# Patient Record
Sex: FEMALE | Race: WHITE | Employment: OTHER | URBAN - NONMETROPOLITAN AREA
[De-identification: names, ages, dates, MRNs, and addresses within clinical notes are randomized per-mention and may not be internally consistent; named-entity substitution may affect disease eponyms.]

---

## 2017-10-06 ENCOUNTER — OFFICE VISIT (OUTPATIENT)
Dept: URGENT CARE | Facility: CLINIC | Age: 82
End: 2017-10-06
Payer: MEDICARE

## 2017-10-06 PROCEDURE — 99213 OFFICE O/P EST LOW 20 MIN: CPT

## 2017-10-06 PROCEDURE — G0463 HOSPITAL OUTPT CLINIC VISIT: HCPCS

## 2017-10-14 NOTE — PROGRESS NOTES
Assessment  1  Corn of toe (700) (L84)   2  Corn infected (700) (L84)    Plan  Corn infected    · Cephalexin 500 MG Oral Capsule; TAKE 1 CAPSULE 3 TIMES DAILY UNTIL GONE    Discussion/Summary  Discussion Summary:   Start antibiotic for infection  Recommend FU with podiatrist    Medication Side Effects Reviewed: Possible side effects of new medications were reviewed with the patient/guardian today  Understands and agrees with treatment plan: The treatment plan was reviewed with the patient/guardian  The patient/guardian understands and agrees with the treatment plan   Follow Up Instructions: Follow Up with your Primary Care Provider in 7 days  If your symptoms worsen, go to the nearest [place] [address] Emergency Department  Chief Complaint  1  Foot Problem  Chief Complaint Free Text Note Form: Lesion between great toe and second toe right foot for 2 weeks      History of Present Illness  HPI: 80year old female here complaining of painful lesion on her 2nd toe  Has been present for a few weeks but has been getting progressively worse over the past 2 weeks  Area is more reddened  Has tried over the counter pads for corns without relief and the pads don't stay in place  Hospital Based Practices Required Assessment:   Pain Assessment   the patient states they have pain  The pain is located in the foot  The patient describes the pain as burning  (on a scale of 0 to 10, the patient rates the pain at 5 )   Abuse And Domestic Violence Screen    Yes, the patient is safe at home  -- The patient states no one is hurting them  Depression And Suicide Screen  No, the patient has not had thoughts of hurting themself  No, the patient has not felt depressed in the past 7 days  Foot Problem: Mehrdad Raymond presents with complaints of foot problem  Associated symptoms include foot pain,-- foot skin lesions-- and-- corns, but-- no foot swelling        Review of Systems  Focused-Female:   Constitutional: No fever, no chills, feels well, no tiredness, no recent weight gain or loss  ENT: no ear ache, no loss of hearing, no nosebleeds or nasal discharge, no sore throat or hoarseness  Cardiovascular: no complaints of slow or fast heart rate, no chest pain, no palpitations, no leg claudication or lower extremity edema  Respiratory: no complaints of shortness of breath, no wheezing, no dyspnea on exertion, no orthopnea or PND  Musculoskeletal: as noted in HPI  Integumentary: as noted in HPI  ROS Reviewed:   ROS reviewed  Active Problems  1  Arthritis (716 90) (M19 90)   2  Benign essential hypertension (401 1) (I10)   3  Hyperlipidemia (272 4) (E78 5)    Past Medical History  Active Problems And Past Medical History Reviewed: The active problems and past medical history were reviewed and updated today  Family History  Family History Reviewed: The family history was reviewed and updated today  Social History   · Never a smoker  Social History Reviewed: The social history was reviewed and updated today  The social history was reviewed and is unchanged  Surgical History  Surgical History Reviewed: The surgical history was reviewed and updated today  Current Meds   1  Anaprox  MG Oral Tablet; Therapy: (Recorded:06Oct2017) to Recorded   2  HydroCHLOROthiazide TABS; Therapy: (Recorded:06Oct2017) to Recorded   3  Lipitor TABS; Therapy: (Recorded:06Oct2017) to Recorded   4  Neurontin TABS; Therapy: (Recorded:06Oct2017) to Recorded  Medication List Reviewed: The medication list was reviewed and updated today  Allergies  1   No Known Drug Allergies    Vitals  Signs   Recorded: [id number] 12:48PM   Temperature: 97 8 F  Heart Rate: 70  Respiration: 18  Systolic: 452  Diastolic: 53  Height: 5 ft 4 in  Weight: 125 lb   BMI Calculated: 21 46  BSA Calculated: 1 6  O2 Saturation: 99    Physical Exam    Constitutional   General appearance: No acute distress, well appearing and well nourished  Skin   Skin and subcutaneous tissue: Abnormal  -- corn on medial aspect of right 2nd toe with surrounding erythema, very tender to palpation, bunion of right great toe        Signatures   Electronically signed by : Malorie Archer, [facility]; Oct  6 2017  2:20PM EST                       (Author)    Electronically signed by : ANTHONY Horner ; Oct 13 2017  1:15PM EST                       (Co-author)

## 2019-03-15 ENCOUNTER — OFFICE VISIT (OUTPATIENT)
Dept: URGENT CARE | Facility: CLINIC | Age: 84
End: 2019-03-15
Payer: MEDICARE

## 2019-03-15 VITALS
SYSTOLIC BLOOD PRESSURE: 128 MMHG | HEART RATE: 72 BPM | BODY MASS INDEX: 23.92 KG/M2 | DIASTOLIC BLOOD PRESSURE: 62 MMHG | OXYGEN SATURATION: 98 % | WEIGHT: 130 LBS | RESPIRATION RATE: 20 BRPM | TEMPERATURE: 98.7 F | HEIGHT: 62 IN

## 2019-03-15 DIAGNOSIS — R19.7 DIARRHEA, UNSPECIFIED TYPE: Primary | ICD-10-CM

## 2019-03-15 PROCEDURE — G0463 HOSPITAL OUTPT CLINIC VISIT: HCPCS | Performed by: PHYSICIAN ASSISTANT

## 2019-03-15 PROCEDURE — 99213 OFFICE O/P EST LOW 20 MIN: CPT | Performed by: PHYSICIAN ASSISTANT

## 2019-03-15 RX ORDER — MELATONIN
1000 DAILY
COMMUNITY

## 2019-03-15 RX ORDER — ATORVASTATIN CALCIUM 80 MG/1
TABLET, FILM COATED ORAL
COMMUNITY

## 2019-03-15 RX ORDER — ENALAPRIL MALEATE 2.5 MG/1
TABLET ORAL
COMMUNITY
Start: 2019-03-15

## 2019-03-15 RX ORDER — LOPERAMIDE HYDROCHLORIDE 2 MG/1
2 TABLET ORAL 4 TIMES DAILY PRN
Qty: 30 TABLET | Refills: 0 | Status: SHIPPED | OUTPATIENT
Start: 2019-03-15

## 2019-03-15 RX ORDER — RIBOFLAVIN (VITAMIN B2) 100 MG
100 TABLET ORAL DAILY
COMMUNITY

## 2019-03-15 RX ORDER — TRAZODONE HYDROCHLORIDE 50 MG/1
150 TABLET ORAL
COMMUNITY

## 2019-03-15 RX ORDER — ESCITALOPRAM OXALATE 20 MG/1
20 TABLET ORAL DAILY
COMMUNITY

## 2019-03-15 NOTE — PATIENT INSTRUCTIONS
Instructions:  1) take loperamide as prescribed  2) keep food journal and bowel journal x 1 week  3) follow up with family doctor in 1 week for further evaluation      Chronic Diarrhea   WHAT YOU NEED TO KNOW:   What is chronic diarrhea? Diarrhea is chronic when it lasts more than 4 weeks  You may have 3 or more episodes of diarrhea each day  What causes chronic diarrhea? · Infections caused by bacteria, viruses, or parasites    · Trouble digesting food, such as lactose, gluten, or sorbitol    · Irritable bowel syndrome, celiac disease, ulcerative colitis, or Crohns disease    · A lack of enzymes that help digest foods or an overgrowth of bacteria in your small intestines    · Chronic pancreatitis, cystic fibrosis, or a tumor in your digestive system    · A thyroid disorder, such as hyperthyroidism    · An immune system disorder, such as AIDS    · Medicines such as laxatives, NSAIDs, antibiotics, metformin, or digoxin    · Surgery or procedures on your stomach, liver, or bowels  What signs and symptoms may happen with chronic diarrhea? · Abdominal tenderness     · Nausea and vomiting    · Urgent need to have a bowel movement, or loss of bowel control     · Weight loss    · Anal irritation and inflammation  How is chronic diarrhea diagnosed? Your healthcare provider will examine you and ask you about your symptoms  Tell him or her if you have noticed symptoms after you eat certain foods  Tell your healthcare provider if you have travelled recently or been around others with the same symptoms  You may need the following tests:  · Blood tests  may show infection  They will also give information about your overall health  · A bowel movement sample  may be sent to a lab to help find the germ that is causing your diarrhea  · An x-ray  may be taken to check for problems in your digestive tract, such as a tumor       · An endoscopy  is a procedure to examine the inside of your esophagus, stomach, and small intestine  A flexible tube with a small light and camera on the end is used  Samples of your esophagus, stomach, or small intestine may be taken and tested for causes of diarrhea  · A colonoscopy is a procedure to examine the inside of your colon (large intestine)  A flexible tube with a small light and camera on the end is used  Samples of your colon may be taken and tested for causes of diarrhea  How is chronic diarrhea treated? Treatment will depend on the condition causing your chronic diarrhea  Medicines may be given to treat an infection or stop the diarrhea  Medicines that are causing diarrhea may be stopped or changed  You may need to change your diet and avoid certain foods  What can I do to manage my symptoms? · Drink more liquids  to replace body fluids lost through diarrhea  You may also need to drink an oral rehydration solution (ORS)  An ORS has the right amounts of sugar, salt, and minerals in water to replace body fluids  ORS can be found at most grocery stores or pharmacies  Ask how much liquid to drink each day and which liquids are best for you  Do not drink liquids with caffeine or alcohol  These can increase your risk for dehydration  · Do not drink or eat foods that may make your symptoms worse  These include milk and dairy products, greasy and fatty foods, spicy foods, caffeine, and alcohol  Keep a food diary to see if your symptoms are caused by certain foods  Bring this to your follow-up visits  · Eat foods that are easy to digest   These include bananas, boiled potatoes, cooked carrots, cooked chicken, plain rice, and toast  You can also try yogurt and applesauce  · Wash your hands often  Germs on your hands can get into your mouth and cause diarrhea  Use soap and water  Use an alcohol-based hand rub if soap and water are not available  Wash your hands after you use the bathroom, change a child's diaper, or sneeze  Wash your hands before you prepare or eat food    When should I seek immediate care? · Your skin, mouth, and tongue are dry, and you feel very thirsty  · You have blood or pus in your bowel movement  · You have trouble eating, drinking, or keeping food down  · You have severe abdominal pain  · You feel lightheaded, weak, or you faint  · Your heart beats faster than normal or you have trouble breathing  · You are confused or cannot think clearly  When should I contact my healthcare provider? · You have a fever  · You have new symptoms  · Your symptoms do not improve, or they get worse  · You have questions or concerns about your condition or care  CARE AGREEMENT:   You have the right to help plan your care  Learn about your health condition and how it may be treated  Discuss treatment options with your caregivers to decide what care you want to receive  You always have the right to refuse treatment  The above information is an  only  It is not intended as medical advice for individual conditions or treatments  Talk to your doctor, nurse or pharmacist before following any medical regimen to see if it is safe and effective for you  © 2017 [facility] Information is for End User's use only and may not be sold, redistributed or otherwise used for commercial purposes  All illustrations and images included in CareNotes® are the copyrighted property of [employer]  or [employer]

## 2019-03-15 NOTE — PROGRESS NOTES
[facility]      NAME: Sumner Councilman is a 80 y o  female  : 1934    MRN: [id number]  DATE: March 15, 2019  TIME: 3:27 PM    Assessment and Plan   Diarrhea, unspecified type [R19 7]  1  Diarrhea, unspecified type         Patient Instructions     Patient Instructions   Instructions:  1) take loperamide as prescribed  2) keep food journal and bowel journal x 1 week  3) follow up with family doctor in 1 week for further evaluation      Chronic Diarrhea   WHAT YOU NEED TO KNOW:   What is chronic diarrhea? Diarrhea is chronic when it lasts more than 4 weeks  You may have 3 or more episodes of diarrhea each day  What causes chronic diarrhea? · Infections caused by bacteria, viruses, or parasites    · Trouble digesting food, such as lactose, gluten, or sorbitol    · Irritable bowel syndrome, celiac disease, ulcerative colitis, or Crohns disease    · A lack of enzymes that help digest foods or an overgrowth of bacteria in your small intestines    · Chronic pancreatitis, cystic fibrosis, or a tumor in your digestive system    · A thyroid disorder, such as hyperthyroidism    · An immune system disorder, such as AIDS    · Medicines such as laxatives, NSAIDs, antibiotics, metformin, or digoxin    · Surgery or procedures on your stomach, liver, or bowels  What signs and symptoms may happen with chronic diarrhea? · Abdominal tenderness     · Nausea and vomiting    · Urgent need to have a bowel movement, or loss of bowel control     · Weight loss    · Anal irritation and inflammation  How is chronic diarrhea diagnosed? Your healthcare provider will examine you and ask you about your symptoms  Tell him or her if you have noticed symptoms after you eat certain foods  Tell your healthcare provider if you have travelled recently or been around others with the same symptoms  You may need the following tests:  · Blood tests  may show infection  They will also give information about your overall health      · A bowel movement sample  may be sent to a lab to help find the germ that is causing your diarrhea  · An x-ray  may be taken to check for problems in your digestive tract, such as a tumor  · An endoscopy  is a procedure to examine the inside of your esophagus, stomach, and small intestine  A flexible tube with a small light and camera on the end is used  Samples of your esophagus, stomach, or small intestine may be taken and tested for causes of diarrhea  · A colonoscopy is a procedure to examine the inside of your colon (large intestine)  A flexible tube with a small light and camera on the end is used  Samples of your colon may be taken and tested for causes of diarrhea  How is chronic diarrhea treated? Treatment will depend on the condition causing your chronic diarrhea  Medicines may be given to treat an infection or stop the diarrhea  Medicines that are causing diarrhea may be stopped or changed  You may need to change your diet and avoid certain foods  What can I do to manage my symptoms? · Drink more liquids  to replace body fluids lost through diarrhea  You may also need to drink an oral rehydration solution (ORS)  An ORS has the right amounts of sugar, salt, and minerals in water to replace body fluids  ORS can be found at most grocery stores or pharmacies  Ask how much liquid to drink each day and which liquids are best for you  Do not drink liquids with caffeine or alcohol  These can increase your risk for dehydration  · Do not drink or eat foods that may make your symptoms worse  These include milk and dairy products, greasy and fatty foods, spicy foods, caffeine, and alcohol  Keep a food diary to see if your symptoms are caused by certain foods  Bring this to your follow-up visits  · Eat foods that are easy to digest   These include bananas, boiled potatoes, cooked carrots, cooked chicken, plain rice, and toast  You can also try yogurt and applesauce  · Wash your hands often  Germs on your hands can get into your mouth and cause diarrhea  Use soap and water  Use an alcohol-based hand rub if soap and water are not available  Wash your hands after you use the bathroom, change a child's diaper, or sneeze  Wash your hands before you prepare or eat food  When should I seek immediate care? · Your skin, mouth, and tongue are dry, and you feel very thirsty  · You have blood or pus in your bowel movement  · You have trouble eating, drinking, or keeping food down  · You have severe abdominal pain  · You feel lightheaded, weak, or you faint  · Your heart beats faster than normal or you have trouble breathing  · You are confused or cannot think clearly  When should I contact my healthcare provider? · You have a fever  · You have new symptoms  · Your symptoms do not improve, or they get worse  · You have questions or concerns about your condition or care  CARE AGREEMENT:   You have the right to help plan your care  Learn about your health condition and how it may be treated  Discuss treatment options with your caregivers to decide what care you want to receive  You always have the right to refuse treatment  The above information is an  only  It is not intended as medical advice for individual conditions or treatments  Talk to your doctor, nurse or pharmacist before following any medical regimen to see if it is safe and effective for you  © 2017 [address] Information is for End User's use only and may not be sold, redistributed or otherwise used for commercial purposes  All illustrations and images included in CareNotes® are the copyrighted property of [employer]  or [employer]  Chief Complaint     Chief Complaint   Patient presents with    Diarrhea     Diarrhea for the past few months  Decreased appitite          History of Present Illness       Diarrhea    This is a new problem  Episode onset: 1 year ago  Episode frequency: once a day  The problem has been gradually worsening  The stool consistency is described as watery (brown)  The patient states that diarrhea does not awaken her from sleep  Associated symptoms include increased flatus and weight loss (10lbs)  Pertinent negatives include no abdominal pain, arthralgias, bloating, chills, coughing, fever, headaches, myalgias, sweats, URI or vomiting  Associated symptoms comments: Abdominal "rumbling", flatus  Exacerbated by: eating  There are no known risk factors  Treatments tried: benefiber and probiotic; patient refuses to try medication because she says it is expensive  There is no history of bowel resection, inflammatory bowel disease, irritable bowel syndrome, malabsorption, a recent abdominal surgery or short gut syndrome  hysterectomy, gall bladder, colonoscopy 2 years ago; patient is concerned she is being poisoned by dog's raw dog food that  feeds dog       Review of Systems   Review of Systems   Constitutional: Positive for weight loss (10lbs)  Negative for chills and fever  Respiratory: Negative for cough  Gastrointestinal: Positive for diarrhea and flatus  Negative for abdominal distention, abdominal pain, anal bleeding, bloating, blood in stool, constipation, nausea, rectal pain and vomiting  Musculoskeletal: Negative for arthralgias and myalgias  Neurological: Negative for headaches       Current Medications       Current Outpatient Medications:     Ascorbic Acid (VITAMIN C) 100 MG tablet, Take 100 mg by mouth daily, Disp: , Rfl:     atorvastatin (LIPITOR) 80 mg tablet, Take by mouth, Disp: , Rfl:     calcium acetate (PHOSLO) 667 mg capsule, Take 1,334 mg by mouth 3 (three) times a day with meals, Disp: , Rfl:     cholecalciferol (VITAMIN D3) 1,000 units tablet, Take 1,000 Units by mouth daily, Disp: , Rfl:     enalapril (VASOTEC) 2 5 mg tablet, , Disp: , Rfl:     escitalopram (LEXAPRO) 20 mg tablet, Take 20 mg by mouth daily, Disp: , Rfl:     traZODone (DESYREL) 50 mg tablet, Take 150 mg by mouth daily at bedtime, Disp: , Rfl:     Current Allergies     Allergies as of 03/15/2019    (No Known Allergies)            The following portions of the patient's history were reviewed and updated as appropriate: allergies, current medications, past family history, past medical history, past social history, past surgical history and problem list      Past Medical History:   Diagnosis Date    Arthritis     Glaucoma     Hypertension     Kidney disease        Past Surgical History:   Procedure Laterality Date    CHOLECYSTECTOMY      HYSTERECTOMY      KNEE ARTHROPLASTY Left     THYROID LOBECTOMY         History reviewed  No pertinent family history  Medications have been verified  Objective   /62   Pulse 72   Temp 98 7 °F (37 1 °C) (Tympanic)   Resp 20   Ht 5' 2" (1 575 m)   Wt 59 kg (130 lb)   SpO2 98%   BMI 23 78 kg/m²        Physical Exam     Physical Exam   Constitutional: She appears well-developed and well-nourished  Cardiovascular: Normal rate and regular rhythm  Exam reveals no gallop and no friction rub  No murmur heard  Pulmonary/Chest: Effort normal and breath sounds normal  No stridor  No respiratory distress  She has no wheezes  She has no rales  Abdominal: Soft  Bowel sounds are normal  She exhibits no distension and no mass  There is no tenderness  There is no rebound and no guarding  No hernia

## 2021-02-12 ENCOUNTER — IMMUNIZATIONS (OUTPATIENT)
Dept: FAMILY MEDICINE CLINIC | Facility: HOSPITAL | Age: 86
End: 2021-02-12

## 2021-02-12 DIAGNOSIS — Z23 ENCOUNTER FOR IMMUNIZATION: Primary | ICD-10-CM

## 2021-02-12 PROCEDURE — 0011A SARS-COV-2 / COVID-19 MRNA VACCINE (MODERNA) 100 MCG: CPT | Performed by: EMERGENCY MEDICINE

## 2021-02-12 PROCEDURE — 91301 SARS-COV-2 / COVID-19 MRNA VACCINE (MODERNA) 100 MCG: CPT | Performed by: EMERGENCY MEDICINE

## 2021-03-12 ENCOUNTER — IMMUNIZATIONS (OUTPATIENT)
Dept: FAMILY MEDICINE CLINIC | Facility: HOSPITAL | Age: 86
End: 2021-03-12

## 2021-03-12 DIAGNOSIS — Z23 ENCOUNTER FOR IMMUNIZATION: Primary | ICD-10-CM

## 2021-03-12 PROCEDURE — 91301 SARS-COV-2 / COVID-19 MRNA VACCINE (MODERNA) 100 MCG: CPT

## 2021-03-12 PROCEDURE — 0012A SARS-COV-2 / COVID-19 MRNA VACCINE (MODERNA) 100 MCG: CPT

## 2021-06-23 ENCOUNTER — APPOINTMENT (EMERGENCY)
Dept: RADIOLOGY | Facility: HOSPITAL | Age: 86
End: 2021-06-23
Payer: MEDICARE

## 2021-06-23 ENCOUNTER — HOSPITAL ENCOUNTER (EMERGENCY)
Facility: HOSPITAL | Age: 86
Discharge: HOME | End: 2021-06-23
Attending: EMERGENCY MEDICINE | Admitting: EMERGENCY MEDICINE
Payer: MEDICARE

## 2021-06-23 VITALS
HEART RATE: 78 BPM | BODY MASS INDEX: 20.31 KG/M2 | HEIGHT: 65 IN | RESPIRATION RATE: 20 BRPM | OXYGEN SATURATION: 97 % | WEIGHT: 121.91 LBS | DIASTOLIC BLOOD PRESSURE: 85 MMHG | TEMPERATURE: 97.8 F | SYSTOLIC BLOOD PRESSURE: 185 MMHG

## 2021-06-23 DIAGNOSIS — S93.601A SPRAIN OF RIGHT FOOT, INITIAL ENCOUNTER: Primary | ICD-10-CM

## 2021-06-23 DIAGNOSIS — M77.50 TENDONITIS OF FOOT: ICD-10-CM

## 2021-06-23 PROCEDURE — 73610 X-RAY EXAM OF ANKLE: CPT

## 2021-06-23 PROCEDURE — 73630 X-RAY EXAM OF FOOT: CPT

## 2021-06-23 PROCEDURE — 99283 EMERGENCY DEPT VISIT LOW MDM: CPT | Performed by: EMERGENCY MEDICINE

## 2021-06-23 PROCEDURE — 99283 EMERGENCY DEPT VISIT LOW MDM: CPT

## 2021-06-23 NOTE — ED PROVIDER NOTES
History  Chief Complaint   Patient presents with    Ankle Pain     pt c/o right medial ankle pain, denies injury, able to bear weight with some pain, + pedal pulse, no deformity , redness or swelling noted     One week of right lateral foot and ankle pain  Worse with walking  No pain at rest   Patient does not recall any injury or twisting  No other complaints  History provided by:  Patient   used: No    Ankle Pain  Location:  Ankle and foot  Time since incident:  1 week  Injury: no    Ankle location:  R ankle  Foot location:  R foot  Pain details:     Quality:  Aching    Radiates to:  Does not radiate    Severity:  Mild    Onset quality:  Gradual    Duration:  1 week    Timing:  Intermittent    Progression:  Unchanged  Chronicity:  New  Dislocation: no    Prior injury to area:  No  Relieved by:  Nothing  Exacerbated by: Walking, palpation  Ineffective treatments:  None tried  Associated symptoms: no decreased ROM, no fever, no muscle weakness, no neck pain, no numbness, no stiffness, no swelling and no tingling        Prior to Admission Medications   Prescriptions Last Dose Informant Patient Reported? Taking?    Ascorbic Acid (VITAMIN C) 100 MG tablet   Yes No   Sig: Take 100 mg by mouth daily   atorvastatin (LIPITOR) 80 mg tablet   Yes No   Sig: Take by mouth   calcium acetate (PHOSLO) 667 mg capsule   Yes No   Sig: Take 1,334 mg by mouth 3 (three) times a day with meals   cholecalciferol (VITAMIN D3) 1,000 units tablet   Yes No   Sig: Take 1,000 Units by mouth daily   enalapril (VASOTEC) 2 5 mg tablet   Yes No   escitalopram (LEXAPRO) 20 mg tablet   Yes No   Sig: Take 20 mg by mouth daily   loperamide (IMODIUM A-D) 2 MG tablet   No No   Sig: Take 1 tablet (2 mg total) by mouth 4 (four) times a day as needed for diarrhea   traZODone (DESYREL) 50 mg tablet   Yes No   Sig: Take 150 mg by mouth daily at bedtime      Facility-Administered Medications: None       Past Medical History: Diagnosis Date    Arthritis     Glaucoma     Hypertension     Kidney disease        Past Surgical History:   Procedure Laterality Date    CHOLECYSTECTOMY      HYSTERECTOMY      KNEE ARTHROPLASTY Left     THYROID LOBECTOMY         History reviewed  No pertinent family history  I have reviewed and agree with the history as documented  E-Cigarette/Vaping    E-Cigarette Use Never User      E-Cigarette/Vaping Substances    Nicotine No     THC No     CBD No     Flavoring No      Social History     Tobacco Use    Smoking status: Former Smoker    Smokeless tobacco: Never Used    Tobacco comment: quit over 50 years ago   Vaping Use    Vaping Use: Never used   Substance Use Topics    Alcohol use: Not Currently    Drug use: Never       Review of Systems   Constitutional: Negative for chills and fever  HENT: Negative for ear pain, hearing loss, sore throat, trouble swallowing and voice change  Eyes: Negative for pain and discharge  Respiratory: Negative for cough, shortness of breath and wheezing  Cardiovascular: Negative for chest pain and palpitations  Gastrointestinal: Negative for abdominal pain, blood in stool, constipation, diarrhea, nausea and vomiting  Genitourinary: Negative for dysuria, flank pain, frequency and hematuria  Musculoskeletal: Negative for joint swelling, neck pain, neck stiffness and stiffness  Skin: Negative for rash and wound  Neurological: Negative for dizziness, seizures, syncope, facial asymmetry and headaches  Psychiatric/Behavioral: Negative for hallucinations, self-injury and suicidal ideas  All other systems reviewed and are negative  Physical Exam  Physical Exam  Vitals and nursing note reviewed  Constitutional:       General: She is not in acute distress  Appearance: She is well-developed  She is not ill-appearing or diaphoretic  HENT:      Head: Normocephalic and atraumatic        Right Ear: External ear normal       Left Ear: External ear normal    Eyes:      General: No scleral icterus  Right eye: No discharge  Left eye: No discharge  Extraocular Movements: Extraocular movements intact  Conjunctiva/sclera: Conjunctivae normal    Pulmonary:      Effort: Pulmonary effort is normal  No respiratory distress  Musculoskeletal:         General: No deformity or signs of injury  Normal range of motion  Cervical back: Normal range of motion and neck supple  Comments: No deformity of the right foot or right ankle  No bony tenderness  There is tenderness in the soft tissues on the lateral side  Consistent with sprain versus tendonitis  Distal neurovascular function is normal    Skin:     General: Skin is warm and dry  Coloration: Skin is not jaundiced or pale  Neurological:      General: No focal deficit present  Mental Status: She is alert and oriented to person, place, and time  Cranial Nerves: No cranial nerve deficit  Coordination: Coordination normal       Gait: Gait normal    Psychiatric:         Mood and Affect: Mood normal          Behavior: Behavior normal          Thought Content: Thought content normal          Judgment: Judgment normal          Vital Signs  ED Triage Vitals [06/23/21 1316]   Temperature Pulse Respirations Blood Pressure SpO2   97 8 °F (36 6 °C) 78 20 (!) 185/85 97 %      Temp Source Heart Rate Source Patient Position - Orthostatic VS BP Location FiO2 (%)   Temporal Monitor Sitting Right arm --      Pain Score       4           Vitals:    06/23/21 1316   BP: (!) 185/85   Pulse: 78   Patient Position - Orthostatic VS: Sitting         Visual Acuity      ED Medications  Medications - No data to display    Diagnostic Studies  Results Reviewed     None                 XR ankle 3+ views RIGHT   ED Interpretation by Riana Vides MD (06/23 1402)   Arthritic changes    No fractures      XR foot 3+ views RIGHT   ED Interpretation by Riana Vides MD (06/23 1402)   Arthritic changes  No fractures                 Procedures  Procedures         ED Course                                           MDM  Number of Diagnoses or Management Options     Amount and/or Complexity of Data Reviewed  Tests in the radiology section of CPT®: ordered and reviewed        Disposition  Final diagnoses:   Sprain of right foot, initial encounter   Tendonitis of foot     Time reflects when diagnosis was documented in both MDM as applicable and the Disposition within this note     Time User Action Codes Description Comment    6/23/2021  1:27 PM Evelia Payer Add [S93 601A] Sprain of right foot, initial encounter     6/23/2021  2:03 PM Evelia Ramirezer Add [M77 50] Tendonitis of foot       ED Disposition     ED Disposition Condition Date/Time Comment    Discharge Stable Wed Jun 23, 2021  2:02 PM Jennifer Martinez discharge to home/self care  Follow-up Information     Follow up With Specialties Details Why Contact Info    Nava Dutton DPM Podiatry, Wound Care  As needed [address] [facility],[address]  [phone]      David Gilmore DPM Podiatry  As needed [address] [facility] [address]  [address] [facility] Orthopedic Surgery  As needed [address]  [address] [facility]      Patrick Gage MD Orthopedic Surgery  As needed [address]   [address] [facility] Headings, DO Internal Medicine  As needed [address]  [address]  [id number] [phone]            Patient's Medications   Discharge Prescriptions    No medications on file     No discharge procedures on file      PDMP Review     None          ED Provider  Electronically Signed by           Tracie Restrepo MD  06/23/21 5634

## 2023-01-01 ENCOUNTER — APPOINTMENT (INPATIENT)
Dept: RADIOLOGY | Facility: HOSPITAL | Age: 88
End: 2023-01-01

## 2023-01-01 ENCOUNTER — APPOINTMENT (INPATIENT)
Dept: NON INVASIVE DIAGNOSTICS | Facility: HOSPITAL | Age: 88
End: 2023-01-01

## 2023-01-01 ENCOUNTER — HOME CARE VISIT (OUTPATIENT)
Dept: HOME HEALTH SERVICES | Facility: HOME HEALTHCARE | Age: 88
End: 2023-01-01

## 2023-01-01 ENCOUNTER — HOSPITAL ENCOUNTER (INPATIENT)
Facility: HOSPITAL | Age: 88
LOS: 18 days | End: 2023-02-28
Attending: EMERGENCY MEDICINE | Admitting: FAMILY MEDICINE

## 2023-01-01 ENCOUNTER — APPOINTMENT (INPATIENT)
Dept: CT IMAGING | Facility: HOSPITAL | Age: 88
End: 2023-01-01

## 2023-01-01 ENCOUNTER — APPOINTMENT (EMERGENCY)
Dept: RADIOLOGY | Facility: HOSPITAL | Age: 88
End: 2023-01-01

## 2023-01-01 ENCOUNTER — APPOINTMENT (EMERGENCY)
Dept: CT IMAGING | Facility: HOSPITAL | Age: 88
End: 2023-01-01

## 2023-01-01 ENCOUNTER — HOME CARE VISIT (OUTPATIENT)
Dept: HOME HOSPICE | Facility: HOSPICE | Age: 88
End: 2023-01-01

## 2023-01-01 ENCOUNTER — APPOINTMENT (INPATIENT)
Dept: INTERVENTIONAL RADIOLOGY/VASCULAR | Facility: HOSPITAL | Age: 88
End: 2023-01-01
Attending: FAMILY MEDICINE

## 2023-01-01 VITALS
RESPIRATION RATE: 15 BRPM | TEMPERATURE: 97.5 F | OXYGEN SATURATION: 98 % | HEART RATE: 63 BPM | DIASTOLIC BLOOD PRESSURE: 58 MMHG | HEIGHT: 62 IN | BODY MASS INDEX: 27.42 KG/M2 | SYSTOLIC BLOOD PRESSURE: 108 MMHG | WEIGHT: 149 LBS

## 2023-01-01 DIAGNOSIS — R78.81 BACTEREMIA: ICD-10-CM

## 2023-01-01 DIAGNOSIS — I48.91 ATRIAL FIBRILLATION WITH RVR (HCC): ICD-10-CM

## 2023-01-01 DIAGNOSIS — N39.0 UTI (URINARY TRACT INFECTION): ICD-10-CM

## 2023-01-01 DIAGNOSIS — E83.42 HYPOMAGNESEMIA: ICD-10-CM

## 2023-01-01 DIAGNOSIS — K57.00 DIVERTICULITIS OF SMALL INTESTINE WITH PERFORATION AND ABSCESS WITHOUT BLEEDING: ICD-10-CM

## 2023-01-01 DIAGNOSIS — K52.9 ENTEROCOLITIS: ICD-10-CM

## 2023-01-01 DIAGNOSIS — I50.9 ACUTE CHF (CONGESTIVE HEART FAILURE) (HCC): Primary | ICD-10-CM

## 2023-01-01 DIAGNOSIS — K56.609 SMALL BOWEL OBSTRUCTION (HCC): ICD-10-CM

## 2023-01-01 LAB
ACANTHOCYTES BLD QL SMEAR: PRESENT
ALBUMIN SERPL BCP-MCNC: 2.4 G/DL (ref 3.5–5)
ALBUMIN SERPL BCP-MCNC: 2.5 G/DL (ref 3.5–5)
ALBUMIN SERPL BCP-MCNC: 2.7 G/DL (ref 3.5–5)
ALBUMIN SERPL BCP-MCNC: 2.9 G/DL (ref 3.5–5)
ALBUMIN SERPL BCP-MCNC: 2.9 G/DL (ref 3.5–5)
ALBUMIN SERPL BCP-MCNC: 3 G/DL (ref 3.5–5)
ALBUMIN SERPL BCP-MCNC: 3 G/DL (ref 3.5–5)
ALBUMIN SERPL BCP-MCNC: 3.5 G/DL (ref 3.5–5)
ALL TARGETS: NOT DETECTED
ALP SERPL-CCNC: 36 U/L (ref 34–104)
ALP SERPL-CCNC: 37 U/L (ref 34–104)
ALP SERPL-CCNC: 37 U/L (ref 34–104)
ALP SERPL-CCNC: 38 U/L (ref 34–104)
ALP SERPL-CCNC: 39 U/L (ref 34–104)
ALP SERPL-CCNC: 42 U/L (ref 34–104)
ALP SERPL-CCNC: 43 U/L (ref 34–104)
ALP SERPL-CCNC: 46 U/L (ref 34–104)
ALT SERPL W P-5'-P-CCNC: 10 U/L (ref 7–52)
ALT SERPL W P-5'-P-CCNC: 19 U/L (ref 7–52)
ALT SERPL W P-5'-P-CCNC: 5 U/L (ref 7–52)
ALT SERPL W P-5'-P-CCNC: 6 U/L (ref 7–52)
ALT SERPL W P-5'-P-CCNC: 7 U/L (ref 7–52)
ALT SERPL W P-5'-P-CCNC: 9 U/L (ref 7–52)
ANION GAP SERPL CALCULATED.3IONS-SCNC: 2 MMOL/L (ref 4–13)
ANION GAP SERPL CALCULATED.3IONS-SCNC: 3 MMOL/L (ref 4–13)
ANION GAP SERPL CALCULATED.3IONS-SCNC: 3 MMOL/L (ref 4–13)
ANION GAP SERPL CALCULATED.3IONS-SCNC: 4 MMOL/L (ref 4–13)
ANION GAP SERPL CALCULATED.3IONS-SCNC: 5 MMOL/L (ref 4–13)
ANION GAP SERPL CALCULATED.3IONS-SCNC: 6 MMOL/L (ref 4–13)
ANION GAP SERPL CALCULATED.3IONS-SCNC: 8 MMOL/L (ref 4–13)
ANISOCYTOSIS BLD QL SMEAR: PRESENT
AORTIC ROOT: 3.3 CM
APICAL FOUR CHAMBER EJECTION FRACTION: 19 %
APPEARANCE FLD: CLEAR
APTT PPP: 104 SECONDS (ref 23–37)
APTT PPP: 109 SECONDS (ref 23–37)
APTT PPP: 110 SECONDS (ref 23–37)
APTT PPP: 132 SECONDS (ref 23–37)
APTT PPP: 142 SECONDS (ref 23–37)
APTT PPP: 176 SECONDS (ref 23–37)
APTT PPP: 199 SECONDS (ref 23–37)
APTT PPP: 34 SECONDS (ref 23–37)
APTT PPP: 41 SECONDS (ref 23–37)
APTT PPP: 46 SECONDS (ref 23–37)
APTT PPP: 52 SECONDS (ref 23–37)
APTT PPP: 52 SECONDS (ref 23–37)
APTT PPP: 56 SECONDS (ref 23–37)
APTT PPP: 59 SECONDS (ref 23–37)
APTT PPP: 66 SECONDS (ref 23–37)
APTT PPP: 70 SECONDS (ref 23–37)
APTT PPP: 99 SECONDS (ref 23–37)
AST SERPL W P-5'-P-CCNC: 11 U/L (ref 13–39)
AST SERPL W P-5'-P-CCNC: 13 U/L (ref 13–39)
AST SERPL W P-5'-P-CCNC: 14 U/L (ref 13–39)
AST SERPL W P-5'-P-CCNC: 17 U/L (ref 13–39)
AST SERPL W P-5'-P-CCNC: 19 U/L (ref 13–39)
AST SERPL W P-5'-P-CCNC: 22 U/L (ref 13–39)
AST SERPL W P-5'-P-CCNC: 24 U/L (ref 13–39)
AST SERPL W P-5'-P-CCNC: 51 U/L (ref 13–39)
BACTERIA BLD CULT: ABNORMAL
BACTERIA BLD CULT: NORMAL
BACTERIA SPEC BFLD CULT: NO GROWTH
BACTERIA UR QL AUTO: ABNORMAL /HPF
BASOPHILS # BLD AUTO: 0.01 THOUSANDS/ÂΜL (ref 0–0.1)
BASOPHILS # BLD AUTO: 0.02 THOUSANDS/ÂΜL (ref 0–0.1)
BASOPHILS # BLD AUTO: 0.02 THOUSANDS/ÂΜL (ref 0–0.1)
BASOPHILS # BLD AUTO: 0.03 THOUSANDS/ÂΜL (ref 0–0.1)
BASOPHILS # BLD AUTO: 0.03 THOUSANDS/ÂΜL (ref 0–0.1)
BASOPHILS # BLD MANUAL: 0 THOUSAND/UL (ref 0–0.1)
BASOPHILS NFR BLD AUTO: 0 % (ref 0–1)
BASOPHILS NFR MAR MANUAL: 0 % (ref 0–1)
BILIRUB SERPL-MCNC: 0.36 MG/DL (ref 0.2–1)
BILIRUB SERPL-MCNC: 0.36 MG/DL (ref 0.2–1)
BILIRUB SERPL-MCNC: 0.37 MG/DL (ref 0.2–1)
BILIRUB SERPL-MCNC: 0.49 MG/DL (ref 0.2–1)
BILIRUB SERPL-MCNC: 0.49 MG/DL (ref 0.2–1)
BILIRUB SERPL-MCNC: 0.51 MG/DL (ref 0.2–1)
BILIRUB SERPL-MCNC: 1.04 MG/DL (ref 0.2–1)
BILIRUB SERPL-MCNC: 1.49 MG/DL (ref 0.2–1)
BILIRUB UR QL STRIP: NEGATIVE
BNP SERPL-MCNC: 396 PG/ML (ref 0–100)
BUN SERPL-MCNC: 13 MG/DL (ref 5–25)
BUN SERPL-MCNC: 15 MG/DL (ref 5–25)
BUN SERPL-MCNC: 17 MG/DL (ref 5–25)
BUN SERPL-MCNC: 17 MG/DL (ref 5–25)
BUN SERPL-MCNC: 19 MG/DL (ref 5–25)
BUN SERPL-MCNC: 20 MG/DL (ref 5–25)
BUN SERPL-MCNC: 22 MG/DL (ref 5–25)
BUN SERPL-MCNC: 22 MG/DL (ref 5–25)
BUN SERPL-MCNC: 23 MG/DL (ref 5–25)
BUN SERPL-MCNC: 24 MG/DL (ref 5–25)
BUN SERPL-MCNC: 26 MG/DL (ref 5–25)
BUN SERPL-MCNC: 28 MG/DL (ref 5–25)
BUN SERPL-MCNC: 28 MG/DL (ref 5–25)
BUN SERPL-MCNC: 29 MG/DL (ref 5–25)
BUN SERPL-MCNC: 30 MG/DL (ref 5–25)
BUN SERPL-MCNC: 30 MG/DL (ref 5–25)
BUN SERPL-MCNC: 32 MG/DL (ref 5–25)
BUN SERPL-MCNC: 33 MG/DL (ref 5–25)
BURR CELLS BLD QL SMEAR: PRESENT
C DIFF TOX GENS STL QL NAA+PROBE: NEGATIVE
C DIFF TOX GENS STL QL NAA+PROBE: NEGATIVE
CALCIUM ALBUM COR SERPL-MCNC: 7.8 MG/DL (ref 8.3–10.1)
CALCIUM ALBUM COR SERPL-MCNC: 7.9 MG/DL (ref 8.3–10.1)
CALCIUM ALBUM COR SERPL-MCNC: 8.1 MG/DL (ref 8.3–10.1)
CALCIUM ALBUM COR SERPL-MCNC: 8.4 MG/DL (ref 8.3–10.1)
CALCIUM ALBUM COR SERPL-MCNC: 8.8 MG/DL (ref 8.3–10.1)
CALCIUM SERPL-MCNC: 6.7 MG/DL (ref 8.4–10.2)
CALCIUM SERPL-MCNC: 6.8 MG/DL (ref 8.4–10.2)
CALCIUM SERPL-MCNC: 6.8 MG/DL (ref 8.4–10.2)
CALCIUM SERPL-MCNC: 6.9 MG/DL (ref 8.4–10.2)
CALCIUM SERPL-MCNC: 6.9 MG/DL (ref 8.4–10.2)
CALCIUM SERPL-MCNC: 7 MG/DL (ref 8.4–10.2)
CALCIUM SERPL-MCNC: 7.1 MG/DL (ref 8.4–10.2)
CALCIUM SERPL-MCNC: 7.1 MG/DL (ref 8.4–10.2)
CALCIUM SERPL-MCNC: 7.2 MG/DL (ref 8.4–10.2)
CALCIUM SERPL-MCNC: 7.7 MG/DL (ref 8.4–10.2)
CALCIUM SERPL-MCNC: 8 MG/DL (ref 8.4–10.2)
CALCIUM SERPL-MCNC: 8.1 MG/DL (ref 8.4–10.2)
CAMPYLOBACTER DNA SPEC NAA+PROBE: NORMAL
CARDIAC TROPONIN I PNL SERPL HS: 15 NG/L
CHLORIDE SERPL-SCNC: 100 MMOL/L (ref 96–108)
CHLORIDE SERPL-SCNC: 100 MMOL/L (ref 96–108)
CHLORIDE SERPL-SCNC: 102 MMOL/L (ref 96–108)
CHLORIDE SERPL-SCNC: 103 MMOL/L (ref 96–108)
CHLORIDE SERPL-SCNC: 104 MMOL/L (ref 96–108)
CHLORIDE SERPL-SCNC: 105 MMOL/L (ref 96–108)
CHLORIDE SERPL-SCNC: 107 MMOL/L (ref 96–108)
CHLORIDE SERPL-SCNC: 97 MMOL/L (ref 96–108)
CHLORIDE SERPL-SCNC: 99 MMOL/L (ref 96–108)
CHLORIDE SERPL-SCNC: 99 MMOL/L (ref 96–108)
CLARITY UR: CLEAR
CO2 SERPL-SCNC: 23 MMOL/L (ref 21–32)
CO2 SERPL-SCNC: 24 MMOL/L (ref 21–32)
CO2 SERPL-SCNC: 26 MMOL/L (ref 21–32)
CO2 SERPL-SCNC: 26 MMOL/L (ref 21–32)
CO2 SERPL-SCNC: 27 MMOL/L (ref 21–32)
CO2 SERPL-SCNC: 27 MMOL/L (ref 21–32)
CO2 SERPL-SCNC: 28 MMOL/L (ref 21–32)
CO2 SERPL-SCNC: 29 MMOL/L (ref 21–32)
CO2 SERPL-SCNC: 30 MMOL/L (ref 21–32)
CO2 SERPL-SCNC: 31 MMOL/L (ref 21–32)
CO2 SERPL-SCNC: 32 MMOL/L (ref 21–32)
COLOR FLD: YELLOW
COLOR UR: YELLOW
CREAT SERPL-MCNC: 0.76 MG/DL (ref 0.6–1.3)
CREAT SERPL-MCNC: 0.78 MG/DL (ref 0.6–1.3)
CREAT SERPL-MCNC: 0.79 MG/DL (ref 0.6–1.3)
CREAT SERPL-MCNC: 0.79 MG/DL (ref 0.6–1.3)
CREAT SERPL-MCNC: 0.85 MG/DL (ref 0.6–1.3)
CREAT SERPL-MCNC: 0.87 MG/DL (ref 0.6–1.3)
CREAT SERPL-MCNC: 0.88 MG/DL (ref 0.6–1.3)
CREAT SERPL-MCNC: 0.92 MG/DL (ref 0.6–1.3)
CREAT SERPL-MCNC: 0.92 MG/DL (ref 0.6–1.3)
CREAT SERPL-MCNC: 0.93 MG/DL (ref 0.6–1.3)
CREAT SERPL-MCNC: 0.93 MG/DL (ref 0.6–1.3)
CREAT SERPL-MCNC: 0.94 MG/DL (ref 0.6–1.3)
CREAT SERPL-MCNC: 0.94 MG/DL (ref 0.6–1.3)
CREAT SERPL-MCNC: 0.96 MG/DL (ref 0.6–1.3)
CREAT SERPL-MCNC: 1.14 MG/DL (ref 0.6–1.3)
CREAT SERPL-MCNC: 1.16 MG/DL (ref 0.6–1.3)
CREAT SERPL-MCNC: 1.23 MG/DL (ref 0.6–1.3)
CREAT SERPL-MCNC: 1.52 MG/DL (ref 0.6–1.3)
EOSINOPHIL # BLD AUTO: 0 THOUSAND/ÂΜL (ref 0–0.61)
EOSINOPHIL # BLD AUTO: 0 THOUSAND/ÂΜL (ref 0–0.61)
EOSINOPHIL # BLD AUTO: 0.01 THOUSAND/ÂΜL (ref 0–0.61)
EOSINOPHIL # BLD AUTO: 0.01 THOUSAND/ÂΜL (ref 0–0.61)
EOSINOPHIL # BLD AUTO: 0.02 THOUSAND/ÂΜL (ref 0–0.61)
EOSINOPHIL # BLD AUTO: 0.02 THOUSAND/ÂΜL (ref 0–0.61)
EOSINOPHIL # BLD AUTO: 0.03 THOUSAND/ÂΜL (ref 0–0.61)
EOSINOPHIL # BLD AUTO: 0.03 THOUSAND/ÂΜL (ref 0–0.61)
EOSINOPHIL # BLD AUTO: 0.09 THOUSAND/ÂΜL (ref 0–0.61)
EOSINOPHIL # BLD AUTO: 0.09 THOUSAND/ÂΜL (ref 0–0.61)
EOSINOPHIL # BLD MANUAL: 0 THOUSAND/UL (ref 0–0.4)
EOSINOPHIL NFR BLD AUTO: 0 % (ref 0–6)
EOSINOPHIL NFR BLD AUTO: 1 % (ref 0–6)
EOSINOPHIL NFR BLD AUTO: 1 % (ref 0–6)
EOSINOPHIL NFR BLD MANUAL: 0 % (ref 0–6)
ERYTHROCYTE [DISTWIDTH] IN BLOOD BY AUTOMATED COUNT: 14.4 % (ref 11.6–15.1)
ERYTHROCYTE [DISTWIDTH] IN BLOOD BY AUTOMATED COUNT: 14.5 % (ref 11.6–15.1)
ERYTHROCYTE [DISTWIDTH] IN BLOOD BY AUTOMATED COUNT: 14.6 % (ref 11.6–15.1)
ERYTHROCYTE [DISTWIDTH] IN BLOOD BY AUTOMATED COUNT: 14.7 % (ref 11.6–15.1)
ERYTHROCYTE [DISTWIDTH] IN BLOOD BY AUTOMATED COUNT: 14.7 % (ref 11.6–15.1)
ERYTHROCYTE [DISTWIDTH] IN BLOOD BY AUTOMATED COUNT: 14.8 % (ref 11.6–15.1)
ERYTHROCYTE [DISTWIDTH] IN BLOOD BY AUTOMATED COUNT: 14.8 % (ref 11.6–15.1)
ERYTHROCYTE [DISTWIDTH] IN BLOOD BY AUTOMATED COUNT: 14.9 % (ref 11.6–15.1)
ERYTHROCYTE [DISTWIDTH] IN BLOOD BY AUTOMATED COUNT: 15.1 % (ref 11.6–15.1)
ERYTHROCYTE [DISTWIDTH] IN BLOOD BY AUTOMATED COUNT: 15.8 % (ref 11.6–15.1)
ERYTHROCYTE [DISTWIDTH] IN BLOOD BY AUTOMATED COUNT: 15.9 % (ref 11.6–15.1)
ERYTHROCYTE [DISTWIDTH] IN BLOOD BY AUTOMATED COUNT: 16.1 % (ref 11.6–15.1)
ERYTHROCYTE [DISTWIDTH] IN BLOOD BY AUTOMATED COUNT: 16.1 % (ref 11.6–15.1)
FLUAV RNA RESP QL NAA+PROBE: NEGATIVE
FLUBV RNA RESP QL NAA+PROBE: NEGATIVE
FRACTIONAL SHORTENING: 23 (ref 28–44)
GFR SERPL CREATININE-BSD FRML MDRD: 30 ML/MIN/1.73SQ M
GFR SERPL CREATININE-BSD FRML MDRD: 39 ML/MIN/1.73SQ M
GFR SERPL CREATININE-BSD FRML MDRD: 42 ML/MIN/1.73SQ M
GFR SERPL CREATININE-BSD FRML MDRD: 43 ML/MIN/1.73SQ M
GFR SERPL CREATININE-BSD FRML MDRD: 52 ML/MIN/1.73SQ M
GFR SERPL CREATININE-BSD FRML MDRD: 54 ML/MIN/1.73SQ M
GFR SERPL CREATININE-BSD FRML MDRD: 54 ML/MIN/1.73SQ M
GFR SERPL CREATININE-BSD FRML MDRD: 55 ML/MIN/1.73SQ M
GFR SERPL CREATININE-BSD FRML MDRD: 58 ML/MIN/1.73SQ M
GFR SERPL CREATININE-BSD FRML MDRD: 59 ML/MIN/1.73SQ M
GFR SERPL CREATININE-BSD FRML MDRD: 61 ML/MIN/1.73SQ M
GFR SERPL CREATININE-BSD FRML MDRD: 67 ML/MIN/1.73SQ M
GFR SERPL CREATININE-BSD FRML MDRD: 67 ML/MIN/1.73SQ M
GFR SERPL CREATININE-BSD FRML MDRD: 68 ML/MIN/1.73SQ M
GFR SERPL CREATININE-BSD FRML MDRD: 70 ML/MIN/1.73SQ M
GLUCOSE SERPL-MCNC: 106 MG/DL (ref 65–140)
GLUCOSE SERPL-MCNC: 108 MG/DL (ref 65–140)
GLUCOSE SERPL-MCNC: 109 MG/DL (ref 65–140)
GLUCOSE SERPL-MCNC: 114 MG/DL (ref 65–140)
GLUCOSE SERPL-MCNC: 122 MG/DL (ref 65–140)
GLUCOSE SERPL-MCNC: 131 MG/DL (ref 65–140)
GLUCOSE SERPL-MCNC: 152 MG/DL (ref 65–140)
GLUCOSE SERPL-MCNC: 153 MG/DL (ref 65–140)
GLUCOSE SERPL-MCNC: 155 MG/DL (ref 65–140)
GLUCOSE SERPL-MCNC: 160 MG/DL (ref 65–140)
GLUCOSE SERPL-MCNC: 190 MG/DL (ref 65–140)
GLUCOSE SERPL-MCNC: 195 MG/DL (ref 65–140)
GLUCOSE SERPL-MCNC: 204 MG/DL (ref 65–140)
GLUCOSE SERPL-MCNC: 213 MG/DL (ref 65–140)
GLUCOSE SERPL-MCNC: 247 MG/DL (ref 65–140)
GLUCOSE SERPL-MCNC: 79 MG/DL (ref 65–140)
GLUCOSE SERPL-MCNC: 81 MG/DL (ref 65–140)
GLUCOSE SERPL-MCNC: 82 MG/DL (ref 65–140)
GLUCOSE SERPL-MCNC: 88 MG/DL (ref 65–140)
GLUCOSE SERPL-MCNC: 91 MG/DL (ref 65–140)
GLUCOSE UR STRIP-MCNC: NEGATIVE MG/DL
GRAM STN SPEC: ABNORMAL
GRAM STN SPEC: NORMAL
GRAM STN SPEC: NORMAL
HCT VFR BLD AUTO: 35.4 % (ref 34.8–46.1)
HCT VFR BLD AUTO: 35.8 % (ref 34.8–46.1)
HCT VFR BLD AUTO: 36.5 % (ref 34.8–46.1)
HCT VFR BLD AUTO: 36.6 % (ref 34.8–46.1)
HCT VFR BLD AUTO: 37.2 % (ref 34.8–46.1)
HCT VFR BLD AUTO: 37.2 % (ref 34.8–46.1)
HCT VFR BLD AUTO: 37.7 % (ref 34.8–46.1)
HCT VFR BLD AUTO: 37.8 % (ref 34.8–46.1)
HCT VFR BLD AUTO: 38.4 % (ref 34.8–46.1)
HCT VFR BLD AUTO: 38.8 % (ref 34.8–46.1)
HCT VFR BLD AUTO: 40.2 % (ref 34.8–46.1)
HCT VFR BLD AUTO: 40.2 % (ref 34.8–46.1)
HCT VFR BLD AUTO: 40.4 % (ref 34.8–46.1)
HCT VFR BLD AUTO: 44.6 % (ref 34.8–46.1)
HCT VFR BLD AUTO: 44.7 % (ref 34.8–46.1)
HGB BLD-MCNC: 11.4 G/DL (ref 11.5–15.4)
HGB BLD-MCNC: 11.5 G/DL (ref 11.5–15.4)
HGB BLD-MCNC: 11.6 G/DL (ref 11.5–15.4)
HGB BLD-MCNC: 11.8 G/DL (ref 11.5–15.4)
HGB BLD-MCNC: 11.8 G/DL (ref 11.5–15.4)
HGB BLD-MCNC: 12 G/DL (ref 11.5–15.4)
HGB BLD-MCNC: 12.1 G/DL (ref 11.5–15.4)
HGB BLD-MCNC: 12.2 G/DL (ref 11.5–15.4)
HGB BLD-MCNC: 12.3 G/DL (ref 11.5–15.4)
HGB BLD-MCNC: 12.4 G/DL (ref 11.5–15.4)
HGB BLD-MCNC: 12.5 G/DL (ref 11.5–15.4)
HGB BLD-MCNC: 12.6 G/DL (ref 11.5–15.4)
HGB BLD-MCNC: 12.9 G/DL (ref 11.5–15.4)
HGB BLD-MCNC: 13 G/DL (ref 11.5–15.4)
HGB BLD-MCNC: 13.2 G/DL (ref 11.5–15.4)
HGB BLD-MCNC: 14.6 G/DL (ref 11.5–15.4)
HGB BLD-MCNC: 14.7 G/DL (ref 11.5–15.4)
HGB UR QL STRIP.AUTO: NEGATIVE
HISTIOCYTES NFR FLD: 6 %
HYALINE CASTS #/AREA URNS LPF: ABNORMAL /LPF
IMM GRANULOCYTES # BLD AUTO: 0.01 THOUSAND/UL (ref 0–0.2)
IMM GRANULOCYTES # BLD AUTO: 0.03 THOUSAND/UL (ref 0–0.2)
IMM GRANULOCYTES # BLD AUTO: 0.03 THOUSAND/UL (ref 0–0.2)
IMM GRANULOCYTES # BLD AUTO: 0.04 THOUSAND/UL (ref 0–0.2)
IMM GRANULOCYTES # BLD AUTO: 0.05 THOUSAND/UL (ref 0–0.2)
IMM GRANULOCYTES # BLD AUTO: 0.05 THOUSAND/UL (ref 0–0.2)
IMM GRANULOCYTES # BLD AUTO: 0.06 THOUSAND/UL (ref 0–0.2)
IMM GRANULOCYTES # BLD AUTO: 0.07 THOUSAND/UL (ref 0–0.2)
IMM GRANULOCYTES # BLD AUTO: 0.12 THOUSAND/UL (ref 0–0.2)
IMM GRANULOCYTES # BLD AUTO: 0.13 THOUSAND/UL (ref 0–0.2)
IMM GRANULOCYTES NFR BLD AUTO: 0 % (ref 0–2)
IMM GRANULOCYTES NFR BLD AUTO: 1 % (ref 0–2)
INR PPP: 1.9 (ref 0.84–1.19)
INTERVENTRICULAR SEPTUM IN DIASTOLE (PARASTERNAL SHORT AXIS VIEW): 0.9 CM
INTERVENTRICULAR SEPTUM: 0.9 CM (ref 0.6–1.1)
KETONES UR STRIP-MCNC: NEGATIVE MG/DL
LACTATE SERPL-SCNC: 1.4 MMOL/L (ref 0.5–2)
LDH FLD L TO P-CCNC: 58 U/L
LEFT ATRIUM SIZE: 3.8 CM
LEFT INTERNAL DIMENSION IN SYSTOLE: 3.1 CM (ref 2.1–4)
LEFT VENTRICLE DIASTOLIC VOLUME (MOD BIPLANE): 57 ML
LEFT VENTRICLE SYSTOLIC VOLUME (MOD BIPLANE): 40 ML
LEFT VENTRICULAR INTERNAL DIMENSION IN DIASTOLE: 4 CM (ref 3.5–6)
LEFT VENTRICULAR POSTERIOR WALL IN END DIASTOLE: 0.8 CM
LEFT VENTRICULAR STROKE VOLUME: 35 ML
LEUKOCYTE ESTERASE UR QL STRIP: ABNORMAL
LG PLATELETS BLD QL SMEAR: PRESENT
LIPASE SERPL-CCNC: 7 U/L (ref 11–82)
LV EF: 30 %
LVSV (TEICH): 35 ML
LYMPHOCYTES # BLD AUTO: 0.36 THOUSANDS/ÂΜL (ref 0.6–4.47)
LYMPHOCYTES # BLD AUTO: 0.42 THOUSANDS/ÂΜL (ref 0.6–4.47)
LYMPHOCYTES # BLD AUTO: 0.57 THOUSANDS/ÂΜL (ref 0.6–4.47)
LYMPHOCYTES # BLD AUTO: 0.69 THOUSANDS/ÂΜL (ref 0.6–4.47)
LYMPHOCYTES # BLD AUTO: 0.73 THOUSANDS/ÂΜL (ref 0.6–4.47)
LYMPHOCYTES # BLD AUTO: 0.78 THOUSANDS/ÂΜL (ref 0.6–4.47)
LYMPHOCYTES # BLD AUTO: 0.91 THOUSANDS/ÂΜL (ref 0.6–4.47)
LYMPHOCYTES # BLD AUTO: 1.04 THOUSANDS/ÂΜL (ref 0.6–4.47)
LYMPHOCYTES # BLD AUTO: 1.09 THOUSAND/UL (ref 0.6–4.47)
LYMPHOCYTES # BLD AUTO: 1.13 THOUSANDS/ÂΜL (ref 0.6–4.47)
LYMPHOCYTES # BLD AUTO: 1.23 THOUSANDS/ÂΜL (ref 0.6–4.47)
LYMPHOCYTES # BLD AUTO: 5 % (ref 14–44)
LYMPHOCYTES NFR BLD AUTO: 10 % (ref 14–44)
LYMPHOCYTES NFR BLD AUTO: 10 % (ref 14–44)
LYMPHOCYTES NFR BLD AUTO: 11 % (ref 14–44)
LYMPHOCYTES NFR BLD AUTO: 13 % (ref 14–44)
LYMPHOCYTES NFR BLD AUTO: 13 % (ref 14–44)
LYMPHOCYTES NFR BLD AUTO: 14 % (ref 14–44)
LYMPHOCYTES NFR BLD AUTO: 3 % (ref 14–44)
LYMPHOCYTES NFR BLD AUTO: 31 %
LYMPHOCYTES NFR BLD AUTO: 5 % (ref 14–44)
LYMPHOCYTES NFR BLD AUTO: 6 % (ref 14–44)
LYMPHOCYTES NFR BLD AUTO: 9 % (ref 14–44)
MAGNESIUM SERPL-MCNC: 1.8 MG/DL (ref 1.9–2.7)
MAGNESIUM SERPL-MCNC: 1.9 MG/DL (ref 1.9–2.7)
MAGNESIUM SERPL-MCNC: 2 MG/DL (ref 1.9–2.7)
MAGNESIUM SERPL-MCNC: 2.1 MG/DL (ref 1.9–2.7)
MAGNESIUM SERPL-MCNC: 2.3 MG/DL (ref 1.9–2.7)
MCH RBC QN AUTO: 27.6 PG (ref 26.8–34.3)
MCH RBC QN AUTO: 27.8 PG (ref 26.8–34.3)
MCH RBC QN AUTO: 28 PG (ref 26.8–34.3)
MCH RBC QN AUTO: 28 PG (ref 26.8–34.3)
MCH RBC QN AUTO: 28.1 PG (ref 26.8–34.3)
MCH RBC QN AUTO: 28.1 PG (ref 26.8–34.3)
MCH RBC QN AUTO: 28.2 PG (ref 26.8–34.3)
MCH RBC QN AUTO: 28.3 PG (ref 26.8–34.3)
MCH RBC QN AUTO: 28.3 PG (ref 26.8–34.3)
MCH RBC QN AUTO: 28.4 PG (ref 26.8–34.3)
MCH RBC QN AUTO: 28.5 PG (ref 26.8–34.3)
MCH RBC QN AUTO: 28.6 PG (ref 26.8–34.3)
MCHC RBC AUTO-ENTMCNC: 31.5 G/DL (ref 31.4–37.4)
MCHC RBC AUTO-ENTMCNC: 31.8 G/DL (ref 31.4–37.4)
MCHC RBC AUTO-ENTMCNC: 32.1 G/DL (ref 31.4–37.4)
MCHC RBC AUTO-ENTMCNC: 32.2 G/DL (ref 31.4–37.4)
MCHC RBC AUTO-ENTMCNC: 32.3 G/DL (ref 31.4–37.4)
MCHC RBC AUTO-ENTMCNC: 32.4 G/DL (ref 31.4–37.4)
MCHC RBC AUTO-ENTMCNC: 32.5 G/DL (ref 31.4–37.4)
MCHC RBC AUTO-ENTMCNC: 32.5 G/DL (ref 31.4–37.4)
MCHC RBC AUTO-ENTMCNC: 32.7 G/DL (ref 31.4–37.4)
MCHC RBC AUTO-ENTMCNC: 32.7 G/DL (ref 31.4–37.4)
MCHC RBC AUTO-ENTMCNC: 32.8 G/DL (ref 31.4–37.4)
MCHC RBC AUTO-ENTMCNC: 32.9 G/DL (ref 31.4–37.4)
MCHC RBC AUTO-ENTMCNC: 33 G/DL (ref 31.4–37.4)
MCHC RBC AUTO-ENTMCNC: 33.1 G/DL (ref 31.4–37.4)
MCHC RBC AUTO-ENTMCNC: 33.1 G/DL (ref 31.4–37.4)
MCV RBC AUTO: 85 FL (ref 82–98)
MCV RBC AUTO: 86 FL (ref 82–98)
MCV RBC AUTO: 87 FL (ref 82–98)
MCV RBC AUTO: 88 FL (ref 82–98)
MCV RBC AUTO: 89 FL (ref 82–98)
MICROCYTES BLD QL AUTO: PRESENT
MONO+MESO NFR FLD MANUAL: 1 %
MONOCYTES # BLD AUTO: 0.19 THOUSAND/ÂΜL (ref 0.17–1.22)
MONOCYTES # BLD AUTO: 0.53 THOUSAND/ÂΜL (ref 0.17–1.22)
MONOCYTES # BLD AUTO: 0.55 THOUSAND/ÂΜL (ref 0.17–1.22)
MONOCYTES # BLD AUTO: 0.7 THOUSAND/ÂΜL (ref 0.17–1.22)
MONOCYTES # BLD AUTO: 0.72 THOUSAND/ÂΜL (ref 0.17–1.22)
MONOCYTES # BLD AUTO: 0.76 THOUSAND/ÂΜL (ref 0.17–1.22)
MONOCYTES # BLD AUTO: 0.77 THOUSAND/ÂΜL (ref 0.17–1.22)
MONOCYTES # BLD AUTO: 0.88 THOUSAND/ÂΜL (ref 0.17–1.22)
MONOCYTES # BLD AUTO: 0.88 THOUSAND/ÂΜL (ref 0.17–1.22)
MONOCYTES # BLD AUTO: 1.09 THOUSAND/UL (ref 0–1.22)
MONOCYTES # BLD AUTO: 1.09 THOUSAND/ÂΜL (ref 0.17–1.22)
MONOCYTES NFR BLD AUTO: 11 %
MONOCYTES NFR BLD AUTO: 11 % (ref 4–12)
MONOCYTES NFR BLD AUTO: 6 % (ref 4–12)
MONOCYTES NFR BLD AUTO: 6 % (ref 4–12)
MONOCYTES NFR BLD AUTO: 7 % (ref 4–12)
MONOCYTES NFR BLD AUTO: 8 % (ref 4–12)
MONOCYTES NFR BLD AUTO: 9 % (ref 4–12)
MONOCYTES NFR BLD AUTO: 9 % (ref 4–12)
MONOCYTES NFR BLD: 5 % (ref 4–12)
NEUTROPHILS # BLD AUTO: 10.1 THOUSANDS/ÂΜL (ref 1.85–7.62)
NEUTROPHILS # BLD AUTO: 10.13 THOUSANDS/ÂΜL (ref 1.85–7.62)
NEUTROPHILS # BLD AUTO: 11.28 THOUSANDS/ÂΜL (ref 1.85–7.62)
NEUTROPHILS # BLD AUTO: 11.34 THOUSANDS/ÂΜL (ref 1.85–7.62)
NEUTROPHILS # BLD AUTO: 2.55 THOUSANDS/ÂΜL (ref 1.85–7.62)
NEUTROPHILS # BLD AUTO: 6.11 THOUSANDS/ÂΜL (ref 1.85–7.62)
NEUTROPHILS # BLD AUTO: 6.14 THOUSANDS/ÂΜL (ref 1.85–7.62)
NEUTROPHILS # BLD AUTO: 6.23 THOUSANDS/ÂΜL (ref 1.85–7.62)
NEUTROPHILS # BLD AUTO: 6.57 THOUSANDS/ÂΜL (ref 1.85–7.62)
NEUTROPHILS # BLD AUTO: 6.58 THOUSANDS/ÂΜL (ref 1.85–7.62)
NEUTROPHILS # BLD MANUAL: 19.68 THOUSAND/UL (ref 1.85–7.62)
NEUTS SEG NFR BLD AUTO: 51 %
NEUTS SEG NFR BLD AUTO: 75 % (ref 43–75)
NEUTS SEG NFR BLD AUTO: 79 % (ref 43–75)
NEUTS SEG NFR BLD AUTO: 79 % (ref 43–75)
NEUTS SEG NFR BLD AUTO: 80 % (ref 43–75)
NEUTS SEG NFR BLD AUTO: 81 % (ref 43–75)
NEUTS SEG NFR BLD AUTO: 81 % (ref 43–75)
NEUTS SEG NFR BLD AUTO: 82 % (ref 43–75)
NEUTS SEG NFR BLD AUTO: 86 % (ref 43–75)
NEUTS SEG NFR BLD AUTO: 89 % (ref 43–75)
NEUTS SEG NFR BLD AUTO: 89 % (ref 43–75)
NEUTS SEG NFR BLD AUTO: 90 % (ref 43–75)
NITRITE UR QL STRIP: NEGATIVE
NON-SQ EPI CELLS URNS QL MICRO: ABNORMAL /HPF
NRBC BLD AUTO-RTO: 0 /100 WBCS
OTHER STN SPEC: ABNORMAL
OVALOCYTES BLD QL SMEAR: PRESENT
PH UR STRIP.AUTO: 5.5 [PH]
PHOSPHATE SERPL-MCNC: 2.1 MG/DL (ref 2.3–4.1)
PHOSPHATE SERPL-MCNC: 2.2 MG/DL (ref 2.3–4.1)
PHOSPHATE SERPL-MCNC: 2.2 MG/DL (ref 2.3–4.1)
PHOSPHATE SERPL-MCNC: 2.3 MG/DL (ref 2.3–4.1)
PHOSPHATE SERPL-MCNC: 2.5 MG/DL (ref 2.3–4.1)
PLATELET # BLD AUTO: 188 THOUSANDS/UL (ref 149–390)
PLATELET # BLD AUTO: 190 THOUSANDS/UL (ref 149–390)
PLATELET # BLD AUTO: 211 THOUSANDS/UL (ref 149–390)
PLATELET # BLD AUTO: 288 THOUSANDS/UL (ref 149–390)
PLATELET # BLD AUTO: 304 THOUSANDS/UL (ref 149–390)
PLATELET # BLD AUTO: 320 THOUSANDS/UL (ref 149–390)
PLATELET # BLD AUTO: 335 THOUSANDS/UL (ref 149–390)
PLATELET # BLD AUTO: 336 THOUSANDS/UL (ref 149–390)
PLATELET # BLD AUTO: 361 THOUSANDS/UL (ref 149–390)
PLATELET # BLD AUTO: 378 THOUSANDS/UL (ref 149–390)
PLATELET # BLD AUTO: 391 THOUSANDS/UL (ref 149–390)
PLATELET # BLD AUTO: 398 THOUSANDS/UL (ref 149–390)
PLATELET # BLD AUTO: 401 THOUSANDS/UL (ref 149–390)
PLATELET # BLD AUTO: 414 THOUSANDS/UL (ref 149–390)
PLATELET # BLD AUTO: 421 THOUSANDS/UL (ref 149–390)
PLATELET # BLD AUTO: 428 THOUSANDS/UL (ref 149–390)
PLATELET # BLD AUTO: 449 THOUSANDS/UL (ref 149–390)
PLATELET BLD QL SMEAR: ABNORMAL
PMV BLD AUTO: 8.6 FL (ref 8.9–12.7)
PMV BLD AUTO: 8.6 FL (ref 8.9–12.7)
PMV BLD AUTO: 8.7 FL (ref 8.9–12.7)
PMV BLD AUTO: 8.8 FL (ref 8.9–12.7)
PMV BLD AUTO: 8.8 FL (ref 8.9–12.7)
PMV BLD AUTO: 8.9 FL (ref 8.9–12.7)
PMV BLD AUTO: 9 FL (ref 8.9–12.7)
PMV BLD AUTO: 9 FL (ref 8.9–12.7)
PMV BLD AUTO: 9.2 FL (ref 8.9–12.7)
PMV BLD AUTO: 9.3 FL (ref 8.9–12.7)
PMV BLD AUTO: 9.3 FL (ref 8.9–12.7)
PMV BLD AUTO: 9.4 FL (ref 8.9–12.7)
PMV BLD AUTO: 9.5 FL (ref 8.9–12.7)
PMV BLD AUTO: 9.7 FL (ref 8.9–12.7)
PMV BLD AUTO: 9.7 FL (ref 8.9–12.7)
POLYCHROMASIA BLD QL SMEAR: PRESENT
POTASSIUM SERPL-SCNC: 3 MMOL/L (ref 3.5–5.3)
POTASSIUM SERPL-SCNC: 3.1 MMOL/L (ref 3.5–5.3)
POTASSIUM SERPL-SCNC: 3.4 MMOL/L (ref 3.5–5.3)
POTASSIUM SERPL-SCNC: 3.5 MMOL/L (ref 3.5–5.3)
POTASSIUM SERPL-SCNC: 3.6 MMOL/L (ref 3.5–5.3)
POTASSIUM SERPL-SCNC: 3.8 MMOL/L (ref 3.5–5.3)
POTASSIUM SERPL-SCNC: 3.8 MMOL/L (ref 3.5–5.3)
POTASSIUM SERPL-SCNC: 3.9 MMOL/L (ref 3.5–5.3)
POTASSIUM SERPL-SCNC: 3.9 MMOL/L (ref 3.5–5.3)
POTASSIUM SERPL-SCNC: 4 MMOL/L (ref 3.5–5.3)
POTASSIUM SERPL-SCNC: 4.1 MMOL/L (ref 3.5–5.3)
POTASSIUM SERPL-SCNC: 4.3 MMOL/L (ref 3.5–5.3)
POTASSIUM SERPL-SCNC: 4.3 MMOL/L (ref 3.5–5.3)
POTASSIUM SERPL-SCNC: 4.4 MMOL/L (ref 3.5–5.3)
PROCALCITONIN SERPL-MCNC: 1.28 NG/ML
PROT FLD-MCNC: <2 G/DL
PROT SERPL-MCNC: 4.5 G/DL (ref 6.4–8.4)
PROT SERPL-MCNC: 4.7 G/DL (ref 6.4–8.4)
PROT SERPL-MCNC: 5 G/DL (ref 6.4–8.4)
PROT SERPL-MCNC: 5.3 G/DL (ref 6.4–8.4)
PROT SERPL-MCNC: 5.5 G/DL (ref 6.4–8.4)
PROT SERPL-MCNC: 5.8 G/DL (ref 6.4–8.4)
PROT SERPL-MCNC: 6 G/DL (ref 6.4–8.4)
PROT SERPL-MCNC: 6.1 G/DL (ref 6.4–8.4)
PROT UR STRIP-MCNC: NEGATIVE MG/DL
PROTHROMBIN TIME: 21.9 SECONDS (ref 11.6–14.5)
QRS AXIS: 185 DEGREES
QRS AXIS: 213 DEGREES
QRS AXIS: 230 DEGREES
QRS AXIS: 261 DEGREES
QRSD INTERVAL: 140 MS
QRSD INTERVAL: 148 MS
QRSD INTERVAL: 92 MS
QRSD INTERVAL: 94 MS
QT INTERVAL: 254 MS
QT INTERVAL: 290 MS
QT INTERVAL: 312 MS
QT INTERVAL: 420 MS
QTC INTERVAL: 376 MS
QTC INTERVAL: 392 MS
QTC INTERVAL: 424 MS
QTC INTERVAL: 565 MS
RBC # BLD AUTO: 4.05 MILLION/UL (ref 3.81–5.12)
RBC # BLD AUTO: 4.1 MILLION/UL (ref 3.81–5.12)
RBC # BLD AUTO: 4.14 MILLION/UL (ref 3.81–5.12)
RBC # BLD AUTO: 4.17 MILLION/UL (ref 3.81–5.12)
RBC # BLD AUTO: 4.18 MILLION/UL (ref 3.81–5.12)
RBC # BLD AUTO: 4.25 MILLION/UL (ref 3.81–5.12)
RBC # BLD AUTO: 4.27 MILLION/UL (ref 3.81–5.12)
RBC # BLD AUTO: 4.34 MILLION/UL (ref 3.81–5.12)
RBC # BLD AUTO: 4.34 MILLION/UL (ref 3.81–5.12)
RBC # BLD AUTO: 4.39 MILLION/UL (ref 3.81–5.12)
RBC # BLD AUTO: 4.43 MILLION/UL (ref 3.81–5.12)
RBC # BLD AUTO: 4.47 MILLION/UL (ref 3.81–5.12)
RBC # BLD AUTO: 4.52 MILLION/UL (ref 3.81–5.12)
RBC # BLD AUTO: 4.61 MILLION/UL (ref 3.81–5.12)
RBC # BLD AUTO: 4.72 MILLION/UL (ref 3.81–5.12)
RBC # BLD AUTO: 5.16 MILLION/UL (ref 3.81–5.12)
RBC # BLD AUTO: 5.19 MILLION/UL (ref 3.81–5.12)
RBC #/AREA URNS AUTO: ABNORMAL /HPF
RBC MORPH BLD: PRESENT
RSV RNA RESP QL NAA+PROBE: NEGATIVE
SALMONELLA DNA SPEC QL NAA+PROBE: NORMAL
SARS-COV-2 RNA RESP QL NAA+PROBE: NEGATIVE
SHIGA TOXIN STX GENE SPEC NAA+PROBE: NORMAL
SHIGELLA DNA SPEC QL NAA+PROBE: NORMAL
SITE: NORMAL
SL CV PED ECHO LEFT VENTRICLE DIASTOLIC VOLUME (MOD BIPLANE) 2D: 72 ML
SL CV PED ECHO LEFT VENTRICLE SYSTOLIC VOLUME (MOD BIPLANE) 2D: 37 ML
SODIUM SERPL-SCNC: 129 MMOL/L (ref 135–147)
SODIUM SERPL-SCNC: 130 MMOL/L (ref 135–147)
SODIUM SERPL-SCNC: 132 MMOL/L (ref 135–147)
SODIUM SERPL-SCNC: 132 MMOL/L (ref 135–147)
SODIUM SERPL-SCNC: 133 MMOL/L (ref 135–147)
SODIUM SERPL-SCNC: 134 MMOL/L (ref 135–147)
SODIUM SERPL-SCNC: 135 MMOL/L (ref 135–147)
SODIUM SERPL-SCNC: 136 MMOL/L (ref 135–147)
SODIUM SERPL-SCNC: 137 MMOL/L (ref 135–147)
SODIUM SERPL-SCNC: 137 MMOL/L (ref 135–147)
SODIUM SERPL-SCNC: 138 MMOL/L (ref 135–147)
SODIUM SERPL-SCNC: 138 MMOL/L (ref 135–147)
SP GR UR STRIP.AUTO: 1.01 (ref 1–1.03)
T WAVE AXIS: -9 DEGREES
T WAVE AXIS: -9 DEGREES
T WAVE AXIS: 14 DEGREES
T WAVE AXIS: 15 DEGREES
TARGETS BLD QL SMEAR: PRESENT
TOTAL CELLS COUNTED SPEC: 100
TR MAX PG: 19 MMHG
TR PEAK VELOCITY: 2.2 M/S
TRICUSPID VALVE PEAK REGURGITATION VELOCITY: 2.2 M/S
TRIGL SERPL-MCNC: 122 MG/DL
TRIGL SERPL-MCNC: 72 MG/DL
UROBILINOGEN UR QL STRIP.AUTO: 0.2 E.U./DL
VENTRICULAR RATE: 101 BPM
VENTRICULAR RATE: 109 BPM
VENTRICULAR RATE: 111 BPM
VENTRICULAR RATE: 143 BPM
WBC # BLD AUTO: 11.32 THOUSAND/UL (ref 4.31–10.16)
WBC # BLD AUTO: 11.73 THOUSAND/UL (ref 4.31–10.16)
WBC # BLD AUTO: 12.58 THOUSAND/UL (ref 4.31–10.16)
WBC # BLD AUTO: 12.71 THOUSAND/UL (ref 4.31–10.16)
WBC # BLD AUTO: 13.16 THOUSAND/UL (ref 4.31–10.16)
WBC # BLD AUTO: 13.34 THOUSAND/UL (ref 4.31–10.16)
WBC # BLD AUTO: 13.52 THOUSAND/UL (ref 4.31–10.16)
WBC # BLD AUTO: 14.99 THOUSAND/UL (ref 4.31–10.16)
WBC # BLD AUTO: 17.79 THOUSAND/UL (ref 4.31–10.16)
WBC # BLD AUTO: 21.87 THOUSAND/UL (ref 4.31–10.16)
WBC # BLD AUTO: 3.19 THOUSAND/UL (ref 4.31–10.16)
WBC # BLD AUTO: 7.49 THOUSAND/UL (ref 4.31–10.16)
WBC # BLD AUTO: 7.85 THOUSAND/UL (ref 4.31–10.16)
WBC # BLD AUTO: 8.05 THOUSAND/UL (ref 4.31–10.16)
WBC # BLD AUTO: 8.3 THOUSAND/UL (ref 4.31–10.16)
WBC # BLD AUTO: 8.36 THOUSAND/UL (ref 4.31–10.16)
WBC # BLD AUTO: 9.8 THOUSAND/UL (ref 4.31–10.16)
WBC # FLD MANUAL: 175 /UL
WBC #/AREA URNS AUTO: ABNORMAL /HPF

## 2023-01-01 PROCEDURE — 0W993ZZ DRAINAGE OF RIGHT PLEURAL CAVITY, PERCUTANEOUS APPROACH: ICD-10-PCS | Performed by: RADIOLOGY

## 2023-01-01 PROCEDURE — 02HV33Z INSERTION OF INFUSION DEVICE INTO SUPERIOR VENA CAVA, PERCUTANEOUS APPROACH: ICD-10-PCS | Performed by: RADIOLOGY

## 2023-01-01 RX ORDER — DILTIAZEM HYDROCHLORIDE 5 MG/ML
10 INJECTION INTRAVENOUS ONCE
Status: COMPLETED | OUTPATIENT
Start: 2023-01-01 | End: 2023-01-01

## 2023-01-01 RX ORDER — POTASSIUM CHLORIDE 14.9 MG/ML
20 INJECTION INTRAVENOUS
Status: COMPLETED | OUTPATIENT
Start: 2023-01-01 | End: 2023-01-01

## 2023-01-01 RX ORDER — METOPROLOL TARTRATE 5 MG/5ML
5 INJECTION INTRAVENOUS ONCE
Status: COMPLETED | OUTPATIENT
Start: 2023-01-01 | End: 2023-01-01

## 2023-01-01 RX ORDER — SODIUM CHLORIDE 9 MG/ML
75 INJECTION, SOLUTION INTRAVENOUS CONTINUOUS
Status: DISCONTINUED | OUTPATIENT
Start: 2023-01-01 | End: 2023-01-01

## 2023-01-01 RX ORDER — METOPROLOL SUCCINATE 25 MG/1
25 TABLET, EXTENDED RELEASE ORAL 2 TIMES DAILY
Status: DISCONTINUED | OUTPATIENT
Start: 2023-01-01 | End: 2023-01-01

## 2023-01-01 RX ORDER — FUROSEMIDE 10 MG/ML
40 INJECTION INTRAMUSCULAR; INTRAVENOUS
Status: DISCONTINUED | OUTPATIENT
Start: 2023-01-01 | End: 2023-01-01

## 2023-01-01 RX ORDER — DILTIAZEM HYDROCHLORIDE 5 MG/ML
15 INJECTION INTRAVENOUS ONCE
Status: COMPLETED | OUTPATIENT
Start: 2023-01-01 | End: 2023-01-01

## 2023-01-01 RX ORDER — LORAZEPAM 0.5 MG/1
0.5 TABLET ORAL EVERY 4 HOURS PRN
Status: DISCONTINUED | OUTPATIENT
Start: 2023-01-01 | End: 2023-01-01

## 2023-01-01 RX ORDER — MIDODRINE HYDROCHLORIDE 5 MG/1
2.5 TABLET ORAL
Status: DISCONTINUED | OUTPATIENT
Start: 2023-01-01 | End: 2023-01-01

## 2023-01-01 RX ORDER — GABAPENTIN 300 MG/1
300 CAPSULE ORAL
Status: DISCONTINUED | OUTPATIENT
Start: 2023-01-01 | End: 2023-01-01

## 2023-01-01 RX ORDER — FUROSEMIDE 10 MG/ML
40 INJECTION INTRAMUSCULAR; INTRAVENOUS ONCE
Status: COMPLETED | OUTPATIENT
Start: 2023-01-01 | End: 2023-01-01

## 2023-01-01 RX ORDER — VANCOMYCIN HYDROCHLORIDE 125 MG/1
125 CAPSULE ORAL EVERY 6 HOURS SCHEDULED
Status: DISCONTINUED | OUTPATIENT
Start: 2023-01-01 | End: 2023-01-01

## 2023-01-01 RX ORDER — CEFTRIAXONE 1 G/50ML
1000 INJECTION, SOLUTION INTRAVENOUS ONCE
Status: COMPLETED | OUTPATIENT
Start: 2023-01-01 | End: 2023-01-01

## 2023-01-01 RX ORDER — MAGNESIUM SULFATE HEPTAHYDRATE 40 MG/ML
2 INJECTION, SOLUTION INTRAVENOUS ONCE
Status: COMPLETED | OUTPATIENT
Start: 2023-01-01 | End: 2023-01-01

## 2023-01-01 RX ORDER — FAMOTIDINE 20 MG/1
20 TABLET, FILM COATED ORAL DAILY
COMMUNITY
End: 2023-01-01

## 2023-01-01 RX ORDER — ACETAMINOPHEN 325 MG/1
650 TABLET ORAL EVERY 6 HOURS PRN
Status: DISCONTINUED | OUTPATIENT
Start: 2023-01-01 | End: 2023-01-01

## 2023-01-01 RX ORDER — METOPROLOL TARTRATE 5 MG/5ML
5 INJECTION INTRAVENOUS ONCE
Status: DISCONTINUED | OUTPATIENT
Start: 2023-01-01 | End: 2023-01-01

## 2023-01-01 RX ORDER — METOPROLOL TARTRATE 5 MG/5ML
2.5 INJECTION INTRAVENOUS ONCE
Status: COMPLETED | OUTPATIENT
Start: 2023-01-01 | End: 2023-01-01

## 2023-01-01 RX ORDER — TRAZODONE HYDROCHLORIDE 100 MG/1
100 TABLET ORAL
Status: DISCONTINUED | OUTPATIENT
Start: 2023-01-01 | End: 2023-01-01

## 2023-01-01 RX ORDER — FUROSEMIDE 40 MG/1
40 TABLET ORAL DAILY
Status: DISCONTINUED | OUTPATIENT
Start: 2023-01-01 | End: 2023-01-01

## 2023-01-01 RX ORDER — LIDOCAINE HYDROCHLORIDE 10 MG/ML
INJECTION, SOLUTION EPIDURAL; INFILTRATION; INTRACAUDAL; PERINEURAL AS NEEDED
Status: COMPLETED | OUTPATIENT
Start: 2023-01-01 | End: 2023-01-01

## 2023-01-01 RX ORDER — SODIUM CHLORIDE, SODIUM GLUCONATE, SODIUM ACETATE, POTASSIUM CHLORIDE, MAGNESIUM CHLORIDE, SODIUM PHOSPHATE, DIBASIC, AND POTASSIUM PHOSPHATE .53; .5; .37; .037; .03; .012; .00082 G/100ML; G/100ML; G/100ML; G/100ML; G/100ML; G/100ML; G/100ML
500 INJECTION, SOLUTION INTRAVENOUS ONCE
Status: COMPLETED | OUTPATIENT
Start: 2023-01-01 | End: 2023-01-01

## 2023-01-01 RX ORDER — TRAZODONE HYDROCHLORIDE 100 MG/1
100 TABLET ORAL
Status: DISCONTINUED | OUTPATIENT
Start: 2023-01-01 | End: 2023-01-01 | Stop reason: HOSPADM

## 2023-01-01 RX ORDER — LANOLIN ALCOHOL/MO/W.PET/CERES
3 CREAM (GRAM) TOPICAL
Status: DISCONTINUED | OUTPATIENT
Start: 2023-01-01 | End: 2023-01-01 | Stop reason: HOSPADM

## 2023-01-01 RX ORDER — GABAPENTIN 600 MG/1
600 TABLET ORAL DAILY
COMMUNITY
End: 2023-01-01

## 2023-01-01 RX ORDER — MORPHINE SULFATE 100 MG/5ML
5 SOLUTION, CONCENTRATE ORAL
Status: DISCONTINUED | OUTPATIENT
Start: 2023-01-01 | End: 2023-01-01

## 2023-01-01 RX ORDER — LATANOPROST 50 UG/ML
1 SOLUTION/ DROPS OPHTHALMIC
COMMUNITY
End: 2023-01-01

## 2023-01-01 RX ORDER — DIGOXIN 0.25 MG/ML
250 INJECTION INTRAMUSCULAR; INTRAVENOUS ONCE
Status: COMPLETED | OUTPATIENT
Start: 2023-01-01 | End: 2023-01-01

## 2023-01-01 RX ORDER — LEVALBUTEROL INHALATION SOLUTION 1.25 MG/3ML
1.25 SOLUTION RESPIRATORY (INHALATION) EVERY 4 HOURS PRN
Status: DISCONTINUED | OUTPATIENT
Start: 2023-01-01 | End: 2023-01-01 | Stop reason: HOSPADM

## 2023-01-01 RX ORDER — FUROSEMIDE 10 MG/ML
20 INJECTION INTRAMUSCULAR; INTRAVENOUS ONCE
Status: COMPLETED | OUTPATIENT
Start: 2023-01-01 | End: 2023-01-01

## 2023-01-01 RX ORDER — PRAVASTATIN SODIUM 40 MG
40 TABLET ORAL
Status: DISCONTINUED | OUTPATIENT
Start: 2023-01-01 | End: 2023-01-01

## 2023-01-01 RX ORDER — LATANOPROST 50 UG/ML
1 SOLUTION/ DROPS OPHTHALMIC
Status: DISCONTINUED | OUTPATIENT
Start: 2023-01-01 | End: 2023-01-01 | Stop reason: HOSPADM

## 2023-01-01 RX ORDER — SODIUM CHLORIDE 9 MG/ML
50 INJECTION, SOLUTION INTRAVENOUS CONTINUOUS
Status: DISCONTINUED | OUTPATIENT
Start: 2023-01-01 | End: 2023-01-01

## 2023-01-01 RX ORDER — CALCIUM GLUCONATE 20 MG/ML
1 INJECTION, SOLUTION INTRAVENOUS ONCE
Status: COMPLETED | OUTPATIENT
Start: 2023-01-01 | End: 2023-01-01

## 2023-01-01 RX ORDER — METOPROLOL TARTRATE 5 MG/5ML
5 INJECTION INTRAVENOUS EVERY 4 HOURS
Status: DISCONTINUED | OUTPATIENT
Start: 2023-01-01 | End: 2023-01-01

## 2023-01-01 RX ORDER — METOPROLOL TARTRATE 5 MG/5ML
2.5 INJECTION INTRAVENOUS EVERY 4 HOURS
Status: DISCONTINUED | OUTPATIENT
Start: 2023-01-01 | End: 2023-01-01

## 2023-01-01 RX ORDER — METOPROLOL TARTRATE 5 MG/5ML
5 INJECTION INTRAVENOUS EVERY 6 HOURS PRN
Status: DISCONTINUED | OUTPATIENT
Start: 2023-01-01 | End: 2023-01-01

## 2023-01-01 RX ORDER — CALCIUM GLUCONATE 20 MG/ML
2 INJECTION, SOLUTION INTRAVENOUS ONCE
Status: COMPLETED | OUTPATIENT
Start: 2023-01-01 | End: 2023-01-01

## 2023-01-01 RX ORDER — POTASSIUM CHLORIDE 20 MEQ/1
40 TABLET, EXTENDED RELEASE ORAL ONCE
Status: COMPLETED | OUTPATIENT
Start: 2023-01-01 | End: 2023-01-01

## 2023-01-01 RX ORDER — METOPROLOL TARTRATE 5 MG/5ML
5 INJECTION INTRAVENOUS
Status: COMPLETED | OUTPATIENT
Start: 2023-01-01 | End: 2023-01-01

## 2023-01-01 RX ORDER — LORAZEPAM 2 MG/ML
0.5 INJECTION INTRAMUSCULAR ONCE
Status: COMPLETED | OUTPATIENT
Start: 2023-01-01 | End: 2023-01-01

## 2023-01-01 RX ORDER — ONDANSETRON 2 MG/ML
4 INJECTION INTRAMUSCULAR; INTRAVENOUS EVERY 4 HOURS PRN
Status: DISCONTINUED | OUTPATIENT
Start: 2023-01-01 | End: 2023-01-01 | Stop reason: HOSPADM

## 2023-01-01 RX ORDER — POTASSIUM CHLORIDE 20 MEQ/1
20 TABLET, EXTENDED RELEASE ORAL ONCE
Status: COMPLETED | OUTPATIENT
Start: 2023-01-01 | End: 2023-01-01

## 2023-01-01 RX ORDER — METRONIDAZOLE 500 MG/100ML
500 INJECTION, SOLUTION INTRAVENOUS EVERY 8 HOURS
Status: DISCONTINUED | OUTPATIENT
Start: 2023-01-01 | End: 2023-01-01

## 2023-01-01 RX ORDER — ESCITALOPRAM OXALATE 10 MG/1
20 TABLET ORAL DAILY
Status: DISCONTINUED | OUTPATIENT
Start: 2023-01-01 | End: 2023-01-01

## 2023-01-01 RX ORDER — AMIODARONE HYDROCHLORIDE 200 MG/1
200 TABLET ORAL 2 TIMES DAILY WITH MEALS
Status: DISCONTINUED | OUTPATIENT
Start: 2023-01-01 | End: 2023-01-01 | Stop reason: HOSPADM

## 2023-01-01 RX ORDER — AMIODARONE HYDROCHLORIDE 200 MG/1
200 TABLET ORAL 2 TIMES DAILY WITH MEALS
Status: DISCONTINUED | OUTPATIENT
Start: 2023-01-01 | End: 2023-01-01

## 2023-01-01 RX ORDER — SIMVASTATIN 80 MG
80 TABLET ORAL
COMMUNITY
End: 2023-01-01

## 2023-01-01 RX ORDER — FUROSEMIDE 40 MG/1
40 TABLET ORAL AS NEEDED
COMMUNITY
End: 2023-01-01

## 2023-01-01 RX ORDER — PANTOPRAZOLE SODIUM 40 MG/1
40 TABLET, DELAYED RELEASE ORAL
Status: DISCONTINUED | OUTPATIENT
Start: 2023-01-01 | End: 2023-01-01

## 2023-01-01 RX ORDER — FUROSEMIDE 10 MG/ML
20 INJECTION INTRAMUSCULAR; INTRAVENOUS
Status: DISCONTINUED | OUTPATIENT
Start: 2023-01-01 | End: 2023-01-01

## 2023-01-01 RX ORDER — POTASSIUM CHLORIDE 14.9 MG/ML
20 INJECTION INTRAVENOUS
Status: DISPENSED | OUTPATIENT
Start: 2023-01-01 | End: 2023-01-01

## 2023-01-01 RX ORDER — VANCOMYCIN HYDROCHLORIDE 125 MG/1
125 CAPSULE ORAL EVERY 12 HOURS SCHEDULED
Status: DISCONTINUED | OUTPATIENT
Start: 2023-01-01 | End: 2023-01-01

## 2023-01-01 RX ORDER — LORAZEPAM 2 MG/ML
0.5 INJECTION INTRAMUSCULAR EVERY 6 HOURS PRN
Status: DISCONTINUED | OUTPATIENT
Start: 2023-01-01 | End: 2023-01-01

## 2023-01-01 RX ORDER — METRONIDAZOLE 500 MG/1
500 TABLET ORAL EVERY 8 HOURS SCHEDULED
Status: DISCONTINUED | OUTPATIENT
Start: 2023-01-01 | End: 2023-01-01

## 2023-01-01 RX ORDER — SACCHAROMYCES BOULARDII 250 MG
250 CAPSULE ORAL 2 TIMES DAILY
COMMUNITY
End: 2023-01-01

## 2023-01-01 RX ORDER — MIDODRINE HYDROCHLORIDE 5 MG/1
5 TABLET ORAL
Status: DISCONTINUED | OUTPATIENT
Start: 2023-01-01 | End: 2023-01-01

## 2023-01-01 RX ORDER — METOPROLOL SUCCINATE 25 MG/1
25 TABLET, EXTENDED RELEASE ORAL DAILY
Status: DISCONTINUED | OUTPATIENT
Start: 2023-01-01 | End: 2023-01-01

## 2023-01-01 RX ORDER — ENOXAPARIN SODIUM 100 MG/ML
40 INJECTION SUBCUTANEOUS
Status: DISCONTINUED | OUTPATIENT
Start: 2023-01-01 | End: 2023-01-01 | Stop reason: DRUGHIGH

## 2023-01-01 RX ORDER — SODIUM CHLORIDE, SODIUM GLUCONATE, SODIUM ACETATE, POTASSIUM CHLORIDE, MAGNESIUM CHLORIDE, SODIUM PHOSPHATE, DIBASIC, AND POTASSIUM PHOSPHATE .53; .5; .37; .037; .03; .012; .00082 G/100ML; G/100ML; G/100ML; G/100ML; G/100ML; G/100ML; G/100ML
75 INJECTION, SOLUTION INTRAVENOUS CONTINUOUS
Status: DISCONTINUED | OUTPATIENT
Start: 2023-01-01 | End: 2023-01-01

## 2023-01-01 RX ORDER — GABAPENTIN 300 MG/1
300 CAPSULE ORAL DAILY
Status: DISCONTINUED | OUTPATIENT
Start: 2023-01-01 | End: 2023-01-01

## 2023-01-01 RX ORDER — LANOLIN ALCOHOL/MO/W.PET/CERES
5000 CREAM (GRAM) TOPICAL DAILY
COMMUNITY
End: 2023-01-01

## 2023-01-01 RX ORDER — METOPROLOL SUCCINATE 50 MG/1
50 TABLET, EXTENDED RELEASE ORAL 2 TIMES DAILY
Status: DISCONTINUED | OUTPATIENT
Start: 2023-01-01 | End: 2023-01-01 | Stop reason: HOSPADM

## 2023-01-01 RX ORDER — METOPROLOL SUCCINATE 50 MG/1
50 TABLET, EXTENDED RELEASE ORAL 2 TIMES DAILY
Status: DISCONTINUED | OUTPATIENT
Start: 2023-01-01 | End: 2023-01-01

## 2023-01-01 RX ORDER — PANTOPRAZOLE SODIUM 40 MG/10ML
40 INJECTION, POWDER, LYOPHILIZED, FOR SOLUTION INTRAVENOUS
Status: DISCONTINUED | OUTPATIENT
Start: 2023-01-01 | End: 2023-01-01

## 2023-01-01 RX ORDER — OMEPRAZOLE 20 MG
2000 TABLET, DELAYED RELEASE (ENTERIC COATED) ORAL
COMMUNITY
End: 2023-01-01

## 2023-01-01 RX ORDER — METOPROLOL TARTRATE 5 MG/5ML
5 INJECTION INTRAVENOUS EVERY 6 HOURS PRN
Status: DISCONTINUED | OUTPATIENT
Start: 2023-01-01 | End: 2023-01-01 | Stop reason: HOSPADM

## 2023-01-01 RX ORDER — HEPARIN SODIUM 10000 [USP'U]/100ML
3-20 INJECTION, SOLUTION INTRAVENOUS
Status: DISCONTINUED | OUTPATIENT
Start: 2023-01-01 | End: 2023-01-01

## 2023-01-01 RX ORDER — POTASSIUM CHLORIDE 14.9 MG/ML
20 INJECTION INTRAVENOUS ONCE
Status: COMPLETED | OUTPATIENT
Start: 2023-01-01 | End: 2023-01-01

## 2023-01-01 RX ORDER — CEFTRIAXONE 1 G/50ML
1000 INJECTION, SOLUTION INTRAVENOUS EVERY 24 HOURS
Status: DISCONTINUED | OUTPATIENT
Start: 2023-01-01 | End: 2023-01-01

## 2023-01-01 RX ORDER — METOPROLOL SUCCINATE 25 MG/1
25 TABLET, EXTENDED RELEASE ORAL 2 TIMES DAILY
Status: CANCELLED | OUTPATIENT
Start: 2023-01-01

## 2023-01-01 RX ORDER — LORAZEPAM 2 MG/ML
0.5 INJECTION INTRAMUSCULAR EVERY 4 HOURS PRN
Status: DISCONTINUED | OUTPATIENT
Start: 2023-01-01 | End: 2023-01-01 | Stop reason: HOSPADM

## 2023-01-01 RX ORDER — LISINOPRIL 2.5 MG/1
2.5 TABLET ORAL DAILY
COMMUNITY
End: 2023-01-01

## 2023-01-01 RX ORDER — FUROSEMIDE 10 MG/ML
40 INJECTION INTRAMUSCULAR; INTRAVENOUS DAILY
Status: DISCONTINUED | OUTPATIENT
Start: 2023-01-01 | End: 2023-01-01

## 2023-01-01 RX ADMIN — Medication: at 21:36

## 2023-01-01 RX ADMIN — GABAPENTIN 300 MG: 300 CAPSULE ORAL at 20:05

## 2023-01-01 RX ADMIN — METRONIDAZOLE 500 MG: 500 INJECTION, SOLUTION INTRAVENOUS at 13:58

## 2023-01-01 RX ADMIN — PIPERACILLIN AND TAZOBACTAM 3.38 G: 36; 4.5 INJECTION, POWDER, FOR SOLUTION INTRAVENOUS at 15:30

## 2023-01-01 RX ADMIN — IOHEXOL 100 ML: 350 INJECTION, SOLUTION INTRAVENOUS at 09:22

## 2023-01-01 RX ADMIN — GABAPENTIN 300 MG: 300 CAPSULE ORAL at 21:25

## 2023-01-01 RX ADMIN — TRAZODONE HYDROCHLORIDE 100 MG: 100 TABLET ORAL at 23:44

## 2023-01-01 RX ADMIN — FUROSEMIDE 40 MG: 10 INJECTION, SOLUTION INTRAVENOUS at 15:02

## 2023-01-01 RX ADMIN — MIDODRINE HYDROCHLORIDE 5 MG: 5 TABLET ORAL at 05:19

## 2023-01-01 RX ADMIN — TRAZODONE HYDROCHLORIDE 150 MG: 50 TABLET ORAL at 21:15

## 2023-01-01 RX ADMIN — HEPARIN SODIUM 10 UNITS/KG/HR: 10000 INJECTION, SOLUTION INTRAVENOUS at 06:12

## 2023-01-01 RX ADMIN — AMIODARONE HYDROCHLORIDE 200 MG: 200 TABLET ORAL at 16:22

## 2023-01-01 RX ADMIN — FUROSEMIDE 20 MG: 10 INJECTION, SOLUTION INTRAVENOUS at 08:40

## 2023-01-01 RX ADMIN — METOROPROLOL TARTRATE 2.5 MG: 5 INJECTION, SOLUTION INTRAVENOUS at 08:00

## 2023-01-01 RX ADMIN — METOROPROLOL TARTRATE 5 MG: 5 INJECTION, SOLUTION INTRAVENOUS at 21:03

## 2023-01-01 RX ADMIN — METOPROLOL SUCCINATE 50 MG: 50 TABLET, EXTENDED RELEASE ORAL at 08:52

## 2023-01-01 RX ADMIN — MIDODRINE HYDROCHLORIDE 2.5 MG: 5 TABLET ORAL at 11:32

## 2023-01-01 RX ADMIN — MELATONIN 3 MG: 3 TAB ORAL at 21:39

## 2023-01-01 RX ADMIN — METOROPROLOL TARTRATE 2.5 MG: 5 INJECTION, SOLUTION INTRAVENOUS at 00:03

## 2023-01-01 RX ADMIN — METOROPROLOL TARTRATE 5 MG: 5 INJECTION, SOLUTION INTRAVENOUS at 07:52

## 2023-01-01 RX ADMIN — LORAZEPAM 0.5 MG: 2 INJECTION INTRAMUSCULAR; INTRAVENOUS at 07:48

## 2023-01-01 RX ADMIN — APIXABAN 2.5 MG: 2.5 TABLET, FILM COATED ORAL at 08:02

## 2023-01-01 RX ADMIN — METOROPROLOL TARTRATE 2.5 MG: 5 INJECTION, SOLUTION INTRAVENOUS at 20:33

## 2023-01-01 RX ADMIN — LEVALBUTEROL HYDROCHLORIDE 1.25 MG: 1.25 SOLUTION RESPIRATORY (INHALATION) at 13:06

## 2023-01-01 RX ADMIN — CEFTRIAXONE 1000 MG: 1 INJECTION, SOLUTION INTRAVENOUS at 04:40

## 2023-01-01 RX ADMIN — MIDODRINE HYDROCHLORIDE 2.5 MG: 5 TABLET ORAL at 15:02

## 2023-01-01 RX ADMIN — POTASSIUM CHLORIDE 40 MEQ: 20 TABLET, EXTENDED RELEASE ORAL at 13:38

## 2023-01-01 RX ADMIN — METOPROLOL SUCCINATE 50 MG: 50 TABLET, EXTENDED RELEASE ORAL at 08:54

## 2023-01-01 RX ADMIN — VANCOMYCIN HYDROCHLORIDE 125 MG: 125 CAPSULE ORAL at 05:18

## 2023-01-01 RX ADMIN — LEVALBUTEROL HYDROCHLORIDE 1.25 MG: 1.25 SOLUTION RESPIRATORY (INHALATION) at 17:53

## 2023-01-01 RX ADMIN — METOPROLOL SUCCINATE 25 MG: 25 TABLET, EXTENDED RELEASE ORAL at 09:46

## 2023-01-01 RX ADMIN — METRONIDAZOLE 500 MG: 500 INJECTION, SOLUTION INTRAVENOUS at 14:49

## 2023-01-01 RX ADMIN — TRAZODONE HYDROCHLORIDE 100 MG: 100 TABLET ORAL at 21:22

## 2023-01-01 RX ADMIN — PIPERACILLIN AND TAZOBACTAM 3.38 G: 36; 4.5 INJECTION, POWDER, FOR SOLUTION INTRAVENOUS at 02:37

## 2023-01-01 RX ADMIN — PIPERACILLIN AND TAZOBACTAM 3.38 G: 36; 4.5 INJECTION, POWDER, FOR SOLUTION INTRAVENOUS at 16:30

## 2023-01-01 RX ADMIN — HEPARIN SODIUM 12 UNITS/KG/HR: 10000 INJECTION, SOLUTION INTRAVENOUS at 17:36

## 2023-01-01 RX ADMIN — HEPARIN SODIUM 17 UNITS/KG/HR: 10000 INJECTION, SOLUTION INTRAVENOUS at 13:58

## 2023-01-01 RX ADMIN — MIDODRINE HYDROCHLORIDE 2.5 MG: 5 TABLET ORAL at 12:43

## 2023-01-01 RX ADMIN — LATANOPROST 1 DROP: 50 SOLUTION OPHTHALMIC at 20:41

## 2023-01-01 RX ADMIN — METRONIDAZOLE 500 MG: 500 INJECTION, SOLUTION INTRAVENOUS at 14:07

## 2023-01-01 RX ADMIN — APIXABAN 2.5 MG: 2.5 TABLET, FILM COATED ORAL at 07:59

## 2023-01-01 RX ADMIN — METOROPROLOL TARTRATE 5 MG: 5 INJECTION, SOLUTION INTRAVENOUS at 02:37

## 2023-01-01 RX ADMIN — CEFTRIAXONE 1000 MG: 1 INJECTION, SOLUTION INTRAVENOUS at 05:15

## 2023-01-01 RX ADMIN — METOPROLOL SUCCINATE 25 MG: 25 TABLET, EXTENDED RELEASE ORAL at 09:40

## 2023-01-01 RX ADMIN — METOPROLOL SUCCINATE 50 MG: 50 TABLET, EXTENDED RELEASE ORAL at 21:15

## 2023-01-01 RX ADMIN — MIDODRINE HYDROCHLORIDE 2.5 MG: 5 TABLET ORAL at 05:09

## 2023-01-01 RX ADMIN — METOPROLOL SUCCINATE 50 MG: 50 TABLET, EXTENDED RELEASE ORAL at 23:44

## 2023-01-01 RX ADMIN — LATANOPROST 1 DROP: 50 SOLUTION OPHTHALMIC at 21:16

## 2023-01-01 RX ADMIN — LORAZEPAM 0.5 MG: 2 INJECTION INTRAMUSCULAR; INTRAVENOUS at 02:34

## 2023-01-01 RX ADMIN — Medication: at 21:37

## 2023-01-01 RX ADMIN — GABAPENTIN 300 MG: 300 CAPSULE ORAL at 21:06

## 2023-01-01 RX ADMIN — GABAPENTIN 300 MG: 300 CAPSULE ORAL at 21:15

## 2023-01-01 RX ADMIN — METOPROLOL SUCCINATE 50 MG: 50 TABLET, EXTENDED RELEASE ORAL at 08:21

## 2023-01-01 RX ADMIN — LORAZEPAM 0.5 MG: 2 INJECTION INTRAMUSCULAR; INTRAVENOUS at 12:46

## 2023-01-01 RX ADMIN — FUROSEMIDE 40 MG: 10 INJECTION, SOLUTION INTRAMUSCULAR; INTRAVENOUS at 09:12

## 2023-01-01 RX ADMIN — LATANOPROST 1 DROP: 50 SOLUTION OPHTHALMIC at 21:25

## 2023-01-01 RX ADMIN — AMIODARONE HYDROCHLORIDE 200 MG: 200 TABLET ORAL at 09:06

## 2023-01-01 RX ADMIN — METRONIDAZOLE 500 MG: 500 TABLET ORAL at 13:15

## 2023-01-01 RX ADMIN — METRONIDAZOLE 500 MG: 500 TABLET ORAL at 05:17

## 2023-01-01 RX ADMIN — CEFTRIAXONE 1000 MG: 1 INJECTION, SOLUTION INTRAVENOUS at 03:08

## 2023-01-01 RX ADMIN — AMIODARONE HYDROCHLORIDE 200 MG: 200 TABLET ORAL at 15:56

## 2023-01-01 RX ADMIN — METOROPROLOL TARTRATE 2.5 MG: 5 INJECTION, SOLUTION INTRAVENOUS at 12:43

## 2023-01-01 RX ADMIN — MIDODRINE HYDROCHLORIDE 5 MG: 5 TABLET ORAL at 10:42

## 2023-01-01 RX ADMIN — LEVALBUTEROL HYDROCHLORIDE 1.25 MG: 1.25 SOLUTION RESPIRATORY (INHALATION) at 09:05

## 2023-01-01 RX ADMIN — SODIUM CHLORIDE, SODIUM LACTATE, POTASSIUM CHLORIDE, AND CALCIUM CHLORIDE 500 ML: .6; .31; .03; .02 INJECTION, SOLUTION INTRAVENOUS at 00:54

## 2023-01-01 RX ADMIN — AMIODARONE HYDROCHLORIDE 1 MG/MIN: 50 INJECTION, SOLUTION INTRAVENOUS at 12:36

## 2023-01-01 RX ADMIN — IOHEXOL 85 ML: 350 INJECTION, SOLUTION INTRAVENOUS at 12:11

## 2023-01-01 RX ADMIN — MIDODRINE HYDROCHLORIDE 2.5 MG: 5 TABLET ORAL at 16:34

## 2023-01-01 RX ADMIN — MAGNESIUM SULFATE HEPTAHYDRATE 2 G: 40 INJECTION, SOLUTION INTRAVENOUS at 02:19

## 2023-01-01 RX ADMIN — ACETAMINOPHEN 325MG 650 MG: 325 TABLET ORAL at 20:05

## 2023-01-01 RX ADMIN — METOROPROLOL TARTRATE 5 MG: 5 INJECTION, SOLUTION INTRAVENOUS at 16:43

## 2023-01-01 RX ADMIN — VANCOMYCIN HYDROCHLORIDE 125 MG: 125 CAPSULE ORAL at 00:36

## 2023-01-01 RX ADMIN — APIXABAN 2.5 MG: 2.5 TABLET, FILM COATED ORAL at 09:40

## 2023-01-01 RX ADMIN — METOPROLOL SUCCINATE 50 MG: 50 TABLET, EXTENDED RELEASE ORAL at 20:30

## 2023-01-01 RX ADMIN — AMIODARONE HYDROCHLORIDE 200 MG: 200 TABLET ORAL at 16:56

## 2023-01-01 RX ADMIN — Medication 1 SPRAY: at 13:31

## 2023-01-01 RX ADMIN — PANTOPRAZOLE SODIUM 40 MG: 40 TABLET, DELAYED RELEASE ORAL at 05:17

## 2023-01-01 RX ADMIN — VANCOMYCIN HYDROCHLORIDE 125 MG: 125 CAPSULE ORAL at 11:24

## 2023-01-01 RX ADMIN — CEFTRIAXONE 1000 MG: 1 INJECTION, SOLUTION INTRAVENOUS at 05:14

## 2023-01-01 RX ADMIN — Medication: at 21:03

## 2023-01-01 RX ADMIN — IOHEXOL 100 ML: 350 INJECTION, SOLUTION INTRAVENOUS at 13:41

## 2023-01-01 RX ADMIN — METRONIDAZOLE 500 MG: 500 TABLET ORAL at 20:44

## 2023-01-01 RX ADMIN — METOROPROLOL TARTRATE 2.5 MG: 5 INJECTION, SOLUTION INTRAVENOUS at 14:50

## 2023-01-01 RX ADMIN — LATANOPROST 1 DROP: 50 SOLUTION OPHTHALMIC at 22:29

## 2023-01-01 RX ADMIN — METOPROLOL SUCCINATE 50 MG: 50 TABLET, EXTENDED RELEASE ORAL at 21:06

## 2023-01-01 RX ADMIN — METOPROLOL SUCCINATE 25 MG: 25 TABLET, EXTENDED RELEASE ORAL at 09:21

## 2023-01-01 RX ADMIN — AMIODARONE HYDROCHLORIDE 0.5 MG/MIN: 50 INJECTION, SOLUTION INTRAVENOUS at 04:04

## 2023-01-01 RX ADMIN — MIDODRINE HYDROCHLORIDE 2.5 MG: 5 TABLET ORAL at 11:49

## 2023-01-01 RX ADMIN — TRAZODONE HYDROCHLORIDE 100 MG: 100 TABLET ORAL at 21:39

## 2023-01-01 RX ADMIN — POTASSIUM CHLORIDE 20 MEQ: 14.9 INJECTION, SOLUTION INTRAVENOUS at 19:57

## 2023-01-01 RX ADMIN — AMIODARONE HYDROCHLORIDE 200 MG: 200 TABLET ORAL at 16:46

## 2023-01-01 RX ADMIN — SODIUM CHLORIDE 75 ML/HR: 0.9 INJECTION, SOLUTION INTRAVENOUS at 06:19

## 2023-01-01 RX ADMIN — Medication: at 21:29

## 2023-01-01 RX ADMIN — LORAZEPAM 0.5 MG: 2 INJECTION INTRAMUSCULAR; INTRAVENOUS at 03:58

## 2023-01-01 RX ADMIN — POTASSIUM CHLORIDE 20 MEQ: 14.9 INJECTION, SOLUTION INTRAVENOUS at 15:55

## 2023-01-01 RX ADMIN — Medication 1 SPRAY: at 10:57

## 2023-01-01 RX ADMIN — LATANOPROST 1 DROP: 50 SOLUTION OPHTHALMIC at 21:22

## 2023-01-01 RX ADMIN — MIDODRINE HYDROCHLORIDE 5 MG: 5 TABLET ORAL at 15:23

## 2023-01-01 RX ADMIN — MIDODRINE HYDROCHLORIDE 5 MG: 5 TABLET ORAL at 11:41

## 2023-01-01 RX ADMIN — APIXABAN 2.5 MG: 2.5 TABLET, FILM COATED ORAL at 17:39

## 2023-01-01 RX ADMIN — DILTIAZEM HYDROCHLORIDE 10 MG: 5 INJECTION INTRAVENOUS at 06:40

## 2023-01-01 RX ADMIN — PIPERACILLIN AND TAZOBACTAM 3.38 G: 36; 4.5 INJECTION, POWDER, FOR SOLUTION INTRAVENOUS at 02:32

## 2023-01-01 RX ADMIN — PANTOPRAZOLE SODIUM 40 MG: 40 TABLET, DELAYED RELEASE ORAL at 05:50

## 2023-01-01 RX ADMIN — MIDODRINE HYDROCHLORIDE 2.5 MG: 5 TABLET ORAL at 10:47

## 2023-01-01 RX ADMIN — METOROPROLOL TARTRATE 5 MG: 5 INJECTION, SOLUTION INTRAVENOUS at 06:58

## 2023-01-01 RX ADMIN — POTASSIUM PHOSPHATE, MONOBASIC AND POTASSIUM PHOSPHATE, DIBASIC 9 MMOL: 224; 236 INJECTION, SOLUTION, CONCENTRATE INTRAVENOUS at 15:29

## 2023-01-01 RX ADMIN — TRAZODONE HYDROCHLORIDE 150 MG: 50 TABLET ORAL at 20:43

## 2023-01-01 RX ADMIN — LORAZEPAM 0.5 MG: 2 INJECTION INTRAMUSCULAR; INTRAVENOUS at 16:17

## 2023-01-01 RX ADMIN — PIPERACILLIN AND TAZOBACTAM 3.38 G: 36; 4.5 INJECTION, POWDER, FOR SOLUTION INTRAVENOUS at 16:16

## 2023-01-01 RX ADMIN — PANTOPRAZOLE SODIUM 40 MG: 40 TABLET, DELAYED RELEASE ORAL at 05:18

## 2023-01-01 RX ADMIN — AMIODARONE HYDROCHLORIDE 200 MG: 200 TABLET ORAL at 18:38

## 2023-01-01 RX ADMIN — METOPROLOL SUCCINATE 50 MG: 50 TABLET, EXTENDED RELEASE ORAL at 21:25

## 2023-01-01 RX ADMIN — VANCOMYCIN HYDROCHLORIDE 125 MG: 125 CAPSULE ORAL at 08:54

## 2023-01-01 RX ADMIN — IOHEXOL 100 ML: 350 INJECTION, SOLUTION INTRAVENOUS at 02:17

## 2023-01-01 RX ADMIN — PIPERACILLIN AND TAZOBACTAM 3.38 G: 36; 4.5 INJECTION, POWDER, FOR SOLUTION INTRAVENOUS at 22:24

## 2023-01-01 RX ADMIN — LORAZEPAM 0.5 MG: 2 INJECTION INTRAMUSCULAR; INTRAVENOUS at 22:59

## 2023-01-01 RX ADMIN — FUROSEMIDE 40 MG: 10 INJECTION, SOLUTION INTRAMUSCULAR; INTRAVENOUS at 10:52

## 2023-01-01 RX ADMIN — LATANOPROST 1 DROP: 50 SOLUTION OPHTHALMIC at 20:44

## 2023-01-01 RX ADMIN — METRONIDAZOLE 500 MG: 500 INJECTION, SOLUTION INTRAVENOUS at 06:08

## 2023-01-01 RX ADMIN — METRONIDAZOLE 500 MG: 500 TABLET ORAL at 21:15

## 2023-01-01 RX ADMIN — METOROPROLOL TARTRATE 2.5 MG: 5 INJECTION, SOLUTION INTRAVENOUS at 23:45

## 2023-01-01 RX ADMIN — METRONIDAZOLE 500 MG: 500 INJECTION, SOLUTION INTRAVENOUS at 06:12

## 2023-01-01 RX ADMIN — PANTOPRAZOLE SODIUM 40 MG: 40 TABLET, DELAYED RELEASE ORAL at 06:00

## 2023-01-01 RX ADMIN — FUROSEMIDE 20 MG: 10 INJECTION, SOLUTION INTRAVENOUS at 10:48

## 2023-01-01 RX ADMIN — TRAZODONE HYDROCHLORIDE 150 MG: 50 TABLET ORAL at 20:41

## 2023-01-01 RX ADMIN — METOPROLOL SUCCINATE 50 MG: 50 TABLET, EXTENDED RELEASE ORAL at 21:39

## 2023-01-01 RX ADMIN — PIPERACILLIN AND TAZOBACTAM 3.38 G: 36; 4.5 INJECTION, POWDER, FOR SOLUTION INTRAVENOUS at 15:38

## 2023-01-01 RX ADMIN — GABAPENTIN 300 MG: 300 CAPSULE ORAL at 21:03

## 2023-01-01 RX ADMIN — CEFTRIAXONE 1000 MG: 1 INJECTION, SOLUTION INTRAVENOUS at 05:12

## 2023-01-01 RX ADMIN — AMIODARONE HYDROCHLORIDE 200 MG: 200 TABLET ORAL at 07:59

## 2023-01-01 RX ADMIN — LATANOPROST 1 DROP: 50 SOLUTION OPHTHALMIC at 21:24

## 2023-01-01 RX ADMIN — Medication: at 22:20

## 2023-01-01 RX ADMIN — AMIODARONE HYDROCHLORIDE 200 MG: 200 TABLET ORAL at 08:40

## 2023-01-01 RX ADMIN — ESCITALOPRAM OXALATE 20 MG: 20 TABLET ORAL at 21:06

## 2023-01-01 RX ADMIN — GABAPENTIN 300 MG: 300 CAPSULE ORAL at 23:09

## 2023-01-01 RX ADMIN — METOPROLOL TARTRATE 2.5 MG: 5 INJECTION INTRAVENOUS at 17:23

## 2023-01-01 RX ADMIN — ESCITALOPRAM OXALATE 20 MG: 20 TABLET ORAL at 20:05

## 2023-01-01 RX ADMIN — METOPROLOL SUCCINATE 50 MG: 50 TABLET, EXTENDED RELEASE ORAL at 09:06

## 2023-01-01 RX ADMIN — METRONIDAZOLE 500 MG: 500 INJECTION, SOLUTION INTRAVENOUS at 11:30

## 2023-01-01 RX ADMIN — GABAPENTIN 300 MG: 300 CAPSULE ORAL at 23:44

## 2023-01-01 RX ADMIN — CEFTRIAXONE 1000 MG: 1 INJECTION, SOLUTION INTRAVENOUS at 05:29

## 2023-01-01 RX ADMIN — FUROSEMIDE 40 MG: 10 INJECTION, SOLUTION INTRAMUSCULAR; INTRAVENOUS at 01:22

## 2023-01-01 RX ADMIN — MORPHINE SULFATE 5 MG: 100 SOLUTION ORAL at 19:19

## 2023-01-01 RX ADMIN — PIPERACILLIN AND TAZOBACTAM 3.38 G: 36; 4.5 INJECTION, POWDER, FOR SOLUTION INTRAVENOUS at 20:31

## 2023-01-01 RX ADMIN — METRONIDAZOLE 500 MG: 500 INJECTION, SOLUTION INTRAVENOUS at 03:41

## 2023-01-01 RX ADMIN — LATANOPROST 1 DROP: 50 SOLUTION OPHTHALMIC at 21:38

## 2023-01-01 RX ADMIN — METOROPROLOL TARTRATE 2.5 MG: 5 INJECTION, SOLUTION INTRAVENOUS at 20:18

## 2023-01-01 RX ADMIN — METOPROLOL SUCCINATE 50 MG: 50 TABLET, EXTENDED RELEASE ORAL at 09:12

## 2023-01-01 RX ADMIN — APIXABAN 2.5 MG: 2.5 TABLET, FILM COATED ORAL at 17:34

## 2023-01-01 RX ADMIN — PRAVASTATIN SODIUM 40 MG: 40 TABLET ORAL at 16:56

## 2023-01-01 RX ADMIN — MELATONIN 3 MG: 3 TAB ORAL at 21:41

## 2023-01-01 RX ADMIN — METOROPROLOL TARTRATE 5 MG: 5 INJECTION, SOLUTION INTRAVENOUS at 02:33

## 2023-01-01 RX ADMIN — METOPROLOL SUCCINATE 50 MG: 50 TABLET, EXTENDED RELEASE ORAL at 09:17

## 2023-01-01 RX ADMIN — CEFTRIAXONE 1000 MG: 1 INJECTION, SOLUTION INTRAVENOUS at 04:29

## 2023-01-01 RX ADMIN — SODIUM CHLORIDE, SODIUM GLUCONATE, SODIUM ACETATE, POTASSIUM CHLORIDE, MAGNESIUM CHLORIDE, SODIUM PHOSPHATE, DIBASIC, AND POTASSIUM PHOSPHATE 500 ML: .53; .5; .37; .037; .03; .012; .00082 INJECTION, SOLUTION INTRAVENOUS at 17:00

## 2023-01-01 RX ADMIN — PIPERACILLIN AND TAZOBACTAM 3.38 G: 36; 4.5 INJECTION, POWDER, FOR SOLUTION INTRAVENOUS at 09:21

## 2023-01-01 RX ADMIN — METRONIDAZOLE 500 MG: 500 INJECTION, SOLUTION INTRAVENOUS at 23:10

## 2023-01-01 RX ADMIN — METOPROLOL SUCCINATE 50 MG: 50 TABLET, EXTENDED RELEASE ORAL at 21:41

## 2023-01-01 RX ADMIN — LORAZEPAM 0.5 MG: 2 INJECTION INTRAMUSCULAR; INTRAVENOUS at 08:04

## 2023-01-01 RX ADMIN — METOPROLOL TARTRATE 5 MG: 5 INJECTION INTRAVENOUS at 04:58

## 2023-01-01 RX ADMIN — SODIUM CHLORIDE, SODIUM GLUCONATE, SODIUM ACETATE, POTASSIUM CHLORIDE, MAGNESIUM CHLORIDE, SODIUM PHOSPHATE, DIBASIC, AND POTASSIUM PHOSPHATE 500 ML: .53; .5; .37; .037; .03; .012; .00082 INJECTION, SOLUTION INTRAVENOUS at 18:48

## 2023-01-01 RX ADMIN — METRONIDAZOLE 500 MG: 500 INJECTION, SOLUTION INTRAVENOUS at 05:58

## 2023-01-01 RX ADMIN — AMIODARONE HYDROCHLORIDE 200 MG: 200 TABLET ORAL at 17:35

## 2023-01-01 RX ADMIN — PRAVASTATIN SODIUM 40 MG: 40 TABLET ORAL at 17:24

## 2023-01-01 RX ADMIN — APIXABAN 2.5 MG: 2.5 TABLET, FILM COATED ORAL at 14:55

## 2023-01-01 RX ADMIN — POTASSIUM CHLORIDE 20 MEQ: 20 TABLET, EXTENDED RELEASE ORAL at 11:17

## 2023-01-01 RX ADMIN — CEFTRIAXONE 1000 MG: 1 INJECTION, SOLUTION INTRAVENOUS at 05:51

## 2023-01-01 RX ADMIN — METOPROLOL SUCCINATE 50 MG: 50 TABLET, EXTENDED RELEASE ORAL at 23:09

## 2023-01-01 RX ADMIN — METOPROLOL SUCCINATE 50 MG: 50 TABLET, EXTENDED RELEASE ORAL at 08:20

## 2023-01-01 RX ADMIN — CALCIUM GLUCONATE 2 G: 20 INJECTION, SOLUTION INTRAVENOUS at 14:05

## 2023-01-01 RX ADMIN — DILTIAZEM HYDROCHLORIDE 10 MG: 5 INJECTION INTRAVENOUS at 09:15

## 2023-01-01 RX ADMIN — LORAZEPAM 0.5 MG: 2 INJECTION INTRAMUSCULAR; INTRAVENOUS at 21:04

## 2023-01-01 RX ADMIN — PIPERACILLIN AND TAZOBACTAM 3.38 G: 36; 4.5 INJECTION, POWDER, FOR SOLUTION INTRAVENOUS at 16:08

## 2023-01-01 RX ADMIN — TRAZODONE HYDROCHLORIDE 100 MG: 100 TABLET ORAL at 23:09

## 2023-01-01 RX ADMIN — METOPROLOL TARTRATE 5 MG: 5 INJECTION INTRAVENOUS at 17:00

## 2023-01-01 RX ADMIN — GABAPENTIN 300 MG: 300 CAPSULE ORAL at 20:45

## 2023-01-01 RX ADMIN — CEFTRIAXONE 1000 MG: 1 INJECTION, SOLUTION INTRAVENOUS at 04:58

## 2023-01-01 RX ADMIN — METRONIDAZOLE 500 MG: 500 INJECTION, SOLUTION INTRAVENOUS at 21:22

## 2023-01-01 RX ADMIN — AMIODARONE HYDROCHLORIDE 200 MG: 200 TABLET ORAL at 09:17

## 2023-01-01 RX ADMIN — POTASSIUM CHLORIDE 20 MEQ: 14.9 INJECTION, SOLUTION INTRAVENOUS at 16:22

## 2023-01-01 RX ADMIN — VANCOMYCIN HYDROCHLORIDE 125 MG: 125 CAPSULE ORAL at 09:17

## 2023-01-01 RX ADMIN — GABAPENTIN 300 MG: 300 CAPSULE ORAL at 21:22

## 2023-01-01 RX ADMIN — GABAPENTIN 300 MG: 300 CAPSULE ORAL at 21:23

## 2023-01-01 RX ADMIN — METOROPROLOL TARTRATE 5 MG: 5 INJECTION, SOLUTION INTRAVENOUS at 21:42

## 2023-01-01 RX ADMIN — POTASSIUM CHLORIDE 20 MEQ: 14.9 INJECTION, SOLUTION INTRAVENOUS at 17:43

## 2023-01-01 RX ADMIN — VANCOMYCIN HYDROCHLORIDE 125 MG: 125 CAPSULE ORAL at 23:44

## 2023-01-01 RX ADMIN — APIXABAN 2.5 MG: 2.5 TABLET, FILM COATED ORAL at 19:10

## 2023-01-01 RX ADMIN — FUROSEMIDE 40 MG: 10 INJECTION, SOLUTION INTRAVENOUS at 14:05

## 2023-01-01 RX ADMIN — METOROPROLOL TARTRATE 5 MG: 5 INJECTION, SOLUTION INTRAVENOUS at 00:01

## 2023-01-01 RX ADMIN — METRONIDAZOLE 500 MG: 500 TABLET ORAL at 21:06

## 2023-01-01 RX ADMIN — GABAPENTIN 300 MG: 300 CAPSULE ORAL at 20:41

## 2023-01-01 RX ADMIN — METOROPROLOL TARTRATE 5 MG: 5 INJECTION, SOLUTION INTRAVENOUS at 14:37

## 2023-01-01 RX ADMIN — METRONIDAZOLE 500 MG: 500 TABLET ORAL at 05:01

## 2023-01-01 RX ADMIN — METRONIDAZOLE 500 MG: 500 INJECTION, SOLUTION INTRAVENOUS at 05:24

## 2023-01-01 RX ADMIN — MIDODRINE HYDROCHLORIDE 2.5 MG: 5 TABLET ORAL at 06:19

## 2023-01-01 RX ADMIN — LEVALBUTEROL HYDROCHLORIDE 1.25 MG: 1.25 SOLUTION RESPIRATORY (INHALATION) at 15:40

## 2023-01-01 RX ADMIN — METRONIDAZOLE 500 MG: 500 INJECTION, SOLUTION INTRAVENOUS at 11:52

## 2023-01-01 RX ADMIN — TRAZODONE HYDROCHLORIDE 100 MG: 100 TABLET ORAL at 21:23

## 2023-01-01 RX ADMIN — METRONIDAZOLE 500 MG: 500 INJECTION, SOLUTION INTRAVENOUS at 01:44

## 2023-01-01 RX ADMIN — METOROPROLOL TARTRATE 2.5 MG: 5 INJECTION, SOLUTION INTRAVENOUS at 15:35

## 2023-01-01 RX ADMIN — APIXABAN 2.5 MG: 2.5 TABLET, FILM COATED ORAL at 09:20

## 2023-01-01 RX ADMIN — FUROSEMIDE 20 MG: 10 INJECTION, SOLUTION INTRAVENOUS at 16:22

## 2023-01-01 RX ADMIN — SODIUM CHLORIDE 50 ML/HR: 0.9 INJECTION, SOLUTION INTRAVENOUS at 08:52

## 2023-01-01 RX ADMIN — APIXABAN 2.5 MG: 2.5 TABLET, FILM COATED ORAL at 08:52

## 2023-01-01 RX ADMIN — ENOXAPARIN SODIUM 40 MG: 40 INJECTION SUBCUTANEOUS at 08:54

## 2023-01-01 RX ADMIN — PIPERACILLIN AND TAZOBACTAM 3.38 G: 36; 4.5 INJECTION, POWDER, FOR SOLUTION INTRAVENOUS at 03:44

## 2023-01-01 RX ADMIN — AMIODARONE HYDROCHLORIDE 200 MG: 200 TABLET ORAL at 09:11

## 2023-01-01 RX ADMIN — APIXABAN 2.5 MG: 2.5 TABLET, FILM COATED ORAL at 08:47

## 2023-01-01 RX ADMIN — METOPROLOL TARTRATE 5 MG: 5 INJECTION INTRAVENOUS at 17:06

## 2023-01-01 RX ADMIN — METOPROLOL TARTRATE 5 MG: 5 INJECTION INTRAVENOUS at 02:19

## 2023-01-01 RX ADMIN — SODIUM CHLORIDE 75 ML/HR: 0.9 INJECTION, SOLUTION INTRAVENOUS at 15:36

## 2023-01-01 RX ADMIN — APIXABAN 2.5 MG: 2.5 TABLET, FILM COATED ORAL at 08:20

## 2023-01-01 RX ADMIN — PIPERACILLIN AND TAZOBACTAM 3.38 G: 36; 4.5 INJECTION, POWDER, FOR SOLUTION INTRAVENOUS at 09:38

## 2023-01-01 RX ADMIN — METRONIDAZOLE 500 MG: 500 INJECTION, SOLUTION INTRAVENOUS at 19:44

## 2023-01-01 RX ADMIN — PIPERACILLIN AND TAZOBACTAM 3.38 G: 36; 4.5 INJECTION, POWDER, FOR SOLUTION INTRAVENOUS at 16:43

## 2023-01-01 RX ADMIN — METOROPROLOL TARTRATE 2.5 MG: 5 INJECTION, SOLUTION INTRAVENOUS at 04:39

## 2023-01-01 RX ADMIN — PANTOPRAZOLE SODIUM 40 MG: 40 INJECTION, POWDER, FOR SOLUTION INTRAVENOUS at 09:21

## 2023-01-01 RX ADMIN — PIPERACILLIN AND TAZOBACTAM 3.38 G: 36; 4.5 INJECTION, POWDER, FOR SOLUTION INTRAVENOUS at 09:24

## 2023-01-01 RX ADMIN — METRONIDAZOLE 500 MG: 500 TABLET ORAL at 05:57

## 2023-01-01 RX ADMIN — LATANOPROST 1 DROP: 50 SOLUTION OPHTHALMIC at 20:18

## 2023-01-01 RX ADMIN — TRAZODONE HYDROCHLORIDE 100 MG: 100 TABLET ORAL at 21:03

## 2023-01-01 RX ADMIN — METOROPROLOL TARTRATE 5 MG: 5 INJECTION, SOLUTION INTRAVENOUS at 11:53

## 2023-01-01 RX ADMIN — SODIUM CHLORIDE, SODIUM GLUCONATE, SODIUM ACETATE, POTASSIUM CHLORIDE, MAGNESIUM CHLORIDE, SODIUM PHOSPHATE, DIBASIC, AND POTASSIUM PHOSPHATE 75 ML/HR: .53; .5; .37; .037; .03; .012; .00082 INJECTION, SOLUTION INTRAVENOUS at 05:15

## 2023-01-01 RX ADMIN — METOPROLOL SUCCINATE 50 MG: 50 TABLET, EXTENDED RELEASE ORAL at 07:59

## 2023-01-01 RX ADMIN — SODIUM CHLORIDE 75 ML/HR: 0.9 INJECTION, SOLUTION INTRAVENOUS at 16:45

## 2023-01-01 RX ADMIN — METRONIDAZOLE 500 MG: 500 INJECTION, SOLUTION INTRAVENOUS at 21:04

## 2023-01-01 RX ADMIN — AMIODARONE HYDROCHLORIDE 200 MG: 200 TABLET ORAL at 16:34

## 2023-01-01 RX ADMIN — METRONIDAZOLE 500 MG: 500 INJECTION, SOLUTION INTRAVENOUS at 06:02

## 2023-01-01 RX ADMIN — APIXABAN 2.5 MG: 2.5 TABLET, FILM COATED ORAL at 17:24

## 2023-01-01 RX ADMIN — LATANOPROST 1 DROP: 50 SOLUTION OPHTHALMIC at 21:07

## 2023-01-01 RX ADMIN — VANCOMYCIN HYDROCHLORIDE 125 MG: 125 CAPSULE ORAL at 17:59

## 2023-01-01 RX ADMIN — METRONIDAZOLE 500 MG: 500 INJECTION, SOLUTION INTRAVENOUS at 21:25

## 2023-01-01 RX ADMIN — PANTOPRAZOLE SODIUM 40 MG: 40 TABLET, DELAYED RELEASE ORAL at 05:01

## 2023-01-01 RX ADMIN — METRONIDAZOLE 500 MG: 500 INJECTION, SOLUTION INTRAVENOUS at 13:31

## 2023-01-01 RX ADMIN — METRONIDAZOLE 500 MG: 500 INJECTION, SOLUTION INTRAVENOUS at 06:30

## 2023-01-01 RX ADMIN — APIXABAN 2.5 MG: 2.5 TABLET, FILM COATED ORAL at 09:06

## 2023-01-01 RX ADMIN — METOROPROLOL TARTRATE 2.5 MG: 5 INJECTION, SOLUTION INTRAVENOUS at 11:35

## 2023-01-01 RX ADMIN — VANCOMYCIN HYDROCHLORIDE 125 MG: 125 CAPSULE ORAL at 05:29

## 2023-01-01 RX ADMIN — MAGNESIUM SULFATE HEPTAHYDRATE 2 G: 40 INJECTION, SOLUTION INTRAVENOUS at 09:29

## 2023-01-01 RX ADMIN — LATANOPROST 1 DROP: 50 SOLUTION OPHTHALMIC at 21:39

## 2023-01-01 RX ADMIN — TRAZODONE HYDROCHLORIDE 100 MG: 100 TABLET ORAL at 20:31

## 2023-01-01 RX ADMIN — IOHEXOL 50 ML: 300 INJECTION, SOLUTION INTRAVENOUS at 13:41

## 2023-01-01 RX ADMIN — LORAZEPAM 0.5 MG: 2 INJECTION INTRAMUSCULAR; INTRAVENOUS at 16:40

## 2023-01-01 RX ADMIN — LATANOPROST 1 DROP: 50 SOLUTION OPHTHALMIC at 20:52

## 2023-01-01 RX ADMIN — METOPROLOL SUCCINATE 50 MG: 50 TABLET, EXTENDED RELEASE ORAL at 20:45

## 2023-01-01 RX ADMIN — DILTIAZEM HYDROCHLORIDE 15 MG: 5 INJECTION INTRAVENOUS at 05:56

## 2023-01-01 RX ADMIN — METOROPROLOL TARTRATE 5 MG: 5 INJECTION, SOLUTION INTRAVENOUS at 04:29

## 2023-01-01 RX ADMIN — PIPERACILLIN AND TAZOBACTAM 3.38 G: 36; 4.5 INJECTION, POWDER, FOR SOLUTION INTRAVENOUS at 02:02

## 2023-01-01 RX ADMIN — AMIODARONE HYDROCHLORIDE 200 MG: 200 TABLET ORAL at 08:54

## 2023-01-01 RX ADMIN — METRONIDAZOLE 500 MG: 500 INJECTION, SOLUTION INTRAVENOUS at 15:00

## 2023-01-01 RX ADMIN — VANCOMYCIN HYDROCHLORIDE 125 MG: 125 CAPSULE ORAL at 21:41

## 2023-01-01 RX ADMIN — MIDODRINE HYDROCHLORIDE 2.5 MG: 5 TABLET ORAL at 12:05

## 2023-01-01 RX ADMIN — AMIODARONE HYDROCHLORIDE 200 MG: 200 TABLET ORAL at 15:32

## 2023-01-01 RX ADMIN — ESCITALOPRAM OXALATE 20 MG: 20 TABLET ORAL at 21:15

## 2023-01-01 RX ADMIN — METOPROLOL SUCCINATE 50 MG: 50 TABLET, EXTENDED RELEASE ORAL at 21:22

## 2023-01-01 RX ADMIN — MIDODRINE HYDROCHLORIDE 2.5 MG: 5 TABLET ORAL at 17:34

## 2023-01-01 RX ADMIN — METRONIDAZOLE 500 MG: 500 INJECTION, SOLUTION INTRAVENOUS at 14:39

## 2023-01-01 RX ADMIN — VANCOMYCIN HYDROCHLORIDE 125 MG: 125 CAPSULE ORAL at 22:34

## 2023-01-01 RX ADMIN — MELATONIN 3 MG: 3 TAB ORAL at 23:44

## 2023-01-01 RX ADMIN — PIPERACILLIN AND TAZOBACTAM 3.38 G: 36; 4.5 INJECTION, POWDER, FOR SOLUTION INTRAVENOUS at 21:38

## 2023-01-01 RX ADMIN — TRAZODONE HYDROCHLORIDE 150 MG: 50 TABLET ORAL at 20:45

## 2023-01-01 RX ADMIN — HEPARIN SODIUM 16 UNITS/KG/HR: 10000 INJECTION, SOLUTION INTRAVENOUS at 02:28

## 2023-01-01 RX ADMIN — AMIODARONE HYDROCHLORIDE 200 MG: 200 TABLET ORAL at 08:20

## 2023-01-01 RX ADMIN — GABAPENTIN 300 MG: 300 CAPSULE ORAL at 21:39

## 2023-01-01 RX ADMIN — METRONIDAZOLE 500 MG: 500 INJECTION, SOLUTION INTRAVENOUS at 10:52

## 2023-01-01 RX ADMIN — METRONIDAZOLE 500 MG: 500 TABLET ORAL at 13:38

## 2023-01-01 RX ADMIN — METRONIDAZOLE 500 MG: 500 TABLET ORAL at 13:07

## 2023-01-01 RX ADMIN — METOPROLOL SUCCINATE 50 MG: 50 TABLET, EXTENDED RELEASE ORAL at 08:40

## 2023-01-01 RX ADMIN — POTASSIUM CHLORIDE 20 MEQ: 14.9 INJECTION, SOLUTION INTRAVENOUS at 22:32

## 2023-01-01 RX ADMIN — PIPERACILLIN AND TAZOBACTAM 3.38 G: 36; 4.5 INJECTION, POWDER, FOR SOLUTION INTRAVENOUS at 21:24

## 2023-01-01 RX ADMIN — TRAZODONE HYDROCHLORIDE 150 MG: 50 TABLET ORAL at 21:06

## 2023-01-01 RX ADMIN — ESCITALOPRAM OXALATE 20 MG: 20 TABLET ORAL at 20:41

## 2023-01-01 RX ADMIN — MIDODRINE HYDROCHLORIDE 2.5 MG: 5 TABLET ORAL at 15:27

## 2023-01-01 RX ADMIN — GABAPENTIN 300 MG: 300 CAPSULE ORAL at 21:41

## 2023-01-01 RX ADMIN — METOROPROLOL TARTRATE 2.5 MG: 5 INJECTION, SOLUTION INTRAVENOUS at 15:26

## 2023-01-01 RX ADMIN — APIXABAN 2.5 MG: 2.5 TABLET, FILM COATED ORAL at 17:05

## 2023-01-01 RX ADMIN — LATANOPROST 1 DROP: 50 SOLUTION OPHTHALMIC at 23:43

## 2023-01-01 RX ADMIN — ENOXAPARIN SODIUM 40 MG: 40 INJECTION SUBCUTANEOUS at 18:39

## 2023-01-01 RX ADMIN — CEFTRIAXONE 1000 MG: 1 INJECTION, SOLUTION INTRAVENOUS at 05:56

## 2023-01-01 RX ADMIN — AMIODARONE HYDROCHLORIDE 200 MG: 200 TABLET ORAL at 16:08

## 2023-01-01 RX ADMIN — GABAPENTIN 300 MG: 300 CAPSULE ORAL at 20:30

## 2023-01-01 RX ADMIN — ONDANSETRON 4 MG: 2 INJECTION INTRAMUSCULAR; INTRAVENOUS at 10:36

## 2023-01-01 RX ADMIN — SODIUM CHLORIDE 500 ML: 0.9 INJECTION, SOLUTION INTRAVENOUS at 23:10

## 2023-01-01 RX ADMIN — PIPERACILLIN AND TAZOBACTAM 3.38 G: 36; 4.5 INJECTION, POWDER, FOR SOLUTION INTRAVENOUS at 21:35

## 2023-01-01 RX ADMIN — VANCOMYCIN HYDROCHLORIDE 125 MG: 125 CAPSULE ORAL at 17:46

## 2023-01-01 RX ADMIN — AMIODARONE HYDROCHLORIDE 1 MG/MIN: 50 INJECTION, SOLUTION INTRAVENOUS at 02:25

## 2023-01-01 RX ADMIN — PIPERACILLIN AND TAZOBACTAM 3.38 G: 36; 4.5 INJECTION, POWDER, FOR SOLUTION INTRAVENOUS at 04:18

## 2023-01-01 RX ADMIN — FUROSEMIDE 40 MG: 40 TABLET ORAL at 11:41

## 2023-01-01 RX ADMIN — MIDODRINE HYDROCHLORIDE 5 MG: 5 TABLET ORAL at 06:00

## 2023-01-01 RX ADMIN — MIDODRINE HYDROCHLORIDE 2.5 MG: 5 TABLET ORAL at 15:32

## 2023-01-01 RX ADMIN — LIDOCAINE HYDROCHLORIDE 5 ML: 10 INJECTION, SOLUTION EPIDURAL; INFILTRATION; INTRACAUDAL; PERINEURAL at 12:48

## 2023-01-01 RX ADMIN — PIPERACILLIN AND TAZOBACTAM 3.38 G: 36; 4.5 INJECTION, POWDER, FOR SOLUTION INTRAVENOUS at 08:27

## 2023-01-01 RX ADMIN — METOPROLOL SUCCINATE 50 MG: 50 TABLET, EXTENDED RELEASE ORAL at 21:23

## 2023-01-01 RX ADMIN — METRONIDAZOLE 500 MG: 500 INJECTION, SOLUTION INTRAVENOUS at 18:03

## 2023-01-01 RX ADMIN — AMIODARONE HYDROCHLORIDE 200 MG: 200 TABLET ORAL at 08:21

## 2023-01-01 RX ADMIN — METRONIDAZOLE 500 MG: 500 INJECTION, SOLUTION INTRAVENOUS at 23:46

## 2023-01-01 RX ADMIN — DIGOXIN 250 MCG: 0.25 INJECTION INTRAMUSCULAR; INTRAVENOUS at 17:39

## 2023-01-01 RX ADMIN — DILTIAZEM HYDROCHLORIDE 10 MG: 5 INJECTION INTRAVENOUS at 11:13

## 2023-01-01 RX ADMIN — APIXABAN 2.5 MG: 2.5 TABLET, FILM COATED ORAL at 16:57

## 2023-01-01 RX ADMIN — TRAZODONE HYDROCHLORIDE 100 MG: 100 TABLET ORAL at 21:25

## 2023-01-01 RX ADMIN — PIPERACILLIN AND TAZOBACTAM 3.38 G: 36; 4.5 INJECTION, POWDER, FOR SOLUTION INTRAVENOUS at 02:23

## 2023-01-01 RX ADMIN — METOROPROLOL TARTRATE 2.5 MG: 5 INJECTION, SOLUTION INTRAVENOUS at 03:58

## 2023-01-01 RX ADMIN — CEFTRIAXONE 1000 MG: 1 INJECTION, SOLUTION INTRAVENOUS at 04:25

## 2023-01-01 RX ADMIN — METRONIDAZOLE 500 MG: 500 INJECTION, SOLUTION INTRAVENOUS at 06:18

## 2023-01-01 RX ADMIN — AMIODARONE HYDROCHLORIDE 200 MG: 200 TABLET ORAL at 09:33

## 2023-01-01 RX ADMIN — VANCOMYCIN HYDROCHLORIDE 125 MG: 125 CAPSULE ORAL at 13:35

## 2023-01-01 RX ADMIN — METOPROLOL SUCCINATE 50 MG: 50 TABLET, EXTENDED RELEASE ORAL at 08:02

## 2023-01-01 RX ADMIN — METOPROLOL TARTRATE 2.5 MG: 5 INJECTION INTRAVENOUS at 00:56

## 2023-01-01 RX ADMIN — SODIUM CHLORIDE 50 ML/HR: 0.9 INJECTION, SOLUTION INTRAVENOUS at 13:21

## 2023-01-01 RX ADMIN — PIPERACILLIN AND TAZOBACTAM 3.38 G: 36; 4.5 INJECTION, POWDER, FOR SOLUTION INTRAVENOUS at 09:17

## 2023-01-01 RX ADMIN — POTASSIUM CHLORIDE 20 MEQ: 14.9 INJECTION, SOLUTION INTRAVENOUS at 12:29

## 2023-01-01 RX ADMIN — TRAZODONE HYDROCHLORIDE 100 MG: 100 TABLET ORAL at 21:41

## 2023-01-01 RX ADMIN — AMIODARONE HYDROCHLORIDE 200 MG: 200 TABLET ORAL at 08:02

## 2023-01-01 RX ADMIN — MIDODRINE HYDROCHLORIDE 2.5 MG: 5 TABLET ORAL at 11:35

## 2023-01-01 RX ADMIN — DILTIAZEM HYDROCHLORIDE 10 MG: 5 INJECTION INTRAVENOUS at 18:35

## 2023-01-01 RX ADMIN — ESCITALOPRAM OXALATE 20 MG: 20 TABLET ORAL at 20:44

## 2023-01-01 RX ADMIN — METOPROLOL TARTRATE 5 MG: 5 INJECTION INTRAVENOUS at 16:20

## 2023-01-01 RX ADMIN — DILTIAZEM HYDROCHLORIDE 30 MG: 30 TABLET, FILM COATED ORAL at 10:55

## 2023-01-01 RX ADMIN — CEFTRIAXONE 1000 MG: 1 INJECTION, SOLUTION INTRAVENOUS at 05:17

## 2023-01-01 RX ADMIN — MIDODRINE HYDROCHLORIDE 2.5 MG: 5 TABLET ORAL at 05:50

## 2023-01-01 RX ADMIN — METRONIDAZOLE 500 MG: 500 INJECTION, SOLUTION INTRAVENOUS at 23:14

## 2023-01-01 RX ADMIN — Medication: at 21:25

## 2023-01-01 RX ADMIN — PIPERACILLIN AND TAZOBACTAM 3.38 G: 36; 4.5 INJECTION, POWDER, FOR SOLUTION INTRAVENOUS at 08:40

## 2023-01-01 RX ADMIN — CALCIUM GLUCONATE 1 G: 20 INJECTION, SOLUTION INTRAVENOUS at 15:49

## 2023-01-01 RX ADMIN — Medication: at 23:03

## 2023-01-01 RX ADMIN — PIPERACILLIN AND TAZOBACTAM 3.38 G: 36; 4.5 INJECTION, POWDER, FOR SOLUTION INTRAVENOUS at 21:41

## 2023-01-01 RX ADMIN — METOPROLOL SUCCINATE 25 MG: 25 TABLET, EXTENDED RELEASE ORAL at 18:01

## 2023-01-01 RX ADMIN — AMIODARONE HYDROCHLORIDE 0.5 MG/MIN: 50 INJECTION, SOLUTION INTRAVENOUS at 10:22

## 2023-01-01 RX ADMIN — LORAZEPAM 0.5 MG: 2 INJECTION INTRAMUSCULAR; INTRAVENOUS at 14:55

## 2023-01-01 RX ADMIN — CALCIUM GLUCONATE 1 G: 20 INJECTION, SOLUTION INTRAVENOUS at 09:10

## 2023-01-01 RX ADMIN — Medication: at 21:38

## 2023-01-01 RX ADMIN — METOROPROLOL TARTRATE 2.5 MG: 5 INJECTION, SOLUTION INTRAVENOUS at 12:04

## 2023-01-01 RX ADMIN — METRONIDAZOLE 500 MG: 500 INJECTION, SOLUTION INTRAVENOUS at 02:03

## 2023-01-01 RX ADMIN — LATANOPROST 1 DROP: 50 SOLUTION OPHTHALMIC at 20:33

## 2023-02-10 PROBLEM — E44.0 MODERATE PROTEIN-CALORIE MALNUTRITION (HCC): Status: ACTIVE | Noted: 2023-01-01

## 2023-02-10 PROBLEM — I48.91 ATRIAL FIBRILLATION WITH RVR (HCC): Status: ACTIVE | Noted: 2023-01-01

## 2023-02-10 PROBLEM — N18.32 STAGE 3B CHRONIC KIDNEY DISEASE (HCC): Status: ACTIVE | Noted: 2023-01-01

## 2023-02-10 PROBLEM — I25.10 CORONARY ARTERY DISEASE INVOLVING NATIVE CORONARY ARTERY OF NATIVE HEART: Status: ACTIVE | Noted: 2023-01-01

## 2023-02-10 PROBLEM — K52.9 ENTEROCOLITIS: Status: ACTIVE | Noted: 2023-01-01

## 2023-02-10 PROBLEM — I50.42 CHRONIC COMBINED SYSTOLIC AND DIASTOLIC CONGESTIVE HEART FAILURE (HCC): Status: ACTIVE | Noted: 2023-01-01

## 2023-02-10 PROBLEM — J90 PLEURAL EFFUSION, BILATERAL: Status: ACTIVE | Noted: 2023-01-01

## 2023-02-10 PROBLEM — I50.23 ACUTE ON CHRONIC SYSTOLIC (CONGESTIVE) HEART FAILURE (HCC): Status: ACTIVE | Noted: 2023-01-01

## 2023-02-10 PROBLEM — N30.00 ACUTE CYSTITIS: Status: ACTIVE | Noted: 2023-01-01

## 2023-02-10 NOTE — CONSULTS
Consultation - General Surgery   Eli York 80 y o  female MRN: [id number]  Unit/Bed#: -01 Encounter: [id number]    Assessment:  80 y o  female w/ CT suggestive of severe gastroenterocolitis  CTAP pertinent IMPRESSION:  - Findings most suggestive of severe gastroenterocolitis with associated mesenteric edema and small volume ascites, which is likely reactive  - No evidence of peritoneal enhancement at this time to suggest peritonitis, however, clinical follow-up recommended  - Afebrile, tachycardia, episodes of hypotension, on 2L nasal cannula  - WBC 3 19  - Hgb 12 4  - Lactic Acid 1 4   - Hypomagnesia 1 8   -   - Blood cultures x 2 in progress  - Acute cystitis - Moderate leuks on UA  - Hx CAD, CKD3b, CHF, NSTEMI, takotsubo cardiomyopathy, A fib on Eliquis    Plan:  - Conservative management  - No acute surgical intervention indicated at this time  - Bowel rest - NPO sips with meds  - Plasmalyte IV hydration   - Serial abdominal exams  - IV abx as indicated - Ceftriaxone/Flagyl   - Procalcitonin pending  - Co-morbidities per primary service  _______________________________________________________________  Physician Requesting Consult: Abad Siegel MD    Additional consultants: cardiology    Reason for Consult / Principal Problem: enterocolitis    HPI: Eli York is a 80y o  year old female with PMH significant for A fib on Eliquis, NSTEMI, CHF, SKD3b, CAD who presented to [address] [place] [address] ED with abdominal pain, increased urinary symptoms, and weakness on 2/10  Patient explains the pain started suddenly late that night  She felt it most significantly across the lower abdomen  She had associated nausea with vomiting and urinary incontinence  In route to ED and in ED patient noted to be short of breath, O2 90%, Afib RVR  CTAP performed significant for severe gastroenterocolitis   At time of exam patient explains she feels her symptoms are much improved although she remains with some tenderness to touch  She denies further nausea or vomiting  She did have an episode of diarrhea this AM  Patient denies any issues with urination including dysuria, burning, frequency, or urgency  Of note patient does mention not having eaten anything the past two days but states this is second to a broken microwave and not feeling like cleaning up after cooking  Patient denies experiencing pain during this time frame  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 who remains at home with her dog at this time  Patient also feels as if she can equate these symptoms to taking her water pill and states she just won't take this next time  Educated on importance of water pill when she notes significant change in weight and unlikely relation of pill with current symptoms  Historical Information   Past Medical History:   Diagnosis Date   • Arthritis    • CHF (congestive heart failure) ([facility] [address] )    • Coronary artery disease    • Glaucoma    • Hypertension    • Kidney disease      Past Surgical History:   Procedure Laterality Date   • CHOLECYSTECTOMY     • HYSTERECTOMY     • KNEE ARTHROPLASTY Left    • THYROID LOBECTOMY       Social History   Social History     Substance and Sexual Activity   Alcohol Use Not Currently     Social History     Substance and Sexual Activity   Drug Use Never     Social History     Tobacco Use   Smoking Status Former   Smokeless Tobacco Never   Tobacco Comments    quit over 50 years ago     Family History: non-contributory    Meds/Allergies   Home meds:   Prior to Admission medications    Medication Sig Start Date End Date Taking?  Authorizing Provider   apixaban (Eliquis) 2 5 mg Take by mouth 2 (two) times a day   Yes Historical Provider, MD   Ascorbic Acid (VITAMIN C) 100 MG tablet Take 100 mg by mouth daily   Yes Historical Provider, MD   cholecalciferol (VITAMIN D3) 1,000 units tablet Take 1,000 Units by mouth daily   Yes Historical Provider, MD   famotidine (PEPCID) 20 mg tablet Take 20 mg by mouth daily   Yes Historical Provider, MD   Flaxseed, Linseed, ([facility] Flaxseed Oil) 1000 MG CAPS Take 2,000 mg by mouth   Yes Historical Provider, MD   gabapentin (NEURONTIN) 600 MG tablet Take 600 mg by mouth daily   Yes Historical Provider, MD   latanoprost (XALATAN) 0 005 % ophthalmic solution Administer 1 drop to both eyes daily at bedtime   Yes Historical Provider, MD   lisinopril (ZESTRIL) 2 5 mg tablet Take 2 5 mg by mouth daily   Yes Historical Provider, MD   metoprolol tartrate (LOPRESSOR) 25 mg tablet Take 12 5 mg by mouth every 12 (twelve) hours   Yes Historical Provider, MD   saccharomyces boulardii (FLORASTOR) 250 mg capsule Take 250 mg by mouth 2 (two) times a day   Yes Historical Provider, MD   simvastatin (ZOCOR) 80 mg tablet Take 80 mg by mouth daily at bedtime   Yes Historical Provider, MD   traZODone (DESYREL) 50 mg tablet Take 150 mg by mouth 3 (three) times a day   Yes Historical Provider, MD   vitamin B-12 (VITAMIN B-12) 1,000 mcg tablet Take 5,000 mcg by mouth daily   Yes Historical Provider, MD   calcium acetate (PHOSLO) 667 mg capsule Take 1,334 mg by mouth 3 (three) times a day with meals  2/10/23 Yes Historical Provider, MD   Latanoprost 0 005 % EMUL Apply to eye  2/10/23 Yes Historical Provider, MD   escitalopram (LEXAPRO) 20 mg tablet Take 20 mg by mouth daily    Historical Provider, MD   furosemide (LASIX) 40 mg tablet Take 40 mg by mouth as needed    Historical Provider, MD   loperamide (IMODIUM A-D) 2 MG tablet Take 1 tablet (2 mg total) by mouth 4 (four) times a day as needed for diarrhea 3/15/19   Sonja Campbell PA-C   atorvastatin (LIPITOR) 80 mg tablet Take by mouth  Patient not taking: Reported on 2/10/2023  2/10/23  Historical Provider, MD   enalapril (VASOTEC) 2 5 mg tablet  3/15/19 2/10/23  Historical Provider, MD     Scheduled Meds:  Current Facility-Administered Medications   Medication Dose Route Frequency Provider Last Rate   • apixaban  2 5 mg Oral BID Liz MEZA Germaine, CRNP     • [START ON 2/11/2023] cefTRIAXone  1,000 mg Intravenous Q24H Liz S Germaine, CRNP     • escitalopram  20 mg Oral Daily Liz S Germaine, CRNP     • gabapentin  300 mg Oral Daily Liz S Germaine, CRNP     • latanoprost  1 drop Both Eyes HS Liz S Germaine, CRNP     • metoprolol  5 mg Intravenous Q5 Min PRN Liz S Germaine, CRNP     • metoprolol succinate  25 mg Oral Daily Liz S Germaine, CRNP     • metroNIDAZOLE  500 mg Intravenous Q8H Harlan Stover MD Stopped (02/10/23 0411)   • multi-electrolyte  75 mL/hr Intravenous Continuous Liz S Germaine, CRNP 75 mL/hr (02/10/23 0515)   • pantoprazole  40 mg Intravenous Q24H [address] Liz S Germaine, CRNP     • traZODone  100 mg Oral HS Liz S Germaine, CRNP       Continuous Infusions:multi-electrolyte, 75 mL/hr, Last Rate: 75 mL/hr (02/10/23 0515)    PRN Meds:  •  metoprolol    ALLERGIES:   Allergies   Allergen Reactions   • Diclofenac Other (See Comments)     liver problems  liver problems     • Meloxicam Other (See Comments)     Kidney failure  Kidney failure     • Sertraline Other (See Comments)     Other reaction(s): increase in anxiety  Other reaction(s): increase in anxiety       Review of Systems:  General: negative for - chills, fatigue or fever  Cardiovascular: negative for - chest pain or awareness of rapid heart rate  Respiratory: positive for - shortness of breath  negative for - cough or wheezing  Gastrointestinal: positive for - abdominal pain, appetite loss, diarrhea and nausea  negative for - blood in stools or vomiting  Genitourinary: positive for - incontinence   negative for - dysuria, hematuria or urinary frequency/urgency  Musculoskeletal: negative  Neurological: positive for - dizziness and lightheadedness initially yesterday AM although resolved   Hematological and Lymphatic: negative  Dermatological : negative  Psychological: negative    Objective   Vitals:  Blood pressure 118/67, pulse 105, temperature 98 3 °F (36 8 °C), temperature source Temporal, resp  rate 16, height 5' 2" (1 575 m), weight 53 3 kg (117 lb 8 1 oz), SpO2 95 %  Body mass index is 21 49 kg/m²  I/Os:  I/O       02/08 0701 02/09 0700 02/09 0701  02/10 0700 02/10 0701 02/11 0700    IV Piggyback  450     Total Intake(mL/kg)  450 (8 4)     Urine (mL/kg/hr)  450     Total Output  450     Net  0                Invasive Lines/Tubes:  Invasive Devices     Peripheral Intravenous Line  Duration           Peripheral IV 02/10/23 Distal;Dorsal (posterior); Right Forearm <1 day    Peripheral IV 02/10/23 Dorsal (posterior); Left Wrist <1 day              Physical Exam  Constitutional:       General: She is not in acute distress  Appearance: Normal appearance  Comments: Hard of hearing   HENT:      Head: Normocephalic and atraumatic  Right Ear: External ear normal       Left Ear: External ear normal       Nose: Nose normal       Comments: Nasal cannula in place     Mouth/Throat:      Mouth: Mucous membranes are dry  Pharynx: Oropharynx is clear  Comments: Dry mucous membranes, licking lips and commenting on dryness throughout conversation  Eyes:      Extraocular Movements: Extraocular movements intact  Pupils: Pupils are equal, round, and reactive to light  Cardiovascular:      Rate and Rhythm: Tachycardia present  Rhythm irregular  Pulmonary:      Effort: Pulmonary effort is normal    Abdominal:      General: Abdomen is flat  Bowel sounds are normal  There is no distension  Palpations: Abdomen is soft  Tenderness: There is abdominal tenderness (across lower abdomen)  There is no guarding or rebound  Genitourinary:     Comments: Clear yellow urine in cannister  Skin:     General: Skin is warm and dry  Capillary Refill: Capillary refill takes less than 2 seconds  Neurological:      General: No focal deficit present  Mental Status: She is alert     Psychiatric:         Mood and Affect: Mood normal          Behavior: Behavior normal      Lab Results and Cultures:   COVID:   Last COVID19 Screening Values     None         CBC:   Results from last 7 days   Lab Units 02/10/23  0050   WBC Thousand/uL 3 19*   HEMOGLOBIN g/dL 12 4   HEMATOCRIT % 38 4   PLATELETS Thousands/uL 211     BMP/CMP:  Results from last 7 days   Lab Units 02/10/23  0050   POTASSIUM mmol/L 4 3   CHLORIDE mmol/L 102   CO2 mmol/L 32   BUN mg/dL 15   CREATININE mg/dL 1 16   CALCIUM mg/dL 8 1*     Coags:     Lipid panel:     HgbA1c: No results found for: HGBA1C    Urinalysis:   Lab Results   Component Value Date    COLORU Yellow 02/10/2023    CLARITYU Clear 02/10/2023    SPECGRAV 1 015 02/10/2023    PHUR 5 5 02/10/2023    LEUKOCYTESUR Moderate (A) 02/10/2023    NITRITE Negative 02/10/2023    GLUCOSEU Negative 02/10/2023    KETONESU Negative 02/10/2023    BILIRUBINUR Negative 02/10/2023    BLOODU Negative 02/10/2023     Urine Culture: No results found for: URINECX  Wound Culure: No results found for: WOUNDCULT  Blood Culture: No results found for: BLOODCX    Imaging Studies: I have personally reviewed pertinent reports  EKG, Pathology, and Other Studies: I have personally reviewed pertinent reports  VTE Prophylaxis: Eliquis     Code Status: Level 1 - Full Code  Advance Directive and Living Will:      Power of :    POLST:      Counseling / Coordination of Care  Counseling/Coordination of Care: Total floor / unit time spent today 40 minutes  Greater than 50% of total time was spent with the patient and / or family counseling and / or coordination of care  A description of the counseling / coordination of care: review of history, notes, labs, imaging, prior history and physical from patient, assessment, plan discussed with patient and attending       Oziel Alonso  2/10/2023

## 2023-02-10 NOTE — MALNUTRITION/BMI
This medical record reflects one or more clinical indicators suggestive of malnutrition  Malnutrition Findings:   Adult Malnutrition type: Chronic illness  Adult Degree of Malnutrition: Malnutrition of moderate degree  Malnutrition Characteristics: Muscle loss, Fat loss, Inadequate energy                  360 Statement: Chronic moderate malnutrition related to decreased oral intake, difficulties with meal preparation at home as evidenced by < 75% estimated energy intake > 1 mo; moderate muscle loss to temporalis, pectoralis, deltoid muscles; moderate to severe fat loss to orbitals, moderate fat loss to buccal pads  Treated with: Advance diet as medically appropriate to modest 4gm DENY given hx of CHF  Will discuss supplements with pt once diet initiated  Recommend daily weights for nutrition monitoring  Did discuss Mom's Meals with pt for ease of meal preparation at home  BMI Findings: Body mass index is 21 49 kg/m²  See Nutrition note dated 2/10/23 for additional details  Completed nutrition assessment is viewable in the nutrition documentation

## 2023-02-10 NOTE — ASSESSMENT & PLAN NOTE
· Presented with complaints of abdominal pain and foul-smelling urine  · UA with bacteriuria, pyuria  · Urine culture pending  · Empiric ceftriaxone

## 2023-02-10 NOTE — ASSESSMENT & PLAN NOTE
· Reports 1 day of severe generalized abdominal pain associated with nausea and dry heaves  · Normal lactic acid  · CT abdomen pelvis with gastroenterocolitis  · Empiric ceftriaxone/metronidazole -check procalcitonin  · Pain improved significantly at time of admission, abdomen soft  · Appreciate general surgery consultation  · N p o  with IV hydration

## 2023-02-10 NOTE — CASE MANAGEMENT
Case Management Assessment & Discharge Planning Note    Patient name Joyce Dominguez  Location /-62 MRN [id number]  : 1934 Date 2/10/2023       Current Admission Date: 2/10/2023  Current Admission Diagnosis:Atrial fibrillation with RVR [facility])   Patient Active Problem List    Diagnosis Date Noted   • Atrial fibrillation with R[facility] ) 02/10/2023   • Enterocolitis 02/10/2023   • Pleural effusion, bilateral 02/10/2023   • Chronic combined systolic and diastolic congestive heart failure ([facility] ) 02/10/2023   • Acute cystitis 02/10/2023   • Stage 3b chronic kidney disease ([facility] ) 02/10/2023   • Coronary artery disease involving native coronary artery of native heart 02/10/2023   • Moderate protein-calorie malnutrition ([facility] ) 02/10/2023      LOS (days): 0  Geometric Mean LOS (GMLOS) (days): 2 60  Days to GMLOS:2     OBJECTIVE:    Risk of Unplanned Readmission Score: 11 37         Current admission status: Inpatient  Referral Reason:  (discharge planning)    Preferred Pharmacy:   [facility]  [address] [place] [address]  Phone: [phone] Fax: [phone]    Primary Care Provider: Amadeo Motta DO    Primary Insurance: MEDICARE  Secondary Insurance: AARP    ASSESSMENT:    CM met with patient at the bedside,baseline information  was obtained  CM discussed the role of CM in helping the patient develop a discharge plan and assist the patient in carry out their plan  Patient stated she lives with her spouse who she cares for he has dementia / forgetful       [address] Proxies    There are no active Health Care Proxies on file         Advance Directives  Does patient have a [address]?: Yes  Does patient have Advance Directives?: Yes  Advance Directives: Living will  Primary Contact: patient stated her daughter Constantin Ruggiero has POA         Readmission Root Cause  30 Day Readmission: No    Patient Information  Admitted from[de-identified] Home  Mental Status: Alert  During Assessment patient was accompanied by: Not accompanied during assessment  Assessment information provided by[de-identified] Patient  Primary Caregiver: Family (daughter assist as needed)  Caregiver's Name[de-identified] Rigoberto Lord Relationship to Patient[de-identified] Family Member  Caregiver's Telephone Number[de-identified] see face sheet  [place] of Residence: [address] do you live in?: [address] entry access options   Select all that apply : Stairs  Number of steps to enter home : 2  Do the steps have railings?: Yes  Type of Current Residence: 2 story home  Upon entering residence, is there a bedroom on the main floor (no further steps)?: Yes (first floor living)  Upon entering residence, is there a bathroom on the main floor (no further steps)?: Yes  In the last 12 months, was there a time when you were not able to pay the mortgage or rent on time?: No  In the last 12 months, how many places have you lived?: 1  In the last 12 months, was there a time when you did not have a steady place to sleep or slept in a shelter (including now)?: No  Homeless/housing insecurity resource given?: N/A  Living Arrangements: Lives w/ Spouse/significant other  Is patient a ?: No    Activities of Daily Living Prior to Admission  Functional Status: Independent  Completes ADLs independently?: Yes  Ambulates independently?: Yes  Does patient use assisted devices?: Yes  Assisted Devices (DME) used: Bedside Commode, Walker  Does patient currently own DME?: Yes  What DME does the patient currently own?: Bedside Commode, Walker  Does patient have a history of Outpatient Therapy (PT/OT)?: No  Does the patient have a history of Short-Term Rehab?: No  Does patient have a history of HHC?: No  Does patient currently have [facility]?: No         Patient Information Continued  Income Source: Pension/group home  Does patient have prescription coverage?: No  Within the past 12 months, you worried that your food would run out before you got the money to buy more : Never true  Within the past 12 months, the food you bought just didn't last and you didn't have money to get more : Never true  Food insecurity resource given?: N/A  Does patient receive dialysis treatments?: No  Does patient have a history of substance abuse?: No         Means of Transportation  Means of Transport to Appts[de-identified] Drives Self  In the past 12 months, has lack of transportation kept you from medical appointments or from getting medications?: No  In the past 12 months, has lack of transportation kept you from meetings, work, or from getting things needed for daily living?: No  Was application for public transport provided?: N/A        DISCHARGE DETAILS:    Discharge planning discussed with[de-identified] patient at bedside  Freedom of Choice: Yes     CM contacted family/caregiver?: Yes (spoke with daughter Jamia Montgomery after 3 attemps during day/ICU nursing to contact daughter with medical update)  Were Treatment Team discharge recommendations reviewed with patient/caregiver?: Yes  Did patient/caregiver verbalize understanding of patient care needs?: Yes  Were patient/caregiver advised of the risks associated with not following Treatment Team discharge recommendations?: Yes    Contacts  Patient Contacts: Casey Montano daughter  Relationship to Patient[de-identified] Family  Contact Method: Phone  Phone Number: see  face sheet  Reason/Outcome: Discharge Planning    CM to follow

## 2023-02-10 NOTE — CASE MANAGEMENT
Case Management Progress Note    Patient name Rhea Winn  Location /-01 MRN [id number]  : 1934 Date 2/10/2023       LOS (days): 0  Geometric Mean LOS (GMLOS) (days): 2 60  Days to GMLOS:2 1        OBJECTIVE:        Current admission status: Inpatient  Preferred Pharmacy:   [facility] [phone]  [facility] [address] [address] [place] [address]  Phone: [phone] Fax: [phone]    Primary Care Provider: Rhina Briseno DO    Primary Insurance: MEDICARE  Secondary Insurance: AARP    PROGRESS NOTE:    CM received message patient was admitted to  hospital and staff or patient are unable to reach her daughter to check on her spouse at home hx some degree of dementia     1000 CM called patient daughter Reyna Haro and no answer message left to call [facility] CM     1015 CM called [address] for a wellness check on patient today due to home alone hx dementia and hospital staff and CM tried to reach patient daughter with no answer   1515 CM placed call to patient daughter Addie Mendes and had to leave message to call [facility]     1530 CM called Claudell Grammes and they did not do a wellness check on patient spouse  They took info again and said they will do wellness check this afternoon and retry calling daughter    CM called [facility] police that covers [facility] to complete a wellness check on patient spouse this evening that [facility] [employer] was called this am for wellness check and they didn't go to check on patient  Police said  They will do wellness visit on patient due to some degree of dementia per patient  State police to call CM back once wellness check is completed  0 patient daughter called CM stating she got a call from Willam Cuenca at  [facility] that we are doing a wellness check on her dad, she will go see how he is but his dementia isn't really bad        Daughter asking for return call from nursing to update her on her moms condition  ICU nursing updated  [address] [address] [facility] from [place] [employer] called EL and stated same they got in contact with daughter and she said she will go and check on her father  AAA not doing wellness check today      EL to follow

## 2023-02-10 NOTE — ASSESSMENT & PLAN NOTE
· Moderate bilateral pleural effusions  · History of chronic systolic heart failure, reviewed care everywhere there were small bilateral pleural effusions on chest x-ray September  · Reports chronic dyspnea  · Outpatient follow-up

## 2023-02-10 NOTE — ASSESSMENT & PLAN NOTE
Lab Results   Component Value Date    EGFR 42 02/10/2023    CREATININE 1 16 02/10/2023   · History CKD 3  · Creatinine appears baseline when compared in care everywhere  · Trend creatinine  · Bacteriuria-empiric ceftriaxone  · Renally dose medications, avoid nephrotoxic medication

## 2023-02-10 NOTE — PROGRESS NOTES
Pt with reported poor oral intake due to decreased appetite and difficulties with meal prepartion at home  Says she is taking care of her  who has dementia,  had broke their microwave at home  Pt could not explain clearly if she was getting a new microwave though says that it has affected her meal intake, has to reheat foods over the stove top which is timely and requires more effort  Was trying to make PRO shakes at home with whey protein, she was concerned about high sugar content of pre-made shakes  Chart review of weight hx: 10/14/21 123lb, 2/28/22 124lb, 9/12/22 108lb, 1/6/23 110lb, 2/10/23 117lb  Previous 12 9% weight loss x 7 mo  Pt reports this was weight loss was related to her taking tumeric supplements for inflammation relief and subsequently had diarrhea  Reports that once she stopped the tumeric supplement, diarrhea stopped and started to gain back some weight  Noting moderate muscle loss, moderate to severe fat loss  Meets criteria for chronic moderate malnutrition  Advance diet as medically appropriate to modest 4gm DENY given hx of CHF  Will discuss supplements with pt once diet initiated  Recommend daily weights for nutrition monitoring  Did discuss Mom's Meals with pt for ease of meal preparation at home

## 2023-02-10 NOTE — ED NOTES
Patient incontinent of a large amount of stool, incontinence care provided       Fabrice Ibanez RN  02/10/23 7475

## 2023-02-10 NOTE — ASSESSMENT & PLAN NOTE
· NSTEMI September 2022  · Cardiac catheterization with minimal atherosclerotic disease, no PCI  · Continue Eliquis and simvastatin

## 2023-02-10 NOTE — PLAN OF CARE
Pt admitted for afib w/ RVR, gastroenteritis, and cystitis  Pt on flagyl and rocephin  Pt is 's caregiver   forgetful and daugthers' not aware of pt's admission  Pt also on IVF  Will continue to monitor and support as needed  Cardiology and surgery consulted and still to see  Problem: Nutrition/Hydration-ADULT  Goal: Nutrient/Hydration intake appropriate for improving, restoring or maintaining nutritional needs  Description: Monitor and assess patient's nutrition/hydration status for malnutrition  Collaborate with interdisciplinary team and initiate plan and interventions as ordered  Monitor patient's weight and dietary intake as ordered or per policy  Utilize nutrition screening tool and intervene as necessary  Determine patient's food preferences and provide high-protein, high-caloric foods as appropriate       INTERVENTIONS:  - Monitor oral intake, urinary output, labs, and treatment plans  - Assess nutrition and hydration status and recommend course of action  - Evaluate amount of meals eaten  - Assist patient with eating if necessary   - Allow adequate time for meals  - Recommend/ encourage appropriate diets, oral nutritional supplements, and vitamin/mineral supplements  - Order, calculate, and assess calorie counts as needed  - Recommend, monitor, and adjust tube feedings and TPN/PPN based on assessed needs  - Assess need for intravenous fluids  - Provide specific nutrition/hydration education as appropriate  - Include patient/family/caregiver in decisions related to nutrition  Outcome: Progressing     Problem: PAIN - ADULT  Goal: Verbalizes/displays adequate comfort level or baseline comfort level  Description: Interventions:  - Encourage patient to monitor pain and request assistance  - Assess pain using appropriate pain scale  - Administer analgesics based on type and severity of pain and evaluate response  - Implement non-pharmacological measures as appropriate and evaluate response  - Consider cultural and social influences on pain and pain management  - Notify physician/advanced practitioner if interventions unsuccessful or patient reports new pain  Outcome: Progressing     Problem: INFECTION - ADULT  Goal: Absence or prevention of progression during hospitalization  Description: INTERVENTIONS:  - Assess and monitor for signs and symptoms of infection  - Monitor lab/diagnostic results  - Monitor all insertion sites, i e  indwelling lines, tubes, and drains  - Monitor endotracheal if appropriate and nasal secretions for changes in amount and color  - [place] appropriate cooling/warming therapies per order  - Administer medications as ordered  - Instruct and encourage patient and family to use good hand hygiene technique  - Identify and instruct in appropriate isolation precautions for identified infection/condition  Outcome: Progressing     Problem: SAFETY ADULT  Goal: Patient will remain free of falls  Description: INTERVENTIONS:  - Educate patient/family on patient safety including physical limitations  - Instruct patient to call for assistance with activity   - Consult OT/PT to assist with strengthening/mobility   - Keep Call bell within reach  - Keep bed low and locked with side rails adjusted as appropriate  - Keep care items and personal belongings within reach  - Initiate and maintain comfort rounds  - Make Fall Risk Sign visible to staff  - Apply yellow socks and bracelet for high fall risk patients  - Consider moving patient to room near nurses station  Outcome: Progressing  Goal: Maintain or return to baseline ADL function  Description: INTERVENTIONS:  -  Assess patient's ability to carry out ADLs; assess patient's baseline for ADL function and identify physical deficits which impact ability to perform ADLs (bathing, care of mouth/teeth, toileting, grooming, dressing, etc )  - Assess/evaluate cause of self-care deficits   - Assess range of motion  - Assess patient's mobility; develop plan if impaired  - Assess patient's need for assistive devices and provide as appropriate  - Encourage maximum independence but intervene and supervise when necessary  - Involve family in performance of ADLs  - Assess for home care needs following discharge   - Consider OT consult to assist with ADL evaluation and planning for discharge  - Provide patient education as appropriate  Outcome: Progressing  Goal: Maintains/Returns to pre admission functional level  Description: INTERVENTIONS:  - Perform BMAT or MOVE assessment daily    - Set and communicate daily mobility goal to care team and patient/family/caregiver  - Collaborate with rehabilitation services on mobility goals if consulted  - Perform Range of Motion 3 times a day  - Reposition patient every 2 hours if pt unable to reposition self  - Out of bed for toileting  - Record patient progress and toleration of activity level   Outcome: Progressing     Problem: DISCHARGE PLANNING  Goal: Discharge to home or other facility with appropriate resources  Description: INTERVENTIONS:  - Identify barriers to discharge w/patient and caregiver  - Arrange for needed discharge resources and transportation as appropriate  - Identify discharge learning needs (meds, wound care, etc )  - Arrange for interpretive services to assist at discharge as needed  - Refer to Case Management Department for coordinating discharge planning if the patient needs post-hospital services based on physician/advanced practitioner order or complex needs related to functional status, cognitive ability, or social support system  Outcome: Progressing     Problem: Knowledge Deficit  Goal: Patient/family/caregiver demonstrates understanding of disease process, treatment plan, medications, and discharge instructions  Description: Complete learning assessment and assess knowledge base    Interventions:  - Provide teaching at level of understanding  - Provide teaching via preferred learning methods  Outcome: Progressing     Problem: CARDIOVASCULAR - ADULT  Goal: Maintains optimal cardiac output and hemodynamic stability  Description: INTERVENTIONS:  - Monitor I/O, vital signs and rhythm  - Monitor for S/S and trends of decreased cardiac output  - Administer and titrate ordered vasoactive medications to optimize hemodynamic stability  - Assess quality of pulses, skin color and temperature  - Assess for signs of decreased coronary artery perfusion  - Instruct patient to report change in severity of symptoms  Outcome: Progressing  Goal: Absence of cardiac dysrhythmias or at baseline rhythm  Description: INTERVENTIONS:  - Continuous cardiac monitoring, vital signs, obtain 12 lead EKG if ordered  - Administer antiarrhythmic and heart rate control medications as ordered  - Monitor electrolytes and administer replacement therapy as ordered  Outcome: Progressing     Problem: GASTROINTESTINAL - ADULT  Goal: Minimal or absence of nausea and/or vomiting  Description: INTERVENTIONS:  - Administer IV fluids if ordered to ensure adequate hydration  - Maintain NPO status until nausea and vomiting are resolved  - Nasogastric tube if ordered  - Administer ordered antiemetic medications as needed  - Provide nonpharmacologic comfort measures as appropriate  - Advance diet as tolerated, if ordered  - Consider nutrition services referral to assist patient with adequate nutrition and appropriate food choices  Outcome: Progressing  Goal: Maintains or returns to baseline bowel function  Description: INTERVENTIONS:  - Assess bowel function  - Encourage oral fluids to ensure adequate hydration  - Administer IV fluids if ordered to ensure adequate hydration  - Administer ordered medications as needed  - Encourage mobilization and activity  - Consider nutritional services referral to assist patient with adequate nutrition and appropriate food choices  Outcome: Progressing  Goal: Maintains adequate nutritional intake  Description: INTERVENTIONS:  - Monitor percentage of each meal consumed  - Identify factors contributing to decreased intake, treat as appropriate  - Assist with meals as needed  - Monitor I&O, weight, and lab values if indicated  - Obtain nutrition services referral as needed  Outcome: Progressing     Problem: MOBILITY - ADULT  Goal: Maintain or return to baseline ADL function  Description: INTERVENTIONS:  -  Assess patient's ability to carry out ADLs; assess patient's baseline for ADL function and identify physical deficits which impact ability to perform ADLs (bathing, care of mouth/teeth, toileting, grooming, dressing, etc )  - Assess/evaluate cause of self-care deficits   - Assess range of motion  - Assess patient's mobility; develop plan if impaired  - Assess patient's need for assistive devices and provide as appropriate  - Encourage maximum independence but intervene and supervise when necessary  - Involve family in performance of ADLs  - Assess for home care needs following discharge   - Consider OT consult to assist with ADL evaluation and planning for discharge  - Provide patient education as appropriate  Outcome: Progressing  Goal: Maintains/Returns to pre admission functional level  Description: INTERVENTIONS:  - Perform BMAT or MOVE assessment daily    - Set and communicate daily mobility goal to care team and patient/family/caregiver  - Collaborate with rehabilitation services on mobility goals if consulted  - Perform Range of Motion 3 times a day    - Reposition patient every 2 hours if pt unable to reposition self  - Out of bed for toileting  - Record patient progress and toleration of activity level   Outcome: Progressing     Problem: Prexisting or High Potential for Compromised Skin Integrity  Goal: Skin integrity is maintained or improved  Description: INTERVENTIONS:  - Identify patients at risk for skin breakdown  - Assess and monitor skin integrity  - Assess and monitor nutrition and hydration status  - Monitor labs   - Assess for incontinence   - Turn and reposition patient  - Assist with mobility/ambulation  - Relieve pressure over bony prominences  - Avoid friction and shearing  - Provide appropriate hygiene as needed including keeping skin clean and dry  - Evaluate need for skin moisturizer/barrier cream  - Collaborate with interdisciplinary team   - Patient/family teaching  - Consider wound care consult   Outcome: Progressing

## 2023-02-10 NOTE — ASSESSMENT & PLAN NOTE
· History paroxysmal atrial fibrillation chronically maintained on metoprolol succinate 25 mg daily   · chronic anticoagulation with Eliquis 2 5 mg twice daily for elevated [id number] stroke risk  · Presented A-fib RVR with rates 143 bpm requiring IV metoprolol for rate control  · Continue to monitor on telemetry  · Appreciate cardiology consultation

## 2023-02-10 NOTE — ASSESSMENT & PLAN NOTE
Wt Readings from Last 3 Encounters:   02/10/23 53 3 kg (117 lb 8 1 oz)   06/23/21 55 3 kg (121 lb 14 6 oz)   03/15/19 59 kg (130 lb)     · History chronic congestive heart failure, follows with [facility] cardiology  · Per their notes - Probable Takotsubo cardiomyopathy with recovered EF and at least moderate MR and moderate TR    · Utilizes diuretics per cardiology direction as needed if weight increases otherwise does not take them on a daily basis  · Continue PTA metoprolol succinate 25 mg daily  · Lisinopril 2 5 mg daily on hold due to hypotension  · Received 40 mg IV furosemide in the ED  · Now appears mildly hypovolemic  · N p o  for gastro enterocolitis, gentle IV hydration  · Monitor volume status closely  · Intake and output

## 2023-02-10 NOTE — ED PROVIDER NOTES
History  Chief Complaint   Patient presents with   • Abdominal Pain     Pt  Called 911 for lower abdominal pain, urinary urgency and foul smelling with incontinence, pt  Found to be sob, a  Fib on monitor 130's, low 90s on room air, placed on 2 L via NC 98 %, hx of a  Fib and takes eliquis, denies n/v       HPI   88F w hx of HTN, CKD, paroxysmal afib on eliquis, Takotsubo cardiomyopathy, CHF presenting with generalized weakness and abdominal pain  Today, patient was having abdominal pain in the left side of her abdomen  She has also been having increased urinary urgency and frequency  Today, she had vomiting episode and during the episode, she had a bowel movement and urinated over herself  EMS noted she was borderline hypoxic to 90% on room air therefore placed her on 2L of O2, and gave her 500cc of IV fluids  She follows with [facility] cardiology and was noted to have paroxysmal afib/flutter with probably persistent atrial fibrillation, mild CAD  She had recent echo which showed small circumferential pericardial effusion with EF 50%  ECHO 12/21/22  Left Ventricle: Left ventricle was not well visualized  Left ventricle is normal in size  There is mild hypertrophy  Consider infiltrative disease such as amyloid  Systolic function is low normal with an ejection fraction of 50%  Prior to Admission Medications   Prescriptions Last Dose Informant Patient Reported? Taking?    Ascorbic Acid (VITAMIN C) 100 MG tablet 2/10/2023  Yes Yes   Sig: Take 100 mg by mouth daily   Flaxseed, Linseed, ([facility] Flaxseed Oil) 1000 MG CAPS 2/10/2023  Yes Yes   Sig: Take 2,000 mg by mouth   calcium acetate (PHOSLO) 667 mg capsule 2/10/2023  Yes Yes   Sig: Take 1,334 mg by mouth 3 (three) times a day with meals   cholecalciferol (VITAMIN D3) 1,000 units tablet 2/10/2023  Yes Yes   Sig: Take 1,000 Units by mouth daily   escitalopram (LEXAPRO) 20 mg tablet Unknown  Yes No   Sig: Take 20 mg by mouth daily   famotidine (PEPCID) 20 mg tablet 2/10/2023  Yes Yes   Sig: Take 20 mg by mouth daily   furosemide (LASIX) 40 mg tablet Unknown  Yes No   Sig: Take 40 mg by mouth as needed   gabapentin (NEURONTIN) 600 MG tablet 2/10/2023  Yes Yes   Sig: Take 600 mg by mouth daily   latanoprost (XALATAN) 0 005 % ophthalmic solution   Yes Yes   Sig: Administer 1 drop to both eyes daily at bedtime   lisinopril (ZESTRIL) 2 5 mg tablet 2/10/2023  Yes Yes   Sig: Take 2 5 mg by mouth daily   loperamide (IMODIUM A-D) 2 MG tablet Unknown  No No   Sig: Take 1 tablet (2 mg total) by mouth 4 (four) times a day as needed for diarrhea   metoprolol tartrate (LOPRESSOR) 25 mg tablet 2/10/2023  Yes Yes   Sig: Take 12 5 mg by mouth every 12 (twelve) hours   saccharomyces boulardii (FLORASTOR) 250 mg capsule 2/10/2023  Yes Yes   Sig: Take 250 mg by mouth 2 (two) times a day   simvastatin (ZOCOR) 80 mg tablet 2/10/2023  Yes Yes   Sig: Take 80 mg by mouth daily at bedtime   traZODone (DESYREL) 50 mg tablet 2/10/2023  Yes Yes   Sig: Take 150 mg by mouth 3 (three) times a day   vitamin B-12 (VITAMIN B-12) 1,000 mcg tablet 2/10/2023  Yes Yes   Sig: Take 5,000 mcg by mouth daily      Facility-Administered Medications: None       Past Medical History:   Diagnosis Date   • Arthritis    • Glaucoma    • Hypertension    • Kidney disease        Past Surgical History:   Procedure Laterality Date   • CHOLECYSTECTOMY     • HYSTERECTOMY     • KNEE ARTHROPLASTY Left    • THYROID LOBECTOMY         History reviewed  No pertinent family history  I have reviewed and agree with the history as documented      E-Cigarette/Vaping   • E-Cigarette Use Never User      E-Cigarette/Vaping Substances   • Nicotine No    • THC No    • CBD No    • Flavoring No      Social History     Tobacco Use   • Smoking status: Former   • Smokeless tobacco: Never   • Tobacco comments:     quit over 50 years ago   Vaping Use   • Vaping Use: Never used   Substance Use Topics   • Alcohol use: Not Currently   • Drug use: Never       Review of Systems   Constitutional: Negative for chills and fever  HENT: Negative for ear pain and sore throat  Eyes: Negative for pain and visual disturbance  Respiratory: Positive for shortness of breath  Negative for cough  Cardiovascular: Negative for chest pain and palpitations  Gastrointestinal: Positive for abdominal pain, nausea and vomiting  Genitourinary: Positive for frequency and urgency  Negative for hematuria  Musculoskeletal: Negative for arthralgias and back pain  Skin: Negative for color change and rash  Neurological: Negative for seizures and syncope  All other systems reviewed and are negative  Physical Exam  Physical Exam  Vitals and nursing note reviewed  Constitutional:       Appearance: She is well-developed  She is ill-appearing  HENT:      Head: Normocephalic and atraumatic  Right Ear: External ear normal       Left Ear: External ear normal       Nose: Nose normal    Eyes:      Conjunctiva/sclera: Conjunctivae normal    Cardiovascular:      Rate and Rhythm: Tachycardia present  Rhythm irregular  Pulmonary:      Effort: Pulmonary effort is normal  No respiratory distress  Breath sounds: Examination of the right-middle field reveals decreased breath sounds  Examination of the left-middle field reveals decreased breath sounds  Examination of the right-lower field reveals decreased breath sounds  Examination of the left-lower field reveals decreased breath sounds  Decreased breath sounds present  Abdominal:      Palpations: Abdomen is soft  Tenderness: There is abdominal tenderness in the periumbilical area and left upper quadrant  Musculoskeletal:      Cervical back: Normal range of motion and neck supple  Right lower le+ Edema present  Left lower le+ Edema present  Skin:     General: Skin is warm and dry  Neurological:      General: No focal deficit present  Mental Status: She is alert   Mental status is at baseline           Vital Signs  ED Triage Vitals   Temperature Pulse Respirations Blood Pressure SpO2   02/10/23 0030 02/10/23 0030 02/10/23 0030 02/10/23 0030 02/10/23 0028   98 2 °F (36 8 °C) 65 22 123/69 98 %      Temp Source Heart Rate Source Patient Position - Orthostatic VS BP Location FiO2 (%)   02/10/23 0030 02/10/23 0030 02/10/23 0030 02/10/23 0030 --   Temporal Monitor Lying Right arm       Pain Score       02/10/23 0030       10 - Worst Possible Pain           Vitals:    02/10/23 0415 02/10/23 0430 02/10/23 0445 02/10/23 0515   BP: 94/55 (!) 89/54 92/55 118/67   Pulse: (!) 112 95 (!) 110 (!) 146   Patient Position - Orthostatic VS:    Lying         Visual Acuity      ED Medications  Medications   metroNIDAZOLE (FLAGYL) IVPB (premix) 500 mg 100 mL (500 mg Intravenous New Bag 2/10/23 0341)   pantoprazole (PROTONIX) injection 40 mg (has no administration in time range)   metoprolol (LOPRESSOR) injection 5 mg (has no administration in time range)   cefTRIAXone (ROCEPHIN) IVPB (premix in dextrose) 1,000 mg 50 mL (has no administration in time range)   multi-electrolyte (PLASMALYTE-A/ISOLYTE-S PH 7 4) IV solution (75 mL/hr Intravenous New Bag 2/10/23 0515)   escitalopram (LEXAPRO) tablet 20 mg (has no administration in time range)   gabapentin (NEURONTIN) capsule 300 mg (has no administration in time range)   metoprolol tartrate (LOPRESSOR) partial tablet 12 5 mg (has no administration in time range)   traZODone (DESYREL) tablet 100 mg (has no administration in time range)   latanoprost (XALATAN) 0 005 % ophthalmic solution 1 drop (has no administration in time range)   metoprolol (LOPRESSOR) injection 2 5 mg (2 5 mg Intravenous Given 2/10/23 0056)   furosemide (LASIX) injection 40 mg (40 mg Intravenous Given 2/10/23 0122)   magnesium sulfate 2 g/50 mL IVPB (premix) 2 g (0 g Intravenous Stopped 2/10/23 0318)   metoprolol (LOPRESSOR) injection 5 mg (5 mg Intravenous Given 2/10/23 0219)   iohexol (OMNIPAQUE) 350 MG/ML injection (SINGLE-DOSE) 100 mL (100 mL Intravenous Given 2/10/23 0217)   cefTRIAXone (ROCEPHIN) IVPB (premix in dextrose) 1,000 mg 50 mL (0 mg Intravenous Stopped 2/10/23 0342)       Diagnostic Studies  Results Reviewed     Procedure Component Value Units Date/Time    Urine Microscopic [[id number]]  (Abnormal) Collected: 02/10/23 0225    Lab Status: Final result Specimen: Urine, Clean Catch Updated: 02/10/23 0246     RBC, UA 0-1 /hpf      WBC, UA 4-10 /hpf      Epithelial Cells Occasional /hpf      Bacteria, UA Innumerable /hpf      Hyaline Casts, UA 0-1 /lpf      OTHER OBSERVATIONS Renal Epithelial Cells Present    UA w Reflex to Microscopic w Reflex to Culture [[id number]]  (Abnormal) Collected: 02/10/23 0225    Lab Status: Final result Specimen: Urine, Clean Catch Updated: 02/10/23 0237     Color, UA Yellow     Clarity, UA Clear     Specific Gravity, UA 1 015     pH, UA 5 5     Leukocytes, UA Moderate     Nitrite, UA Negative     Protein, UA Negative mg/dl      Glucose, UA Negative mg/dl      Ketones, UA Negative mg/dl      Urobilinogen, UA 0 2 E U /dl      Bilirubin, UA Negative     Occult Blood, UA Negative    HS Troponin 0hr (reflex protocol) [[id number]]  (Normal) Collected: 02/10/23 0138    Lab Status: Final result Specimen: Blood from Arm, Right Updated: 02/10/23 0222     hs TnI 0hr 15 ng/L     B-Type Natriuretic Peptide(BNP) [[id number]]  (Abnormal) Collected: 02/10/23 0050    Lab Status: Final result Specimen: Blood from Arm, Right Updated: 02/10/23 0220      pg/mL     Lactic acid [[id number]]  (Normal) Collected: 02/10/23 0050    Lab Status: Final result Specimen: Blood from Arm, Right Updated: 02/10/23 0151     LACTIC ACID 1 4 mmol/L     Narrative:      Result may be elevated if tourniquet was used during collection      Comprehensive metabolic panel [[id number]]  (Abnormal) Collected: 02/10/23 0050    Lab Status: Final result Specimen: Blood from Arm, Right Updated: 02/10/23 0151     Sodium 138 mmol/L      Potassium 4 3 mmol/L      Chloride 102 mmol/L      CO2 32 mmol/L      ANION GAP 4 mmol/L      BUN 15 mg/dL      Creatinine 1 16 mg/dL      Glucose 91 mg/dL      Calcium 8 1 mg/dL      AST 24 U/L      ALT 10 U/L      Alkaline Phosphatase 46 U/L      Total Protein 6 0 g/dL      Albumin 3 5 g/dL      Total Bilirubin 1 49 mg/dL      eGFR 42 ml/min/1 73sq m     Narrative:      [facility] guidelines for Chronic Kid[facility] Disease (CKD):   •  Stage 1 with normal or high GFR (GFR > 90 mL/min/1 73 square meters)  •  Stage 2 Mild CKD (GFR = 60-89 mL/min/1 73 square meters)  •  Stage 3A Moderate CKD (GFR = 45-59 mL/min/1 73 square meters)  •  Stage 3B Moderate CKD (GFR = 30-44 mL/min/1 73 square meters)  •  Stage 4 Severe CKD (GFR = 15-29 mL/min/1 73 square meters)  •  Stage 5 End Stage CKD (GFR <15 mL/min/1 73 square meters)  Note: GFR calculation is accurate only with a steady state creatinine    Lipase [[id number]]  (Abnormal) Collected: 02/10/23 0050    Lab Status: Final result Specimen: Blood from Arm, Right Updated: 02/10/23 0151     Lipase 7 u/L     Magnesium [[id number]]  (Abnormal) Collected: 02/10/23 0050    Lab Status: Final result Specimen: Blood from Arm, Right Updated: 02/10/23 0151     Magnesium 1 8 mg/dL     FLU/RSV/COVID - if FLU/RSV clinically relevant [[id number]]  (Normal) Collected: 02/10/23 0050    Lab Status: Final result Specimen: Nares from Nose Updated: 02/10/23 0142     SARS-CoV-2 Negative     INFLUENZA A PCR Negative     INFLUENZA B PCR Negative     RSV PCR Negative    Narrative:      FOR PEDIATRIC PATIENTS - copy/paste COVID Guidelines URL to browser: https://[employer] org/  ashx    SARS-CoV-2 assay is a Nucleic Acid Amplification assay intended for the  qualitative detection of nucleic acid from SARS-CoV-2 in nasopharyngeal  swabs  Results are for the presumptive identification of SARS-CoV-2 RNA      Positive results are indicative of infection with SARS-CoV-2, the virus  causing COVID-19, but do not rule out bacterial infection or co-infection  with other viruses  Laboratories within the [place] and its  territories are required to report all positive results to the appropriate  public health authorities  Negative results do not preclude SARS-CoV-2  infection and should not be used as the sole basis for treatment or other  patient management decisions  Negative results must be combined with  clinical observations, patient history, and epidemiological information  This test has not been FDA cleared or approved  This test has been authorized by FDA under an Emergency Use Authorization  (EUA)  This test is only authorized for the duration of time the  declaration that circumstances exist justifying the authorization of the  emergency use of an in vitro diagnostic tests for detection of SARS-CoV-2  virus and/or diagnosis of COVID-19 infection under section 564(b)(1) of  the Act, 21 U  S C  882ELE-6(D)(3), unless the authorization is terminated  or revoked sooner  The test has been validated but independent review by FDA  and CLIA is pending  Test performed using [employer] GeneXpert: This RT-PCR assay targets N2,  a region unique to SARS-CoV-2  A conserved region in the E-gene was chosen  for pan-Sarbecovirus detection which includes SARS-CoV-2  According to CMS-2020-01-R, this platform meets the definition of high-throughput technology      CBC and differential [[id number]]  (Abnormal) Collected: 02/10/23 0050    Lab Status: Final result Specimen: Blood from Arm, Right Updated: 02/10/23 0106     WBC 3 19 Thousand/uL      RBC 4 39 Million/uL      Hemoglobin 12 4 g/dL      Hematocrit 38 4 %      MCV 88 fL      MCH 28 2 pg      MCHC 32 3 g/dL      RDW 14 5 %      MPV 9 2 fL      Platelets 535 Thousands/uL      nRBC 0 /100 WBCs      Neutrophils Relative 81 %      Immat GRANS % 0 %      Lymphocytes Relative 13 %      Monocytes Relative 6 %      Eosinophils Relative 0 %      Basophils Relative 0 %      Neutrophils Absolute 2 55 Thousands/µL      Immature Grans Absolute 0 01 Thousand/uL      Lymphocytes Absolute 0 42 Thousands/µL      Monocytes Absolute 0 19 Thousand/µL      Eosinophils Absolute 0 01 Thousand/µL      Basophils Absolute 0 01 Thousands/µL     Blood culture #2 [[id number]] Collected: 02/10/23 0052    Lab Status: In process Specimen: Blood from Arm, Right Updated: 02/10/23 0103    Blood culture #1 [[id number]] Collected: 02/10/23 0052    Lab Status: In process Specimen: Blood from Arm, Right Updated: 02/10/23 0103    Procalcitonin [[id number]] Collected: 02/10/23 0050    Lab Status: No result Specimen: Blood from Arm, Right                PE Study with CT Abdomen and Pelvis with contrast   Final Result by Charis Chand DO (02/10 0248)      Findings most suggestive of severe gastroenterocolitis with associated mesenteric edema and small volume ascites, which is likely reactive  No evidence of peritoneal enhancement at this time to suggest peritonitis, however, clinical follow-up recommended  Tubular fluid-filled structure in the right lower quadrant  Although patient has undergone hysterectomy, correlate for the possibility of remnant right fallopian tube  This could represent hydrosalpinx or pyosalpinx  If symptoms persist, recommend contrast-enhanced scan with oral contrast for better delineation of these structures  Moderate bilateral pleural effusions  No acute pulmonary embolus  The study was marked in [facility] for immediate notification        Workstation performed: [id number]         XR chest portable - 1 view   ED Interpretation by Luz Elena Delgado MD (02/10 0122)   B/l pleural effusions             Procedures  ECG 12 Lead Documentation Only    Date/Time: 2/10/2023 12:59 AM  Performed by: Luz Elena Delgado MD  Authorized by: Luz Elena Delgado MD     Patient location:  ED  Rate:     ECG rate:  143    ECG rate assessment: tachycardic    Rhythm:     Rhythm: atrial fibrillation    Ectopy:     Ectopy: none    QRS:     QRS axis:  Right  Conduction:     Conduction: normal    ST segments:     ST segments:  Non-specific  T waves:     T waves: non-specific      CriticalCare Time  Performed by: Nicholas Hartley MD  Authorized by: Nicholas Hartley MD     Critical care provider statement:     Critical care time (minutes):  40    Critical care was necessary to treat or prevent imminent or life-threatening deterioration of the following conditions:  Cardiac failure    Critical care was time spent personally by me on the following activities:  Obtaining history from patient or surrogate, development of treatment plan with patient or surrogate, ordering and performing treatments and interventions, ordering and review of laboratory studies, ordering and review of radiographic studies, re-evaluation of patient's condition, review of old charts, discussions with primary provider, evaluation of patient's response to treatment and examination of patient        ED Course  ED Course as of 02/10/23 0532   Fri Feb 10, 2023   0107 WBC(!): 3 19   0107 Hemoglobin: 12 4   0159 LACTIC ACID: 1 4   0200 Magnesium(!): 1 8  Will replete with IV Magnesium  0222 BNP(!): 396   0225 hs TnI 0hr: 15   0237 Leukocytes, UA(!): Moderate   0247 Urinalysis significant for UTI  Will treat with IV antibiotics  0251 CT PE and Abd/Pelvis  IMPRESSION:  Findings most suggestive of severe gastroenterocolitis with associated mesenteric edema and small volume ascites, which is likely reactive  No evidence of peritoneal enhancement at this time to suggest peritonitis, however, clinical follow-up recommended  Tubular fluid-filled structure in the right lower quadrant  Although patient has undergone hysterectomy, correlate for the possibility of remnant right fallopian tube  This could represent hydrosalpinx or pyosalpinx   If symptoms persist, recommend contrast-enhanced scan with oral contrast for better delineation of these structures  Moderate bilateral pleural effusions  No acute pulmonary embolus  0256 Flagyl added on for gastroenterocolitis  Heart rates are improving, more consistently in 100-120s  Intermittently increases to 130    0300 Discussed with hospitalist, Liz Knoxx  Accepts patient for admission  0359 Blood pressures downtrending  Message sent to hospitalist  Amador Maldonado aware  SBIRT 22yo+    Flowsheet Row Most Recent Value   SBIRT (25 yo +)    In order to provide better care to our patients, we are screening all of our patients for alcohol and drug use  Would it be okay to ask you these screening questions? No Filed at: 02/10/2023 0053        Medical Decision Making  88F presenting with abdominal pain, tachycardia, and hypoxia  On arrival, she was 89% on room air and placed on 2L of O2  Tachycardic to 150s, rhythm irregularly irregular  Blood pressures normotensive  EKG confirms atrial fibrillation w RVR  Patient given IV metoprolol x2 for afib w RVR (patient is on metoprolol at home chronically) with improvement in heart rate from 150s to 110s  Septic workup w blood cultures ordered  Labwork significant for mild hypomagnesemia of 1 8; patient given IV magnesium for repletion  BNP elevated at 396  Lactate normal at 1 4  Urinalysis significant for UTI  CT PE and CT abd/pelvis was obtained to eval for etiologies for hypoxia and abdominal pain such as pulmonary embolism vs CHF vs pneumonia vs URI and diverticulitis vs gastroenteritis vs colitis vs bowel perforation  CT was significant for moderate pleural effusions and severe gastroenterocolitis, no evidence of peritonitis  Given concerns for volume overload, patient was given IV lasix for diuresis  Patient initially started on IV ceftriaxone for UTI, but broadened to IV flagyl to cover for anaerobes given CT read  She was admitted to medicine service for further management of her multiple medical issues  Acute CHF (congestive heart failure) ([facility] ): acute illness or injury  Atrial fibrillation with RVR ([place] [address] ): chronic illness or injury  Enterocolitis: acute illness or injury  Hypomagnesemia: acute illness or injury  UTI (urinary tract infection): acute illness or injury  Amount and/or Complexity of Data Reviewed  Independent Historian: EMS  External Data Reviewed: notes  Labs: ordered  Decision-making details documented in ED Course  Radiology: ordered and independent interpretation performed  ECG/medicine tests: ordered and independent interpretation performed  Decision-making details documented in ED Course  Risk  Prescription drug management  Drug therapy requiring intensive monitoring for toxicity  Decision regarding hospitalization        Critical Care  Total time providing critical care: 30-74 minutes  Disposition  Final diagnoses:   Atrial fibrillation with RVR (HCC)   Acute CHF (congestive heart failure) ([facility])   UTI (urinary tract infection)   Enterocolitis   Hypomagnesemia     Time reflects when diagnosis was documented in both MDM as applicable and the Disposition within this note     Time User Action Codes Description Comment    2/10/2023  1:11 AM Quintanilla Medhat Add [I48 91] Atrial fibrillation with RVR ([place] [address] )     2/10/2023  2:35 AM Quintanilla Medhat Add [I50 9] Acute CHF (congestive heart failure) ([place] [address] )     2/10/2023  2:35 AM Quintanilla Medhat Modify [I48 91] Atrial fibrillation with RVR ([place] [address] )     2/10/2023  2:35 AM Quintanilla Medhat Modify [I50 9] Acute CHF (congestive heart failure) ([place] [address] )     2/10/2023  2:47 AM Quintanilla Medhat Add [N39 0] UTI (urinary tract infection)     2/10/2023  2:59 AM Quintanilla Medhat Add [K52 9] Enterocolitis     2/10/2023  3:00 AM Quintanilla Medhat Add [E83 42] Hypomagnesemia       ED Disposition     ED Disposition   Admit    Condition   Stable    Date/Time   Fri Feb 10, 2023  2:59 AM    Comment   Case was discussed with Liz Herron and the patient's admission status was agreed to be Admission Status: inpatient status to the service of Dr Ryann Walker  Follow-up Information    None         Current Discharge Medication List      CONTINUE these medications which have NOT CHANGED    Details   Ascorbic Acid (VITAMIN C) 100 MG tablet Take 100 mg by mouth daily      calcium acetate (PHOSLO) 667 mg capsule Take 1,334 mg by mouth 3 (three) times a day with meals      cholecalciferol (VITAMIN D3) 1,000 units tablet Take 1,000 Units by mouth daily      famotidine (PEPCID) 20 mg tablet Take 20 mg by mouth daily      Flaxseed, Linseed, ([employer] Flaxseed Oil) 1000 MG CAPS Take 2,000 mg by mouth      gabapentin (NEURONTIN) 600 MG tablet Take 600 mg by mouth daily      latanoprost (XALATAN) 0 005 % ophthalmic solution Administer 1 drop to both eyes daily at bedtime      lisinopril (ZESTRIL) 2 5 mg tablet Take 2 5 mg by mouth daily      metoprolol tartrate (LOPRESSOR) 25 mg tablet Take 12 5 mg by mouth every 12 (twelve) hours      saccharomyces boulardii (FLORASTOR) 250 mg capsule Take 250 mg by mouth 2 (two) times a day      simvastatin (ZOCOR) 80 mg tablet Take 80 mg by mouth daily at bedtime      traZODone (DESYREL) 50 mg tablet Take 150 mg by mouth 3 (three) times a day      vitamin B-12 (VITAMIN B-12) 1,000 mcg tablet Take 5,000 mcg by mouth daily      escitalopram (LEXAPRO) 20 mg tablet Take 20 mg by mouth daily      furosemide (LASIX) 40 mg tablet Take 40 mg by mouth as needed      loperamide (IMODIUM A-D) 2 MG tablet Take 1 tablet (2 mg total) by mouth 4 (four) times a day as needed for diarrhea  Qty: 30 tablet, Refills: 0    Associated Diagnoses: Diarrhea, unspecified type             No discharge procedures on file      PDMP Review     None          ED Provider  Electronically Signed by           Helene Knott MD  02/10/23 7305

## 2023-02-10 NOTE — H&P
Luisluann 1934, 80 y o  female MRN: [id number]  Unit/Bed#: -01 Encounter: [id number]  Primary Care Provider: Whitley Chavarria DO   Date and time admitted to hospital: 2/10/2023 12:28 AM    * Atrial fibrillation with RVR (HCC)  Assessment & Plan  · History paroxysmal atrial fibrillation chronically maintained on metoprolol succinate 25 mg daily   · chronic anticoagulation with Eliquis 2 5 mg twice daily for elevated [id number] stroke risk  · Presented A-fib RVR with rates 143 bpm requiring IV metoprolol for rate control  · Continue to monitor on telemetry  · Appreciate cardiology consultation    Enterocolitis  Assessment & Plan  · Reports 1 day of severe generalized abdominal pain associated with nausea and dry heaves  · Normal lactic acid  · CT abdomen pelvis with gastroenterocolitis  · Empiric ceftriaxone/metronidazole -check procalcitonin  · Pain improved significantly at time of admission, abdomen soft  · Appreciate general surgery consultation  · N p o  with IV hydration    Coronary artery disease involving native coronary artery of native heart  Assessment & Plan  · NSTEMI September 2022  · Cardiac catheterization with minimal atherosclerotic disease, no PCI  · Continue Eliquis and simvastatin    Stage 3b chronic kidney disease ([facility] [address] )  Assessment & Plan  Lab Results   Component Value Date    EGFR 42 02/10/2023    CREATININE 1 16 02/10/2023   · History CKD 3  · Creatinine appears baseline when compared in care everywhere  · Trend creatinine  · Bacteriuria-empiric ceftriaxone  · Renally dose medications, avoid nephrotoxic medication    Acute cystitis  Assessment & Plan  · Presented with complaints of abdominal pain and foul-smelling urine  · UA with bacteriuria, pyuria  · Urine culture pending  · Empiric ceftriaxone    Chronic combined systolic and diastolic congestive heart failure (HCC)  Assessment & Plan  Wt Readings from Last 3 Encounters: 02/10/23 53 3 kg (117 lb 8 1 oz)   06/23/21 55 3 kg (121 lb 14 6 oz)   03/15/19 59 kg (130 lb)     · History chronic congestive heart failure, follows with [facility] cardiology  · Per their notes - Probable Takotsubo cardiomyopathy with recovered EF and at least moderate MR and moderate TR  · Utilizes diuretics per cardiology direction as needed if weight increases otherwise does not take them on a daily basis  · Continue PTA metoprolol succinate 25 mg daily  · Lisinopril 2 5 mg daily on hold due to hypotension  · Received 40 mg IV furosemide in the ED  · Now appears mildly hypovolemic  · N p o  for gastro enterocolitis, gentle IV hydration  · Monitor volume status closely  · Intake and output    Pleural effusion, bilateral  Assessment & Plan  · Moderate bilateral pleural effusions  · History of chronic systolic heart failure, reviewed care everywhere there were small bilateral pleural effusions on chest x-ray September  · Reports chronic dyspnea  · Outpatient follow-up    VTE Pharmacologic Prophylaxis: VTE Score: 4 Moderate Risk (Score 3-4) - Pharmacological DVT Prophylaxis Ordered: apixaban (Eliquis)  Code Status: Level 1 - Full Code   Discussion with family: Patient declined call to   Anticipated Length of Stay: Patient will be admitted on an inpatient basis with an anticipated length of stay of greater than 2 midnights secondary to A-fib RVR  Total Time for Visit, including Counseling / Coordination of Care: 60 minutes Greater than 50% of this total time spent on direct patient counseling and coordination of care  Chief Complaint: Abdominal pain    History of Present Illness:  Katie Jaramillo is a 80 y o  female with a PMH of CHF, NSTEMI, CAD, A-fib chronically anticoagulated with Eliquis, CKD, significant social stress caring for her  with dementia who presents with abdominal pain associated with nausea and dry heaves    In the emergency department patient was found to be atrial fibrillation with RVR requiring multiple doses of IV metoprolol  CT of the abdomen pelvis revealed gastroenterocolitis  She was intermittently on oxygen but not due to hypoxia, this was supplemental therapy in setting of A-fib RVR  Presented to medical service for further evaluation treatment of A-fib RVR and gastroenterocolitis  Review of Systems:  Review of Systems   Constitutional: Negative for chills and fever  HENT: Negative for ear pain and sore throat  Eyes: Negative for pain and visual disturbance  Respiratory: Positive for shortness of breath  Negative for cough  Cardiovascular: Negative for chest pain and palpitations  Gastrointestinal: Positive for abdominal distention, abdominal pain, nausea and vomiting  Genitourinary: Negative for dysuria and hematuria  Musculoskeletal: Negative for arthralgias and back pain  Skin: Negative for color change and rash  Neurological: Negative for seizures and syncope  All other systems reviewed and are negative  Past Medical and Surgical History:   Past Medical History:   Diagnosis Date   • Arthritis    • Glaucoma    • Hypertension    • Kidney disease        Past Surgical History:   Procedure Laterality Date   • CHOLECYSTECTOMY     • HYSTERECTOMY     • KNEE ARTHROPLASTY Left    • THYROID LOBECTOMY         Meds/Allergies:  Prior to Admission medications    Medication Sig Start Date End Date Taking?  Authorizing Provider   apixaban (Eliquis) 2 5 mg Take by mouth 2 (two) times a day   Yes Historical Provider, MD   Ascorbic Acid (VITAMIN C) 100 MG tablet Take 100 mg by mouth daily   Yes Historical Provider, MD   cholecalciferol (VITAMIN D3) 1,000 units tablet Take 1,000 Units by mouth daily   Yes Historical Provider, MD   famotidine (PEPCID) 20 mg tablet Take 20 mg by mouth daily   Yes Historical Provider, MD   Flaxseed, Linseed, ([facility] Flaxseed Oil) 1000 MG CAPS Take 2,000 mg by mouth   Yes Historical Provider, MD   gabapentin (NEURONTIN) 600 MG tablet Take 600 mg by mouth daily   Yes Historical Provider, MD   latanoprost (XALATAN) 0 005 % ophthalmic solution Administer 1 drop to both eyes daily at bedtime   Yes Historical Provider, MD   lisinopril (ZESTRIL) 2 5 mg tablet Take 2 5 mg by mouth daily   Yes Historical Provider, MD   metoprolol tartrate (LOPRESSOR) 25 mg tablet Take 12 5 mg by mouth every 12 (twelve) hours   Yes Historical Provider, MD   saccharomyces boulardii (FLORASTOR) 250 mg capsule Take 250 mg by mouth 2 (two) times a day   Yes Historical Provider, MD   simvastatin (ZOCOR) 80 mg tablet Take 80 mg by mouth daily at bedtime   Yes Historical Provider, MD   traZODone (DESYREL) 50 mg tablet Take 150 mg by mouth 3 (three) times a day   Yes Historical Provider, MD   vitamin B-12 (VITAMIN B-12) 1,000 mcg tablet Take 5,000 mcg by mouth daily   Yes Historical Provider, MD   calcium acetate (PHOSLO) 667 mg capsule Take 1,334 mg by mouth 3 (three) times a day with meals  2/10/23 Yes Historical Provider, MD   Latanoprost 0 005 % EMUL Apply to eye  2/10/23 Yes Historical Provider, MD   escitalopram (LEXAPRO) 20 mg tablet Take 20 mg by mouth daily    Historical Provider, MD   furosemide (LASIX) 40 mg tablet Take 40 mg by mouth as needed    Historical Provider, MD   loperamide (IMODIUM A-D) 2 MG tablet Take 1 tablet (2 mg total) by mouth 4 (four) times a day as needed for diarrhea 3/15/19   Yohannes Zamora PA-C   atorvastatin (LIPITOR) 80 mg tablet Take by mouth  Patient not taking: Reported on 2/10/2023  2/10/23  Historical Provider, MD   enalapril (VASOTEC) 2 5 mg tablet  3/15/19 2/10/23  Historical Provider, MD     I have reviewed home medications with patient personally  Allergies:    Allergies   Allergen Reactions   • Diclofenac Other (See Comments)     liver problems  liver problems     • Meloxicam Other (See Comments)     Kidney failure  Kidney failure     • Sertraline Other (See Comments)     Other reaction(s): increase in anxiety  Other reaction(s): increase in anxiety         Social History:  Marital Status: /Civil Union   Occupation: Retired  Patient Pre-hospital Living Situation: With spouse-she is the caregiver for the spouse    Patient Pre-hospital Level of Mobility: walks  Patient Pre-hospital Diet Restrictions: None  Substance Use History:   Social History     Substance and Sexual Activity   Alcohol Use Not Currently     Social History     Tobacco Use   Smoking Status Former   Smokeless Tobacco Never   Tobacco Comments    quit over 50 years ago     Social History     Substance and Sexual Activity   Drug Use Never       Family History:  History reviewed  No pertinent family history  Physical Exam:     Vitals:   Blood Pressure: 118/67 (02/10/23 0515)  Pulse: 105 (02/10/23 0600)  Temperature: 98 3 °F (36 8 °C) (02/10/23 0515)  Temp Source: Temporal (02/10/23 0515)  Respirations: 16 (02/10/23 0600)  Height: 5' 2" (157 5 cm) (02/10/23 0515)  Weight - Scale: 53 3 kg (117 lb 8 1 oz) (02/10/23 0515)  SpO2: 95 % (02/10/23 0600)    Physical Exam  Vitals and nursing note reviewed  Constitutional:       General: She is in acute distress  Appearance: She is well-developed  She is ill-appearing  HENT:      Head: Normocephalic and atraumatic  Ears:      Comments: Very hard of hearing     Mouth/Throat:      Mouth: Mucous membranes are dry  Comments: Extremely dry mucous membranes  Eyes:      Conjunctiva/sclera: Conjunctivae normal    Cardiovascular:      Rate and Rhythm: Tachycardia present  Rhythm irregular  Heart sounds: No murmur heard  Pulmonary:      Effort: Pulmonary effort is normal  No respiratory distress  Breath sounds: Normal breath sounds  Abdominal:      Palpations: Abdomen is soft  Tenderness: There is abdominal tenderness  There is no guarding  Comments: Generalized tenderness   Musculoskeletal:         General: Swelling present  Cervical back: Neck supple        Right lower leg: Edema present  Left lower leg: Edema present  Comments: +1 pitting edema bilateral lower extremities   Skin:     General: Skin is warm and dry  Capillary Refill: Capillary refill takes less than 2 seconds  Neurological:      Mental Status: She is alert  Psychiatric:         Mood and Affect: Mood normal          Behavior: Behavior normal         Additional Data:     Lab Results:  Results from last 7 days   Lab Units 02/10/23  0050   WBC Thousand/uL 3 19*   HEMOGLOBIN g/dL 12 4   HEMATOCRIT % 38 4   PLATELETS Thousands/uL 211   NEUTROS PCT % 81*   LYMPHS PCT % 13*   MONOS PCT % 6   EOS PCT % 0     Results from last 7 days   Lab Units 02/10/23  0050   SODIUM mmol/L 138   POTASSIUM mmol/L 4 3   CHLORIDE mmol/L 102   CO2 mmol/L 32   BUN mg/dL 15   CREATININE mg/dL 1 16   ANION GAP mmol/L 4   CALCIUM mg/dL 8 1*   ALBUMIN g/dL 3 5   TOTAL BILIRUBIN mg/dL 1 49*   ALK PHOS U/L 46   ALT U/L 10   AST U/L 24   GLUCOSE RANDOM mg/dL 91                 Results from last 7 days   Lab Units 02/10/23  0050   LACTIC ACID mmol/L 1 4       Lines/Drains:  Invasive Devices     Peripheral Intravenous Line  Duration           Peripheral IV 02/10/23 Distal;Dorsal (posterior); Right Forearm <1 day    Peripheral IV 02/10/23 Dorsal (posterior); Left Wrist <1 day                    Imaging: Reviewed radiology reports from this admission including: abdominal/pelvic CT  PE Study with CT Abdomen and Pelvis with contrast   Final Result by Jorge Ambrocio DO (02/10 0248)      Findings most suggestive of severe gastroenterocolitis with associated mesenteric edema and small volume ascites, which is likely reactive  No evidence of peritoneal enhancement at this time to suggest peritonitis, however, clinical follow-up recommended  Tubular fluid-filled structure in the right lower quadrant  Although patient has undergone hysterectomy, correlate for the possibility of remnant right fallopian tube    This could represent hydrosalpinx or pyosalpinx  If symptoms persist, recommend contrast-enhanced scan with oral contrast for better delineation of these structures  Moderate bilateral pleural effusions  No acute pulmonary embolus  The study was marked in [facility] for immediate notification  Workstation performed: [id number]         XR chest portable - 1 view   ED Interpretation by Sunny Lorenzo MD (02/10 0122)   B/l pleural effusions          EKG and Other Studies Reviewed on Admission:   · EKG: Atrial fibrillation    ** Please Note: This note has been constructed using a voice recognition system   **

## 2023-02-10 NOTE — CONSULTS
Consultation - Cardiology   Shivam Pearce 80 y o  female MRN: [id number]  Unit/Bed#: -01 Encounter: [id number]    Assessment/Plan     Assessment:  Gastroenterocolitis with fever, nausea, treated conservatively   Acute cystitis  Persistent afib - on chronic low dose eliquis appropriately    - planning for OP use of amiodarone and possible DCCV with [facility] cardiology    -rates elevated in the setting of illness  Hx of Tako Tsubo Cardiomyopathy with recovered LVEF  CKD3  Hypotension overnight but improved this AM    Plan:  Continue Toprol xl 25 mg PO daily but can consider increasing to BID as noted  Continue anticoagulation  Treat underlying infection  Continue anticoagulation  Will monitor on telemetry     History of Present Illness   Physician Requesting Consult: Carter Valladares MD  Reason for Consult / Principal Problem: afib with rvr  HPI: Shivam Pearce is a 80y o  year old female with history of CHF and probably takotsubo cardiomyopathy in the past, CKD3, NSTEMI, persistent afib chronically anticoagulated on Eliquis came in for concerns with nausea and dry heaving  She was found to have gastroenterocolitis and afib with rvr in the ED  She was given IV lopressor in the ED  Rates have improved but remain elevated  She is being treated conservatively for her gastroenterocolitis  She is currently on Toprol xl 25 mg daily  Magnesium is low  She did have a fever this AM      Patient follows with cardiology as an OP  Given her aifb has been more persistent as of lately they did check LFTs and TSH to determine stability for amiodarone use  Cardioversion was also discussed but no performed  Per notes she has a history of reduced LVEF in the setting of likely tako tsubo cardiomyopathy  Her LVEF afterwards had recovered  She has a history of minimal CAD  Consults    Review of Systems   Constitutional: Positive for chills and fatigue  Negative for unexpected weight change     Respiratory: Negative for chest tightness, shortness of breath and wheezing  Cardiovascular: Positive for palpitations  Negative for chest pain and leg swelling  Gastrointestinal: Positive for abdominal pain and nausea  Skin: Negative for color change, pallor and rash  Neurological: Positive for weakness  Negative for dizziness and light-headedness  Psychiatric/Behavioral: Negative for agitation and behavioral problems  Historical Information   Past Medical History:   Diagnosis Date   • Arthritis    • CHF (congestive heart failure) ([facility] [address] )    • Coronary artery disease    • Glaucoma    • Hypertension    • Kidney disease      Past Surgical History:   Procedure Laterality Date   • CHOLECYSTECTOMY     • HYSTERECTOMY     • KNEE ARTHROPLASTY Left    • THYROID LOBECTOMY       Social History     Substance and Sexual Activity   Alcohol Use Not Currently     Social History     Substance and Sexual Activity   Drug Use Never     E-Cigarette/Vaping   • E-Cigarette Use Never User      E-Cigarette/Vaping Substances   • Nicotine No    • THC No    • CBD No    • Flavoring No      Social History     Tobacco Use   Smoking Status Former   Smokeless Tobacco Never   Tobacco Comments    quit over 50 years ago     Family History: non-contributory    Meds/Allergies   all current active meds have been reviewed  Allergies   Allergen Reactions   • Diclofenac Other (See Comments)     liver problems  liver problems     • Meloxicam Other (See Comments)     Kidney failure  Kidney failure     • Sertraline Other (See Comments)     Other reaction(s): increase in anxiety  Other reaction(s): increase in anxiety         Objective   Vitals: Blood pressure 110/59, pulse (!) 112, temperature (!) 100 7 °F (38 2 °C), temperature source Temporal, resp  rate (!) 23, height 5' 2" (1 575 m), weight 53 3 kg (117 lb 8 1 oz), SpO2 97 %    Orthostatic Blood Pressures    Flowsheet Row Most Recent Value   Blood Pressure 110/59 filed at 02/10/2023 0900   Patient Position - Orthostatic VS Lying filed at 02/10/2023 0900            Intake/Output Summary (Last 24 hours) at 2/10/2023 1036  Last data filed at 2/10/2023 0600  Gross per 24 hour   Intake 626 25 ml   Output 450 ml   Net 176 25 ml       Invasive Devices     Peripheral Intravenous Line  Duration           Peripheral IV 02/10/23 Distal;Dorsal (posterior); Right Forearm <1 day    Peripheral IV 02/10/23 Dorsal (posterior); Left Wrist <1 day                Physical Exam  Constitutional:       Appearance: Normal appearance  HENT:      Head: Normocephalic and atraumatic  Cardiovascular:      Rate and Rhythm: Rhythm irregular  Heart sounds: No murmur heard  No gallop  Pulmonary:      Effort: Pulmonary effort is normal       Breath sounds: No wheezing  Abdominal:      Palpations: Abdomen is soft  Musculoskeletal:         General: No swelling  Cervical back: Neck supple  Skin:     General: Skin is warm and dry  Capillary Refill: Capillary refill takes less than 2 seconds  Neurological:      General: No focal deficit present  Mental Status: She is alert  Lab Results:   I have personally reviewed pertinent lab results      CBC with diff:   Results from last 7 days   Lab Units 02/10/23  0050   WBC Thousand/uL 3 19*   RBC Million/uL 4 39   HEMOGLOBIN g/dL 12 4   HEMATOCRIT % 38 4   MCV fL 88   MCH pg 28 2   MCHC g/dL 32 3   RDW % 14 5   MPV fL 9 2   PLATELETS Thousands/uL 211     CMP:   Results from last 7 days   Lab Units 02/10/23  0050   SODIUM mmol/L 138   CHLORIDE mmol/L 102   CO2 mmol/L 32   BUN mg/dL 15   CREATININE mg/dL 1 16   CALCIUM mg/dL 8 1*   AST U/L 24   ALT U/L 10   ALK PHOS U/L 46   EGFR ml/min/1 73sq m 42     HS Troponin:   0   Lab Value Date/Time    HSTNI0 15 02/10/2023 0138     BNP:   Results from last 7 days   Lab Units 02/10/23  0050   POTASSIUM mmol/L 4 3   CHLORIDE mmol/L 102   CO2 mmol/L 32   BUN mg/dL 15   CREATININE mg/dL 1 16   CALCIUM mg/dL 8 1*   EGFR ml/min/1 73sq m 42     Coags: TSH:     Magnesium:   Results from last 7 days   Lab Units 02/10/23  0050   MAGNESIUM mg/dL 1 8*     Lipid Profile:     Imaging: I have personally reviewed pertinent reports  Echo 12/2022:  •  Left Ventricle: Left ventricle was not well visualized  Left ventricle   is normal in size  There is mild hypertrophy  Consider infiltrative   disease such as amyloid  Systolic function is low normal with an ejection   fraction of 50%  Global hypokinesis  Indeterminate diastolic function due   to severe mitral valve disease, atrial fibrillation  Left atrial pressure   is elevated (D>S)  •  Left Atrium: Dilated left atrium  •  Right Ventricle: Right ventricle cavity is mildly dilated  Systolic   function is mildly to moderately reduced  Abnormal tricuspid annular plane   systolic excursion (TAPSE) <1 7 cm  •  Right Atrium: Dilated right atrium  •  Mitral Valve: The leaflets are severely thickened  There is moderate to   severe regurgitation with a centrally directed jet  There is no evidence   of mitral valve stenosis  •  Aortic Valve: The aortic valve is trileaflet  The leaflets exhibit   probably normal excursion  The leaflets are moderately calcified  There is   no regurgitation or stenosis  •  Pericardium: There is a left pleural effusion  There is a small   circumferential pericardial effusion anterior and posterior to the heart  •  Ascending Aorta: The aorta appears normal in size  •  IVC/SVC: The inferior vena cava demonstrates a diameter of >21 mm and   collapses >50%; therefore, the right atrial pressure is estimated at 10-15   mmHg         EKG: afib with ventricular rate 110

## 2023-02-11 NOTE — ASSESSMENT & PLAN NOTE
Wt Readings from Last 3 Encounters:   02/10/23 53 3 kg (117 lb 8 1 oz)   06/23/21 55 3 kg (121 lb 14 6 oz)   03/15/19 59 kg (130 lb)     · History chronic congestive heart failure, follows with [facility] cardiology  · Per their notes - Probable Takotsubo cardiomyopathy with recovered EF and at least moderate MR and moderate TR    · Utilizes diuretics per cardiology direction as needed if weight increases otherwise does not take them on a daily basis  · Continue PTA metoprolol succinate 25 mg daily  · Lisinopril 2 5 mg daily on hold due to hypotension  · Received 40 mg IV furosemide in the ED  · Appears to be euvolemic stop fluids and observe  · Monitor volume status closely  · Intake and output

## 2023-02-11 NOTE — ASSESSMENT & PLAN NOTE
· Reports 1 day of severe generalized abdominal pain associated with nausea and dry heaves  · Normal lactic acid  · CT abdomen pelvis with gastroenterocolitis  · Empiric ceftriaxone/metronidazole -elevated procalcitonin  · Pain improved significantly at time of admission, abdomen soft  · Appreciate general surgery  · advance to full liquid toast and crackers and observe

## 2023-02-11 NOTE — PLAN OF CARE
Problem: Nutrition/Hydration-ADULT  Goal: Nutrient/Hydration intake appropriate for improving, restoring or maintaining nutritional needs  Description: Monitor and assess patient's nutrition/hydration status for malnutrition  Collaborate with interdisciplinary team and initiate plan and interventions as ordered  Monitor patient's weight and dietary intake as ordered or per policy  Utilize nutrition screening tool and intervene as necessary  Determine patient's food preferences and provide high-protein, high-caloric foods as appropriate       INTERVENTIONS:  - Monitor oral intake, urinary output, labs, and treatment plans  - Assess nutrition and hydration status and recommend course of action  - Evaluate amount of meals eaten  - Assist patient with eating if necessary   - Allow adequate time for meals  - Recommend/ encourage appropriate diets, oral nutritional supplements, and vitamin/mineral supplements  - Order, calculate, and assess calorie counts as needed  - Recommend, monitor, and adjust tube feedings and TPN/PPN based on assessed needs  - Assess need for intravenous fluids  - Provide specific nutrition/hydration education as appropriate  - Include patient/family/caregiver in decisions related to nutrition  Outcome: Progressing     Problem: PAIN - ADULT  Goal: Verbalizes/displays adequate comfort level or baseline comfort level  Description: Interventions:  - Encourage patient to monitor pain and request assistance  - Assess pain using appropriate pain scale  - Administer analgesics based on type and severity of pain and evaluate response  - Implement non-pharmacological measures as appropriate and evaluate response  - Consider cultural and social influences on pain and pain management  - Notify physician/advanced practitioner if interventions unsuccessful or patient reports new pain  Outcome: Progressing     Problem: INFECTION - ADULT  Goal: Absence or prevention of progression during hospitalization  Description: INTERVENTIONS:  - Assess and monitor for signs and symptoms of infection  - Monitor lab/diagnostic results  - Monitor all insertion sites, i e  indwelling lines, tubes, and drains  - Monitor endotracheal if appropriate and nasal secretions for changes in amount and color  - [place] appropriate cooling/warming therapies per order  - Administer medications as ordered  - Instruct and encourage patient and family to use good hand hygiene technique  - Identify and instruct in appropriate isolation precautions for identified infection/condition  Outcome: Progressing     Problem: DISCHARGE PLANNING  Goal: Discharge to home or other facility with appropriate resources  Description: INTERVENTIONS:  - Identify barriers to discharge w/patient and caregiver  - Arrange for needed discharge resources and transportation as appropriate  - Identify discharge learning needs (meds, wound care, etc )  - Arrange for interpretive services to assist at discharge as needed  - Refer to Case Management Department for coordinating discharge planning if the patient needs post-hospital services based on physician/advanced practitioner order or complex needs related to functional status, cognitive ability, or social support system  Outcome: Progressing     Problem: Knowledge Deficit  Goal: Patient/family/caregiver demonstrates understanding of disease process, treatment plan, medications, and discharge instructions  Description: Complete learning assessment and assess knowledge base    Interventions:  - Provide teaching at level of understanding  - Provide teaching via preferred learning methods  Outcome: Progressing     Problem: CARDIOVASCULAR - ADULT  Goal: Maintains optimal cardiac output and hemodynamic stability  Description: INTERVENTIONS:  - Monitor I/O, vital signs and rhythm  - Monitor for S/S and trends of decreased cardiac output  - Administer and titrate ordered vasoactive medications to optimize hemodynamic stability  - Assess quality of pulses, skin color and temperature  - Assess for signs of decreased coronary artery perfusion  - Instruct patient to report change in severity of symptoms  Outcome: Progressing  Goal: Absence of cardiac dysrhythmias or at baseline rhythm  Description: INTERVENTIONS:  - Continuous cardiac monitoring, vital signs, obtain 12 lead EKG if ordered  - Administer antiarrhythmic and heart rate control medications as ordered  - Monitor electrolytes and administer replacement therapy as ordered  Outcome: Progressing     Problem: GASTROINTESTINAL - ADULT  Goal: Minimal or absence of nausea and/or vomiting  Description: INTERVENTIONS:  - Administer IV fluids if ordered to ensure adequate hydration  - Maintain NPO status until nausea and vomiting are resolved  - Nasogastric tube if ordered  - Administer ordered antiemetic medications as needed  - Provide nonpharmacologic comfort measures as appropriate  - Advance diet as tolerated, if ordered  - Consider nutrition services referral to assist patient with adequate nutrition and appropriate food choices  Outcome: Progressing  Goal: Maintains or returns to baseline bowel function  Description: INTERVENTIONS:  - Assess bowel function  - Encourage oral fluids to ensure adequate hydration  - Administer IV fluids if ordered to ensure adequate hydration  - Administer ordered medications as needed  - Encourage mobilization and activity  - Consider nutritional services referral to assist patient with adequate nutrition and appropriate food choices  Outcome: Progressing  Goal: Maintains adequate nutritional intake  Description: INTERVENTIONS:  - Monitor percentage of each meal consumed  - Identify factors contributing to decreased intake, treat as appropriate  - Assist with meals as needed  - Monitor I&O, weight, and lab values if indicated  - Obtain nutrition services referral as needed  Outcome: Progressing

## 2023-02-11 NOTE — ASSESSMENT & PLAN NOTE
Malnutrition Findings:   Adult Malnutrition type: Chronic illness  Adult Degree of Malnutrition: Malnutrition of moderate degree  Malnutrition Characteristics: Muscle loss, Fat loss, Inadequate energy                  360 Statement: Chronic moderate malnutrition related to decreased oral intake, difficulties with meal preparation at home as evidenced by < 75% estimated energy intake > 1 mo; moderate muscle loss to temporalis, pectoralis, deltoid muscles; moderate to severe fat loss to orbitals, moderate fat loss to buccal pads  Treated with: Advance diet as medically appropriate to modest 4gm DENY given hx of CHF  Will discuss supplements with pt once diet initiated  Recommend daily weights for nutrition monitoring  Did discuss Mom's Meals with pt for ease of meal preparation at home  BMI Findings: Body mass index is 21 49 kg/m²

## 2023-02-11 NOTE — PROGRESS NOTES
[address]  Progress Note - Joyce Dominguez 1934, 80 y o  female MRN: [id number]  Unit/Bed#: -[facility] Encounter: [id number]  Primary Care Provider: Amadeo Motta DO   Date and time admitted to hospital: 2/10/2023 12:28 AM    * Atrial fibrillation with RVR (HCC)  Assessment & Plan  · History paroxysmal atrial fibrillation chronically maintained on metoprolol succinate 25 mg daily   · chronic anticoagulation with Eliquis 2 5 mg twice daily for elevated [id number] stroke risk  · Presented A-fib RVR with rates 143 bpm requiring oral metoprolol for rate control  Having low normal blood pressures and hence hard to give IV medications tried few doses of IV Lopressor we will do a trial of IV Cardizem x1 now and observe  · Continue to monitor on telemetry  · Appreciate cardiology consultation    Enterocolitis  Assessment & Plan  · Reports 1 day of severe generalized abdominal pain associated with nausea and dry heaves  · Normal lactic acid  · CT abdomen pelvis with gastroenterocolitis  · Empiric ceftriaxone/metronidazole -elevated procalcitonin  · Pain improved significantly at time of admission, abdomen soft  · Appreciate general surgery  · advance to full liquid toast and crackers and observe    Moderate protein-calorie malnutrition (HCC)  Assessment & Plan  Malnutrition Findings:   Adult Malnutrition type: Chronic illness  Adult Degree of Malnutrition: Malnutrition of moderate degree  Malnutrition Characteristics: Muscle loss, Fat loss, Inadequate energy                  360 Statement: Chronic moderate malnutrition related to decreased oral intake, difficulties with meal preparation at home as evidenced by < 75% estimated energy intake > 1 mo; moderate muscle loss to temporalis, pectoralis, deltoid muscles; moderate to severe fat loss to orbitals, moderate fat loss to buccal pads  Treated with: Advance diet as medically appropriate to modest 4gm DENY given hx of CHF   Will discuss supplements with pt once diet initiated  Recommend daily weights for nutrition monitoring  Did discuss Mom's Meals with pt for ease of meal preparation at home  BMI Findings: Body mass index is 21 49 kg/m²  Coronary artery disease involving native coronary artery of native heart  Assessment & Plan  · NSTEMI September 2022  · Cardiac catheterization with minimal atherosclerotic disease, no PCI  · Continue Eliquis and simvastatin    Stage 3b chronic kidney disease [facility])  Assessment & Plan  Lab Results   Component Value Date    EGFR 39 02/11/2023    EGFR 42 02/10/2023    CREATININE 1 23 02/11/2023    CREATININE 1 16 02/10/2023   · History CKD 3  · Creatinine appears baseline when compared in care everywhere  · Trend creatinine  · Bacteriuria-empiric ceftriaxone  · Renally dose medications, avoid nephrotoxic medication    Acute cystitis  Assessment & Plan  · Presented with complaints of abdominal pain and foul-smelling urine  · UA with bacteriuria, pyuria  · Urine culture pending  · Empiric ceftriaxone    Chronic combined systolic and diastolic congestive heart failure (HCC)  Assessment & Plan  Wt Readings from Last 3 Encounters:   02/10/23 53 3 kg (117 lb 8 1 oz)   06/23/21 55 3 kg (121 lb 14 6 oz)   03/15/19 59 kg (130 lb)     · History chronic congestive heart failure, follows with [facility] cardiology  · Per their notes - Probable Takotsubo cardiomyopathy with recovered EF and at least moderate MR and moderate TR    · Utilizes diuretics per cardiology direction as needed if weight increases otherwise does not take them on a daily basis  · Continue PTA metoprolol succinate 25 mg daily  · Lisinopril 2 5 mg daily on hold due to hypotension  · Received 40 mg IV furosemide in the ED  · Appears to be euvolemic stop fluids and observe  · Monitor volume status closely  · Intake and output    Pleural effusion, bilateral  Assessment & Plan  · Moderate bilateral pleural effusions  · History of chronic systolic heart failure, reviewed care everywhere there were small bilateral pleural effusions on chest x-ray September  · Reports chronic dyspnea  · Outpatient follow-up      VTE Pharmacologic Prophylaxis:   Pharmacologic: Apixaban (Eliquis)  Mechanical VTE Prophylaxis in Place: Yes    Patient Centered Rounds: I have performed bedside rounds with nursing staff today  Discussions with Specialists or Other Care Team Provider: None    Education and Discussions with Family / Patient: Discussed with patient at bedside and update family    Time Spent for Care: 45 minutes  More than 50% of total time spent on counseling and coordination of care as described above  Current Length of Stay: 1 day(s)    Current Patient Status: Inpatient   Certification Statement: The patient will continue to require additional inpatient hospital stay due to A-fib with RVR    Discharge Plan: Pending progress    Code Status: Level 1 - Full Code      Subjective:   Still continues to have A-fib with RVR episodes feels fatigued and still having some diarrhea    Objective:     Vitals:   Temp (24hrs), Av 8 °F (37 1 °C), Min:97 3 °F (36 3 °C), Max:100 6 °F (38 1 °C)    Temp:  [97 3 °F (36 3 °C)-100 6 °F (38 1 °C)] 97 3 °F (36 3 °C)  HR:  [] 113  Resp:  [16-29] 18  BP: ()/(48-69) 105/58  SpO2:  [76 %-98 %] 96 %  Body mass index is 21 49 kg/m²  Input and Output Summary (last 24 hours): Intake/Output Summary (Last 24 hours) at 2023 1111  Last data filed at 2023 0913  Gross per 24 hour   Intake 1716 25 ml   Output 500 ml   Net 1216 25 ml       Physical Exam:     Physical Exam  Vitals and nursing note reviewed  Constitutional:       Appearance: Normal appearance  HENT:      Head: Normocephalic and atraumatic  Right Ear: External ear normal       Left Ear: External ear normal       Nose: Nose normal       Mouth/Throat:      Pharynx: Oropharynx is clear     Eyes:      Pupils: Pupils are equal, round, and reactive to light  Cardiovascular:      Rate and Rhythm: Tachycardia present  Rhythm irregular  Heart sounds: Normal heart sounds  Pulmonary:      Effort: Pulmonary effort is normal       Breath sounds: Normal breath sounds  Abdominal:      General: Bowel sounds are normal       Palpations: Abdomen is soft  Tenderness: There is abdominal tenderness  Musculoskeletal:         General: Normal range of motion  Cervical back: Normal range of motion and neck supple  Skin:     General: Skin is warm and dry  Capillary Refill: Capillary refill takes less than 2 seconds  Neurological:      General: No focal deficit present  Mental Status: She is alert and oriented to person, place, and time  Psychiatric:         Mood and Affect: Mood normal            Additional Data:     Labs:    Results from last 7 days   Lab Units 02/11/23  0543 02/10/23  0050   WBC Thousand/uL 13 52* 3 19*   HEMOGLOBIN g/dL 12 6 12 4   HEMATOCRIT % 38 8 38 4   PLATELETS Thousands/uL 190 211   NEUTROS PCT %  --  81*   LYMPHS PCT %  --  13*   MONOS PCT %  --  6   EOS PCT %  --  0     Results from last 7 days   Lab Units 02/11/23  0543 02/10/23  0050   SODIUM mmol/L 137 138   POTASSIUM mmol/L 3 4* 4 3   CHLORIDE mmol/L 102 102   CO2 mmol/L 30 32   BUN mg/dL 22 15   CREATININE mg/dL 1 23 1 16   ANION GAP mmol/L 5 4   CALCIUM mg/dL 7 2* 8 1*   ALBUMIN g/dL  --  3 5   TOTAL BILIRUBIN mg/dL  --  1 49*   ALK PHOS U/L  --  46   ALT U/L  --  10   AST U/L  --  24   GLUCOSE RANDOM mg/dL 79 91                 Results from last 7 days   Lab Units 02/10/23  0050   LACTIC ACID mmol/L 1 4   PROCALCITONIN ng/ml 1 28*           * I Have Reviewed All Lab Data Listed Above  * Additional Pertinent Lab Tests Reviewed:  Jv 66 Admission Reviewed    Imaging:    Imaging Reports Reviewed Today Include: cTA chest abdomen pelvis  Imaging Personally Reviewed by Myself Includes: CTA chest abdomen pelvis    Recent Cultures (last 7 days):     Results from last 7 days   Lab Units 02/10/23  0052   BLOOD CULTURE  No Growth at 24 hrs  No Growth at 24 hrs  Last 24 Hours Medication List:   Current Facility-Administered Medications   Medication Dose Route Frequency Provider Last Rate   • acetaminophen  650 mg Oral Q6H PRN Liz S Germaine, CRNP     • apixaban  2 5 mg Oral BID Liz S Germaine, CRNP     • cefTRIAXone  1,000 mg Intravenous Q24H Liz S Germaine, CRNP 1,000 mg (02/11/23 0515)   • diltiazem  10 mg Intravenous Once Temitope Jenkins MD     • escitalopram  20 mg Oral Daily Liz S Germaine, CRNP     • gabapentin  300 mg Oral HS Liz S Germaine, CRNP     • latanoprost  1 drop Both Eyes HS Liz S Germaine, CRNP     • metoprolol  5 mg Intravenous Q5 Min PRN Liz S Germaine, CRNP     • metoprolol succinate  25 mg Oral Daily Liz S Germaine, CRNP     • metroNIDAZOLE  500 mg Intravenous Q8H Liz S Germaine, CRNP 500 mg (02/11/23 1052)   • pantoprazole  40 mg Oral Early Morning Liz S Germaine, CRNP     • potassium chloride  20 mEq Oral Once Temitope Jenkins MD     • traZODone  150 mg Oral HS Liz S Germaine, CRNP          Today, Patient Was Seen By: Temitope Jenkins MD    ** Please Note: Dictation voice to text software may have been used in the creation of this document   **

## 2023-02-11 NOTE — ASSESSMENT & PLAN NOTE
Lab Results   Component Value Date    EGFR 39 02/11/2023    EGFR 42 02/10/2023    CREATININE 1 23 02/11/2023    CREATININE 1 16 02/10/2023   · History CKD 3  · Creatinine appears baseline when compared in care everywhere  · Trend creatinine  · Bacteriuria-empiric ceftriaxone  · Renally dose medications, avoid nephrotoxic medication

## 2023-02-11 NOTE — PROGRESS NOTES
PA made aware of patient's hear rate sustaining 120's-150  Manual BP 96/50  Ok to administer prn metoprolol as ordered

## 2023-02-11 NOTE — PROGRESS NOTES
MD made aware of patient's HR high 100's-140's at times, but HR not sustained  New orders received to give the scheduled po metoprolol now

## 2023-02-11 NOTE — QUICK NOTE
QUICK NOTE - Deterioration Index  Remy Louiser 80 y o  female MRN: [id number]  Unit/Bed#: -01 Encounter: [id number]    Date Paged: 23  Time Paged: 913  Room #: 550  [id number] Time: Spoke to RN at 0915, DI score 46 at that time, evaluated at bedside at 0950  Deterioration index score at time of page: 47 01  %  Spoke with SLIM attending from primary team  Need to escalate level of care: no     PROBLEMS resulting in high DI score:   29 Age 80years old   24% Supplemental oxygen Nasal cannula   19% Pulse 130   9% Cardiac rhythm Atrial fibrillation   7% WBC count abnormal (13 52 Thousand/uL)   5% Respiratory rate 18   4% Systolic 916   2% Sodium 326 mmol/L   2% Potassium abnormal (3 4 mmol/L)   <1% Temperature 97 3 °F (36 3 °C)   <1% Pulse oximetry 97 %   <1% Hematocrit 38 8 %     • Current DI score 47    PLAN:    • Please contact CCM in the event of need for re-evaluation  Please contact critical care via [employer] with any questions or concerns       Vitals:   Vitals:    23 0842 23 0912 23 0913 23 0941   BP: 99/59 102/68 106/50 104/59   BP Location:   Left arm    Pulse: (!) 114 (!) 117 (!) 124    Resp:       Temp:       TempSrc:       SpO2: 96% 96%     Weight:       Height:           Respiratory:  SpO2: SpO2: 96 %, SpO2 Activity: SpO2 Activity: At Rest, SpO2 Device: O2 Device: Nasal cannula  Nasal Cannula O2 Flow Rate (L/min): 2 L/min (pt destats when asleep)    Temperature: Temp (24hrs), Av 8 °F (37 1 °C), Min:97 3 °F (36 3 °C), Max:100 6 °F (38 1 °C)  Current: Temperature: (!) 97 3 °F (36 3 °C)    Labs:   Results from last 7 days   Lab Units 23  0543 02/10/23  0050   WBC Thousand/uL 13 52* 3 19*   HEMOGLOBIN g/dL 12 6 12 4   HEMATOCRIT % 38 8 38 4   PLATELETS Thousands/uL 190 211   NEUTROS PCT %  --  81*   MONOS PCT %  --  6     Results from last 7 days   Lab Units 23  0543 02/10/23  0050   SODIUM mmol/L 137 138   POTASSIUM mmol/L 3 4* 4 3   CHLORIDE mmol/L 102 102   CO2 mmol/L 30 32   BUN mg/dL 22 15   CREATININE mg/dL 1 23 1 16   CALCIUM mg/dL 7 2* 8 1*   ALK PHOS U/L  --  46   ALT U/L  --  10   AST U/L  --  24     Results from last 7 days   Lab Units 02/10/23  0050   MAGNESIUM mg/dL 1 8*     Results from last 7 days   Lab Units 02/10/23  0050   LACTIC ACID mmol/L 1 4         Results from last 7 days   Lab Units 02/10/23  0050   PROCALCITONIN ng/ml 1 28*       Code Status: Level 1 - Full Code

## 2023-02-11 NOTE — PLAN OF CARE
Problem: Nutrition/Hydration-ADULT  Goal: Nutrient/Hydration intake appropriate for improving, restoring or maintaining nutritional needs  Description: Monitor and assess patient's nutrition/hydration status for malnutrition  Collaborate with interdisciplinary team and initiate plan and interventions as ordered  Monitor patient's weight and dietary intake as ordered or per policy  Utilize nutrition screening tool and intervene as necessary  Determine patient's food preferences and provide high-protein, high-caloric foods as appropriate       INTERVENTIONS:  - Monitor oral intake, urinary output, labs, and treatment plans  - Assess nutrition and hydration status and recommend course of action  - Evaluate amount of meals eaten  - Assist patient with eating if necessary   - Allow adequate time for meals  - Recommend/ encourage appropriate diets, oral nutritional supplements, and vitamin/mineral supplements  - Order, calculate, and assess calorie counts as needed  - Recommend, monitor, and adjust tube feedings and TPN/PPN based on assessed needs  - Assess need for intravenous fluids  - Provide specific nutrition/hydration education as appropriate  - Include patient/family/caregiver in decisions related to nutrition  2/10/2023 2211 by Андрей Stephen RN  Outcome: Progressing  2/10/2023 2211 by Emanuel Lazo RN  Outcome: Progressing     Problem: PAIN - ADULT  Goal: Verbalizes/displays adequate comfort level or baseline comfort level  Description: Interventions:  - Encourage patient to monitor pain and request assistance  - Assess pain using appropriate pain scale  - Administer analgesics based on type and severity of pain and evaluate response  - Implement non-pharmacological measures as appropriate and evaluate response  - Consider cultural and social influences on pain and pain management  - Notify physician/advanced practitioner if interventions unsuccessful or patient reports new pain  2/10/2023 2211 by Emanuel Lazo RN  Outcome: Progressing  2/10/2023 2211 by Emanuel Lazo RN  Outcome: Progressing     Problem: INFECTION - ADULT  Goal: Absence or prevention of progression during hospitalization  Description: INTERVENTIONS:  - Assess and monitor for signs and symptoms of infection  - Monitor lab/diagnostic results  - Monitor all insertion sites, i e  indwelling lines, tubes, and drains  - Monitor endotracheal if appropriate and nasal secretions for changes in amount and color  - [place] appropriate cooling/warming therapies per order  - Administer medications as ordered  - Instruct and encourage patient and family to use good hand hygiene technique  - Identify and instruct in appropriate isolation precautions for identified infection/condition  2/10/2023 2211 by Agusto Malik RN  Outcome: Progressing  2/10/2023 2211 by Agusto Malik RN  Outcome: Progressing     Problem: SAFETY ADULT  Goal: Patient will remain free of falls  Description: INTERVENTIONS:  - Educate patient/family on patient safety including physical limitations  - Instruct patient to call for assistance with activity   - Consult OT/PT to assist with strengthening/mobility   - Keep Call bell within reach  - Keep bed low and locked with side rails adjusted as appropriate  - Keep care items and personal belongings within reach  - Initiate and maintain comfort rounds  - Make Fall Risk Sign visible to staff  - Apply yellow socks and bracelet for high fall risk patients  - Consider moving patient to room near nurses station  2/10/2023 2211 by Agusto Malik RN  Outcome: Progressing  2/10/2023 2211 by Agusto Malik RN  Outcome: Progressing  Goal: Maintain or return to baseline ADL function  Description: INTERVENTIONS:  -  Assess patient's ability to carry out ADLs; assess patient's baseline for ADL function and identify physical deficits which impact ability to perform ADLs (bathing, care of mouth/teeth, toileting, grooming, dressing, etc )  - Assess/evaluate cause of self-care deficits   - Assess range of motion  - Assess patient's mobility; develop plan if impaired  - Assess patient's need for assistive devices and provide as appropriate  - Encourage maximum independence but intervene and supervise when necessary  - Involve family in performance of ADLs  - Assess for home care needs following discharge   - Consider OT consult to assist with ADL evaluation and planning for discharge  - Provide patient education as appropriate  2/10/2023 2211 by Cara Langley RN  Outcome: Progressing  2/10/2023 2211 by Cara Langley RN  Outcome: Progressing  Goal: Maintains/Returns to pre admission functional level  Description: INTERVENTIONS:  - Perform BMAT or MOVE assessment daily    - Set and communicate daily mobility goal to care team and patient/family/caregiver  - Collaborate with rehabilitation services on mobility goals if consulted  - Perform Range of Motion 3 times a day    - Reposition patient every 2 hours if pt unable to reposition self  - Out of bed for toileting  - Record patient progress and toleration of activity level   2/10/2023 2211 by Cara Langley RN  Outcome: Progressing  2/10/2023 2211 by Cara Langley RN  Outcome: Progressing     Problem: DISCHARGE PLANNING  Goal: Discharge to home or other facility with appropriate resources  Description: INTERVENTIONS:  - Identify barriers to discharge w/patient and caregiver  - Arrange for needed discharge resources and transportation as appropriate  - Identify discharge learning needs (meds, wound care, etc )  - Arrange for interpretive services to assist at discharge as needed  - Refer to Case Management Department for coordinating discharge planning if the patient needs post-hospital services based on physician/advanced practitioner order or complex needs related to functional status, cognitive ability, or social support system  2/10/2023 2211 by Cara Langley RN  Outcome: Progressing  2/10/2023 2211 by Cara Langley RN  Outcome: Progressing     Problem: Knowledge Deficit  Goal: Patient/family/caregiver demonstrates understanding of disease process, treatment plan, medications, and discharge instructions  Description: Complete learning assessment and assess knowledge base    Interventions:  - Provide teaching at level of understanding  - Provide teaching via preferred learning methods  2/10/2023 2211 by Tiffnay Pink RN  Outcome: Progressing  2/10/2023 2211 by Tiffany Pink RN  Outcome: Progressing     Problem: CARDIOVASCULAR - ADULT  Goal: Maintains optimal cardiac output and hemodynamic stability  Description: INTERVENTIONS:  - Monitor I/O, vital signs and rhythm  - Monitor for S/S and trends of decreased cardiac output  - Administer and titrate ordered vasoactive medications to optimize hemodynamic stability  - Assess quality of pulses, skin color and temperature  - Assess for signs of decreased coronary artery perfusion  - Instruct patient to report change in severity of symptoms  2/10/2023 2211 by Tiffany Pink RN  Outcome: Progressing  2/10/2023 2211 by Emanuel Lazo RN  Outcome: Progressing  Goal: Absence of cardiac dysrhythmias or at baseline rhythm  Description: INTERVENTIONS:  - Continuous cardiac monitoring, vital signs, obtain 12 lead EKG if ordered  - Administer antiarrhythmic and heart rate control medications as ordered  - Monitor electrolytes and administer replacement therapy as ordered  2/10/2023 2211 by Tiffany Pink RN  Outcome: Progressing  2/10/2023 2211 by Emanuel Lazo RN  Outcome: Progressing     Problem: GASTROINTESTINAL - ADULT  Goal: Minimal or absence of nausea and/or vomiting  Description: INTERVENTIONS:  - Administer IV fluids if ordered to ensure adequate hydration  - Maintain NPO status until nausea and vomiting are resolved  - Nasogastric tube if ordered  - Administer ordered antiemetic medications as needed  - Provide nonpharmacologic comfort measures as appropriate  - Advance diet as tolerated, if ordered  - Consider nutrition services referral to assist patient with adequate nutrition and appropriate food choices  2/10/2023 2211 by Brian Guadarrama RN  Outcome: Progressing  2/10/2023 2211 by Brian Guadarrama RN  Outcome: Progressing  Goal: Maintains or returns to baseline bowel function  Description: INTERVENTIONS:  - Assess bowel function  - Encourage oral fluids to ensure adequate hydration  - Administer IV fluids if ordered to ensure adequate hydration  - Administer ordered medications as needed  - Encourage mobilization and activity  - Consider nutritional services referral to assist patient with adequate nutrition and appropriate food choices  2/10/2023 2211 by Brian Guadarrama RN  Outcome: Progressing  2/10/2023 2211 by Emanuel Lazo RN  Outcome: Progressing  Goal: Maintains adequate nutritional intake  Description: INTERVENTIONS:  - Monitor percentage of each meal consumed  - Identify factors contributing to decreased intake, treat as appropriate  - Assist with meals as needed  - Monitor I&O, weight, and lab values if indicated  - Obtain nutrition services referral as needed  2/10/2023 2211 by Brian Guadarrama RN  Outcome: Progressing  2/10/2023 2211 by Brian Guadarrama RN  Outcome: Progressing     Problem: MOBILITY - ADULT  Goal: Maintain or return to baseline ADL function  Description: INTERVENTIONS:  -  Assess patient's ability to carry out ADLs; assess patient's baseline for ADL function and identify physical deficits which impact ability to perform ADLs (bathing, care of mouth/teeth, toileting, grooming, dressing, etc )  - Assess/evaluate cause of self-care deficits   - Assess range of motion  - Assess patient's mobility; develop plan if impaired  - Assess patient's need for assistive devices and provide as appropriate  - Encourage maximum independence but intervene and supervise when necessary  - Involve family in performance of ADLs  - Assess for home care needs following discharge   - Consider OT consult to assist with ADL evaluation and planning for discharge  - Provide patient education as appropriate  2/10/2023 2211 by Yadiel Rivers RN  Outcome: Progressing  2/10/2023 2211 by Yadiel Rivers RN  Outcome: Progressing  Goal: Maintains/Returns to pre admission functional level  Description: INTERVENTIONS:  - Perform BMAT or MOVE assessment daily    - Set and communicate daily mobility goal to care team and patient/family/caregiver  - Collaborate with rehabilitation services on mobility goals if consulted  - Perform Range of Motion 3 times a day    - Reposition patient every 2 hours if pt unable to reposition self  - Out of bed for toileting  - Record patient progress and toleration of activity level   2/10/2023 2211 by Yadiel Rivers RN  Outcome: Progressing  2/10/2023 2211 by Yadiel Rivers RN  Outcome: Progressing     Problem: Prexisting or High Potential for Compromised Skin Integrity  Goal: Skin integrity is maintained or improved  Description: INTERVENTIONS:  - Identify patients at risk for skin breakdown  - Assess and monitor skin integrity  - Assess and monitor nutrition and hydration status  - Monitor labs   - Assess for incontinence   - Turn and reposition patient  - Assist with mobility/ambulation  - Relieve pressure over bony prominences  - Avoid friction and shearing  - Provide appropriate hygiene as needed including keeping skin clean and dry  - Evaluate need for skin moisturizer/barrier cream  - Collaborate with interdisciplinary team   - Patient/family teaching  - Consider wound care consult   2/10/2023 2211 by Yadiel Rivers RN  Outcome: Progressing  2/10/2023 2211 by Emanuel Lazo RN  Outcome: Progressing

## 2023-02-11 NOTE — PROGRESS NOTES
Progress Note - General Surgery   Evalene Nails 80 y o  female MRN: [id number]  Unit/Bed#: -01 Encounter: [id number]    Assessment:  80 y o  female w/ gastroenterocolitis   - Episodes of low grade fevers yesterday although improved overnight, tachycardia, episodes of hypotension - currently 101/59, on nasal cannula  - WBC 13 52 (3 19)  - Hgb 12 6 (12 4)   - Hypokalemia 3 4 (4 3)  - UA moderate leuks  - Blood cultures with no growth x 24 hours  - Stool studies need to be collected  - Patient with some lower abdominal discomfort, no nausea, vomiting    Plan:  - Conservative management  - No surgical intervention indicated at this time  - Bowel rest - clear liquid diet  - IV abx - Cefriaxone, Flagyl   - Serial abdominal exams  - Monitor AM labs, trend WBC  - Co-morbidities per primary service    Subjective:   Patient feels she is doing okay today  She explains yesterday she was having some tenderness of the abdomen but no pain  Today patient feels as if there is some pain of the abdomen  She states she did have another episode of explosive diarrhea and again was unaware this was happening - nursing was unable to get stool sample at this time  Patient is urinating without issue  She denies feeling feverish this morning, no chills, shortness of breath, or chest tightness  Objective:   Blood pressure 101/59, pulse 90, temperature (!) 97 3 °F (36 3 °C), resp  rate 18, height 5' 2" (1 575 m), weight 53 3 kg (117 lb 8 1 oz), SpO2 96 %  ,Body mass index is 21 49 kg/m²  Intake/Output Summary (Last 24 hours) at 2/11/2023 1034  Last data filed at 2/11/2023 0913  Gross per 24 hour   Intake 1716 25 ml   Output 500 ml   Net 1216 25 ml     Invasive Devices     Peripheral Intravenous Line  Duration           Peripheral IV 02/10/23 Dorsal (posterior); Left Wrist 1 day          Drain  Duration           External Urinary Catheter 1 day              Physical Exam:  General: no acute distress, pt appears well and comfortable, hard of hearing   Skin: warm and dry to touch  Pulmonary: normal effort with nasal cannula present  Abdominal: soft, non-distended, tenderness to palpation in suprapubic region and across lower abdomen, she feels this is more significant on the left than right, no guarding or rebound noted  Neuro: alert and oriented     Lab, Imaging and other studies:  I have personally reviewed pertinent lab results      CBC:   Lab Results   Component Value Date    WBC 13 52 (H) 02/11/2023    HGB 12 6 02/11/2023    HCT 38 8 02/11/2023    MCV 88 02/11/2023     02/11/2023    MCH 28 4 02/11/2023    MCHC 32 5 02/11/2023    RDW 14 7 02/11/2023    MPV 9 0 02/11/2023   , CMP:   Lab Results   Component Value Date    SODIUM 137 02/11/2023    K 3 4 (L) 02/11/2023     02/11/2023    CO2 30 02/11/2023    BUN 22 02/11/2023    CREATININE 1 23 02/11/2023    CALCIUM 7 2 (L) 02/11/2023    EGFR 39 02/11/2023     VTE Pharmacologic Prophylaxis: Eliquis  VTE Mechanical Prophylaxis: sequential compression device    Lexy Carter PA-C  2/11/2023

## 2023-02-11 NOTE — ASSESSMENT & PLAN NOTE
· History paroxysmal atrial fibrillation chronically maintained on metoprolol succinate 25 mg daily   · chronic anticoagulation with Eliquis 2 5 mg twice daily for elevated [id number] stroke risk  · Presented A-fib RVR with rates 143 bpm requiring oral metoprolol for rate control    Having low normal blood pressures and hence hard to give IV medications tried few doses of IV Lopressor we will do a trial of IV Cardizem x1 now and observe  · Continue to monitor on telemetry  · Appreciate cardiology consultation

## 2023-02-12 NOTE — PROGRESS NOTES
Progress Note - General Surgery   Bobbi Garibay 80 y o  female MRN: [id number]  Unit/Bed#: -01 Encounter: [id number]    Assessment:  80 y o  female w/ gastroenterocolitis   - Afib w/ RvR  - Tachycardia, otherwise VSS and WNL  - WBC 11 73 (13 52, 3 19)  - Hgb 11 6 (12 6, 12 4)   - Hypokalemia resolved, 3 8 (3 4, 4 3)  - Cr elevated, 1 52 (1 23), BUN 28 (22)  - UA moderate leuks  - Blood cultures preliminary: gram negative rods  - Stool studies collected this AM, awaiting results     Plan:  - Conservative management  No surgical intervention indicated at this time  - Diet as tolerated, recommend primary advance as indicated  - Await stool studies  - IV abx - Cefriaxone, Flagyl   - Serial abdominal exams  - Monitor AM labs, trend WBC  - Co-morbidities per primary service    Subjective/Objective   Subjective: No acute events overnight  Pt reports abdominal pain is somewhat improved  Patient reports pain is worse in the right lower abdomen as well as near the bellybutton  Patient reports she is having persistent liquid stools  Denies nausea, vomiting, constipation, bloating, fever, chills, chest pain, or shortness of breath  Patient is tolerating full liquids with toast and crackers  Objective:     Blood pressure 108/63, pulse (!) 120, temperature 97 9 °F (36 6 °C), resp  rate 18, height 5' 2" (1 575 m), weight 53 3 kg (117 lb 8 1 oz), SpO2 98 %  ,Body mass index is 21 49 kg/m²  Intake/Output Summary (Last 24 hours) at 2/12/2023 1149  Last data filed at 2/11/2023 1432  Gross per 24 hour   Intake 480 ml   Output --   Net 480 ml       Invasive Devices     Peripheral Intravenous Line  Duration           Peripheral IV 02/10/23 Dorsal (posterior); Left Wrist 2 days    Peripheral IV 02/11/23 Right;Ventral (anterior) Forearm 1 day                Physical Exam: /63   Pulse (!) 120   Temp 97 9 °F (36 6 °C)   Resp 18   Ht 5' 2" (1 575 m)   Wt 53 3 kg (117 lb 8 1 oz)   SpO2 98%   BMI 21 49 kg/m² General appearance: alert and oriented, in no acute distress  Lungs: clear to auscultation bilaterally  Heart: regular rate and rhythm, S1, S2 normal, no murmur, click, rub or gallop  Abdomen: Mild distension  Tender across lowe abdomen and periumbilical areas  Normal bowel sounds    Lab, Imaging and other studies:  I have personally reviewed pertinent lab results      CBC:   Lab Results   Component Value Date    WBC 11 73 (H) 02/12/2023    HGB 11 6 02/12/2023    HCT 35 4 02/12/2023    MCV 87 02/12/2023     02/12/2023    MCH 28 6 02/12/2023    MCHC 32 8 02/12/2023    RDW 14 6 02/12/2023    MPV 9 2 02/12/2023   CMP:   Lab Results   Component Value Date    SODIUM 135 02/12/2023    K 3 8 02/12/2023     02/12/2023    CO2 28 02/12/2023    BUN 28 (H) 02/12/2023    CREATININE 1 52 (H) 02/12/2023    CALCIUM 7 0 (L) 02/12/2023    EGFR 30 02/12/2023     VTE Pharmacologic Prophylaxis: Eliquis  VTE Mechanical Prophylaxis: sequential compression device     Ellis Palacios PA-C

## 2023-02-12 NOTE — ASSESSMENT & PLAN NOTE
· Reports 1 day of severe generalized abdominal pain associated with nausea and dry heaves  · Normal lactic acid  · CT abdomen pelvis with gastroenterocolitis  · Empiric ceftriaxone/metronidazole -elevated procalcitonin  · Pain improved significantly at time of admission, abdomen soft, having some diarrhea but is slowly improving advance diet  · Appreciate general surgery

## 2023-02-12 NOTE — PROGRESS NOTES
[address]  Progress Note - Eli York 1934, 80 y o  female MRN: [id number]  Unit/Bed#: -[facility] Encounter: [id number]  Primary Care Provider: Eugenia Tate DO   Date and time admitted to hospital: 2/10/2023 12:28 AM    * Atrial fibrillation with RVR (HCC)  Assessment & Plan  · History paroxysmal atrial fibrillation chronically maintained on metoprolol succinate 25 mg daily   · chronic anticoagulation with Eliquis 2 5 mg twice daily for elevated [id number] stroke risk  · Presented A-fib RVR with rates 143 bpm requiring oral metoprolol for rate control  Having low normal blood pressures and hence hard to give medications tried few doses of IV Lopressor we will do a trial of IV Cardizem x1 now and observe  · Continue to monitor on telemetry  Increase Toprol-XL to 50 mg twice daily and add midodrine 5 mg 3 times daily  · Appreciate cardiology consultation    Enterocolitis  Assessment & Plan  · Reports 1 day of severe generalized abdominal pain associated with nausea and dry heaves  · Normal lactic acid  · CT abdomen pelvis with gastroenterocolitis  · Empiric ceftriaxone/metronidazole -elevated procalcitonin  · Pain improved significantly at time of admission, abdomen soft, having some diarrhea but is slowly improving advance diet  · Appreciate general surgery    Moderate protein-calorie malnutrition (HCC)  Assessment & Plan  Malnutrition Findings:   Adult Malnutrition type: Chronic illness  Adult Degree of Malnutrition: Malnutrition of moderate degree  Malnutrition Characteristics: Muscle loss, Fat loss, Inadequate energy                  360 Statement: Chronic moderate malnutrition related to decreased oral intake, difficulties with meal preparation at home as evidenced by < 75% estimated energy intake > 1 mo; moderate muscle loss to temporalis, pectoralis, deltoid muscles; moderate to severe fat loss to orbitals, moderate fat loss to buccal pads   Treated with: Advance diet as medically appropriate to modest 4gm DENY given hx of CHF  Will discuss supplements with pt once diet initiated  Recommend daily weights for nutrition monitoring  Did discuss Mom's Meals with pt for ease of meal preparation at home  BMI Findings: Body mass index is 21 49 kg/m²  Coronary artery disease involving native coronary artery of native heart  Assessment & Plan  · NSTEMI September 2022  · Cardiac catheterization with minimal atherosclerotic disease, no PCI  · Continue Eliquis and simvastatin    Stage 3b chronic kidney disease [facility])  Assessment & Plan  Lab Results   Component Value Date    EGFR 30 02/12/2023    EGFR 39 02/11/2023    EGFR 42 02/10/2023    CREATININE 1 52 (H) 02/12/2023    CREATININE 1 23 02/11/2023    CREATININE 1 16 02/10/2023   · History CKD 3  · creatinine worsened to 1 5  Placed on gentle hydration hold diuretics and observe  · Bacteriuria-empiric ceftriaxone  · Renally dose medications, avoid nephrotoxic medication    Acute cystitis  Assessment & Plan  · Presented with complaints of abdominal pain and foul-smelling urine  · UA with bacteriuria, pyuria  · Urine culture pending,  · Blood culture 1 out of 2 gram-negative rods  · Empiric ceftriaxone    Chronic combined systolic and diastolic congestive heart failure (HCC)  Assessment & Plan  Wt Readings from Last 3 Encounters:   02/10/23 53 3 kg (117 lb 8 1 oz)   06/23/21 55 3 kg (121 lb 14 6 oz)   03/15/19 59 kg (130 lb)     · History chronic congestive heart failure, follows with [facility] cardiology  · Per their notes - Probable Takotsubo cardiomyopathy with recovered EF and at least moderate MR and moderate TR  · Utilizes diuretics per cardiology direction as needed if weight increases otherwise does not take them on a daily basis  · Continue Toprol   · Lisinopril 2 5 mg daily on hold due to hypotension  · Received 40 mg IV furosemide in the ED  · Creatinine bumped up    Hold diuretics and placed on gentle hydration for now    Pleural effusion, bilateral  Assessment & Plan  · Moderate bilateral pleural effusions  · History of chronic systolic heart failure, reviewed care everywhere there were small bilateral pleural effusions on chest x-ray September  · Reports chronic dyspnea  · Outpatient follow-up      VTE Pharmacologic Prophylaxis:   Pharmacologic: Apixaban (Eliquis)  Mechanical VTE Prophylaxis in Place: Yes    Patient Centered Rounds: I have performed bedside rounds with nursing staff today  Discussions with Specialists or Other Care Team Provider: General surgery    Education and Discussions with Family / Patient: Discussed with patient at bedside and update family    Time Spent for Care: 30 minutes  More than 50% of total time spent on counseling and coordination of care as described above  Current Length of Stay: 2 day(s)    Current Patient Status: Inpatient   Certification Statement: The patient will continue to require additional inpatient hospital stay due to A-fib with RVR    Discharge Plan: Pending progress  We will be in the hospital at least another 48 hours    Code Status: Level 1 - Full Code      Subjective:   States her abdominal pain is improving but still having some diarrhea still having episodes of A-fib with RVR on telemetry monitor  Feels a little better today but is concerned on why she has an infection in her belly and her urine    Objective:     Vitals:   Temp (24hrs), Av 6 °F (36 4 °C), Min:97 2 °F (36 2 °C), Max:97 9 °F (36 6 °C)    Temp:  [97 2 °F (36 2 °C)-97 9 °F (36 6 °C)] 97 9 °F (36 6 °C)  HR:  [] 120  Resp:  [16-18] 18  BP: ()/(47-91) 108/63  SpO2:  [96 %-98 %] 98 %  Body mass index is 21 49 kg/m²  Input and Output Summary (last 24 hours):        Intake/Output Summary (Last 24 hours) at 2023 1300  Last data filed at 2023 1253  Gross per 24 hour   Intake 840 ml   Output --   Net 840 ml       Physical Exam:     Physical Exam  Vitals and nursing note reviewed  Constitutional:       Appearance: She is ill-appearing  HENT:      Head: Normocephalic and atraumatic  Right Ear: External ear normal       Left Ear: External ear normal       Nose: Nose normal       Mouth/Throat:      Pharynx: Oropharynx is clear  Eyes:      Pupils: Pupils are equal, round, and reactive to light  Cardiovascular:      Rate and Rhythm: Tachycardia present  Rhythm irregular  Heart sounds: Normal heart sounds  Pulmonary:      Effort: Pulmonary effort is normal       Breath sounds: Normal breath sounds  Abdominal:      General: Bowel sounds are normal       Palpations: Abdomen is soft  Tenderness: There is abdominal tenderness  Musculoskeletal:         General: Normal range of motion  Cervical back: Normal range of motion and neck supple  Skin:     General: Skin is warm and dry  Capillary Refill: Capillary refill takes less than 2 seconds  Neurological:      General: No focal deficit present  Mental Status: She is alert and oriented to person, place, and time  Psychiatric:         Mood and Affect: Mood normal            Additional Data:     Labs:    Results from last 7 days   Lab Units 02/12/23  0550 02/11/23  0543 02/10/23  0050   WBC Thousand/uL 11 73*   < > 3 19*   HEMOGLOBIN g/dL 11 6   < > 12 4   HEMATOCRIT % 35 4   < > 38 4   PLATELETS Thousands/uL 188   < > 211   NEUTROS PCT %  --   --  81*   LYMPHS PCT %  --   --  13*   MONOS PCT %  --   --  6   EOS PCT %  --   --  0    < > = values in this interval not displayed       Results from last 7 days   Lab Units 02/12/23  0550 02/11/23  0543 02/10/23  0050   SODIUM mmol/L 135   < > 138   POTASSIUM mmol/L 3 8   < > 4 3   CHLORIDE mmol/L 103   < > 102   CO2 mmol/L 28   < > 32   BUN mg/dL 28*   < > 15   CREATININE mg/dL 1 52*   < > 1 16   ANION GAP mmol/L 4   < > 4   CALCIUM mg/dL 7 0*   < > 8 1*   ALBUMIN g/dL  --   --  3 5   TOTAL BILIRUBIN mg/dL  --   --  1 49*   ALK PHOS U/L  --   --  46 ALT U/L  --   --  10   AST U/L  --   --  24   GLUCOSE RANDOM mg/dL 88   < > 91    < > = values in this interval not displayed  Results from last 7 days   Lab Units 02/10/23  0050   LACTIC ACID mmol/L 1 4   PROCALCITONIN ng/ml 1 28*           * I Have Reviewed All Lab Data Listed Above  * Additional Pertinent Lab Tests Reviewed: Jv 66 Admission Reviewed    Imaging:    Imaging Reports Reviewed Today Include: cta chest abd  Imaging Personally Reviewed by Myself Includes:  cta chest abd    Recent Cultures (last 7 days):     Results from last 7 days   Lab Units 02/10/23  0052   BLOOD CULTURE  No Growth at 48 hrs  GRAM STAIN RESULT  Gram negative rods*       Last 24 Hours Medication List:   Current Facility-Administered Medications   Medication Dose Route Frequency Provider Last Rate   • acetaminophen  650 mg Oral Q6H PRN Liz S Germaine, CRNP     • apixaban  2 5 mg Oral BID Liz S Germaine, CRNP     • cefTRIAXone  1,000 mg Intravenous Q24H Liz S Germaine, CRNP 1,000 mg (02/12/23 0551)   • escitalopram  20 mg Oral Daily Liz S Germaine, CRNP     • gabapentin  300 mg Oral HS Liz S Germaine, CRNP     • latanoprost  1 drop Both Eyes HS Liz S Germaine, CRNP     • metoprolol  5 mg Intravenous Q5 Min PRN Liz S Germaine, CRNP     • metoprolol succinate  50 mg Oral BID Norma Powell MD     • metroNIDAZOLE  500 mg Oral Q8H [facility] Norma Powell MD     • midodrine  5 mg Oral TID AC Norma Powell MD     • pantoprazole  40 mg Oral Early Morning Liz S Germaine, CRNP     • sodium chloride  50 mL/hr Intravenous Continuous Norma Powell MD     • traZODone  150 mg Oral HS Liz S Germaine, CRNP          Today, Patient Was Seen By: Norma Powell MD    ** Please Note: Dictation voice to text software may have been used in the creation of this document   **

## 2023-02-12 NOTE — ASSESSMENT & PLAN NOTE
Wt Readings from Last 3 Encounters:   02/10/23 53 3 kg (117 lb 8 1 oz)   06/23/21 55 3 kg (121 lb 14 6 oz)   03/15/19 59 kg (130 lb)     · History chronic congestive heart failure, follows with [facility] cardiology  · Per their notes - Probable Takotsubo cardiomyopathy with recovered EF and at least moderate MR and moderate TR  · Utilizes diuretics per cardiology direction as needed if weight increases otherwise does not take them on a daily basis  · Continue Toprol   · Lisinopril 2 5 mg daily on hold due to hypotension  · Received 40 mg IV furosemide in the ED  · Creatinine bumped up    Hold diuretics and placed on gentle hydration for now

## 2023-02-12 NOTE — ASSESSMENT & PLAN NOTE
· History paroxysmal atrial fibrillation chronically maintained on metoprolol succinate 25 mg daily   · chronic anticoagulation with Eliquis 2 5 mg twice daily for elevated [id number] stroke risk  · Presented A-fib RVR with rates 143 bpm requiring oral metoprolol for rate control  Having low normal blood pressures and hence hard to give medications tried few doses of IV Lopressor we will do a trial of IV Cardizem x1 now and observe  · Continue to monitor on telemetry    Increase Toprol-XL to 50 mg twice daily and add midodrine 5 mg 3 times daily  · Appreciate cardiology consultation

## 2023-02-12 NOTE — PLAN OF CARE
Problem: Nutrition/Hydration-ADULT  Goal: Nutrient/Hydration intake appropriate for improving, restoring or maintaining nutritional needs  Description: Monitor and assess patient's nutrition/hydration status for malnutrition  Collaborate with interdisciplinary team and initiate plan and interventions as ordered  Monitor patient's weight and dietary intake as ordered or per policy  Utilize nutrition screening tool and intervene as necessary  Determine patient's food preferences and provide high-protein, high-caloric foods as appropriate       INTERVENTIONS:  - Monitor oral intake, urinary output, labs, and treatment plans  - Assess nutrition and hydration status and recommend course of action  - Evaluate amount of meals eaten  - Assist patient with eating if necessary   - Allow adequate time for meals  - Recommend/ encourage appropriate diets, oral nutritional supplements, and vitamin/mineral supplements  - Order, calculate, and assess calorie counts as needed  - Recommend, monitor, and adjust tube feedings and TPN/PPN based on assessed needs  - Assess need for intravenous fluids  - Provide specific nutrition/hydration education as appropriate  - Include patient/family/caregiver in decisions related to nutrition  Outcome: Progressing     Problem: PAIN - ADULT  Goal: Verbalizes/displays adequate comfort level or baseline comfort level  Description: Interventions:  - Encourage patient to monitor pain and request assistance  - Assess pain using appropriate pain scale  - Administer analgesics based on type and severity of pain and evaluate response  - Implement non-pharmacological measures as appropriate and evaluate response  - Consider cultural and social influences on pain and pain management  - Notify physician/advanced practitioner if interventions unsuccessful or patient reports new pain  Outcome: Progressing     Problem: INFECTION - ADULT  Goal: Absence or prevention of progression during hospitalization  Description: INTERVENTIONS:  - Assess and monitor for signs and symptoms of infection  - Monitor lab/diagnostic results  - Monitor all insertion sites, i e  indwelling lines, tubes, and drains  - Monitor endotracheal if appropriate and nasal secretions for changes in amount and color  - [place] appropriate cooling/warming therapies per order  - Administer medications as ordered  - Instruct and encourage patient and family to use good hand hygiene technique  - Identify and instruct in appropriate isolation precautions for identified infection/condition  Outcome: Progressing     Problem: SAFETY ADULT  Goal: Patient will remain free of falls  Description: INTERVENTIONS:  - Educate patient/family on patient safety including physical limitations  - Instruct patient to call for assistance with activity   - Consult OT/PT to assist with strengthening/mobility   - Keep Call bell within reach  - Keep bed low and locked with side rails adjusted as appropriate  - Keep care items and personal belongings within reach  - Initiate and maintain comfort rounds  - Make Fall Risk Sign visible to staff  - Apply yellow socks and bracelet for high fall risk patients  - Consider moving patient to room near nurses station  Outcome: Progressing  Goal: Maintain or return to baseline ADL function  Description: INTERVENTIONS:  -  Assess patient's ability to carry out ADLs; assess patient's baseline for ADL function and identify physical deficits which impact ability to perform ADLs (bathing, care of mouth/teeth, toileting, grooming, dressing, etc )  - Assess/evaluate cause of self-care deficits   - Assess range of motion  - Assess patient's mobility; develop plan if impaired  - Assess patient's need for assistive devices and provide as appropriate  - Encourage maximum independence but intervene and supervise when necessary  - Involve family in performance of ADLs  - Assess for home care needs following discharge   - Consider OT consult to assist with ADL evaluation and planning for discharge  - Provide patient education as appropriate  Outcome: Progressing  Goal: Maintains/Returns to pre admission functional level  Description: INTERVENTIONS:  - Perform BMAT or MOVE assessment daily    - Set and communicate daily mobility goal to care team and patient/family/caregiver  - Collaborate with rehabilitation services on mobility goals if consulted  - Perform Range of Motion 3 times a day  - Reposition patient every 2 hours if pt unable to reposition self  - Out of bed for toileting  - Record patient progress and toleration of activity level   Outcome: Progressing     Problem: DISCHARGE PLANNING  Goal: Discharge to home or other facility with appropriate resources  Description: INTERVENTIONS:  - Identify barriers to discharge w/patient and caregiver  - Arrange for needed discharge resources and transportation as appropriate  - Identify discharge learning needs (meds, wound care, etc )  - Arrange for interpretive services to assist at discharge as needed  - Refer to Case Management Department for coordinating discharge planning if the patient needs post-hospital services based on physician/advanced practitioner order or complex needs related to functional status, cognitive ability, or social support system  Outcome: Progressing     Problem: Knowledge Deficit  Goal: Patient/family/caregiver demonstrates understanding of disease process, treatment plan, medications, and discharge instructions  Description: Complete learning assessment and assess knowledge base    Interventions:  - Provide teaching at level of understanding  - Provide teaching via preferred learning methods  Outcome: Progressing     Problem: CARDIOVASCULAR - ADULT  Goal: Maintains optimal cardiac output and hemodynamic stability  Description: INTERVENTIONS:  - Monitor I/O, vital signs and rhythm  - Monitor for S/S and trends of decreased cardiac output  - Administer and titrate ordered vasoactive medications to optimize hemodynamic stability  - Assess quality of pulses, skin color and temperature  - Assess for signs of decreased coronary artery perfusion  - Instruct patient to report change in severity of symptoms  Outcome: Progressing  Goal: Absence of cardiac dysrhythmias or at baseline rhythm  Description: INTERVENTIONS:  - Continuous cardiac monitoring, vital signs, obtain 12 lead EKG if ordered  - Administer antiarrhythmic and heart rate control medications as ordered  - Monitor electrolytes and administer replacement therapy as ordered  Outcome: Progressing     Problem: GASTROINTESTINAL - ADULT  Goal: Minimal or absence of nausea and/or vomiting  Description: INTERVENTIONS:  - Administer IV fluids if ordered to ensure adequate hydration  - Maintain NPO status until nausea and vomiting are resolved  - Nasogastric tube if ordered  - Administer ordered antiemetic medications as needed  - Provide nonpharmacologic comfort measures as appropriate  - Advance diet as tolerated, if ordered  - Consider nutrition services referral to assist patient with adequate nutrition and appropriate food choices  Outcome: Progressing  Goal: Maintains or returns to baseline bowel function  Description: INTERVENTIONS:  - Assess bowel function  - Encourage oral fluids to ensure adequate hydration  - Administer IV fluids if ordered to ensure adequate hydration  - Administer ordered medications as needed  - Encourage mobilization and activity  - Consider nutritional services referral to assist patient with adequate nutrition and appropriate food choices  Outcome: Progressing  Goal: Maintains adequate nutritional intake  Description: INTERVENTIONS:  - Monitor percentage of each meal consumed  - Identify factors contributing to decreased intake, treat as appropriate  - Assist with meals as needed  - Monitor I&O, weight, and lab values if indicated  - Obtain nutrition services referral as needed  Outcome: Progressing     Problem: MOBILITY - ADULT  Goal: Maintain or return to baseline ADL function  Description: INTERVENTIONS:  -  Assess patient's ability to carry out ADLs; assess patient's baseline for ADL function and identify physical deficits which impact ability to perform ADLs (bathing, care of mouth/teeth, toileting, grooming, dressing, etc )  - Assess/evaluate cause of self-care deficits   - Assess range of motion  - Assess patient's mobility; develop plan if impaired  - Assess patient's need for assistive devices and provide as appropriate  - Encourage maximum independence but intervene and supervise when necessary  - Involve family in performance of ADLs  - Assess for home care needs following discharge   - Consider OT consult to assist with ADL evaluation and planning for discharge  - Provide patient education as appropriate  Outcome: Progressing  Goal: Maintains/Returns to pre admission functional level  Description: INTERVENTIONS:  - Perform BMAT or MOVE assessment daily    - Set and communicate daily mobility goal to care team and patient/family/caregiver  - Collaborate with rehabilitation services on mobility goals if consulted  - Perform Range of Motion 3 times a day    - Reposition patient every 2 hours if pt unable to reposition self  - Out of bed for toileting  - Record patient progress and toleration of activity level   Outcome: Progressing     Problem: Prexisting or High Potential for Compromised Skin Integrity  Goal: Skin integrity is maintained or improved  Description: INTERVENTIONS:  - Identify patients at risk for skin breakdown  - Assess and monitor skin integrity  - Assess and monitor nutrition and hydration status  - Monitor labs   - Assess for incontinence   - Turn and reposition patient  - Assist with mobility/ambulation  - Relieve pressure over bony prominences  - Avoid friction and shearing  - Provide appropriate hygiene as needed including keeping skin clean and dry  - Evaluate need for skin moisturizer/barrier cream  - Collaborate with interdisciplinary team   - Patient/family teaching  - Consider wound care consult   Outcome: Progressing

## 2023-02-12 NOTE — ASSESSMENT & PLAN NOTE
· Presented with complaints of abdominal pain and foul-smelling urine  · UA with bacteriuria, pyuria  · Urine culture pending,  · Blood culture 1 out of 2 gram-negative rods  · Empiric ceftriaxone

## 2023-02-13 PROBLEM — I49.3 PVC'S (PREMATURE VENTRICULAR CONTRACTIONS): Status: ACTIVE | Noted: 2023-01-01

## 2023-02-13 PROBLEM — I95.9 HYPOTENSION: Status: ACTIVE | Noted: 2023-01-01

## 2023-02-13 NOTE — ASSESSMENT & PLAN NOTE
Lab Results   Component Value Date    EGFR 43 02/13/2023    EGFR 30 02/12/2023    EGFR 39 02/11/2023    CREATININE 1 14 02/13/2023    CREATININE 1 52 (H) 02/12/2023    CREATININE 1 23 02/11/2023   · History CKD 3  · creatinine worsened to 1 5  Placed on gentle hydration hold diuretics and observe  Improved to baseline of 1 1    Stop IV fluids and restart oral Lasix as per home regimen  · Renally dose medications, avoid nephrotoxic medication

## 2023-02-13 NOTE — PROGRESS NOTES
[address]  Progress Note - Abel Aminata 1934, 80 y o  female MRN: [id number]  Unit/Bed#: -01 Encounter: [id number]  Primary Care Provider: Sole Robledo DO   Date and time admitted to hospital: 2/10/2023 12:28 AM    * Atrial fibrillation with RVR (HCC)  Assessment & Plan  · History paroxysmal atrial fibrillation chronically maintained on metoprolol succinate 25 mg daily   · chronic anticoagulation with Eliquis 2 5 mg twice daily for elevated [id number] stroke risk  · Presented A-fib RVR with rates 143 bpm requiring oral metoprolol for rate control  Having low normal blood pressures and hence hard to give medications tried few doses of IV Lopressor and IV Cardizem  · Continue to monitor on telemetry  Increase Toprol-XL to 50 mg twice daily and add midodrine 5 mg 3 times daily  · Appreciate cardiology consultation    Enterocolitis  Assessment & Plan  · Reports 1 day of severe generalized abdominal pain associated with nausea and dry heaves  · Normal lactic acid  · CT abdomen pelvis with gastroenterocolitis  · Stool Culture including C  difficile and enteric panel negative  · Blood Culture 1 out of 2 gram-negative rods  · Empiric ceftriaxone/metronidazole -elevated procalcitonin  · Pain improved significantly at time of admission, abdomen soft, having some diarrhea but is slowly    advance diet to soft diet  · Appreciate general surgery    Moderate protein-calorie malnutrition (HCC)  Assessment & Plan  Malnutrition Findings:   Adult Malnutrition type: Chronic illness  Adult Degree of Malnutrition: Malnutrition of moderate degree  Malnutrition Characteristics: Muscle loss, Fat loss, Inadequate energy                  360 Statement: Chronic moderate malnutrition related to decreased oral intake, difficulties with meal preparation at home as evidenced by < 75% estimated energy intake > 1 mo; moderate muscle loss to temporalis, pectoralis, deltoid muscles; moderate to severe fat loss to orbitals, moderate fat loss to buccal pads  Treated with: Advance diet as medically appropriate to modest 4gm DENY given hx of CHF  Will discuss supplements with pt once diet initiated  Recommend daily weights for nutrition monitoring  Did discuss Mom's Meals with pt for ease of meal preparation at home  BMI Findings: Body mass index is 21 49 kg/m²  Coronary artery disease involving native coronary artery of native heart  Assessment & Plan  · NSTEMI September 2022  · Cardiac catheterization with minimal atherosclerotic disease, no PCI  · Continue Eliquis and simvastatin    Stage 3b chronic kidney disease [facility])  Assessment & Plan  Lab Results   Component Value Date    EGFR 43 02/13/2023    EGFR 30 02/12/2023    EGFR 39 02/11/2023    CREATININE 1 14 02/13/2023    CREATININE 1 52 (H) 02/12/2023    CREATININE 1 23 02/11/2023   · History CKD 3  · creatinine worsened to 1 5  Placed on gentle hydration hold diuretics and observe  Improved to baseline of 1 1  Stop IV fluids and restart oral Lasix as per home regimen  · Renally dose medications, avoid nephrotoxic medication    Acute cystitis  Assessment & Plan  · Presented with complaints of abdominal pain and foul-smelling urine  · UA with bacteriuria, pyuria  · Urine did not reflex to culture  · Blood culture 1 out of 2 gram-negative rods  · Empiric ceftriaxone    Chronic combined systolic and diastolic congestive heart failure (HCC)  Assessment & Plan  Wt Readings from Last 3 Encounters:   02/10/23 53 3 kg (117 lb 8 1 oz)   06/23/21 55 3 kg (121 lb 14 6 oz)   03/15/19 59 kg (130 lb)     · History chronic congestive heart failure, follows with [facility] cardiology  · Per their notes - Probable Takotsubo cardiomyopathy with recovered EF and at least moderate MR and moderate TR    · Utilizes diuretics per cardiology direction as needed if weight increases otherwise does not take them on a daily basis  · Continue Toprol   · Lisinopril 2 5 mg daily on hold due to hypotension  · Received 40 mg IV furosemide in the ED  · creatinine back to baseline  Stop IV fluids and restart Lasix 40 mg daily orally as per home dose    Pleural effusion, bilateral  Assessment & Plan  · Moderate bilateral pleural effusions  · History of chronic systolic heart failure, reviewed care everywhere there were small bilateral pleural effusions on chest x-ray September  · Reports chronic dyspnea  repeat cxray ordered for today  restart lasix 40 mg daily today  · Stop iv fluids      VTE Pharmacologic Prophylaxis:   Pharmacologic: Apixaban (Eliquis)  Mechanical VTE Prophylaxis in Place: Yes    Patient Centered Rounds: I have performed bedside rounds with nursing staff today  Discussions with Specialists or Other Care Team Provider: evan cardiology    Education and Discussions with Family / Patient: evan patient and update daughter    Time Spent for Care: 45 minutes  More than 50% of total time spent on counseling and coordination of care as described above  Current Length of Stay: 3 day(s)    Current Patient Status: Inpatient   Certification Statement: The patient will continue to require additional inpatient hospital stay due to afib rvr    Discharge Plan: anticipate discharge in 24 to 48 hours    Code Status: Level 1 - Full Code      Subjective:   Patient states that her diarrhea is slowing down but she is afraid to eat much  Feels a little bit better but feels tired  Objective:     Vitals:   Temp (24hrs), Av 8 °F (36 6 °C), Min:97 5 °F (36 4 °C), Max:97 9 °F (36 6 °C)    Temp:  [97 5 °F (36 4 °C)-97 9 °F (36 6 °C)] 97 9 °F (36 6 °C)  HR:  [] 94  Resp:  [16-21] 21  BP: (105-127)/(59-82) 109/80  SpO2:  [92 %-98 %] 92 %  Body mass index is 21 49 kg/m²  Input and Output Summary (last 24 hours):        Intake/Output Summary (Last 24 hours) at 2023 1121  Last data filed at 2023 1034  Gross per 24 hour   Intake 712 5 ml   Output 700 ml   Net 12 5 ml Physical Exam:     Physical Exam  Vitals and nursing note reviewed  Constitutional:       Appearance: She is ill-appearing  HENT:      Head: Normocephalic and atraumatic  Right Ear: External ear normal       Left Ear: External ear normal       Nose: Nose normal       Mouth/Throat:      Pharynx: Oropharynx is clear  Eyes:      Pupils: Pupils are equal, round, and reactive to light  Cardiovascular:      Rate and Rhythm: Tachycardia present  Rhythm irregular  Heart sounds: Normal heart sounds  Pulmonary:      Effort: Pulmonary effort is normal       Breath sounds: Rales present  Abdominal:      General: Bowel sounds are normal       Palpations: Abdomen is soft  Tenderness: There is no abdominal tenderness  Musculoskeletal:         General: Normal range of motion  Cervical back: Normal range of motion and neck supple  Skin:     General: Skin is warm and dry  Capillary Refill: Capillary refill takes less than 2 seconds  Neurological:      General: No focal deficit present  Mental Status: She is alert and oriented to person, place, and time  Psychiatric:         Mood and Affect: Mood normal            Additional Data:     Labs:    Results from last 7 days   Lab Units 02/12/23  0550 02/11/23  0543 02/10/23  0050   WBC Thousand/uL 11 73*   < > 3 19*   HEMOGLOBIN g/dL 11 6   < > 12 4   HEMATOCRIT % 35 4   < > 38 4   PLATELETS Thousands/uL 188   < > 211   NEUTROS PCT %  --   --  81*   LYMPHS PCT %  --   --  13*   MONOS PCT %  --   --  6   EOS PCT %  --   --  0    < > = values in this interval not displayed       Results from last 7 days   Lab Units 02/13/23  0516 02/11/23  0543 02/10/23  0050   SODIUM mmol/L 138   < > 138   POTASSIUM mmol/L 3 5   < > 4 3   CHLORIDE mmol/L 107   < > 102   CO2 mmol/L 29   < > 32   BUN mg/dL 24   < > 15   CREATININE mg/dL 1 14   < > 1 16   ANION GAP mmol/L 2*   < > 4   CALCIUM mg/dL 7 1*   < > 8 1*   ALBUMIN g/dL  --   --  3 5   TOTAL BILIRUBIN mg/dL  --   --  1 49*   ALK PHOS U/L  --   --  46   ALT U/L  --   --  10   AST U/L  --   --  24   GLUCOSE RANDOM mg/dL 106   < > 91    < > = values in this interval not displayed  Results from last 7 days   Lab Units 02/12/23 2047 02/12/23  1540   POC GLUCOSE mg/dl 155* 109         Results from last 7 days   Lab Units 02/10/23  0050   LACTIC ACID mmol/L 1 4   PROCALCITONIN ng/ml 1 28*           * I Have Reviewed All Lab Data Listed Above  * Additional Pertinent Lab Tests Reviewed: Jv 66 Admission Reviewed    Imaging:    Imaging Reports Reviewed Today Include: cxray  Imaging Personally Reviewed by Myself Includes:  cxray    Recent Cultures (last 7 days):     Results from last 7 days   Lab Units 02/11/23  1910 02/10/23  0052   BLOOD CULTURE   --  Anaerobic Gram Negative Bacilli*  No Growth at 72 hrs  GRAM STAIN RESULT   --  Gram negative rods*   C DIFF TOXIN B BY PCR  Negative  --        Last 24 Hours Medication List:   Current Facility-Administered Medications   Medication Dose Route Frequency Provider Last Rate   • acetaminophen  650 mg Oral Q6H PRN Liz S Germaine, CRNP     • apixaban  2 5 mg Oral BID Liz S Germaine, CRNP     • cefTRIAXone  1,000 mg Intravenous Q24H Liz S Germaine, CRNP 1,000 mg (02/13/23 0517)   • escitalopram  20 mg Oral Daily Liz S Germaine, CRNP     • furosemide  40 mg Oral Daily Katy Cruz MD     • gabapentin  300 mg Oral HS Liz S Germaine, CRNP     • latanoprost  1 drop Both Eyes HS Liz S Germaine, CRNP     • metoprolol succinate  50 mg Oral BID Katy Cruz MD     • metroNIDAZOLE  500 mg Oral Q8H [address] Katy Cruz MD     • midodrine  5 mg Oral TID AC Katy Cruz MD     • pantoprazole  40 mg Oral Early Morning Liz S Germaine, CRNP     • traZODone  150 mg Oral HS Liz S Germaine, CRNP          Today, Patient Was Seen By: Katy Cruz MD    ** Please Note: Dictation voice to text software may have been used in the creation of this document   **

## 2023-02-13 NOTE — ASSESSMENT & PLAN NOTE
· Moderate bilateral pleural effusions  · History of chronic systolic heart failure, reviewed care everywhere there were small bilateral pleural effusions on chest x-ray September  · Reports chronic dyspnea  repeat cxray ordered for today  restart lasix 40 mg daily today  · Stop iv fluids

## 2023-02-13 NOTE — ASSESSMENT & PLAN NOTE
Wt Readings from Last 3 Encounters:   02/10/23 53 3 kg (117 lb 8 1 oz)   06/23/21 55 3 kg (121 lb 14 6 oz)   03/15/19 59 kg (130 lb)     · History chronic congestive heart failure, follows with [facility] cardiology  · Per their notes - Probable Takotsubo cardiomyopathy with recovered EF and at least moderate MR and moderate TR  · Utilizes diuretics per cardiology direction as needed if weight increases otherwise does not take them on a daily basis  · Continue Toprol   · Lisinopril 2 5 mg daily on hold due to hypotension  · Received 40 mg IV furosemide in the ED  · creatinine back to baseline    Stop IV fluids and restart Lasix 40 mg daily orally as per home dose

## 2023-02-13 NOTE — PROGRESS NOTES
Progress Note - General Surgery   Lexi Hammnod 80 y o  female MRN: [id number]  Unit/Bed#: -01 Encounter: [id number]    Assessment:  80 y o  female w/ gastroenterocolitis   - Afib w/ RvR  - Afebrile, VSS w/ nasal cannula  - No new CBC today, yesterday - WBC 11 73 (13 52, 3 19), Hgb 11 6 (12 6, 12 4)   - Hypokalemia resolved, 3 5 (3 8, 3 4, 4 3)  - Cr - 1 14 (1 52, 1 23), BUN 24 (28, 22)  - UA moderate leuks  - Blood cultures 1/2 preliminary: gram negative rods  - Stool studies collected, awaiting results     Plan:  - Conservative management  No surgical intervention indicated at this time  - Diet as tolerated, on surgical soft diet   - Await stool studies  - IV abx - Cefriaxone, Flagyl   - Serial abdominal exams  - Co-morbidities per primary service  - Surgery team will sign off at this time, please reach out with questions or concerns    Subjective:   Patient states she is doing okay  She is tolerating a surgical soft diet, in small amounts without nausea or vomiting  Patient continues to experience diarrhea and understands a stool sample from yesterday was sent, awaiting results  Patient explains the pain feels improved when at rest although she continues to experience tenderness with palpation  Objective:   Blood pressure 127/82, pulse 82, temperature 97 5 °F (36 4 °C), resp  rate 17, height 5' 2" (1 575 m), weight 53 3 kg (117 lb 8 1 oz), SpO2 95 %  ,Body mass index is 21 49 kg/m²  Intake/Output Summary (Last 24 hours) at 2/13/2023 0745  Last data filed at 2/13/2023 0724  Gross per 24 hour   Intake 532 5 ml   Output 700 ml   Net -167 5 ml     Invasive Devices     Peripheral Intravenous Line  Duration           Peripheral IV 02/10/23 Dorsal (posterior); Left Wrist 3 days    Peripheral IV 02/11/23 Right;Ventral (anterior) Forearm 1 day              Physical Exam:  General: no acute distress, pt appears well and comfortable, conversational today, eating breakfast in bed  Skin: warm and dry to touch  Pulmonary: normal effort, nasal cannula present  Abdominal: softly distended, tender to palpation across lower abdomen without guarding or rebound, bowel sounds appreciated   Neuro: alert and oriented     Lab, Imaging and other studies:  I have personally reviewed pertinent lab results      CBC: No results found for: WBC, HGB, HCT, MCV, PLT, ADJUSTEDWBC, MCH, MCHC, RDW, MPV, NRBC     CMP:   Lab Results   Component Value Date    SODIUM 138 02/13/2023    K 3 5 02/13/2023     02/13/2023    CO2 29 02/13/2023    BUN 24 02/13/2023    CREATININE 1 14 02/13/2023    CALCIUM 7 1 (L) 02/13/2023    EGFR 43 02/13/2023     VTE Pharmacologic Prophylaxis: Eliquis  VTE Mechanical Prophylaxis: sequential compression device    Lexy Jensen  2/13/2023

## 2023-02-13 NOTE — ASSESSMENT & PLAN NOTE
Patient has known atrial fibrillation, previously paroxysmal but now persistent  Follows with Dr Steven Dawson with [facility]  Maintained on Eliquis 2 5 mg BID (based on age and weight), metoprolol succinate 25 mg daily in the outpatient setting  Presented with atrial fibrillation with RVR in the setting of acute enterocolitis and acute cystitis  Heart rates have improved but not optimized with increase in metoprolol succinate to 50 mg BID  Amiodarone load started 2/14/23 at 200 mg BID x 14 days then 200 mg daily  Patient confirms no missed Eliquis doses  Discussed need for liver, lung, and thyroid monitoring with ongoing use  Dr Steven Dawson made aware of this plan  Given NPO status I have converted her to amiodarone drip 1 mg/hr x 24 hours then 0 5 mg/hr thereafter  IV Lopressor 2 5 mg every 4 hours, hold for SBP < 95  Optimize electrolytes for K+ >4, Mag >2  Continue telemetry

## 2023-02-13 NOTE — ASSESSMENT & PLAN NOTE
· Reports 1 day of severe generalized abdominal pain associated with nausea and dry heaves  · Normal lactic acid  · CT abdomen pelvis with gastroenterocolitis  · Stool Culture including C  difficile and enteric panel negative  · Blood Culture 1 out of 2 gram-negative rods  · Empiric ceftriaxone/metronidazole -elevated procalcitonin  · Pain improved significantly at time of admission, abdomen soft, having some diarrhea but is slowly    advance diet to soft diet  · Appreciate general surgery

## 2023-02-13 NOTE — PLAN OF CARE
Problem: Nutrition/Hydration-ADULT  Goal: Nutrient/Hydration intake appropriate for improving, restoring or maintaining nutritional needs  Description: Monitor and assess patient's nutrition/hydration status for malnutrition  Collaborate with interdisciplinary team and initiate plan and interventions as ordered  Monitor patient's weight and dietary intake as ordered or per policy  Utilize nutrition screening tool and intervene as necessary  Determine patient's food preferences and provide high-protein, high-caloric foods as appropriate       INTERVENTIONS:  - Monitor oral intake, urinary output, labs, and treatment plans  - Assess nutrition and hydration status and recommend course of action  - Evaluate amount of meals eaten  - Assist patient with eating if necessary   - Allow adequate time for meals  - Recommend/ encourage appropriate diets, oral nutritional supplements, and vitamin/mineral supplements  - Order, calculate, and assess calorie counts as needed  - Recommend, monitor, and adjust tube feedings and TPN/PPN based on assessed needs  - Assess need for intravenous fluids  - Provide specific nutrition/hydration education as appropriate  - Include patient/family/caregiver in decisions related to nutrition  Outcome: Progressing     Problem: PAIN - ADULT  Goal: Verbalizes/displays adequate comfort level or baseline comfort level  Description: Interventions:  - Encourage patient to monitor pain and request assistance  - Assess pain using appropriate pain scale0-10  - Administer analgesics based on type and severity of pain and evaluate response  - Implement non-pharmacological measures as appropriate and evaluate response  - Consider cultural and social influences on pain and pain management  - Notify physician/advanced practitioner if interventions unsuccessful or patient reports new pain  Outcome: Progressing     Problem: INFECTION - ADULT  Goal: Absence or prevention of progression during hospitalization  Description: INTERVENTIONS:  - Assess and monitor for signs and symptoms of infection  - Monitor lab/diagnostic results  - Monitor all insertion sites, i e  indwelling lines, tubes, and drains  - Monitor endotracheal if appropriate and nasal secretions for changes in amount and color  - [place] appropriate cooling/warming therapies per order  - Administer medications as ordered  - Instruct and encourage patient and family to use good hand hygiene technique  - Identify and instruct in appropriate isolation precautions for identified infection/condition  Outcome: Progressing     Problem: SAFETY ADULT  Goal: Patient will remain free of falls  Description: INTERVENTIONS:  - Educate patient/family on patient safety including physical limitations  - Instruct patient to call for assistance with activity   - Consult OT/PT to assist with strengthening/mobility   - Keep Call bell within reach  - Keep bed low and locked with side rails adjusted as appropriate  - Keep care items and personal belongings within reach  - Initiate and maintain comfort rounds  - Make Fall Risk Sign visible to staff  - Apply yellow socks and bracelet for high fall risk patients  - Consider moving patient to room near nurses station  Outcome: Progressing  Goal: Maintain or return to baseline ADL function  Description: INTERVENTIONS:  -  Assess patient's ability to carry out ADLs; assess patient's baseline for ADL function and identify physical deficits which impact ability to perform ADLs (bathing, care of mouth/teeth, toileting, grooming, dressing, etc )  - Assess/evaluate cause of self-care deficits   - Assess range of motion  - Assess patient's mobility; develop plan if impaired  - Assess patient's need for assistive devices and provide as appropriate  - Encourage maximum independence but intervene and supervise when necessary  - Involve family in performance of ADLs  - Assess for home care needs following discharge   - Consider OT consult to assist with ADL evaluation and planning for discharge  - Provide patient education as appropriate  Outcome: Progressing  Goal: Maintains/Returns to pre admission functional level  Description: INTERVENTIONS:  - Perform BMAT or MOVE assessment daily    - Set and communicate daily mobility goal to care team and patient/family/caregiver  - Collaborate with rehabilitation services on mobility goals if consulted  - Perform Range of Motion 3 times a day  - Reposition patient every 2 hours if pt unable to reposition self  - Out of bed for toileting  - Record patient progress and toleration of activity level   Outcome: Progressing     Problem: DISCHARGE PLANNING  Goal: Discharge to home or other facility with appropriate resources  Description: INTERVENTIONS:  - Identify barriers to discharge w/patient and caregiver  - Arrange for needed discharge resources and transportation as appropriate  - Identify discharge learning needs (meds, wound care, etc )  - Arrange for interpretive services to assist at discharge as needed  - Refer to Case Management Department for coordinating discharge planning if the patient needs post-hospital services based on physician/advanced practitioner order or complex needs related to functional status, cognitive ability, or social support system  Outcome: Progressing     Problem: Knowledge Deficit  Goal: Patient/family/caregiver demonstrates understanding of disease process, treatment plan, medications, and discharge instructions  Description: Complete learning assessment and assess knowledge base    Interventions:  - Provide teaching at level of understanding  - Provide teaching via preferred learning methods  Outcome: Progressing     Problem: CARDIOVASCULAR - ADULT  Goal: Maintains optimal cardiac output and hemodynamic stability  Description: INTERVENTIONS:  - Monitor I/O, vital signs and rhythm  - Monitor for S/S and trends of decreased cardiac output  - Administer and titrate ordered vasoactive medications to optimize hemodynamic stability  - Assess quality of pulses, skin color and temperature  - Assess for signs of decreased coronary artery perfusion  - Instruct patient to report change in severity of symptoms  Outcome: Progressing  Goal: Absence of cardiac dysrhythmias or at baseline rhythm  Description: INTERVENTIONS:  - Continuous cardiac monitoring, vital signs, obtain 12 lead EKG if ordered  - Administer antiarrhythmic and heart rate control medications as ordered  - Monitor electrolytes and administer replacement therapy as ordered  Outcome: Progressing     Problem: GASTROINTESTINAL - ADULT  Goal: Minimal or absence of nausea and/or vomiting  Description: INTERVENTIONS:  - Administer IV fluids if ordered to ensure adequate hydration  - Maintain NPO status until nausea and vomiting are resolved  - Nasogastric tube if ordered  - Administer ordered antiemetic medications as needed  - Provide nonpharmacologic comfort measures as appropriate  - Advance diet as tolerated, if ordered  - Consider nutrition services referral to assist patient with adequate nutrition and appropriate food choices  Outcome: Progressing  Goal: Maintains or returns to baseline bowel function  Description: INTERVENTIONS:  - Assess bowel function  - Encourage oral fluids to ensure adequate hydration  - Administer IV fluids if ordered to ensure adequate hydration  - Administer ordered medications as needed  - Encourage mobilization and activity  - Consider nutritional services referral to assist patient with adequate nutrition and appropriate food choices  Outcome: Progressing  Goal: Maintains adequate nutritional intake  Description: INTERVENTIONS:  - Monitor percentage of each meal consumed  - Identify factors contributing to decreased intake, treat as appropriate  - Assist with meals as needed  - Monitor I&O, weight, and lab values if indicated  - Obtain nutrition services referral as needed  Outcome: Progressing     Problem: MOBILITY - ADULT  Goal: Maintain or return to baseline ADL function  Description: INTERVENTIONS:  -  Assess patient's ability to carry out ADLs; assess patient's baseline for ADL function and identify physical deficits which impact ability to perform ADLs (bathing, care of mouth/teeth, toileting, grooming, dressing, etc )  - Assess/evaluate cause of self-care deficits   - Assess range of motion  - Assess patient's mobility; develop plan if impaired  - Assess patient's need for assistive devices and provide as appropriate  - Encourage maximum independence but intervene and supervise when necessary  - Involve family in performance of ADLs  - Assess for home care needs following discharge   - Consider OT consult to assist with ADL evaluation and planning for discharge  - Provide patient education as appropriate  Outcome: Progressing  Goal: Maintains/Returns to pre admission functional level  Description: INTERVENTIONS:  - Perform BMAT or MOVE assessment daily    - Set and communicate daily mobility goal to care team and patient/family/caregiver  - Collaborate with rehabilitation services on mobility goals if consulted  - Perform Range of Motion 3 times a day    - Reposition patient every 2 hours if pt unable to reposition self  - Out of bed for toileting  - Record patient progress and toleration of activity level   Outcome: Progressing     Problem: Prexisting or High Potential for Compromised Skin Integrity  Goal: Skin integrity is maintained or improved  Description: INTERVENTIONS:  - Identify patients at risk for skin breakdown  - Assess and monitor skin integrity  - Assess and monitor nutrition and hydration status  - Monitor labs   - Assess for incontinence   - Turn and reposition patient  - Assist with mobility/ambulation  - Relieve pressure over bony prominences  - Avoid friction and shearing  - Provide appropriate hygiene as needed including keeping skin clean and dry  - Evaluate need for skin moisturizer/barrier cream  - Collaborate with interdisciplinary team   - Patient/family teaching  - Consider wound care consult   Outcome: Progressing

## 2023-02-13 NOTE — PROGRESS NOTES
Progress Note:Cardiology  Keny aMrtinez 1934, 80 y o  female MRN: [id number]    Unit/Bed#: -01 Encounter: [id number]  Attending Physician: Chandrakant Madrigal MD   Primary Care Provider: Wilmer Gonzalez DO   Date admitted to hospital: 2/10/2023  Length of stay: 3         PVC's (premature ventricular contractions)  Assessment & Plan  Frequent PVC's noted on telemetry with one 5-beat run noted overnight  Potassium 3 5 today  Will give PO potassium 40 mEq today  Goal > 4  Will assess response  * Atrial fibrillation with RVR [facility])  Assessment & Plan  Patient has known atrial fibrillation, previously paroxysmal but now persistent  Follows with Dr Francisco Javier Deleon with [facility] EP  Maintained on Eliquis 2 5 mg BID (based on age and weight), metoprolol succinate 25 mg daily in the outpatient setting  Presented with atrial fibrillation with RVR in the setting of acute enterocolitis and acute cystitis  Heart rates have improved with increase in metoprolol succinate to 50 mg BID  Optimize electrolytes for K+ >4, Mag >2  Given a dose of PO potassium today and will assess response  Continue telemetry  Hypotension  Assessment & Plan  Patient has been started on midodrine 5 mg TID for hypotension during admission to allow for up-titration of beta blocker  This is in the setting of dehydration from enterocolitis as well as a fib with RVR  Blood pressures have improved with midodrine and use of IV hydration  Will monitor    Coronary artery disease involving native coronary artery of native heart  Assessment & Plan  Mild nonobstructive CAD by cardiac catheterization at [address] 9/2022    Continue beta blocker and statin    Stage 3b chronic kidney disease [facility])  Assessment & Plan  Lab Results   Component Value Date    EGFR 43 02/13/2023    EGFR 30 02/12/2023    EGFR 39 02/11/2023    CREATININE 1 14 02/13/2023    CREATININE 1 52 (H) 02/12/2023    CREATININE 1 23 02/11/2023     Renal function improved with IV hydration  Would avoid diuresis and monitor for now  Acute cystitis  Assessment & Plan  Management as per medicine team    Chronic combined systolic and diastolic congestive heart failure (HCC)  Assessment & Plan  Wt Readings from Last 3 Encounters:   02/10/23 53 3 kg (117 lb 8 1 oz)   06/23/21 55 3 kg (121 lb 14 6 oz)   03/15/19 59 kg (130 lb)     [facility] at [facility] 12/21/2022 with EF 50% and mild global hypokinesis with mod-severe MR  She was taking furosemide 40 mg daily in the outpatient setting, recently started by her primary cardiologist   This was stopped and IV hydration provided during admission  She has wheezing on exam today  Would stop IV hydration and recommend continued hold of diuretic and assess response tomorrow  Strict I's/O's, standing daily weights  Optimize electrolytes for K+ > 4, Mag > 2  Pleural effusion, bilateral  Assessment & Plan  Noted on chest imaging  Wheezing noted on today's exam  Would stop IV fluids  Enterocolitis  Assessment & Plan  Management as per general surgery and medicine team      Subjective:   Patient seen and examined  No significant events overnight  She is sitting up in a chair at bedside  HR's remain around high 90's to low 100's on telemetry  She denies chest pain  Reports intermittent shortness of breath ongoing since prior to admission  Reported orthopnea prior to admission which has since resolved  She reports lightheadedness while standing earlier today but resolved now that she is sitting  Review of Systems   Constitutional: Negative  HENT: Negative  Cardiovascular: Negative for chest pain, dyspnea on exertion, irregular heartbeat, leg swelling, near-syncope, orthopnea and palpitations  Respiratory: Positive for shortness of breath  Negative for cough and snoring  Endocrine: Negative  Skin: Negative  Musculoskeletal: Negative  Gastrointestinal: Negative  Genitourinary: Negative      Neurological: Positive for light-headedness  Psychiatric/Behavioral: Negative  Objective:     Vitals: Blood pressure 141/78, pulse 105, temperature 97 7 °F (36 5 °C), resp  rate 17, height 5' 2" (1 575 m), weight 53 3 kg (117 lb 8 1 oz), SpO2 94 %  , Body mass index is 21 49 kg/m² ,     Orthostatic Blood Pressures    Flowsheet Row Most Recent Value   Blood Pressure 141/78 filed at 02/13/2023 1457   Patient Position - Orthostatic VS Sitting filed at 02/13/2023 1100          Physical Exam  Vitals and nursing note reviewed  Constitutional:       General: She is not in acute distress  Appearance: She is well-developed  HENT:      Head: Normocephalic and atraumatic  Eyes:      Conjunctiva/sclera: Conjunctivae normal    Neck:      Vascular: No JVD  Cardiovascular:      Rate and Rhythm: Tachycardia present  Rhythm irregular  Heart sounds: No murmur heard  Pulmonary:      Effort: Pulmonary effort is normal  No respiratory distress  Breath sounds: Wheezing present  Comments: Breathing comfortably on room air  Abdominal:      Palpations: Abdomen is soft  Tenderness: There is no abdominal tenderness  Musculoskeletal:         General: No swelling  Cervical back: Neck supple  Right lower leg: No edema  Left lower leg: No edema  Skin:     General: Skin is warm and dry  Capillary Refill: Capillary refill takes less than 2 seconds  Neurological:      Mental Status: She is alert  Psychiatric:         Mood and Affect: Mood and affect normal          Speech: Speech normal          Behavior: Behavior normal  Behavior is cooperative           Cognition and Memory: Cognition normal             Intake/Output Summary (Last 24 hours) at 2/13/2023 1605  Last data filed at 2/13/2023 1331  Gross per 24 hour   Intake 670 ml   Output 700 ml   Net -30 ml       Weight (last 2 days)     None             Medications:      Current Facility-Administered Medications:   •  acetaminophen (TYLENOL) tablet 650 mg, 650 mg, Oral, Q6H PRN, Liz S Germaine, CRNP, 650 mg at 02/10/23 2005  •  apixaban (ELIQUIS) tablet 2 5 mg, 2 5 mg, Oral, BID, Liz S Germaine, CRNP, 2 5 mg at 02/13/23 1222  •  cefTRIAXone (ROCEPHIN) IVPB (premix in dextrose) 1,000 mg 50 mL, 1,000 mg, Intravenous, Q24H, Liz S Germaine, CRNP, Last Rate: 100 mL/hr at 02/13/23 0517, 1,000 mg at 02/13/23 0517  •  escitalopram (LEXAPRO) tablet 20 mg, 20 mg, Oral, Daily, Liz S Germaine, CRNP, 20 mg at 02/12/23 2044  •  gabapentin (NEURONTIN) capsule 300 mg, 300 mg, Oral, HS, Liz S Germaine, CRNP, 300 mg at 02/12/23 2045  •  latanoprost (XALATAN) 0 005 % ophthalmic solution 1 drop, 1 drop, Both Eyes, HS, Liz S Germaine, CRNP, 1 drop at 02/12/23 2052  •  metoprolol succinate (TOPROL-XL) 24 hr tablet 50 mg, 50 mg, Oral, BID, Vickie Wright MD, 50 mg at 02/13/23 9857  •  metroNIDAZOLE (FLAGYL) tablet 500 mg, 500 mg, Oral, Q8H [address], Vickie Wright MD, 500 mg at 02/13/23 1338  •  midodrine (PROAMATINE) tablet 5 mg, 5 mg, Oral, TID AC, Vickie Wright MD, 5 mg at 02/13/23 1141  •  pantoprazole (PROTONIX) EC tablet 40 mg, 40 mg, Oral, Early Morning, Liz S Germaine, CRNP, 40 mg at 02/13/23 9300  •  pravastatin (PRAVACHOL) tablet 40 mg, 40 mg, Oral, Daily With Rene Soto MD  •  traZODone (DESYREL) tablet 150 mg, 150 mg, Oral, HS, Liz S Germaine, CRNP, 150 mg at 02/12/23 2045     Labs & Results:        Results from last 7 days   Lab Units 02/12/23  0550 02/11/23  0543 02/10/23  0050   WBC Thousand/uL 11 73* 13 52* 3 19*   HEMOGLOBIN g/dL 11 6 12 6 12 4   HEMATOCRIT % 35 4 38 8 38 4   PLATELETS Thousands/uL 188 190 211         Results from last 7 days   Lab Units 02/13/23  0516 02/12/23  0550 02/11/23  0543 02/10/23  0050   POTASSIUM mmol/L 3 5 3 8 3 4* 4 3   CHLORIDE mmol/L 107 103 102 102   CO2 mmol/L 29 28 30 32   BUN mg/dL 24 28* 22 15   CREATININE mg/dL 1 14 1 52* 1 23 1 16   CALCIUM mg/dL 7 1* 7 0* 7 2* 8 1*   ALK PHOS U/L  --   --   --  46   ALT U/L  --   --   --  10   AST U/L  -- --   --  24         Results from last 7 days   Lab Units 02/12/23  0550 02/10/23  0050   MAGNESIUM mg/dL 2 1 1 8*     Results from last 7 days   Lab Units 02/10/23  0050   BNP pg/mL 396*      Telemetry: atrial fibrillation, rate 's  PVC's  One 5 beat wide complex tachycardia overnight  Counseling / Coordination of Care  Total floor / unit time spent today 25 minutes  Greater than 50% of total time was spent with the patient and / or family counseling and / or coordination of care  A description of the counseling / coordination of care: Discussed case with Dr Max Fiore

## 2023-02-13 NOTE — PLAN OF CARE
Problem: Nutrition/Hydration-ADULT  Goal: Nutrient/Hydration intake appropriate for improving, restoring or maintaining nutritional needs  Description: Monitor and assess patient's nutrition/hydration status for malnutrition  Collaborate with interdisciplinary team and initiate plan and interventions as ordered  Monitor patient's weight and dietary intake as ordered or per policy  Utilize nutrition screening tool and intervene as necessary  Determine patient's food preferences and provide high-protein, high-caloric foods as appropriate       INTERVENTIONS:  - Monitor oral intake, urinary output, labs, and treatment plans  - Assess nutrition and hydration status and recommend course of action  - Evaluate amount of meals eaten  - Assist patient with eating if necessary   - Allow adequate time for meals  - Recommend/ encourage appropriate diets, oral nutritional supplements, and vitamin/mineral supplements  - Order, calculate, and assess calorie counts as needed  - Recommend, monitor, and adjust tube feedings and TPN/PPN based on assessed needs  - Assess need for intravenous fluids  - Provide specific nutrition/hydration education as appropriate  - Include patient/family/caregiver in decisions related to nutrition  Outcome: Progressing     Problem: PAIN - ADULT  Goal: Verbalizes/displays adequate comfort level or baseline comfort level  Description: Interventions:  - Encourage patient to monitor pain and request assistance  - Assess pain using appropriate pain scale0-10  - Administer analgesics based on type and severity of pain and evaluate response  - Implement non-pharmacological measures as appropriate and evaluate response  - Consider cultural and social influences on pain and pain management  - Notify physician/advanced practitioner if interventions unsuccessful or patient reports new pain  Outcome: Progressing     Problem: INFECTION - ADULT  Goal: Absence or prevention of progression during hospitalization  Description: INTERVENTIONS:  - Assess and monitor for signs and symptoms of infection  - Monitor lab/diagnostic results  - Monitor all insertion sites, i e  indwelling lines, tubes, and drains  - Monitor endotracheal if appropriate and nasal secretions for changes in amount and color  - [place] appropriate cooling/warming therapies per order  - Administer medications as ordered  - Instruct and encourage patient and family to use good hand hygiene technique  - Identify and instruct in appropriate isolation precautions for identified infection/condition  Outcome: Progressing     Problem: SAFETY ADULT  Goal: Patient will remain free of falls  Description: INTERVENTIONS:  - Educate patient/family on patient safety including physical limitations  - Instruct patient to call for assistance with activity   - Consult OT/PT to assist with strengthening/mobility   - Keep Call bell within reach  - Keep bed low and locked with side rails adjusted as appropriate  - Keep care items and personal belongings within reach  - Initiate and maintain comfort rounds  - Make Fall Risk Sign visible to staff  - Apply yellow socks and bracelet for high fall risk patients  - Consider moving patient to room near nurses station  Outcome: Progressing  Goal: Maintain or return to baseline ADL function  Description: INTERVENTIONS:  -  Assess patient's ability to carry out ADLs; assess patient's baseline for ADL function and identify physical deficits which impact ability to perform ADLs (bathing, care of mouth/teeth, toileting, grooming, dressing, etc )  - Assess/evaluate cause of self-care deficits   - Assess range of motion  - Assess patient's mobility; develop plan if impaired  - Assess patient's need for assistive devices and provide as appropriate  - Encourage maximum independence but intervene and supervise when necessary  - Involve family in performance of ADLs  - Assess for home care needs following discharge   - Consider OT consult to assist with ADL evaluation and planning for discharge  - Provide patient education as appropriate  Outcome: Progressing  Goal: Maintains/Returns to pre admission functional level  Description: INTERVENTIONS:  - Perform BMAT or MOVE assessment daily    - Set and communicate daily mobility goal to care team and patient/family/caregiver  - Collaborate with rehabilitation services on mobility goals if consulted  - Perform Range of Motion 3 times a day  - Reposition patient every 2 hours if pt unable to reposition self  - Out of bed for toileting  - Record patient progress and toleration of activity level   Outcome: Progressing     Problem: DISCHARGE PLANNING  Goal: Discharge to home or other facility with appropriate resources  Description: INTERVENTIONS:  - Identify barriers to discharge w/patient and caregiver  - Arrange for needed discharge resources and transportation as appropriate  - Identify discharge learning needs (meds, wound care, etc )  - Arrange for interpretive services to assist at discharge as needed  - Refer to Case Management Department for coordinating discharge planning if the patient needs post-hospital services based on physician/advanced practitioner order or complex needs related to functional status, cognitive ability, or social support system  Outcome: Progressing     Problem: Knowledge Deficit  Goal: Patient/family/caregiver demonstrates understanding of disease process, treatment plan, medications, and discharge instructions  Description: Complete learning assessment and assess knowledge base    Interventions:  - Provide teaching at level of understanding  - Provide teaching via preferred learning methods  Outcome: Progressing     Problem: CARDIOVASCULAR - ADULT  Goal: Maintains optimal cardiac output and hemodynamic stability  Description: INTERVENTIONS:  - Monitor I/O, vital signs and rhythm  - Monitor for S/S and trends of decreased cardiac output  - Administer and titrate ordered vasoactive medications to optimize hemodynamic stability  - Assess quality of pulses, skin color and temperature  - Assess for signs of decreased coronary artery perfusion  - Instruct patient to report change in severity of symptoms  Outcome: Progressing  Goal: Absence of cardiac dysrhythmias or at baseline rhythm  Description: INTERVENTIONS:  - Continuous cardiac monitoring, vital signs, obtain 12 lead EKG if ordered  - Administer antiarrhythmic and heart rate control medications as ordered  - Monitor electrolytes and administer replacement therapy as ordered  Outcome: Progressing     Problem: GASTROINTESTINAL - ADULT  Goal: Minimal or absence of nausea and/or vomiting  Description: INTERVENTIONS:  - Administer IV fluids if ordered to ensure adequate hydration  - Maintain NPO status until nausea and vomiting are resolved  - Nasogastric tube if ordered  - Administer ordered antiemetic medications as needed  - Provide nonpharmacologic comfort measures as appropriate  - Advance diet as tolerated, if ordered  - Consider nutrition services referral to assist patient with adequate nutrition and appropriate food choices  Outcome: Progressing  Goal: Maintains or returns to baseline bowel function  Description: INTERVENTIONS:  - Assess bowel function  - Encourage oral fluids to ensure adequate hydration  - Administer IV fluids if ordered to ensure adequate hydration  - Administer ordered medications as needed  - Encourage mobilization and activity  - Consider nutritional services referral to assist patient with adequate nutrition and appropriate food choices  Outcome: Progressing  Goal: Maintains adequate nutritional intake  Description: INTERVENTIONS:  - Monitor percentage of each meal consumed  - Identify factors contributing to decreased intake, treat as appropriate  - Assist with meals as needed  - Monitor I&O, weight, and lab values if indicated  - Obtain nutrition services referral as needed  Outcome: Progressing     Problem: MOBILITY - ADULT  Goal: Maintain or return to baseline ADL function  Description: INTERVENTIONS:  -  Assess patient's ability to carry out ADLs; assess patient's baseline for ADL function and identify physical deficits which impact ability to perform ADLs (bathing, care of mouth/teeth, toileting, grooming, dressing, etc )  - Assess/evaluate cause of self-care deficits   - Assess range of motion  - Assess patient's mobility; develop plan if impaired  - Assess patient's need for assistive devices and provide as appropriate  - Encourage maximum independence but intervene and supervise when necessary  - Involve family in performance of ADLs  - Assess for home care needs following discharge   - Consider OT consult to assist with ADL evaluation and planning for discharge  - Provide patient education as appropriate  Outcome: Progressing  Goal: Maintains/Returns to pre admission functional level  Description: INTERVENTIONS:  - Perform BMAT or MOVE assessment daily    - Set and communicate daily mobility goal to care team and patient/family/caregiver  - Collaborate with rehabilitation services on mobility goals if consulted  - Perform Range of Motion 3 times a day    - Reposition patient every 2 hours if pt unable to reposition self  - Out of bed for toileting  - Record patient progress and toleration of activity level   Outcome: Progressing     Problem: Prexisting or High Potential for Compromised Skin Integrity  Goal: Skin integrity is maintained or improved  Description: INTERVENTIONS:  - Identify patients at risk for skin breakdown  - Assess and monitor skin integrity  - Assess and monitor nutrition and hydration status  - Monitor labs   - Assess for incontinence   - Turn and reposition patient  - Assist with mobility/ambulation  - Relieve pressure over bony prominences  - Avoid friction and shearing  - Provide appropriate hygiene as needed including keeping skin clean and dry  - Evaluate need for skin moisturizer/barrier cream  - Collaborate with interdisciplinary team   - Patient/family teaching  - Consider wound care consult   Outcome: Progressing

## 2023-02-13 NOTE — ASSESSMENT & PLAN NOTE
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

## 2023-02-13 NOTE — ASSESSMENT & PLAN NOTE
· Presented with complaints of abdominal pain and foul-smelling urine  · UA with bacteriuria, pyuria  · Urine did not reflex to culture  · Blood culture 1 out of 2 gram-negative rods  · Empiric ceftriaxone

## 2023-02-13 NOTE — ASSESSMENT & PLAN NOTE
Frequent PVC's noted on telemetry with one 5-beat run noted 2/13/2023  Improved with potassium repletion

## 2023-02-13 NOTE — ASSESSMENT & PLAN NOTE
Patient has been started on midodrine 5 mg TID for hypotension during admission to allow for up-titration of beta blocker  This is in the setting of dehydration from enterocolitis as well as a fib with RVR    PRN midodrine for SBP < 110

## 2023-02-13 NOTE — ASSESSMENT & PLAN NOTE
· History paroxysmal atrial fibrillation chronically maintained on metoprolol succinate 25 mg daily   · chronic anticoagulation with Eliquis 2 5 mg twice daily for elevated [id number] stroke risk  · Presented A-fib RVR with rates 143 bpm requiring oral metoprolol for rate control  Having low normal blood pressures and hence hard to give medications tried few doses of IV Lopressor and IV Cardizem  · Continue to monitor on telemetry    Increase Toprol-XL to 50 mg twice daily and add midodrine 5 mg 3 times daily  · Appreciate cardiology consultation

## 2023-02-13 NOTE — ASSESSMENT & PLAN NOTE
Lab Results   Component Value Date    EGFR 43 02/13/2023    EGFR 30 02/12/2023    EGFR 39 02/11/2023    CREATININE 1 14 02/13/2023    CREATININE 1 52 (H) 02/12/2023    CREATININE 1 23 02/11/2023     Renal function improved with IV hydration  Would avoid diuresis and monitor for now

## 2023-02-13 NOTE — ASSESSMENT & PLAN NOTE
Wt Readings from Last 3 Encounters:   02/10/23 53 3 kg (117 lb 8 1 oz)   06/23/21 55 3 kg (121 lb 14 6 oz)   03/15/19 59 kg (130 lb)     Echocardiogram at [facility] 12/21/2022 with EF 50% and mild global hypokinesis with mod-severe MR  She was taking furosemide 40 mg daily in the outpatient setting, recently started by her primary cardiologist   This was stopped and IV hydration provided during admission  Received 40 mg PO furosemide 2/13/23  Avoiding due to concerns for dehydration with ongoing N/V/D  Currently receiving IV hydration given NPO status  Will monitor volume status closely  Strict I's/O's, standing daily weights  Optimize electrolytes for K+ > 4, Mag > 2

## 2023-02-14 PROBLEM — I95.9 HYPOTENSION: Status: RESOLVED | Noted: 2023-01-01 | Resolved: 2023-01-01

## 2023-02-14 NOTE — PLAN OF CARE
Problem: OCCUPATIONAL THERAPY ADULT  Goal: Performs self-care activities at highest level of function for planned discharge setting  See evaluation for individualized goals  Description: Treatment Interventions: ADL retraining, Functional transfer training, UE strengthening/ROM, Endurance training, Patient/family training, Equipment evaluation/education, Neuromuscular reeducation, Compensatory technique education, Continued evaluation, Energy conservation, Activityengagement          See flowsheet documentation for full assessment, interventions and recommendations  Note: Limitation: Decreased ADL status, Decreased UE strength, Decreased Safe judgement during ADL, Decreased endurance, Decreased self-care trans, Decreased high-level ADLs  Prognosis: Good  Assessment: Pt is a 80 y o  female, admitted to [address] [place] [address] 2/10/2023 d/t experiencing abdominal pain with nausea  Dx: a-fib with RVR  Pt with PMHx impacting their performance during ADL tasks, including: arthritis, glaucoma, HTN, kidney disease  Prior to admission to the hospital Pt was performing ADLs without physical assistance  IADLs without physical assistance  Functional transfers/ambulation without physical assistance  Cognitive status was PTA was intact  OT order placed to assess Pt's ADLs, cognitive status, and performance during functional tasks in order to maximize safety and independence while making most appropriate d/c recommendations   Pt's clinical presentation is currently unstable/unpredictable given new onset deficits that effect Pt's occupational performance and ability to safely return to OF including decrease activity tolerance, decrease standing balance, decrease performance during ADL tasks, decrease safety awareness , decrease generalized strength, decrease activity engagement, decrease performance during functional transfers, limited family support, high fall risk and limited insight to deficits combined with medical complications of tachycardia, pain impacting overall mobility status, A-fib, decreased skin integrity, incontinence, fear/retreat and need for input for mobility technique/safety  Personal factors affecting Pt at time of initial evaluation include: unable to perform caregiver support/tasks, availability as recommended, limited home support, advanced age, inability to perform current job functions, inability to perform IADLs, inability to perform ADLs, new need for AD, inability to ambulate household distances, inability to navigate community distances, limited insight into impairments, decreased initiation and engagement and questionable non-compliance  Pt will benefit from continued skilled OT services to address deficits as defined above and to maximize level independence/participation during ADLs and functional tasks to facilitate return toward PLOF and improved quality of life  From an occupational therapy standpoint, recommendation at time of d/c would be [facility] with increased family support       OT Discharge Recommendation: Home with home health rehabilitation

## 2023-02-14 NOTE — PLAN OF CARE
Problem: PHYSICAL THERAPY ADULT  Goal: Performs mobility at highest level of function for planned discharge setting  See evaluation for individualized goals  Description: Treatment/Interventions: ADL retraining, Functional transfer training, LE strengthening/ROM, Elevations, Therapeutic exercise, Endurance training, Patient/family training, Bed mobility, Equipment eval/education, Gait training, Compensatory technique education, Spoke to nursing, Spoke to case management, OT  Equipment Recommended:  (RW-pt has)       See flowsheet documentation for full assessment, interventions and recommendations  1/05/9730 [id number] by Nilson Broderick PT  Note: Prognosis: Good  Problem List: Decreased strength, Decreased endurance, Impaired balance, Decreased mobility, Pain  Pt tolerated session fairly  She was able to ambulate increased distance with decreased assistance wiht use of RW compared to no AD  She is significantly limited by decreased endurance with elevated HR and min [place]  She fatigues quickly  She requires min cues for improved gait pattern and safety with mobility  She verbalized understanding to use RW for mobility upon returning home  Barriers to Discharge: Decreased caregiver support  Barriers to Discharge Comments: 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

## 2023-02-14 NOTE — PHYSICAL THERAPY NOTE
PHYSICAL THERAPY EVALUATION  NAME:  Eli York  DATE: 02/14/23    AGE:   80 y o  Mrn:   [id number]  ADMIT DX:  Enterocolitis [K52 9]  Hypomagnesemia [E83 42]  UTI (urinary tract infection) [N39 0]  Abdominal pain [R10 9]  Acute CHF (congestive heart failure) ([facility] ) [I50 9]  Atrial fibrillation with RVR ([facility] ) [I48 91]    Past Medical History:   Diagnosis Date   • Arthritis    • CHF (congestive heart failure) ([facility] )    • Coronary artery disease    • Glaucoma    • Hypertension    • Kidney disease      Length Of Stay: 4  Performed at least 2 patient identifiers during session: Name and Birthday  PHYSICAL THERAPY EVALUATION :    02/14/23 0942   PT Last Visit   PT Visit Date 02/14/23   Note Type   Note type Evaluation   Pain Assessment   Pain Assessment Tool FLACC   Pain Location/Orientation Orientation: Left; Location: Buttocks   Pain Rating: FLACC (Rest) - Face 0   Pain Rating: FLACC (Rest) - Legs 0   Pain Rating: FLACC (Rest) - Activity 0   Pain Rating: FLACC (Rest) - Cry 0   Pain Rating: FLACC (Rest) - Consolability 0   Score: FLACC (Rest) 0   Pain Rating: FLACC (Activity) - Face 1   Pain Rating: FLACC (Activity) - Legs 1   Pain Rating: FLACC (Activity) - Activity 1   Pain Rating: FLACC (Activity) - Cry 1   Pain Rating: FLACC (Activity) - Consolability 0   Score: FLACC (Activity) 4   Restrictions/Precautions   Other Precautions Chair Alarm; Bed Alarm; Fall Risk;Hard of hearing;Pain  (hearing aides not working  O2 at 1 5 lpm start of session)   Home Living   Type of Home House  (2 JOSH c HR)   Home Layout Two level;Performs ADLs on one level; Able to live on main level with bedroom/bathroom   Bathroom Shower/Tub Tub/shower unit   Bathroom Toilet Raised   Home Equipment Walker;Cane  (wasn't using in the house, uses a cane when she goes out)   Additional Comments Reports living in a 2  with 2 JOSH with HR and stays on 1st floor  Reports not using an AD in the home, but a cane outside the home     Prior Function Level of [place] Independent with ADLs; Independent with functional mobility; Independent with IADLS   Lives With Spouse   Receives Help From Family   IADLs Independent with driving; Independent with meal prep; Independent with medication management   Falls in the last 6 months 0   Comments Reports being independent with mobility, ADLs and IADLs  Cognition   Orientation Level Oriented X4   Following Commands Follows one step commands with increased time or repetition   Comments repeated cues due to [facility]  Subjective   Subjective "My butt hurts "   RLE Assessment   RLE Assessment WFL  (3+/5)   LLE Assessment   LLE Assessment WFL  (3+/5  left ankle DF to neutral)   Coordination   Rapid Alternating Movements Intact   Light Touch   RLE Light Touch Grossly intact   LLE Light Touch Grossly intact   Bed Mobility   Additional Comments Pt sitting OOB in recliner chair upon arrival to room and at end of session  Transfers   Sit to Stand   (steadying assistance)   Additional items Assist x 1; Increased time required;Verbal cues   Stand to Sit   (steadying assistance)   Additional items Assist x 1; Increased time required;Verbal cues   Stand pivot   (min/modAx1)   Additional items Assist x 1; Increased time required;Verbal cues   Additional Comments no AD  sit<>stand with steadying assistance  inc time to achieve standing  spt without AD with min/modAx1 with pt c/o increased pain in left buttocks requiring manual cues for balance and wt shifting  pt reaching for external support  Ambulation/Elevation   Gait pattern Wide TYRESE; Antalgic; Improper Weight shift;Decreased foot clearance; Short stride; Excessively slow   Gait Assistance   (min-->modAx1)   Additional items Assist x 1;Verbal cues   Assistive Device None   Distance ambulated 10'x1 without AD with min-->modAx1 with increased c/o left buttock pain "from that chair"  required manual cues for wt shifting and balance and verbal cues for technique and sequence   pt reaching for external support  Balance   Static Sitting Good   Dynamic Sitting Fair +   Static Standing Fair   Dynamic Standing Poor +   Ambulatory Poor   Endurance Deficit   Endurance Deficit Yes   Endurance Deficit Description HR at rest low 100s to 110s  increased to 140s andas high as 150s on monitor with activity  returned to 110s with sitting rest  SpO2 at rest on 1 5 lpm 97%  trialed on room air, Spo2 >/= 93% throughout   Activity Tolerance   Activity Tolerance Patient limited by fatigue;Patient limited by pain   Medical Staff Made Aware OTMariaelena   Nurse Made Aware RNNakita   Assessment   Prognosis Good   Problem List Decreased strength;Decreased endurance; Impaired balance;Decreased mobility;Pain   Barriers to Discharge Decreased caregiver support   Barriers to Discharge Comments patient is primary caretaker for her spouse   Goals   Patient Goals "Go home"   STG Expiration Date 02/28/23   PT Treatment Day 1   Plan   Treatment/Interventions ADL retraining;Functional transfer training;LE strengthening/ROM; Elevations; Therapeutic exercise; Endurance training;Patient/family training;Bed mobility; Equipment eval/education;Gait training; Compensatory technique education;Spoke to nursing;Spoke to case management;OT   PT Frequency 3-5x/wk   Recommendation   PT Discharge Recommendation Home with home health rehabilitation   Equipment Recommended   (RW-pt has)   Additional Comments would benefit from increased support to assist wiht caring for spouse   AM-PAC Basic Mobility Inpatient   Turning in Flat Bed Without Bedrails 3   Lying on Back to Sitting on Edge of Flat Bed Without Bedrails 3   Moving Bed to Chair 3  (with RW)   Standing Up From Chair Using Arms 3   Walk in Room 3  (w RW)   Climb 3-5 Stairs With Railing 2   Basic Mobility Inpatient Raw Score 17   Basic Mobility Standardized Score 39 67   Highest Level Of Mobility   -HLM Goal 5: Stand one or more mins   JH-HLM Achieved 7: Walk 25 feet or more   Additional Treatment Session   Start Time 0957   End Time 3558   Treatment Assessment Pt tolerated session fairly  She was able to ambulate increased distance with decreased assistance wiht use of RW compared to no AD  She is significantly limited by decreased endurance with elevated HR and min BOUDREAUX  She fatigues quickly  She requires min cues for improved gait pattern and safety with mobility  She verbalized understanding to use RW for mobility upon returning home  Equipment Use initiated use of RW  sit to stand from EOB with stand by assistance wiht min cues for hand placement for safety with RW  spt with RW with steadying assistance with min cues for turning completely prior to sitting and cues for staying within TYRESE of RW and technique  ambulated 14'x1 and then 25'x1 with RW with steadying assistance with slow antalgic pattern with decreased step length, stance L LE and cues for sequence and technique and to stay withihn TYRESE of RW  pt reporting "My heart feels like it is beating fast"  HR noted to be at 80s to 90s on [place], however on telemetry outside of room reading 150s per nursing  pt with min BOUDREAUX  recovered with sitting rest  further ambulaiton limited by fatigue with elevated HR  SpO2 reading in mid 80s, however not accurate, quickly increased to 90% and then 94-96% with accurate reading  End of Consult   Patient Position at End of Consult Bedside chair;Bed/Chair alarm activated; All needs within reach       Pt requires PT/OT co-eval due to medical complexity  (Please find full objective findings from PT assessment regarding body systems outlined above)  Assessment: Pt is a 80 y o  female seen for PT evaluation s/p admission to [address] [place] [address] on 2/10/2023 with Atrial fibrillation with RVR ([facility])  Order placed for PT services    Upon evaluation: Pt is presenting with impaired functional mobility due to pain, decreased strength, decreased endurance, impaired balance, gait deviations, decreased safety awareness, impaired judgment, impaired hearing, and fall risk requiring  steadying to moderate assistance for transfers and minimal to moderate assistance for ambulation with out AD   Pt's clinical presentation is currently unpredictable given the functional mobility deficits above, especially weakness, decreased endurance, gait deviations, pain, decreased activity tolerance, decreased functional mobility tolerance, decreased safety awareness, and SOB upon exertion, coupled with fall risks as indicated by AM-PAC 6-Clicks: 66/71 as well as impaired balance, polypharmacy and decreased safety awareness and combined with medical complications of tachycardia, pain impacting overall mobility status, abnormal WBCs, abnormal blood sugars, need for input for mobility technique/safety and Afib with RVR, enterocolitis, acute cystitis, moderate bilateral pleural effusions  Pt's PMHx and comorbidities that may affect physical performance and progress include: A fib, CHF, CKD and CAD with h/o NSTEMI September 2022, arthritis, glaucoma, HTN  Personal factors affecting pt at time of IE include: unable to perform caregiver support/tasks, inaccessible home environment, step(s) to enter environment, limited home support, inability to perform IADLs, inability to perform ADLs, inability to navigate level surfaces without external assistance and inability to ambulate household distances  Pt will benefit from continued skilled PT services to address deficits as defined above and to maximize level of functional mobility to facilitate return toward PLOF and improved QOL  From PT/mobility standpoint, recommendation at time of d/c would be Home PT, home with family support and with walker pending progress in order to reduce fall risk and maximize pt's functional independence and consistency with mobility in order to facilitate return to PLOF  Recommend trial with walker next 1-2 sessions and ther ex next 1-2 sessions       The patient's AM-PAC Basic Mobility Inpatient Short Form Raw Score is 17  A Raw score of greater than 16 suggests the patient may benefit from discharge to home  Please also refer to the recommendation of the Physical Therapist for safe discharge planning  Goals: Pt will: Perform bed mobility tasks with modified Independent to reposition in bed and prepare for transfers  Pt will perform transfers with modified Independent to decrease burden of care and decrease risk for falls and prepare for ambulation  Pt will ambulate with LRAD for >/= 150' with  modified Independent  to decrease burden of care, decrease risk for falls, improve activity tolerance and improve gait quality and to access home environment  Pt will complete >/= 4 steps with with unilateral handrail with modified Independent to decrease burden of care, return to home with JOSH, decrease risk for falls and improve activity tolerance  Pt will participate in objective balance assessment to determine baseline fall risk  Pt will participate in SSWS assessment to determine level of mobility  Pt will increase B LE strength >/= 1/2 MMT grade to facilitate functional mobility        Christos Moody, PT,DPT

## 2023-02-14 NOTE — PROGRESS NOTES
Progress Note:Cardiology  Domenica Schreiber 1934, 80 y o  female MRN: [id number]    Unit/Bed#: -01 Encounter: [id number]  Attending Physician: Daljit Dominguez MD   Primary Care Provider: Gissell Urbina DO   Date admitted to hospital: 2/10/2023  Length of stay: 4         PVC's (premature ventricular contractions)  Assessment & Plan  Frequent PVC's noted on telemetry with one 5-beat run noted 2/13/2023  Improved with potassium repletion  * Atrial fibrillation with RVR [facility])  Assessment & Plan  Patient has known atrial fibrillation, previously paroxysmal but now persistent  Follows with Dr Barbra Libman with [facility] EP  Maintained on Eliquis 2 5 mg BID (based on age and weight), metoprolol succinate 25 mg daily in the outpatient setting  Presented with atrial fibrillation with RVR in the setting of acute enterocolitis and acute cystitis  Heart rates have improved but not optimized with increase in metoprolol succinate to 50 mg BID  Ordered amiodarone load at 200 mg BID x 14 days then 200 mg daily  Patient confirms no missed Eliquis doses  Discussed need for liver, lung, and thyroid monitoring with ongoing use  Dr Barbra Libman made aware of this plan  Optimize electrolytes for K+ >4, Mag >2  Continue telemetry  Hypotension  Assessment & Plan  Patient has been started on midodrine 5 mg TID for hypotension during admission to allow for up-titration of beta blocker  This is in the setting of dehydration from enterocolitis as well as a fib with RVR  Blood pressures have improved with midodrine and use of IV hydration  Will monitor    Coronary artery disease involving native coronary artery of native heart  Assessment & Plan  Mild nonobstructive CAD by cardiac catheterization at [facility] 9/2022    Continue beta blocker and statin    Stage 3b chronic kidney disease [facility])  Assessment & Plan  Lab Results   Component Value Date    EGFR 43 02/13/2023    EGFR 30 02/12/2023    EGFR 39 02/11/2023    CREATININE 1 14 02/13/2023    CREATININE 1 52 (H) 02/12/2023    CREATININE 1 23 02/11/2023     Renal function improved with IV hydration  Would avoid diuresis and monitor for now  Acute cystitis  Assessment & Plan  Management as per medicine team    Chronic combined systolic and diastolic congestive heart failure (HCC)  Assessment & Plan  Wt Readings from Last 3 Encounters:   02/10/23 53 3 kg (117 lb 8 1 oz)   06/23/21 55 3 kg (121 lb 14 6 oz)   03/15/19 59 kg (130 lb)     Echocardiogram at [facility] 12/21/2022 with EF 50% and mild global hypokinesis with mod-severe MR  She was taking furosemide 40 mg daily in the outpatient setting, recently started by her primary cardiologist   This was stopped and IV hydration provided during admission  Received 40 mg PO furosemide yesterday  Will monitor to determine further doses  Strict I's/O's, standing daily weights  Optimize electrolytes for K+ > 4, Mag > 2  Pleural effusion, bilateral  Assessment & Plan  Noted on chest imaging  Wheezing is resolved today  Received furosemide 40 mg PO yesterday  Will monitor and resume as needed  Enterocolitis  Assessment & Plan  Management as per general surgery and medicine team      Subjective:   Patient seen and examined  No significant events overnight  She is sitting up in chair at bedside  She denies complaint this morning but states she did not sleep well last night  No chest pain, baseline shortness of breath, or lightheadedness  Notes dyspnea on exertion  No edema  Review of Systems   Constitutional: Negative  HENT: Negative  Cardiovascular: Positive for dyspnea on exertion  Negative for chest pain, irregular heartbeat, leg swelling, near-syncope, orthopnea and palpitations  Respiratory: Negative for cough, shortness of breath and snoring  Endocrine: Negative  Skin: Negative  Musculoskeletal: Negative  Gastrointestinal: Negative  Genitourinary: Negative  Neurological: Negative    Negative for light-headedness  Psychiatric/Behavioral: Negative  Objective:     Vitals: Blood pressure 127/77, pulse 84, temperature 97 7 °F (36 5 °C), resp  rate 18, height 5' 2" (1 575 m), weight 53 3 kg (117 lb 8 1 oz), SpO2 94 %  , Body mass index is 21 49 kg/m² ,     Orthostatic Blood Pressures    Flowsheet Row Most Recent Value   Blood Pressure 127/77 filed at 02/14/2023 1044   Patient Position - Orthostatic VS Lying filed at 02/14/2023 0241          Physical Exam  Vitals and nursing note reviewed  Constitutional:       General: She is not in acute distress  Appearance: She is well-developed  HENT:      Head: Normocephalic and atraumatic  Eyes:      Conjunctiva/sclera: Conjunctivae normal    Cardiovascular:      Rate and Rhythm: Normal rate  Rhythm irregular  Heart sounds: Murmur heard  Systolic murmur is present  Pulmonary:      Effort: Pulmonary effort is normal  No respiratory distress  Breath sounds: Normal breath sounds  Abdominal:      Palpations: Abdomen is soft  Tenderness: There is no abdominal tenderness  Musculoskeletal:         General: No swelling  Cervical back: Neck supple  Right lower leg: No edema  Left lower leg: No edema  Skin:     General: Skin is warm and dry  Capillary Refill: Capillary refill takes less than 2 seconds  Neurological:      Mental Status: She is alert  Psychiatric:         Mood and Affect: Mood and affect normal          Speech: Speech normal          Behavior: Behavior normal  Behavior is cooperative           Cognition and Memory: Cognition normal             Intake/Output Summary (Last 24 hours) at 2/14/2023 1332  Last data filed at 2/14/2023 7042  Gross per 24 hour   Intake 180 ml   Output --   Net 180 ml       Weight (last 2 days)     None             Medications:      Current Facility-Administered Medications:   •  acetaminophen (TYLENOL) tablet 650 mg, 650 mg, Oral, Q6H PRN, Liz S Germaine, CRNP, 650 mg at 02/10/23 2005  •  amiodarone tablet 200 mg, 200 mg, Oral, BID With Meals, Nikhil Salazartead, CRNP, 200 mg at 02/14/23 4520  •  apixaban (ELIQUIS) tablet 2 5 mg, 2 5 mg, Oral, BID, Liz S Germaine, CRNP, 2 5 mg at 02/14/23 0759  •  cefTRIAXone (ROCEPHIN) IVPB (premix in dextrose) 1,000 mg 50 mL, 1,000 mg, Intravenous, Q24H, Liz S Germaine, CRNP, Last Rate: 100 mL/hr at 02/14/23 0458, 1,000 mg at 02/14/23 0458  •  escitalopram (LEXAPRO) tablet 20 mg, 20 mg, Oral, Daily, Liz S Germaine, CRNP, 20 mg at 02/13/23 2115  •  gabapentin (NEURONTIN) capsule 300 mg, 300 mg, Oral, HS, Liz S Germaine, CRNP, 300 mg at 02/13/23 2115  •  latanoprost (XALATAN) 0 005 % ophthalmic solution 1 drop, 1 drop, Both Eyes, HS, Liz S Germaine, CRNP, 1 drop at 02/13/23 2116  •  metoprolol (LOPRESSOR) injection 5 mg, 5 mg, Intravenous, Q6H PRN, Angelina Aguila, HENRY, 5 mg at 02/14/23 9678  •  metoprolol succinate (TOPROL-XL) 24 hr tablet 50 mg, 50 mg, Oral, BID, Kevin Judd MD, 50 mg at 02/14/23 0759  •  metroNIDAZOLE (FLAGYL) tablet 500 mg, 500 mg, Oral, Q8H [facility], Kevin Judd MD, 500 mg at 02/14/23 1307  •  midodrine (PROAMATINE) tablet 2 5 mg, 2 5 mg, Oral, TID AC, HENRY Edmond  •  pantoprazole (PROTONIX) EC tablet 40 mg, 40 mg, Oral, Early Morning, Liz S Germaine, CRNP, 40 mg at 02/14/23 0501  •  pravastatin (PRAVACHOL) tablet 40 mg, 40 mg, Oral, Daily With Dinner, Kevin Judd MD, 40 mg at 02/13/23 1724  •  traZODone (DESYREL) tablet 150 mg, 150 mg, Oral, HS, Liz S Germaine, HENRY, 150 mg at 02/13/23 2115     Labs & Results:        Results from last 7 days   Lab Units 02/12/23  0550 02/11/23  0543 02/10/23  0050   WBC Thousand/uL 11 73* 13 52* 3 19*   HEMOGLOBIN g/dL 11 6 12 6 12 4   HEMATOCRIT % 35 4 38 8 38 4   PLATELETS Thousands/uL 188 190 211         Results from last 7 days   Lab Units 02/14/23  0615 02/13/23  0516 02/12/23  0550 02/11/23  0543 02/10/23  0050   POTASSIUM mmol/L 3 8 3 5 3 8   < > 4 3   CHLORIDE mmol/L 103 107 103   < > 102   CO2 mmol/L 30 29 28   < > 32   BUN mg/dL 17 24 28*   < > 15   CREATININE mg/dL 0 96 1 14 1 52*   < > 1 16   CALCIUM mg/dL 7 7* 7 1* 7 0*   < > 8 1*   ALK PHOS U/L  --   --   --   --  46   ALT U/L  --   --   --   --  10   AST U/L  --   --   --   --  24    < > = values in this interval not displayed  Results from last 7 days   Lab Units 02/12/23  0550 02/10/23  0050   MAGNESIUM mg/dL 2 1 1 8*     Results from last 7 days   Lab Units 02/10/23  0050   BNP pg/mL 396*      Telemetry: atrial fibrillation 's    Counseling / Coordination of Care  Total floor / unit time spent today 25 minutes  Greater than 50% of total time was spent with the patient and / or family counseling and / or coordination of care    A description of the counseling / coordination of care: Discussed case with Dr Lebron Herring

## 2023-02-14 NOTE — NURSING NOTE
HR on telemetry was 120-130's with ambulation  Once resting in the chair, HR decreased to 90's on tele

## 2023-02-14 NOTE — PROGRESS NOTES
Pt advanced to surgical soft diet  Noting % meal completions per nursing flowsheets  Pt says she does not like the food options, says she has always been a picky eater and also has decreased appetite making oral intake difficult  Pt says she didn't feel that the foods she was being offered were appropriate with diarrhea symptoms  Asked pt which foods in particular bothered her  Pt replied "I dont' know" and then listed off several foods on the diet she was ok with eating  Showed concern about chicken fingers though these are air fried, not deep fried like pt thought  Advance diet as medically appropriate to low fiber diet  RD does not have protocol to order supplements, pended ensure pudding BID and ensure max PRO BID, to be released by physician if agreeable  Will consider banatrol with pt if continues with diarrhea and she is agreeable

## 2023-02-14 NOTE — PLAN OF CARE
Problem: PHYSICAL THERAPY ADULT  Goal: Performs mobility at highest level of function for planned discharge setting  See evaluation for individualized goals  Description: Treatment/Interventions: ADL retraining, Functional transfer training, LE strengthening/ROM, Elevations, Therapeutic exercise, Endurance training, Patient/family training, Bed mobility, Equipment eval/education, Gait training, Compensatory technique education, Spoke to nursing, Spoke to case management, OT  Equipment Recommended:  (RW-pt has)       See flowsheet documentation for full assessment, interventions and recommendations  Note: Prognosis: Good  Problem List: Decreased strength, Decreased endurance, Impaired balance, Decreased mobility, Pain  Assessment: Pt is a 80 y o  female seen for PT evaluation s/p admission to [address] [place] [address] 18 on 2/10/2023 with Atrial fibrillation with RVR ([facility] )  Order placed for PT services  Upon evaluation: Pt is presenting with impaired functional mobility due to pain, decreased strength, decreased endurance, impaired balance, gait deviations, decreased safety awareness, impaired judgment, impaired hearing, and fall risk requiring  steadying to moderate assistance for transfers and minimal to moderate assistance for ambulation with out AD    Pt's clinical presentation is currently unpredictable given the functional mobility deficits above, especially weakness, decreased endurance, gait deviations, pain, decreased activity tolerance, decreased functional mobility tolerance, decreased safety awareness, and SOB upon exertion, coupled with fall risks as indicated by AM-PAC 6-Clicks: 04/54 as well as impaired balance, polypharmacy and decreased safety awareness and combined with medical complications of tachycardia, pain impacting overall mobility status, abnormal WBCs, abnormal blood sugars, need for input for mobility technique/safety and Afib with RVR, enterocolitis, acute cystitis, moderate bilateral pleural effusions  Pt's PMHx and comorbidities that may affect physical performance and progress include: A fib, CHF, CKD and CAD with h/o NSTEMI September 2022, arthritis, glaucoma, HTN  Personal factors affecting pt at time of IE include: unable to perform caregiver support/tasks, inaccessible home environment, step(s) to enter environment, limited home support, inability to perform IADLs, inability to perform ADLs, inability to navigate level surfaces without external assistance and inability to ambulate household distances  Pt will benefit from continued skilled PT services to address deficits as defined above and to maximize level of functional mobility to facilitate return toward PLOF and improved QOL  From PT/mobility standpoint, recommendation at time of d/c would be Home PT, home with family support and with walker pending progress in order to reduce fall risk and maximize pt's functional independence and consistency with mobility in order to facilitate return to PLOF  Recommend trial with walker next 1-2 sessions and ther ex next 1-2 sessions  Barriers to Discharge: Decreased caregiver support  Barriers to Discharge Comments: 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

## 2023-02-14 NOTE — OCCUPATIONAL THERAPY NOTE
Occupational Therapy Evaluation     Evaluation: 2131-6198  Treatment: 3486-2545    Patient Name: Lexi Hammond  DTFLT'A Date: 2/14/2023  Problem List  Principal Problem:    Atrial fibrillation with RVR ([facility] )  Active Problems:    Enterocolitis    Pleural effusion, bilateral    Chronic combined systolic and diastolic congestive heart failure (HCC)    Acute cystitis    Stage 3b chronic kidney disease ([facility] )    Coronary artery disease involving native coronary artery of native heart    Moderate protein-calorie malnutrition (HCC)    Hypotension    PVC's (premature ventricular contractions)    Past Medical History  Past Medical History:   Diagnosis Date   • Arthritis    • CHF (congestive heart failure) (HCC)    • Coronary artery disease    • Glaucoma    • Hypertension    • Kidney disease      Past Surgical History  Past Surgical History:   Procedure Laterality Date   • CHOLECYSTECTOMY     • HYSTERECTOMY     • KNEE ARTHROPLASTY Left    • THYROID LOBECTOMY           02/14/23 0941   Note Type   Note type Evaluation   Pain Assessment   Pain Assessment Tool FLACC   Pain Location/Orientation Orientation: Left; Location: Buttocks   Pain Rating: FLACC (Rest) - Face 0   Pain Rating: FLACC (Rest) - Legs 0   Pain Rating: FLACC (Rest) - Activity 0   Pain Rating: FLACC (Rest) - Cry 0   Pain Rating: FLACC (Rest) - Consolability 0   Score: FLACC (Rest) 0   Pain Rating: FLACC (Activity) - Face 1   Pain Rating: FLACC (Activity) - Legs 1   Pain Rating: FLACC (Activity) - Activity 1   Pain Rating: FLACC (Activity) - Cry 1   Pain Rating: FLACC (Activity) - Consolability 0   Score: FLACC (Activity) 4   Restrictions/Precautions   Other Precautions Chair Alarm; Fall Risk;O2;Telemetry   Home Living   Type of Home House  (2 JOSH with 1 HR)   Home Layout Two level;Performs ADLs on one level; Able to live on main level with bedroom/bathroom  (full bath on 1st floor)   Bathroom Shower/Tub Tub/shower unit   [employer] Raised   Home Equipment Walker;Cane  (uses no AD in the house  SPC outside of home)   Additional Comments Pt reports living in a 2 story home with 2 JOSH and a full 1st floor set-up  Pt ambulates with no AD at baseline  SPC outside of home PRN   Prior Function   Level of Walker Independent with ADLs; Independent with functional mobility; Independent with IADLS   Lives With Spouse   Receives Help From Family   IADLs Independent with driving; Independent with meal prep; Independent with medication management   Falls in the last 6 months 0   Comments Pt reports completing ADLs, IADLs, functional ambulation and community mobility + drivign @ I   ADL   Where Assessed Chair   Grooming Assistance 5  Supervision/Setup   Grooming Deficit Setup   UB Dressing Assistance 5  Supervision/Setup   UB Dressing Deficit Verbal cueing;Supervision/safety; Increased time to complete   LB Dressing Assistance 4  Minimal Assistance   LB Dressing Deficit Steadying; Requires assistive device for steadying;Verbal cueing;Supervision/safety; Increased time to complete;Pull up over hips   Toileting Assistance  4  Minimal Assistance   Toileting Deficit Verbal cueing;Supervison/safety; Increased time to complete;Grab bar use;Clothing management up;Clothing management down;Perineal hygiene   Additional Comments Pt completin ADL tasks while seated OOB in recliner chair  UB Dressing @ S after set-up  LB Dressing @ Min A for balance/safety while completing CM around waist with UB supported from RW PRN  Please refer to treatment note fo rperofrmace durin toileting and grooming tasks   Bed Mobility   Additional Comments Pt seated OOB at start and end of session with call bell in reach, chair alarm intact and all needs met at this time   Transfers   Sit to Stand   (Steadying Assist/ SBA)   Additional items Assist x 1; Increased time required;Verbal cues   Stand to Sit   (SBA)   Additional items Increased time required;Verbal cues   Stand pivot   (Steadying Assist)   Additional items Increased time required;Verbal cues   Toilet transfer   (Steadying Assist)   Additional items Increased time required;Verbal cues;Standard toilet   Additional Comments Pt completing functional transfers with us eof Rw for UB support  Steadying/SBA for STS with vc'ing for hand placement and safety  Steadying Assist for SPT with increased time, vc'ing for RW managment and weight shifting  Balance   Static Sitting Good   Dynamic Sitting Fair +   Static Standing Fair   Dynamic Standing Fair -   Activity Tolerance   Activity Tolerance Patient limited by fatigue;Patient limited by pain   Medical Staff Made Aware Spoke with PTDiana   Nurse Made Aware Spoke with RN   RUE Assessment   RUE Assessment WFL   LUE Assessment   LUE Assessment WFL   Hand Function   Gross Motor Coordination Functional   Fine Motor Coordination Functional   Sensation   Light Touch No apparent deficits   Cognition   Arousal/Participation Alert; Responsive; Cooperative   Attention Attends with cues to redirect   Orientation Level Oriented X4   Memory Within functional limits   Following Commands Follows one step commands without difficulty   Comments Pt [place], requires repeated cues and increased volume for comprehension   Assessment   Limitation Decreased ADL status; Decreased UE strength;Decreased Safe judgement during ADL;Decreased endurance;Decreased self-care trans;Decreased high-level ADLs   Prognosis Good   Assessment Pt is a 80 y o  female, admitted to [address] [place] [address] 2/10/2023 d/t experiencing abdominal pain with nausea  Dx: a-fib with RVR  Pt with PMHx impacting their performance during ADL tasks, including: arthritis, glaucoma, HTN, kidney disease  Prior to admission to the hospital Pt was performing ADLs without physical assistance  IADLs without physical assistance  Functional transfers/ambulation without physical assistance  Cognitive status was PTA was intact   OT order placed to assess Pt's ADLs, cognitive status, and performance during functional tasks in order to maximize safety and independence while making most appropriate d/c recommendations  Pt's clinical presentation is currently unstable/unpredictable given new onset deficits that effect Pt's occupational performance and ability to safely return to PLOF including decrease activity tolerance, decrease standing balance, decrease performance during ADL tasks, decrease safety awareness , decrease generalized strength, decrease activity engagement, decrease performance during functional transfers, limited family support, high fall risk and limited insight to deficits combined with medical complications of tachycardia, pain impacting overall mobility status, A-fib, decreased skin integrity, incontinence, fear/retreat and need for input for mobility technique/safety  Personal factors affecting Pt at time of initial evaluation include: unable to perform caregiver support/tasks, availability as recommended, limited home support, advanced age, inability to perform current job functions, inability to perform IADLs, inability to perform ADLs, new need for AD, inability to ambulate household distances, inability to navigate community distances, limited insight into impairments, decreased initiation and engagement and questionable non-compliance  Pt will benefit from continued skilled OT services to address deficits as defined above and to maximize level independence/participation during ADLs and functional tasks to facilitate return toward PLOF and improved quality of life  From an occupational therapy standpoint, recommendation at time of d/c would be [facility] with increased family support  Plan   Treatment Interventions ADL retraining;Functional transfer training;UE strengthening/ROM; Endurance training;Patient/family training;Equipment evaluation/education; Neuromuscular reeducation; Compensatory technique education;Continued evaluation; Energy conservation; Activityengagement   Goal Expiration Date 02/28/23   OT Treatment Day 1   OT Frequency 3-5x/wk   Recommendation   OT Discharge Recommendation Home with home health rehabilitation   Additional Comments  home with [address] [place] [address] and increased family support  Pt is anticipated to have increased performance during ADL tasks and functional ambulation once HR and medical status are better controlled   AM-PAC Daily Activity Inpatient   Lower Body Dressing 3   Bathing 3   Toileting 3   Upper Body Dressing 3   Grooming 3   Eating 4   Daily Activity Raw Score 19   Daily Activity Standardized Score (Calc for Raw Score >=11) 40 22   AM-[facility] Applied Cognition Inpatient   Following a Speech/Presentation 3   Understanding Ordinary Conversation 4   Taking Medications 3   Remembering Where Things Are Placed or Put Away 4   Remembering List of 4-5 Errands 4   Taking Care of Complicated Tasks 4   Applied Cognition Raw Score 22   Applied Cognition Standardized Score 47 83   Additional Treatment Session   Start Time 0959   End Time 1008   Treatment Assessment Pt seen for treatment session #1 this date  Pt alert and agreeable to participate at this time  Pt completin gshort distane ambulation into restroom @ [employer] with UB supported from RW  Pt then completing toileting tasks @ Min A fo rbalance/safety while standin to complete thorough pericare and CM aroudn waist with UB supported PRN  Pt completing STS from toiet @ Steadying Assist  Pt reports "my heart is beating fast"  grooming at sinkside was deferred and Pt returned to [facility] chair  Spoke with RN Juan Ramon Christie who reports PT's HR was up to 130bpm on telemetry in the hallway  Pt seated and completing grooming tasks @ S after set-up  Pt's HR noted to then recover to 80bpm and O2 satting at 92%  At end of session Pt seated OOB in recliner chair with call bell in reach, chair alarm intact and ll need smet at this time   Additional Treatment Day 1     The patient's raw score on the AM-PAC Daily Activity Inpatient Short Form is 19   A raw score of greater than or equal to 19 suggests the patient may benefit from discharge to home  Please refer to the recommendation of the Occupational Therapist for safe discharge planning  Pt goals to be met by 2/28/2023    1  Pt will demonstrate ability to complete LB dressing @ Mod I in order to increase safety and independence during meaningful tasks  2  Pt will demonstrate ability to ailyn/doff socks/shoes while sitting EOB @ Mod I in order to increase safety and independence during meaningful tasks  3  Pt will demonstrate ability to complete toileting tasks including CM and pericare @ Mod I in order to increase safety and independence during meaningful tasks  4  Pt will demonstrate ability to complete EOB, chair, toilet/commode transfers @ Mod I in order to increase performance and participation during functional tasks  5  Pt will demonstrate ability to stand for 7-8 minutes while maintaining G balance with use of RW for UB support PRN  6  Pt will demonstrate ability to tolerate 30-35 minute OT session with no vc'ing for deep breathing or use of energy conservation techniques in order to increase activity tolerance during functional tasks  7  Pt will demonstrate Good carryover of use of energy conservation/compensatory strategies during ADLs and functional tasks in order to increase safety and reduce risk for falls  8  Pt will demonstrate Good attention and participation in continued evaluation of functional ambulation house hold distances in order to assist with safe d/c planning  9  Pt will attend to continued cognitive assessments 100% of the time in order to provide most appropriate d/c recommendations  10  Pt will follow 100% simple 2-step commands and be A&O x4 consistently with environmental cues to increase participation in functional activities     11  Pt will identify 3 areas of interest/hobbies and 1 intervention on how to incorporate into daily life in order to increase interaction with environment and peers as well as increase participation in meaningful tasks  12  Pt will demonstrate 100% carryover of BUE HEP in order to increase BUE MS and increase performance during functional tasks upon d/c home      Alyssa Ear OTR/L

## 2023-02-14 NOTE — PLAN OF CARE
Problem: Nutrition/Hydration-ADULT  Goal: Nutrient/Hydration intake appropriate for improving, restoring or maintaining nutritional needs  Description: Monitor and assess patient's nutrition/hydration status for malnutrition  Collaborate with interdisciplinary team and initiate plan and interventions as ordered  Monitor patient's weight and dietary intake as ordered or per policy  Utilize nutrition screening tool and intervene as necessary  Determine patient's food preferences and provide high-protein, high-caloric foods as appropriate       INTERVENTIONS:  - Monitor oral intake, urinary output, labs, and treatment plans  - Assess nutrition and hydration status and recommend course of action  - Evaluate amount of meals eaten  - Assist patient with eating if necessary   - Allow adequate time for meals  - Recommend/ encourage appropriate diets, oral nutritional supplements, and vitamin/mineral supplements  - Order, calculate, and assess calorie counts as needed  - Recommend, monitor, and adjust tube feedings and TPN/PPN based on assessed needs  - Assess need for intravenous fluids  - Provide specific nutrition/hydration education as appropriate  - Include patient/family/caregiver in decisions related to nutrition  Outcome: Progressing     Problem: PAIN - ADULT  Goal: Verbalizes/displays adequate comfort level or baseline comfort level  Description: Interventions:  - Encourage patient to monitor pain and request assistance  - Assess pain using appropriate pain scale0-10  - Administer analgesics based on type and severity of pain and evaluate response  - Implement non-pharmacological measures as appropriate and evaluate response  - Consider cultural and social influences on pain and pain management  - Notify physician/advanced practitioner if interventions unsuccessful or patient reports new pain  Outcome: Progressing     Problem: SAFETY ADULT  Goal: Maintain or return to baseline ADL function  Description: INTERVENTIONS:  -  Assess patient's ability to carry out ADLs; assess patient's baseline for ADL function and identify physical deficits which impact ability to perform ADLs (bathing, care of mouth/teeth, toileting, grooming, dressing, etc )  - Assess/evaluate cause of self-care deficits   - Assess range of motion  - Assess patient's mobility; develop plan if impaired  - Assess patient's need for assistive devices and provide as appropriate  - Encourage maximum independence but intervene and supervise when necessary  - Involve family in performance of ADLs  - Assess for home care needs following discharge   - Consider OT consult to assist with ADL evaluation and planning for discharge  - Provide patient education as appropriate  Outcome: Progressing     Problem: DISCHARGE PLANNING  Goal: Discharge to home or other facility with appropriate resources  Description: INTERVENTIONS:  - Identify barriers to discharge w/patient and caregiver  - Arrange for needed discharge resources and transportation as appropriate  - Identify discharge learning needs (meds, wound care, etc )  - Arrange for interpretive services to assist at discharge as needed  - Refer to Case Management Department for coordinating discharge planning if the patient needs post-hospital services based on physician/advanced practitioner order or complex needs related to functional status, cognitive ability, or social support system  Outcome: Progressing     Problem: MOBILITY - ADULT  Goal: Maintain or return to baseline ADL function  Description: INTERVENTIONS:  -  Assess patient's ability to carry out ADLs; assess patient's baseline for ADL function and identify physical deficits which impact ability to perform ADLs (bathing, care of mouth/teeth, toileting, grooming, dressing, etc )  - Assess/evaluate cause of self-care deficits   - Assess range of motion  - Assess patient's mobility; develop plan if impaired  - Assess patient's need for assistive devices and provide as appropriate  - Encourage maximum independence but intervene and supervise when necessary  - Involve family in performance of ADLs  - Assess for home care needs following discharge   - Consider OT consult to assist with ADL evaluation and planning for discharge  - Provide patient education as appropriate  Outcome: Progressing

## 2023-02-14 NOTE — PLAN OF CARE
Problem: Nutrition/Hydration-ADULT  Goal: Nutrient/Hydration intake appropriate for improving, restoring or maintaining nutritional needs  Description: Monitor and assess patient's nutrition/hydration status for malnutrition  Collaborate with interdisciplinary team and initiate plan and interventions as ordered  Monitor patient's weight and dietary intake as ordered or per policy  Utilize nutrition screening tool and intervene as necessary  Determine patient's food preferences and provide high-protein, high-caloric foods as appropriate       INTERVENTIONS:  - Monitor oral intake, urinary output, labs, and treatment plans  - Assess nutrition and hydration status and recommend course of action  - Evaluate amount of meals eaten  - Assist patient with eating if necessary   - Allow adequate time for meals  - Recommend/ encourage appropriate diets, oral nutritional supplements, and vitamin/mineral supplements  - Order, calculate, and assess calorie counts as needed  - Recommend, monitor, and adjust tube feedings and TPN/PPN based on assessed needs  - Assess need for intravenous fluids  - Provide specific nutrition/hydration education as appropriate  - Include patient/family/caregiver in decisions related to nutrition  Outcome: Progressing     Problem: PAIN - ADULT  Goal: Verbalizes/displays adequate comfort level or baseline comfort level  Description: Interventions:  - Encourage patient to monitor pain and request assistance  - Assess pain using appropriate pain scale0-10  - Administer analgesics based on type and severity of pain and evaluate response  - Implement non-pharmacological measures as appropriate and evaluate response  - Consider cultural and social influences on pain and pain management  - Notify physician/advanced practitioner if interventions unsuccessful or patient reports new pain  Outcome: Progressing     Problem: INFECTION - ADULT  Goal: Absence or prevention of progression during hospitalization  Description: INTERVENTIONS:  - Assess and monitor for signs and symptoms of infection  - Monitor lab/diagnostic results  - Monitor all insertion sites, i e  indwelling lines, tubes, and drains  - Monitor endotracheal if appropriate and nasal secretions for changes in amount and color  - [place] appropriate cooling/warming therapies per order  - Administer medications as ordered  - Instruct and encourage patient and family to use good hand hygiene technique  - Identify and instruct in appropriate isolation precautions for identified infection/condition  Outcome: Progressing     Problem: SAFETY ADULT  Goal: Patient will remain free of falls  Description: INTERVENTIONS:  - Educate patient/family on patient safety including physical limitations  - Instruct patient to call for assistance with activity   - Consult OT/PT to assist with strengthening/mobility   - Keep Call bell within reach  - Keep bed low and locked with side rails adjusted as appropriate  - Keep care items and personal belongings within reach  - Initiate and maintain comfort rounds  - Make Fall Risk Sign visible to staff  - Apply yellow socks and bracelet for high fall risk patients  - Consider moving patient to room near nurses station  Outcome: Progressing  Goal: Maintain or return to baseline ADL function  Description: INTERVENTIONS:  -  Assess patient's ability to carry out ADLs; assess patient's baseline for ADL function and identify physical deficits which impact ability to perform ADLs (bathing, care of mouth/teeth, toileting, grooming, dressing, etc )  - Assess/evaluate cause of self-care deficits   - Assess range of motion  - Assess patient's mobility; develop plan if impaired  - Assess patient's need for assistive devices and provide as appropriate  - Encourage maximum independence but intervene and supervise when necessary  - Involve family in performance of ADLs  - Assess for home care needs following discharge   - Consider OT consult to assist with ADL evaluation and planning for discharge  - Provide patient education as appropriate  Outcome: Progressing  Goal: Maintains/Returns to pre admission functional level  Description: INTERVENTIONS:  - Perform BMAT or MOVE assessment daily    - Set and communicate daily mobility goal to care team and patient/family/caregiver  - Collaborate with rehabilitation services on mobility goals if consulted  - Perform Range of Motion 3 times a day  - Reposition patient every 2 hours if pt unable to reposition self  - Out of bed for toileting  - Record patient progress and toleration of activity level   Outcome: Progressing     Problem: DISCHARGE PLANNING  Goal: Discharge to home or other facility with appropriate resources  Description: INTERVENTIONS:  - Identify barriers to discharge w/patient and caregiver  - Arrange for needed discharge resources and transportation as appropriate  - Identify discharge learning needs (meds, wound care, etc )  - Arrange for interpretive services to assist at discharge as needed  - Refer to Case Management Department for coordinating discharge planning if the patient needs post-hospital services based on physician/advanced practitioner order or complex needs related to functional status, cognitive ability, or social support system  Outcome: Progressing     Problem: Knowledge Deficit  Goal: Patient/family/caregiver demonstrates understanding of disease process, treatment plan, medications, and discharge instructions  Description: Complete learning assessment and assess knowledge base    Interventions:  - Provide teaching at level of understanding  - Provide teaching via preferred learning methods  Outcome: Progressing     Problem: CARDIOVASCULAR - ADULT  Goal: Maintains optimal cardiac output and hemodynamic stability  Description: INTERVENTIONS:  - Monitor I/O, vital signs and rhythm  - Monitor for S/S and trends of decreased cardiac output  - Administer and titrate ordered vasoactive medications to optimize hemodynamic stability  - Assess quality of pulses, skin color and temperature  - Assess for signs of decreased coronary artery perfusion  - Instruct patient to report change in severity of symptoms  Outcome: Progressing  Goal: Absence of cardiac dysrhythmias or at baseline rhythm  Description: INTERVENTIONS:  - Continuous cardiac monitoring, vital signs, obtain 12 lead EKG if ordered  - Administer antiarrhythmic and heart rate control medications as ordered  - Monitor electrolytes and administer replacement therapy as ordered  Outcome: Progressing     Problem: GASTROINTESTINAL - ADULT  Goal: Minimal or absence of nausea and/or vomiting  Description: INTERVENTIONS:  - Administer IV fluids if ordered to ensure adequate hydration  - Maintain NPO status until nausea and vomiting are resolved  - Nasogastric tube if ordered  - Administer ordered antiemetic medications as needed  - Provide nonpharmacologic comfort measures as appropriate  - Advance diet as tolerated, if ordered  - Consider nutrition services referral to assist patient with adequate nutrition and appropriate food choices  Outcome: Progressing  Goal: Maintains or returns to baseline bowel function  Description: INTERVENTIONS:  - Assess bowel function  - Encourage oral fluids to ensure adequate hydration  - Administer IV fluids if ordered to ensure adequate hydration  - Administer ordered medications as needed  - Encourage mobilization and activity  - Consider nutritional services referral to assist patient with adequate nutrition and appropriate food choices  Outcome: Progressing  Goal: Maintains adequate nutritional intake  Description: INTERVENTIONS:  - Monitor percentage of each meal consumed  - Identify factors contributing to decreased intake, treat as appropriate  - Assist with meals as needed  - Monitor I&O, weight, and lab values if indicated  - Obtain nutrition services referral as needed  Outcome: Progressing     Problem: MOBILITY - ADULT  Goal: Maintain or return to baseline ADL function  Description: INTERVENTIONS:  -  Assess patient's ability to carry out ADLs; assess patient's baseline for ADL function and identify physical deficits which impact ability to perform ADLs (bathing, care of mouth/teeth, toileting, grooming, dressing, etc )  - Assess/evaluate cause of self-care deficits   - Assess range of motion  - Assess patient's mobility; develop plan if impaired  - Assess patient's need for assistive devices and provide as appropriate  - Encourage maximum independence but intervene and supervise when necessary  - Involve family in performance of ADLs  - Assess for home care needs following discharge   - Consider OT consult to assist with ADL evaluation and planning for discharge  - Provide patient education as appropriate  Outcome: Progressing  Goal: Maintains/Returns to pre admission functional level  Description: INTERVENTIONS:  - Perform BMAT or MOVE assessment daily    - Set and communicate daily mobility goal to care team and patient/family/caregiver  - Collaborate with rehabilitation services on mobility goals if consulted  - Perform Range of Motion 3 times a day    - Reposition patient every 2 hours if pt unable to reposition self  - Out of bed for toileting  - Record patient progress and toleration of activity level   Outcome: Progressing     Problem: Prexisting or High Potential for Compromised Skin Integrity  Goal: Skin integrity is maintained or improved  Description: INTERVENTIONS:  - Identify patients at risk for skin breakdown  - Assess and monitor skin integrity  - Assess and monitor nutrition and hydration status  - Monitor labs   - Assess for incontinence   - Turn and reposition patient  - Assist with mobility/ambulation  - Relieve pressure over bony prominences  - Avoid friction and shearing  - Provide appropriate hygiene as needed including keeping skin clean and dry  - Evaluate need for skin moisturizer/barrier cream  - Collaborate with interdisciplinary team   - Patient/family teaching  - Consider wound care consult   Outcome: Progressing

## 2023-02-14 NOTE — CASE MANAGEMENT
Case Management Discharge Planning Note    Patient name Roberto Szymanski  Location /-18 MRN [id number]  : 1934 Date 2023       Current Admission Date: 2/10/2023  Current Admission Diagnosis:Atrial fibrillation with RVR [facility])   Patient Active Problem List    Diagnosis Date Noted   • Hypotension 2023   • PVC's (premature ventricular contractions) 2023   • Atrial fibrillation with RVR ([facility] ) 02/10/2023   • Enterocolitis 02/10/2023   • Pleural effusion, bilateral 02/10/2023   • Chronic combined systolic and diastolic congestive heart failure ([facility] ) 02/10/2023   • Acute cystitis 02/10/2023   • Stage 3b chronic kidney disease ([facility] ) 02/10/2023   • Coronary artery disease involving native coronary artery of native heart 02/10/2023   • Moderate protein-calorie malnutrition ([facility] ) 02/10/2023      LOS (days): 4  Geometric Mean LOS (GMLOS) (days): 3 50  Days to GMLOS:-1 1     OBJECTIVE:  Risk of Unplanned Readmission Score: 12 63         Current admission status: Inpatient   Preferred Pharmacy:   [facility], [address]  [place] [address]  Phone: [phone] Fax: [phone]    Primary Care Provider: Maria Victoria Dozier DO    Primary Insurance: MEDICARE  Secondary Insurance: AARP    DISCHARGE DETAILS:    Discharge planning discussed with[de-identified] patient at bedside  Freedom of Choice: Yes  Comments - Freedom of Choice: CM discussed FOC with patient for home health recommendations from PT/OT patient wants to talk to her daughter and son because she may go to one of there homes post hospital    patient stated she will speak with her family tonight and let CM know tomorrow  CM contacted family/caregiver?: No- see comments (patient to call her family this evening)  Were Treatment Team discharge recommendations reviewed with patient/caregiver?: Yes  Did patient/caregiver verbalize understanding of patient care needs?: Yes  Were patient/caregiver advised of the risks associated with not following Treatment Team discharge recommendations?: Yes         Patient discussed in huddle anticipated dc 48 hours  CM spoke with patient concerning HHA options under PT/Ot recommendations and patient stated she may go stay with one of her children after hospital not sure yet  Patient declined for CM to place referral in aidin until she speaks with her family this evening    DC IMM explained to patient, pt verbalized understanding  Pt signed IMM  Copy with patient and copy placed in bin to be scanned      CM to follow

## 2023-02-15 PROBLEM — K56.609 SMALL BOWEL OBSTRUCTION (HCC): Status: ACTIVE | Noted: 2023-01-01

## 2023-02-15 NOTE — ASSESSMENT & PLAN NOTE
· Moderate bilateral pleural effusions  · History of chronic systolic heart failure, reviewed care everywhere there were small bilateral pleural effusions on chest x-ray September  · Reports chronic dyspnea  repeat cxray ordered for today  restart lasix 40 mg daily today  · Continue to monitor

## 2023-02-15 NOTE — PLAN OF CARE
Problem: Nutrition/Hydration-ADULT  Goal: Nutrient/Hydration intake appropriate for improving, restoring or maintaining nutritional needs  Description: Monitor and assess patient's nutrition/hydration status for malnutrition  Collaborate with interdisciplinary team and initiate plan and interventions as ordered  Monitor patient's weight and dietary intake as ordered or per policy  Utilize nutrition screening tool and intervene as necessary  Determine patient's food preferences and provide high-protein, high-caloric foods as appropriate       INTERVENTIONS:  - Monitor oral intake, urinary output, labs, and treatment plans  - Assess nutrition and hydration status and recommend course of action  - Evaluate amount of meals eaten  - Assist patient with eating if necessary   - Allow adequate time for meals  - Recommend/ encourage appropriate diets, oral nutritional supplements, and vitamin/mineral supplements  - Order, calculate, and assess calorie counts as needed  - Recommend, monitor, and adjust tube feedings and TPN/PPN based on assessed needs  - Assess need for intravenous fluids  - Provide specific nutrition/hydration education as appropriate  - Include patient/family/caregiver in decisions related to nutrition  Outcome: Progressing     Problem: PAIN - ADULT  Goal: Verbalizes/displays adequate comfort level or baseline comfort level  Description: Interventions:  - Encourage patient to monitor pain and request assistance  - Assess pain using appropriate pain scale0-10  - Administer analgesics based on type and severity of pain and evaluate response  - Implement non-pharmacological measures as appropriate and evaluate response  - Consider cultural and social influences on pain and pain management  - Notify physician/advanced practitioner if interventions unsuccessful or patient reports new pain  Outcome: Progressing     Problem: SAFETY ADULT  Goal: Maintain or return to baseline ADL function  Description: INTERVENTIONS:  -  Assess patient's ability to carry out ADLs; assess patient's baseline for ADL function and identify physical deficits which impact ability to perform ADLs (bathing, care of mouth/teeth, toileting, grooming, dressing, etc )  - Assess/evaluate cause of self-care deficits   - Assess range of motion  - Assess patient's mobility; develop plan if impaired  - Assess patient's need for assistive devices and provide as appropriate  - Encourage maximum independence but intervene and supervise when necessary  - Involve family in performance of ADLs  - Assess for home care needs following discharge   - Consider OT consult to assist with ADL evaluation and planning for discharge  - Provide patient education as appropriate  Outcome: Progressing     Problem: DISCHARGE PLANNING  Goal: Discharge to home or other facility with appropriate resources  Description: INTERVENTIONS:  - Identify barriers to discharge w/patient and caregiver  - Arrange for needed discharge resources and transportation as appropriate  - Identify discharge learning needs (meds, wound care, etc )  - Arrange for interpretive services to assist at discharge as needed  - Refer to Case Management Department for coordinating discharge planning if the patient needs post-hospital services based on physician/advanced practitioner order or complex needs related to functional status, cognitive ability, or social support system  Outcome: Progressing     Problem: Knowledge Deficit  Goal: Patient/family/caregiver demonstrates understanding of disease process, treatment plan, medications, and discharge instructions  Description: Complete learning assessment and assess knowledge base    Interventions:  - Provide teaching at level of understanding  - Provide teaching via preferred learning methods  Outcome: Progressing     Problem: MOBILITY - ADULT  Goal: Maintain or return to baseline ADL function  Description: INTERVENTIONS:  -  Assess patient's ability to carry out ADLs; assess patient's baseline for ADL function and identify physical deficits which impact ability to perform ADLs (bathing, care of mouth/teeth, toileting, grooming, dressing, etc )  - Assess/evaluate cause of self-care deficits   - Assess range of motion  - Assess patient's mobility; develop plan if impaired  - Assess patient's need for assistive devices and provide as appropriate  - Encourage maximum independence but intervene and supervise when necessary  - Involve family in performance of ADLs  - Assess for home care needs following discharge   - Consider OT consult to assist with ADL evaluation and planning for discharge  - Provide patient education as appropriate  Outcome: Progressing

## 2023-02-15 NOTE — PROGRESS NOTES
[address]  Progress Note - Domenica Schreiber 1934, 80 y o  female MRN: [id number]  Unit/Bed#: -[facility] Encounter: [id number]  Primary Care Provider: Gissell Urbina DO   Date and time admitted to hospital: 2/10/2023 12:28 AM    * Atrial fibrillation with RVR (HCC)  Assessment & Plan  · History paroxysmal atrial fibrillation chronically maintained on metoprolol succinate 25 mg daily   · chronic anticoagulation with Eliquis 2 5 mg twice daily for elevated [id number] stroke risk  · Presented A-fib RVR with rates 143 bpm requiring oral metoprolol for rate control  Having low normal blood pressures and hence hard to give medications tried few doses of IV Lopressor and IV Cardizem  · Continue to monitor on telemetry  Increase Toprol-XL to 50 mg twice daily and add midodrine 5 mg 3 times daily  · Appreciate cardiology consultation  · Heart rate is still fluctuates, continue beta-blocker 50 mg twice daily if blood pressure tolerates  Patient also started on amiodarone as per cardiology recommendation    PVC's (premature ventricular contractions)  Assessment & Plan  Continue beta-blocker  Maintain electrolytes    Moderate protein-calorie malnutrition (HCC)  Assessment & Plan  Malnutrition Findings:   Adult Malnutrition type: Chronic illness  Adult Degree of Malnutrition: Malnutrition of moderate degree  Malnutrition Characteristics: Muscle loss, Fat loss, Inadequate energy                  360 Statement: Chronic moderate malnutrition related to decreased oral intake, difficulties with meal preparation at home as evidenced by < 75% estimated energy intake > 1 mo; moderate muscle loss to temporalis, pectoralis, deltoid muscles; moderate to severe fat loss to orbitals, moderate fat loss to buccal pads  Treated with: Advance diet as medically appropriate to modest 4gm DENY given hx of CHF  Will discuss supplements with pt once diet initiated   Recommend daily weights for nutrition monitoring  Did discuss Mom's Meals with pt for ease of meal preparation at home  BMI Findings: Body mass index is 21 49 kg/m²  Coronary artery disease involving native coronary artery of native heart  Assessment & Plan  · NSTEMI September 2022  · Cardiac catheterization with minimal atherosclerotic disease, no PCI  · Continue Eliquis and simvastatin    Stage 3b chronic kidney disease [facility])  Assessment & Plan  Lab Results   Component Value Date    EGFR 52 02/14/2023    EGFR 43 02/13/2023    EGFR 30 02/12/2023    CREATININE 0 96 02/14/2023    CREATININE 1 14 02/13/2023    CREATININE 1 52 (H) 02/12/2023   · History CKD 3  · creatinine worsened to 1 5  Placed on gentle hydration hold diuretics and observe  Improved to baseline of 1 1  Stop IV fluids and restart oral Lasix as per home regimen  · Renally dose medications, avoid nephrotoxic medication    Acute cystitis  Assessment & Plan  · Presented with complaints of abdominal pain and foul-smelling urine  · UA with bacteriuria, pyuria  · Urine did not reflex to culture  · Blood culture 1 out of 2 gram-negative rods  · Empiric ceftriaxone    Chronic combined systolic and diastolic congestive heart failure (HCC)  Assessment & Plan  Wt Readings from Last 3 Encounters:   02/10/23 53 3 kg (117 lb 8 1 oz)   06/23/21 55 3 kg (121 lb 14 6 oz)   03/15/19 59 kg (130 lb)     · History chronic congestive heart failure, follows with [facility] cardiology  · Per their notes - Probable Takotsubo cardiomyopathy with recovered EF and at least moderate MR and moderate TR  · Utilizes diuretics per cardiology direction as needed if weight increases otherwise does not take them on a daily basis  · Continue Toprol   · Lisinopril 2 5 mg daily on hold due to hypotension  · Received 40 mg IV furosemide in the ED  · creatinine back to baseline    Stop IV fluids and restart Lasix 40 mg daily orally as per home dose    Pleural effusion, bilateral  Assessment & Plan  · Moderate bilateral pleural effusions  · History of chronic systolic heart failure, reviewed care everywhere there were small bilateral pleural effusions on chest x-ray September  · Reports chronic dyspnea  repeat cxray ordered for today  restart lasix 40 mg daily today  · Continue to monitor    Enterocolitis  Assessment & Plan  · Reports 1 day of severe generalized abdominal pain associated with nausea and dry heaves  · Normal lactic acid  · CT abdomen pelvis with gastroenterocolitis  · Stool Culture including C  difficile and enteric panel negative  · Blood Culture 1 out of 2 gram-negative rods  · Empiric ceftriaxone/metronidazole -elevated procalcitonin  · Pain improved significantly at time of admission, abdomen soft, having some diarrhea but is slowly  advance diet to soft diet  · Appreciate general surgery          VTE Pharmacologic Prophylaxis: VTE Score: 4 Moderate Risk (Score 3-4) - Pharmacological DVT Prophylaxis Ordered: apixaban (Eliquis)  Patient Centered Rounds: I performed bedside rounds with nursing staff today  Discussions with Specialists or Other Care Team Provider: Cardiology    Education and Discussions with Family / Patient: With patient  Total Time Spent on Date of Encounter in care of patient: 10 minutes This time was spent on one or more of the following: performing physical exam; counseling and coordination of care; obtaining or reviewing history; documenting in the medical record; reviewing/ordering tests, medications or procedures; communicating with other healthcare professionals and discussing with patient's family/caregivers  Current Length of Stay: 4 day(s)  Current Patient Status: Inpatient   Certification Statement: The patient will continue to require additional inpatient hospital stay due to To monitor above condition  Discharge Plan: Anticipate discharge in 24-48 hrs to home  Code Status: Level 1 - Full Code    Subjective:   Seen and evaluated during the rounds    Resting comfortably  Denies any significant complaint other than cough  Objective:     Vitals:   Temp (24hrs), Av 8 °F (36 6 °C), Min:97 7 °F (36 5 °C), Max:97 9 °F (36 6 °C)    Temp:  [97 7 °F (36 5 °C)-97 9 °F (36 6 °C)] 97 7 °F (36 5 °C)  HR:  [] 100  Resp:  [18] 18  BP: (112-137)/() 122/74  SpO2:  [91 %-96 %] 94 %  Body mass index is 21 49 kg/m²  Input and Output Summary (last 24 hours): Intake/Output Summary (Last 24 hours) at 2023 1900  Last data filed at 2023 6763  Gross per 24 hour   Intake 180 ml   Output --   Net 180 ml       Physical Exam:   Physical Exam  Vitals and nursing note reviewed  Constitutional:       Appearance: She is not ill-appearing or diaphoretic  HENT:      Head: Normocephalic and atraumatic  Nose: Nose normal  No congestion  Mouth/Throat:      Mouth: Mucous membranes are moist       Pharynx: Oropharynx is clear  No oropharyngeal exudate  Eyes:      General: No scleral icterus  Left eye: No discharge  Extraocular Movements: Extraocular movements intact  Conjunctiva/sclera: Conjunctivae normal       Pupils: Pupils are equal, round, and reactive to light  Cardiovascular:      Rate and Rhythm: Tachycardia present  Rhythm irregular  Heart sounds: No friction rub  No gallop  Pulmonary:      Effort: Pulmonary effort is normal  No respiratory distress  Breath sounds: Wheezing present  No rales  Chest:      Chest wall: No tenderness  Abdominal:      General: Abdomen is flat  Bowel sounds are normal  There is no distension  Palpations: There is no mass  Tenderness: There is no abdominal tenderness  Hernia: No hernia is present  Musculoskeletal:         General: No swelling  Right lower leg: No edema  Left lower leg: No edema  Skin:     General: Skin is warm  Neurological:      General: No focal deficit present  Mental Status: She is alert and oriented to person, place, and time  Cranial Nerves: No cranial nerve deficit  Sensory: No sensory deficit  Motor: No weakness  Coordination: Coordination normal          Additional Data:     Labs:  Results from last 7 days   Lab Units 02/12/23  0550 02/11/23  0543 02/10/23  0050   WBC Thousand/uL 11 73*   < > 3 19*   HEMOGLOBIN g/dL 11 6   < > 12 4   HEMATOCRIT % 35 4   < > 38 4   PLATELETS Thousands/uL 188   < > 211   NEUTROS PCT %  --   --  81*   LYMPHS PCT %  --   --  13*   MONOS PCT %  --   --  6   EOS PCT %  --   --  0    < > = values in this interval not displayed  Results from last 7 days   Lab Units 02/14/23  0615 02/11/23  0543 02/10/23  0050   SODIUM mmol/L 137   < > 138   POTASSIUM mmol/L 3 8   < > 4 3   CHLORIDE mmol/L 103   < > 102   CO2 mmol/L 30   < > 32   BUN mg/dL 17   < > 15   CREATININE mg/dL 0 96   < > 1 16   ANION GAP mmol/L 4   < > 4   CALCIUM mg/dL 7 7*   < > 8 1*   ALBUMIN g/dL  --   --  3 5   TOTAL BILIRUBIN mg/dL  --   --  1 49*   ALK PHOS U/L  --   --  46   ALT U/L  --   --  10   AST U/L  --   --  24   GLUCOSE RANDOM mg/dL 131   < > 91    < > = values in this interval not displayed           Results from last 7 days   Lab Units 02/12/23  2047 02/12/23  1540   POC GLUCOSE mg/dl 155* 109         Results from last 7 days   Lab Units 02/10/23  0050   LACTIC ACID mmol/L 1 4   PROCALCITONIN ng/ml 1 28*       Lines/Drains:  Invasive Devices     Peripheral Intravenous Line  Duration           Peripheral IV 02/11/23 Right;Ventral (anterior) Forearm 3 days                  Telemetry:  Telemetry Orders (From admission, onward)             48 Hour Telemetry Monitoring  Continuous x 48 hours        References:    Telemetry Guidelines   Question:  Reason for 48 Hour Telemetry  Answer:  Acute Decompensated CHF (continuous diuretic infusion or total diuretic dose > 200 mg daily, associated electrolyte derangement, ionotropic drip, history of ventricular arrhythmia, or new EF <35%)                 Telemetry Reviewed: Atrial fibrillation  HR averaging Heart rate varies  Indication for Continued Telemetry Use: Arrthymias requiring medical therapy             Imaging: No pertinent imaging reviewed  Recent Cultures (last 7 days):   Results from last 7 days   Lab Units 02/11/23  1910 02/10/23  0052   BLOOD CULTURE   --  Parabacteroides distasonis*  No Growth After 4 Days  GRAM STAIN RESULT   --  Gram negative rods*   C DIFF TOXIN B BY PCR  Negative  --        Last 24 Hours Medication List:   Current Facility-Administered Medications   Medication Dose Route Frequency Provider Last Rate   • acetaminophen  650 mg Oral Q6H PRN Liz S Germaine, CRNP     • amiodarone  200 mg Oral BID With Meals HENRY Anne     • apixaban  2 5 mg Oral BID Liz S Germaine, CRNP     • cefTRIAXone  1,000 mg Intravenous Q24H Liz S Germaine, CRNP 1,000 mg (02/14/23 0458)   • escitalopram  20 mg Oral Daily Liz S Germaine, CRNP     • gabapentin  300 mg Oral HS Liz S Germaine, CRNP     • latanoprost  1 drop Both Eyes HS Liz S Germaine, CRNP     • metoprolol  5 mg Intravenous Q6H PRN HENRY Farrell     • metoprolol succinate  50 mg Oral BID Radha Haro MD     • metroNIDAZOLE  500 mg Oral [facility] Radha Haro MD     • midodrine  2 5 mg Oral TID AC HENRY Rashid     • [START ON 2/15/2023] pantoprazole  40 mg Oral BID AC Silvia Vazquez MD     • pravastatin  40 mg Oral Daily With Jeoffrey Gottron, MD     • traZODone  150 mg Oral HS Liz S Germaine, NESSANP          Today, Patient Was Seen By: Leilani Kenny MD    **Please Note: This note may have been constructed using a voice recognition system  **

## 2023-02-15 NOTE — ASSESSMENT & PLAN NOTE
Wt Readings from Last 3 Encounters:   02/10/23 53 3 kg (117 lb 8 1 oz)   06/23/21 55 3 kg (121 lb 14 6 oz)   03/15/19 59 kg (130 lb)     · History chronic congestive heart failure, follows with [facility] cardiology  · Per their notes - Probable Takotsubo cardiomyopathy with recovered EF and at least moderate MR and moderate TR  · Utilizes diuretics per cardiology direction as needed if weight increases otherwise does not take them on a daily basis  · Continue Toprol   · Lisinopril 2 5 mg daily on hold due to hypotension  · Received 40 mg IV furosemide in the ED  · Patient placed on gentle IV hydration since developed a small bowel obstruction    Continue to monitor for volume overload

## 2023-02-15 NOTE — ASSESSMENT & PLAN NOTE
Since patient was throwing up, repeat CAT scan done which shows a small bowel obstruction  General surgery consult appreciated, recommends conservative management,  Status post NG tube in place, continue suction  Patient was on Eliquis, which condition to heparin drip  Continue gentle hydration, while patient remained on IV hydration follow for signs symptoms of volume overload

## 2023-02-15 NOTE — PROGRESS NOTES
[address]  Progress Note - Steven Holes 1934, 80 y o  female MRN: [id number]  Unit/Bed#: -[facility] Encounter: [id number]  Primary Care Provider: Gomez Flores DO   Date and time admitted to hospital: 2/10/2023 12:28 AM    Atrial fibrillation with RVR (HCC)  Assessment & Plan  · History paroxysmal atrial fibrillation chronically maintained on metoprolol succinate 25 mg daily   · chronic anticoagulation with Eliquis 2 5 mg twice daily for elevated [id number] stroke risk  · Presented A-fib RVR with rates 143 bpm requiring oral metoprolol for rate control  Having low normal blood pressures and hence hard to give medications tried few doses of IV Lopressor and IV Cardizem  · Continue to monitor on telemetry    Increase Toprol-XL to 50 mg twice daily and add midodrine 5 mg 3 times daily  · Appreciate cardiology consultation  · Patient was placed on beta-blocker, amiodarone and also recently placed Cardizem short-acting-since patient developed small bowel obstruction, patient placed on n p o , continue IV Lopressor as needed, instead of Eliquis, patient placed on heparin drip as per [facility] protocol  · Monitor labs, if heart rate remained uncontrolled, consider amiodarone drip    * Small bowel obstruction ([address] )  Assessment & Plan  Since patient was throwing up, repeat CAT scan done which shows a small bowel obstruction  General surgery consult appreciated, recommends conservative management,  Status post NG tube in place, continue suction  Patient was on Eliquis, which condition to heparin drip  Continue gentle hydration, while patient remained on IV hydration follow for signs symptoms of volume overload    Moderate protein-calorie malnutrition ([address] )  Assessment & Plan  Malnutrition Findings:   Adult Malnutrition type: Chronic illness  Adult Degree of Malnutrition: Malnutrition of moderate degree  Malnutrition Characteristics: Muscle loss, Fat loss, Inadequate energy 360 Statement: Chronic moderate malnutrition related to decreased oral intake, difficulties with meal preparation at home as evidenced by < 75% estimated energy intake > 1 mo; moderate muscle loss to temporalis, pectoralis, deltoid muscles; moderate to severe fat loss to orbitals, moderate fat loss to buccal pads  Treated with: Advance diet as medically appropriate to modest 4gm DENY given hx of CHF  Will discuss supplements with pt once diet initiated  Recommend daily weights for nutrition monitoring  Did discuss Mom's Meals with pt for ease of meal preparation at home  BMI Findings: Body mass index is 21 49 kg/m²  Coronary artery disease involving native coronary artery of native heart  Assessment & Plan  · NSTEMI September 2022  · Cardiac catheterization with minimal atherosclerotic disease, no PCI  · Continue Eliquis and simvastatin    Stage 3b chronic kidney disease [facility])  Assessment & Plan  Lab Results   Component Value Date    EGFR 55 02/15/2023    EGFR 52 02/14/2023    EGFR 43 02/13/2023    CREATININE 0 93 02/15/2023    CREATININE 0 96 02/14/2023    CREATININE 1 14 02/13/2023   · History CKD 3  · creatinine worsened to 1 5  Placed on gentle hydration hold diuretics and observe  Improved to baseline of 1 1    Stop IV fluids and restart oral Lasix as per home regimen  · Renally dose medications, avoid nephrotoxic medication    Acute cystitis  Assessment & Plan  · Presented with complaints of abdominal pain and foul-smelling urine  · UA with bacteriuria, pyuria  · Urine did not reflex to culture  · Blood culture 1 out of 2 gram-negative rods  · Empiric ceftriaxone    Chronic combined systolic and diastolic congestive heart failure (HCC)  Assessment & Plan  Wt Readings from Last 3 Encounters:   02/10/23 53 3 kg (117 lb 8 1 oz)   06/23/21 55 3 kg (121 lb 14 6 oz)   03/15/19 59 kg (130 lb)     · History chronic congestive heart failure, follows with [facility] cardiology  · Per their notes - Probable Takotsubo cardiomyopathy with recovered EF and at least moderate MR and moderate TR  · Utilizes diuretics per cardiology direction as needed if weight increases otherwise does not take them on a daily basis  · Continue Toprol   · Lisinopril 2 5 mg daily on hold due to hypotension  · Received 40 mg IV furosemide in the ED  · Patient placed on gentle IV hydration since developed a small bowel obstruction  Continue to monitor for volume overload    Pleural effusion, bilateral  Assessment & Plan  · Moderate bilateral pleural effusions  · History of chronic systolic heart failure, reviewed care everywhere there were small bilateral pleural effusions on chest x-ray September  · Reports chronic dyspnea  repeat cxray ordered for today  restart lasix 40 mg daily today  · Continue to monitor    Enterocolitis  Assessment & Plan  · Reports 1 day of severe generalized abdominal pain associated with nausea and dry heaves  · Normal lactic acid  · CT abdomen pelvis with gastroenterocolitis  · Stool Culture including C  difficile and enteric panel negative  · Blood Culture 1 out of 2 gram-negative rods  · Empiric ceftriaxone/metronidazole -elevated procalcitonin  · Pain improved significantly at time of admission, abdomen soft, having some diarrhea but is slowly  advance diet to soft diet  · Appreciate general surgery        VTE Pharmacologic Prophylaxis: VTE Score: 4 Moderate Risk (Score 3-4) - Pharmacological DVT Prophylaxis Ordered: heparin drip  Patient Centered Rounds: I performed bedside rounds with nursing staff today  Discussions with Specialists or Other Care Team Provider: General surgery, cardiology    Education and Discussions with Family / Patient: Updated  (daughter) via phone      Total Time Spent on Date of Encounter in care of patient: 15-minute This time was spent on one or more of the following: performing physical exam; counseling and coordination of care; obtaining or reviewing history; documenting in the medical record; reviewing/ordering tests, medications or procedures; communicating with other healthcare professionals and discussing with patient's family/caregivers  Current Length of Stay: 5 day(s)  Current Patient Status: Inpatient   Certification Statement: The patient will continue to require additional inpatient hospital stay due to To monitor above condition  Discharge Plan: Anticipate discharge in 48-72 hrs to To be determined    Code Status: Level 1 - Full Code    Subjective:   Seen and evaluated during the event  Sitting upright position  Still throwing up bilious vomiting  Denies any abdominal pain but reports feels stomach fullness  Objective:     Vitals:   Temp (24hrs), Av 7 °F (36 5 °C), Min:97 3 °F (36 3 °C), Max:98 1 °F (36 7 °C)    Temp:  [97 3 °F (36 3 °C)-98 1 °F (36 7 °C)] 97 3 °F (36 3 °C)  HR:  [100-137] 137  Resp:  [13-18] 13  BP: ()/(55-83) 95/55  SpO2:  [93 %-96 %] 93 %  Body mass index is 21 49 kg/m²  Input and Output Summary (last 24 hours):   No intake or output data in the 24 hours ending 02/15/23 1704    Physical Exam:   Physical Exam  Vitals and nursing note reviewed  Constitutional:       Appearance: Normal appearance  She is not ill-appearing or diaphoretic  HENT:      Mouth/Throat:      Mouth: Mucous membranes are moist       Pharynx: Oropharynx is clear  No oropharyngeal exudate  Eyes:      General: No scleral icterus  Conjunctiva/sclera: Conjunctivae normal       Pupils: Pupils are equal, round, and reactive to light  Cardiovascular:      Rate and Rhythm: Tachycardia present  Rhythm irregular  Heart sounds: No friction rub  No gallop  Pulmonary:      Effort: Pulmonary effort is normal  No respiratory distress  Breath sounds: No stridor  No wheezing or rhonchi  Abdominal:      General: Abdomen is flat  Bowel sounds are normal  There is distension  Palpations: There is no mass        Tenderness: There is no abdominal tenderness  Hernia: No hernia is present  Musculoskeletal:         General: No swelling or deformity  Normal range of motion  Cervical back: Normal range of motion  No rigidity  Lymphadenopathy:      Cervical: No cervical adenopathy  Skin:     Coloration: Skin is not pale  Findings: No erythema or lesion  Neurological:      General: No focal deficit present  Mental Status: She is alert and oriented to person, place, and time  Cranial Nerves: No cranial nerve deficit  Sensory: No sensory deficit  Motor: No weakness        Coordination: Coordination normal          Additional Data:     Labs:  Results from last 7 days   Lab Units 02/15/23  1533 02/15/23  0534   WBC Thousand/uL 9 80 8 30   HEMOGLOBIN g/dL 14 7 14 6   HEMATOCRIT % 44 6 44 7   PLATELETS Thousands/uL 320 304   NEUTROS PCT %  --  75   LYMPHS PCT %  --  14   MONOS PCT %  --  11   EOS PCT %  --  0     Results from last 7 days   Lab Units 02/15/23  0534   SODIUM mmol/L 135   POTASSIUM mmol/L 4 0   CHLORIDE mmol/L 97   CO2 mmol/L 32   BUN mg/dL 20   CREATININE mg/dL 0 93   ANION GAP mmol/L 6   CALCIUM mg/dL 8 0*   ALBUMIN g/dL 3 0*   TOTAL BILIRUBIN mg/dL 1 04*   ALK PHOS U/L 43   ALT U/L 7   AST U/L 14   GLUCOSE RANDOM mg/dL 114     Results from last 7 days   Lab Units 02/15/23  1533   INR  1 90*     Results from last 7 days   Lab Units 02/12/23  2047 02/12/23  1540   POC GLUCOSE mg/dl 155* 109         Results from last 7 days   Lab Units 02/10/23  0050   LACTIC ACID mmol/L 1 4   PROCALCITONIN ng/ml 1 28*       Lines/Drains:  Invasive Devices     Peripheral Intravenous Line  Duration           Peripheral IV 02/15/23 Right Forearm <1 day          Drain  Duration           NG/OG/Enteral Tube Nasogastric 16 Fr Right nare <1 day                  Telemetry:  Telemetry Orders (From admission, onward)             48 Hour Telemetry Monitoring  Continuous x 48 hours        References:    Telemetry Guidelines   Question:  Reason for 48 Hour Telemetry  Answer:  Arrhythmias Requiring Medical Therapy (eg  SVT, Vtach/fib, Bradycardia, Uncontrolled A-fib)                 Telemetry Reviewed: Atrial fibrillation  HR averaging Variable heart rate  Indication for Continued Telemetry Use: Arrthymias requiring medical therapy             Imaging: Reviewed radiology reports from this admission including: abdominal/pelvic CT    Recent Cultures (last 7 days):   Results from last 7 days   Lab Units 02/11/23  1910 02/10/23  0052   BLOOD CULTURE   --  No Growth After 5 Days  Parabacteroides distasonis*   GRAM STAIN RESULT   --  Gram negative rods*   C DIFF TOXIN B BY PCR  Negative  --        Last 24 Hours Medication List:   Current Facility-Administered Medications   Medication Dose Route Frequency Provider Last Rate   • cefTRIAXone  1,000 mg Intravenous Q24H NESSA BossNP 1,000 mg (02/15/23 0556)   • heparin (porcine)  3-20 Units/kg/hr (Order-Specific) Intravenous Titrated Luis Fernando Vazquez MD     • latanoprost  1 drop Both Eyes HS HENRY Boss     • metoprolol  5 mg Intravenous Q6H PRN HENRY Baird     • metroNIDAZOLE  500 mg Intravenous Q8H Luis Fernando Vazquez MD     • midodrine  2 5 mg Oral TID AC HENRY Edmond     • ondansetron  4 mg Intravenous Q4H PRN Frank Marcum MD     • sodium chloride  75 mL/hr Intravenous Continuous Frank Marcum MD 75 mL/hr (02/15/23 1536)        Today, Patient Was Seen By: Frank Marcum MD    **Please Note: This note may have been constructed using a voice recognition system  **

## 2023-02-15 NOTE — ASSESSMENT & PLAN NOTE
· History paroxysmal atrial fibrillation chronically maintained on metoprolol succinate 25 mg daily   · chronic anticoagulation with Eliquis 2 5 mg twice daily for elevated [id number] stroke risk  · Presented A-fib RVR with rates 143 bpm requiring oral metoprolol for rate control  Having low normal blood pressures and hence hard to give medications tried few doses of IV Lopressor and IV Cardizem  · Continue to monitor on telemetry  Increase Toprol-XL to 50 mg twice daily and add midodrine 5 mg 3 times daily  · Appreciate cardiology consultation  · Heart rate is still fluctuates, continue beta-blocker 50 mg twice daily if blood pressure tolerates    Patient also started on amiodarone as per cardiology recommendation

## 2023-02-15 NOTE — PLAN OF CARE
Problem: Nutrition/Hydration-ADULT  Goal: Nutrient/Hydration intake appropriate for improving, restoring or maintaining nutritional needs  Description: Monitor and assess patient's nutrition/hydration status for malnutrition  Collaborate with interdisciplinary team and initiate plan and interventions as ordered  Monitor patient's weight and dietary intake as ordered or per policy  Utilize nutrition screening tool and intervene as necessary  Determine patient's food preferences and provide high-protein, high-caloric foods as appropriate       INTERVENTIONS:  - Monitor oral intake, urinary output, labs, and treatment plans  - Assess nutrition and hydration status and recommend course of action  - Evaluate amount of meals eaten  - Assist patient with eating if necessary   - Allow adequate time for meals  - Recommend/ encourage appropriate diets, oral nutritional supplements, and vitamin/mineral supplements  - Order, calculate, and assess calorie counts as needed  - Recommend, monitor, and adjust tube feedings and TPN/PPN based on assessed needs  - Assess need for intravenous fluids  - Provide specific nutrition/hydration education as appropriate  - Include patient/family/caregiver in decisions related to nutrition  Outcome: Progressing     Problem: PAIN - ADULT  Goal: Verbalizes/displays adequate comfort level or baseline comfort level  Description: Interventions:  - Encourage patient to monitor pain and request assistance  - Assess pain using appropriate pain scale0-10  - Administer analgesics based on type and severity of pain and evaluate response  - Implement non-pharmacological measures as appropriate and evaluate response  - Consider cultural and social influences on pain and pain management  - Notify physician/advanced practitioner if interventions unsuccessful or patient reports new pain  Outcome: Progressing     Problem: INFECTION - ADULT  Goal: Absence or prevention of progression during hospitalization  Description: INTERVENTIONS:  - Assess and monitor for signs and symptoms of infection  - Monitor lab/diagnostic results  - Monitor all insertion sites, i e  indwelling lines, tubes, and drains  - Monitor endotracheal if appropriate and nasal secretions for changes in amount and color  - [place] appropriate cooling/warming therapies per order  - Administer medications as ordered  - Instruct and encourage patient and family to use good hand hygiene technique  - Identify and instruct in appropriate isolation precautions for identified infection/condition  Outcome: Progressing     Problem: SAFETY ADULT  Goal: Patient will remain free of falls  Description: INTERVENTIONS:  - Educate patient/family on patient safety including physical limitations  - Instruct patient to call for assistance with activity   - Consult OT/PT to assist with strengthening/mobility   - Keep Call bell within reach  - Keep bed low and locked with side rails adjusted as appropriate  - Keep care items and personal belongings within reach  - Initiate and maintain comfort rounds  - Make Fall Risk Sign visible to staff  - Apply yellow socks and bracelet for high fall risk patients  - Consider moving patient to room near nurses station  Outcome: Progressing  Goal: Maintain or return to baseline ADL function  Description: INTERVENTIONS:  -  Assess patient's ability to carry out ADLs; assess patient's baseline for ADL function and identify physical deficits which impact ability to perform ADLs (bathing, care of mouth/teeth, toileting, grooming, dressing, etc )  - Assess/evaluate cause of self-care deficits   - Assess range of motion  - Assess patient's mobility; develop plan if impaired  - Assess patient's need for assistive devices and provide as appropriate  - Encourage maximum independence but intervene and supervise when necessary  - Involve family in performance of ADLs  - Assess for home care needs following discharge   - Consider OT consult to assist with ADL evaluation and planning for discharge  - Provide patient education as appropriate  Outcome: Progressing  Goal: Maintains/Returns to pre admission functional level  Description: INTERVENTIONS:  - Perform BMAT or MOVE assessment daily    - Set and communicate daily mobility goal to care team and patient/family/caregiver  - Collaborate with rehabilitation services on mobility goals if consulted  - Perform Range of Motion 3 times a day  - Reposition patient every 2 hours if pt unable to reposition self  - Out of bed for toileting  - Record patient progress and toleration of activity level   Outcome: Progressing     Problem: DISCHARGE PLANNING  Goal: Discharge to home or other facility with appropriate resources  Description: INTERVENTIONS:  - Identify barriers to discharge w/patient and caregiver  - Arrange for needed discharge resources and transportation as appropriate  - Identify discharge learning needs (meds, wound care, etc )  - Arrange for interpretive services to assist at discharge as needed  - Refer to Case Management Department for coordinating discharge planning if the patient needs post-hospital services based on physician/advanced practitioner order or complex needs related to functional status, cognitive ability, or social support system  Outcome: Progressing     Problem: Knowledge Deficit  Goal: Patient/family/caregiver demonstrates understanding of disease process, treatment plan, medications, and discharge instructions  Description: Complete learning assessment and assess knowledge base    Interventions:  - Provide teaching at level of understanding  - Provide teaching via preferred learning methods  Outcome: Progressing     Problem: CARDIOVASCULAR - ADULT  Goal: Maintains optimal cardiac output and hemodynamic stability  Description: INTERVENTIONS:  - Monitor I/O, vital signs and rhythm  - Monitor for S/S and trends of decreased cardiac output  - Administer and titrate ordered vasoactive medications to optimize hemodynamic stability  - Assess quality of pulses, skin color and temperature  - Assess for signs of decreased coronary artery perfusion  - Instruct patient to report change in severity of symptoms  Outcome: Progressing  Goal: Absence of cardiac dysrhythmias or at baseline rhythm  Description: INTERVENTIONS:  - Continuous cardiac monitoring, vital signs, obtain 12 lead EKG if ordered  - Administer antiarrhythmic and heart rate control medications as ordered  - Monitor electrolytes and administer replacement therapy as ordered  Outcome: Progressing     Problem: GASTROINTESTINAL - ADULT  Goal: Minimal or absence of nausea and/or vomiting  Description: INTERVENTIONS:  - Administer IV fluids if ordered to ensure adequate hydration  - Maintain NPO status until nausea and vomiting are resolved  - Nasogastric tube if ordered  - Administer ordered antiemetic medications as needed  - Provide nonpharmacologic comfort measures as appropriate  - Advance diet as tolerated, if ordered  - Consider nutrition services referral to assist patient with adequate nutrition and appropriate food choices  Outcome: Progressing  Goal: Maintains or returns to baseline bowel function  Description: INTERVENTIONS:  - Assess bowel function  - Encourage oral fluids to ensure adequate hydration  - Administer IV fluids if ordered to ensure adequate hydration  - Administer ordered medications as needed  - Encourage mobilization and activity  - Consider nutritional services referral to assist patient with adequate nutrition and appropriate food choices  Outcome: Progressing  Goal: Maintains adequate nutritional intake  Description: INTERVENTIONS:  - Monitor percentage of each meal consumed  - Identify factors contributing to decreased intake, treat as appropriate  - Assist with meals as needed  - Monitor I&O, weight, and lab values if indicated  - Obtain nutrition services referral as needed  Outcome: Progressing     Problem: MOBILITY - ADULT  Goal: Maintain or return to baseline ADL function  Description: INTERVENTIONS:  -  Assess patient's ability to carry out ADLs; assess patient's baseline for ADL function and identify physical deficits which impact ability to perform ADLs (bathing, care of mouth/teeth, toileting, grooming, dressing, etc )  - Assess/evaluate cause of self-care deficits   - Assess range of motion  - Assess patient's mobility; develop plan if impaired  - Assess patient's need for assistive devices and provide as appropriate  - Encourage maximum independence but intervene and supervise when necessary  - Involve family in performance of ADLs  - Assess for home care needs following discharge   - Consider OT consult to assist with ADL evaluation and planning for discharge  - Provide patient education as appropriate  Outcome: Progressing  Goal: Maintains/Returns to pre admission functional level  Description: INTERVENTIONS:  - Perform BMAT or MOVE assessment daily    - Set and communicate daily mobility goal to care team and patient/family/caregiver  - Collaborate with rehabilitation services on mobility goals if consulted  - Perform Range of Motion 3 times a day    - Reposition patient every 2 hours if pt unable to reposition self  - Out of bed for toileting  - Record patient progress and toleration of activity level   Outcome: Progressing     Problem: Prexisting or High Potential for Compromised Skin Integrity  Goal: Skin integrity is maintained or improved  Description: INTERVENTIONS:  - Identify patients at risk for skin breakdown  - Assess and monitor skin integrity  - Assess and monitor nutrition and hydration status  - Monitor labs   - Assess for incontinence   - Turn and reposition patient  - Assist with mobility/ambulation  - Relieve pressure over bony prominences  - Avoid friction and shearing  - Provide appropriate hygiene as needed including keeping skin clean and dry  - Evaluate need for skin moisturizer/barrier cream  - Collaborate with interdisciplinary team   - Patient/family teaching  - Consider wound care consult   Outcome: Progressing

## 2023-02-15 NOTE — PROGRESS NOTES
Progress Note:Cardiology  Asiya Centeno 1934, 80 y o  female MRN: [id number]    Unit/Bed#: -01 Encounter: [id number]  Attending Physician: Katie Del Toro MD   Primary Care Provider: Juan M Enamorado DO   Date admitted to hospital: 2/10/2023  Length of stay: 5         * Atrial fibrillation with RVR [facility])  Assessment & Plan  Patient has known atrial fibrillation, previously paroxysmal but now persistent  Follows with Dr Sandro Camacho with [facility]  Maintained on Eliquis 2 5 mg BID (based on age and weight), metoprolol succinate 25 mg daily in the outpatient setting  Presented with atrial fibrillation with RVR in the setting of acute enterocolitis and acute cystitis  Heart rates have improved but not optimized with increase in metoprolol succinate to 50 mg BID  Amiodarone load started 2/14/23 at 200 mg BID x 14 days then 200 mg daily  Patient confirms no missed Eliquis doses  Discussed need for liver, lung, and thyroid monitoring with ongoing use  Dr Sandro Camacho made aware of this plan  Will utilize diltiazem 30 mg Q 6 hrs temporarily for rate control and assess response  May benefit from amiodarone drip in place of oral amiodarone given nausea - will consider this as an option  Optimize electrolytes for K+ >4, Mag >2  Continue telemetry  PVC's (premature ventricular contractions)  Assessment & Plan  Frequent PVC's noted on telemetry with one 5-beat run noted 2/13/2023  Improved with potassium repletion  Hypotension-resolved as of 2/14/2023  Assessment & Plan  Patient has been started on midodrine 5 mg TID for hypotension during admission to allow for up-titration of beta blocker  This is in the setting of dehydration from enterocolitis as well as a fib with RVR  Not currently requiring midodrine  Coronary artery disease involving native coronary artery of native heart  Assessment & Plan  Mild nonobstructive CAD by cardiac catheterization at [facility] 9/2022    Continue beta blocker and statin    Stage 3b chronic kidney disease [facility])  Assessment & Plan  Lab Results   Component Value Date    EGFR 43 02/13/2023    EGFR 30 02/12/2023    EGFR 39 02/11/2023    CREATININE 1 14 02/13/2023    CREATININE 1 52 (H) 02/12/2023    CREATININE 1 23 02/11/2023     Renal function improved with IV hydration  Would avoid diuresis and monitor for now  Acute cystitis  Assessment & Plan  Management as per medicine team    Chronic combined systolic and diastolic congestive heart failure (HCC)  Assessment & Plan  Wt Readings from Last 3 Encounters:   02/10/23 53 3 kg (117 lb 8 1 oz)   06/23/21 55 3 kg (121 lb 14 6 oz)   03/15/19 59 kg (130 lb)     Echocardiogram at [facility] 12/21/2022 with EF 50% and mild global hypokinesis with mod-severe MR  She was taking furosemide 40 mg daily in the outpatient setting, recently started by her primary cardiologist   This was stopped and IV hydration provided during admission  Received 40 mg PO furosemide 2/13/23  Will monitor to determine further doses  Avoiding due to concerns for dehydration with ongoing N/V/D  Strict I's/O's, standing daily weights  Optimize electrolytes for K+ > 4, Mag > 2  Pleural effusion, bilateral  Assessment & Plan  Noted on chest imaging  Wheezing is resolved today  Received furosemide 40 mg PO yesterday  Will monitor and resume as needed  Enterocolitis  Assessment & Plan  Management as per general surgery and medicine team  Symptoms persist today  Subjective:   Patient seen and examined  Continues with elevated heart rates overnight requiring PRN Lopressor and Diltiazem IV  At the time of my assessment she is working with PT/OT and HR's 140-150's  She denies chest pain, lightheadedness  Reports nausea, dry heaving, diarrhea  Review of Systems   Constitutional: Negative  HENT: Negative  Cardiovascular: Positive for dyspnea on exertion   Negative for chest pain, irregular heartbeat, leg swelling, near-syncope, orthopnea and palpitations  Respiratory: Negative for cough, shortness of breath and snoring  Endocrine: Negative  Skin: Negative  Musculoskeletal: Negative  Gastrointestinal: Positive for anorexia, diarrhea, nausea and vomiting  Negative for abdominal pain  Genitourinary: Negative  Neurological: Negative  Negative for light-headedness  Psychiatric/Behavioral: Negative  Objective:     Vitals: Blood pressure 124/68, pulse (!) 137, temperature 97 7 °F (36 5 °C), resp  rate 16, height 5' 2" (1 575 m), weight 53 3 kg (117 lb 8 1 oz), SpO2 93 %  , Body mass index is 21 49 kg/m² ,     Orthostatic Blood Pressures    Flowsheet Row Most Recent Value   Blood Pressure 124/68 filed at 02/15/2023 1034   Patient Position - Orthostatic VS Lying filed at 02/15/2023 1034          Physical Exam  Vitals and nursing note reviewed  Constitutional:       General: She is not in acute distress  Appearance: She is well-developed  HENT:      Head: Normocephalic and atraumatic  Eyes:      Conjunctiva/sclera: Conjunctivae normal    Neck:      Vascular: No JVD  Cardiovascular:      Rate and Rhythm: Tachycardia present  Rhythm irregular  Heart sounds: No murmur heard  Pulmonary:      Effort: Pulmonary effort is normal  No respiratory distress  Breath sounds: Normal breath sounds  Comments: Breathing comfortably on room air  Abdominal:      Palpations: Abdomen is soft  Tenderness: There is no abdominal tenderness  Musculoskeletal:         General: No swelling  Cervical back: Neck supple  Right lower leg: No edema  Left lower leg: No edema  Skin:     General: Skin is warm and dry  Capillary Refill: Capillary refill takes less than 2 seconds  Neurological:      Mental Status: She is alert  Psychiatric:         Mood and Affect: Mood and affect normal          Speech: Speech normal          Behavior: Behavior normal  Behavior is cooperative           Cognition and Memory: Cognition and memory normal           No intake or output data in the 24 hours ending 02/15/23 1201    Weight (last 2 days)     None             Medications:      Current Facility-Administered Medications:   •  acetaminophen (TYLENOL) tablet 650 mg, 650 mg, Oral, Q6H PRN, Liz MEZA Germaine, CRNP, 650 mg at 02/10/23 2005  •  amiodarone tablet 200 mg, 200 mg, Oral, BID With Meals, Kana Gallego, CRNP, 200 mg at 02/15/23 0820  •  apixaban (ELIQUIS) tablet 2 5 mg, 2 5 mg, Oral, BID, Liz S Germaine, CRNP, 2 5 mg at 02/15/23 0820  •  cefTRIAXone (ROCEPHIN) IVPB (premix in dextrose) 1,000 mg 50 mL, 1,000 mg, Intravenous, Q24H, Liz S Germaine, CRNP, Last Rate: 100 mL/hr at 02/15/23 0556, 1,000 mg at 02/15/23 0556  •  diltiazem (CARDIZEM) tablet 30 mg, 30 mg, Oral, Q6H [address], Ekaterina Blount, CRNP, 30 mg at 02/15/23 1055  •  escitalopram (LEXAPRO) tablet 20 mg, 20 mg, Oral, Daily, Liz S Germaine, CRNP, 20 mg at 02/14/23 2106  •  gabapentin (NEURONTIN) capsule 300 mg, 300 mg, Oral, HS, Liz S Germaine, CRNP, 300 mg at 02/14/23 2106  •  latanoprost (XALATAN) 0 005 % ophthalmic solution 1 drop, 1 drop, Both Eyes, HS, Liz S Germaine, CRNP, 1 drop at 02/14/23 2107  •  metoprolol (LOPRESSOR) injection 5 mg, 5 mg, Intravenous, Q6H PRN, HENRY Capps, 5 mg at 02/15/23 9597  •  metoprolol (LOPRESSOR) injection 5 mg, 5 mg, Intravenous, Once, HENRY Capps  •  metoprolol succinate (TOPROL-XL) 24 hr tablet 50 mg, 50 mg, Oral, BID, Chandrakant Madrigal MD, 50 mg at 02/15/23 5890  •  metroNIDAZOLE (FLAGYL) tablet 500 mg, 500 mg, Oral, Q8H [address], Chandrakant Madrigal MD, 500 mg at 02/15/23 0557  •  midodrine (PROAMATINE) tablet 2 5 mg, 2 5 mg, Oral, TID AC, HENRY Edmond  •  ondansetron (ZOFRAN) injection 4 mg, 4 mg, Intravenous, Q4H PRN, Alta Anderson MD, 4 mg at 02/15/23 1036  •  pantoprazole (PROTONIX) EC tablet 40 mg, 40 mg, Oral, BID AC, Luis Fernando Vazquez MD, 40 mg at 02/15/23 0600  •  pravastatin (PRAVACHOL) tablet 40 mg, 40 mg, Oral, Daily With Guero Perez MD, 40 mg at 02/14/23 1656  •  traZODone (DESYREL) tablet 150 mg, 150 mg, Oral, HS, Liz MEZA HENRY Herron, 150 mg at 02/14/23 2106     Labs & Results:        Results from last 7 days   Lab Units 02/15/23  0534 02/12/23  0550 02/11/23  0543   WBC Thousand/uL 8 30 11 73* 13 52*   HEMOGLOBIN g/dL 14 6 11 6 12 6   HEMATOCRIT % 44 7 35 4 38 8   PLATELETS Thousands/uL 304 188 190         Results from last 7 days   Lab Units 02/15/23  0534 02/14/23  0615 02/13/23  0516 02/11/23  0543 02/10/23  0050   POTASSIUM mmol/L 4 0 3 8 3 5   < > 4 3   CHLORIDE mmol/L 97 103 107   < > 102   CO2 mmol/L 32 30 29   < > 32   BUN mg/dL 20 17 24   < > 15   CREATININE mg/dL 0 93 0 96 1 14   < > 1 16   CALCIUM mg/dL 8 0* 7 7* 7 1*   < > 8 1*   ALK PHOS U/L 43  --   --   --  46   ALT U/L 7  --   --   --  10   AST U/L 14  --   --   --  24    < > = values in this interval not displayed  Results from last 7 days   Lab Units 02/15/23  0534 02/12/23  0550 02/10/23  0050   MAGNESIUM mg/dL 1 9 2 1 1 8*     Results from last 7 days   Lab Units 02/10/23  0050   BNP pg/mL 396*      Telemetry: atrial fibrillation, rate 's, primarily around 100    Counseling / Coordination of Care  Total floor / unit time spent today 25 minutes  Greater than 50% of total time was spent with the patient and / or family counseling and / or coordination of care    A description of the counseling / coordination of care: Discussed case with Dr Solange Silva

## 2023-02-15 NOTE — PROGRESS NOTES
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  I explained to her that due to the distension of her stomach and intestines with bilious vomiting she should get an NG tube to prevent aspiration  I explained that if her symptoms do not resolve with conservative measures, that surgery may be required to explore the abdomen for possible cause of her obstruction, though suspect this is still due to severe gastroenterocolitis given her ongoing diarrhea and findings on admission  The patient states she is too old for surgery and does not want to discuss that possibility  She would like to pursue conservative management at this time  Given her non-toxic appearance and non-peritoneal exam, I think this is reasonable  Hold Eliquis for now, in case she were to fail conservative management and was then agreeable to surgery  Can be on a heparin gtt in the meanwhile  Subjective:   Patient developed bilious vomiting overnight with ongoing abdominal pain  States her pain is much better than it was when she came in to the ED but now the vomiting is troubling her and she is unable to keep down an PO intake  Afebrile  Objective:   Blood pressure 124/68, pulse (!) 137, temperature 97 7 °F (36 5 °C), resp  rate 16, height 5' 2" (1 575 m), weight 53 3 kg (117 lb 8 1 oz), SpO2 93 %  ,Body mass index is 21 49 kg/m²    No intake or output data in the 24 hours ending 02/15/23 1424  Invasive Devices Peripheral Intravenous Line  Duration           Peripheral IV 02/11/23 Right;Ventral (anterior) Forearm 4 days              Physical Exam:  General: no acute distress, pt appears well and comfortable and is conversational   Skin: warm and dry to touch  Pulmonary: normal effort, nasal cannula present  Abdominal: distended, soft, tender in the periumbilical area and lower abdomen without rebound or guarding  Neuro: alert and oriented     Lab, Imaging and other studies:  I have personally reviewed pertinent lab results      CBC:   Lab Results   Component Value Date    WBC 8 30 02/15/2023    HGB 14 6 02/15/2023    HCT 44 7 02/15/2023    MCV 86 02/15/2023     02/15/2023    MCH 28 1 02/15/2023    MCHC 32 7 02/15/2023    RDW 14 4 02/15/2023    MPV 8 7 (L) 02/15/2023    NRBC 0 02/15/2023        CMP:   Lab Results   Component Value Date    SODIUM 135 02/15/2023    K 4 0 02/15/2023    CL 97 02/15/2023    CO2 32 02/15/2023    BUN 20 02/15/2023    CREATININE 0 93 02/15/2023    CALCIUM 8 0 (L) 02/15/2023    AST 14 02/15/2023    ALT 7 02/15/2023    ALKPHOS 43 02/15/2023    EGFR 55 02/15/2023     VTE Pharmacologic Prophylaxis: - Hold Eliquis  VTE Mechanical Prophylaxis: sequential compression device    Michell Martin  2/15/2023

## 2023-02-15 NOTE — PROGRESS NOTES
Provider made aware that patient has been sustaining a HR in the 130s-140s and spiking up as high as the 170s  Pt has already received x2 IV lopressor this shift with minimal results  Provider reached out to cardiology and they stated that they don't feel as if she will benefit from a cardiac drip at this point  They suggested to give a cardizem bolus at this time   This RN will give cardizem bolus and will continue to monitor HR and VS

## 2023-02-15 NOTE — OCCUPATIONAL THERAPY NOTE
Occupational Therapy     02/15/23 0920   OT Last Visit   OT Visit Date 02/15/23   Note Type   Note Type Treatment   Pain Assessment   Pain Assessment Tool 0-10   Pain Score No Pain   Restrictions/Precautions   Other Precautions Chair Alarm; Bed Alarm; Fall Risk;Hard of hearing   ADL   Where Assessed Edge of bed   Grooming Assistance 5  Supervision/Setup   Grooming Deficit Setup;Verbal cueing;Supervision/safety; Increased time to complete;Wash/dry hands; Wash/dry face; Teeth care;Brushing hair   UB Bathing Assistance 5  Supervision/Setup   UB Bathing Deficit Setup;Verbal cueing;Supervision/safety; Increased time to complete; Chest;Right arm;Left arm; Abdomen   LB Bathing Assistance 4  Minimal Assistance   LB Bathing Deficit Setup;Verbal cueing;Supervision/safety; Increased time to complete;Perineal area; Buttocks;Right upper leg;Left upper leg;Right lower leg including foot; Left lower leg including foot   LB Bathing Comments Pt  bathes to ankles  Dependent for B feet  UB Dressing Assistance 4  Minimal Assistance   UB Dressing Deficit Setup;Verbal cueing; Increased time to complete; Thread RUE; Thread LUE   UB Dressing Comments hospital gown change   LB Dressing Assistance 3  Moderate Assistance   LB Dressing Deficit Setup;Verbal cueing;Supervision/safety; Increased time to complete; Don/doff R sock; Don/doff L sock   LB Dressing Comments Pt  able to doff B socks  Dependent to don B socks   Functional Standing Tolerance   Time 1-2 mins  Activity ned/buttocks hygiene care   Bed Mobility   Supine to Sit 4  Minimal assistance   Additional items Assist x 1;HOB elevated; Increased time required;Verbal cues;LE management   Additional Comments Pt  in bed upon arrival   [facility] session   Transfers   Sit to Stand 5  Supervision   Additional items Assist x 1;Bedrails; Increased time required;Verbal cues   Stand to Sit 5  Supervision   Additional items Assist x 1; Armrests; Increased time required;Verbal cues   Stand pivot 5  Supervision   Additional items Assist x 1; Increased time required;Verbal cues   Subjective   Subjective "I feel nauseated"   Cognition   Overall Cognitive Status [facility]   Arousal/Participation Alert; Responsive; Cooperative   Attention Attends with cues to redirect   Orientation Level Oriented X4   Memory Within functional limits   Following Commands Follows one step commands with increased time or repetition   Comments Pt  agreeable to OT treatment   Activity Tolerance   Activity Tolerance Patient tolerated treatment well   Assessment   Assessment Patient participated in Skilled OT session this date with interventions consisting of ADL re training with the use of correct body mechnaics, safety awareness and fall prevention techniques,  therapeutic activities to: increase activity tolerance, increase standing tolerance time with unilateral UE support to complete sink level ADLs and increase dynamic sit/ stand balance during functional activity    Patient agreeable to OT treatment session, upon arrival patient was found supine in bed  In comparison to previous session, patient with improvements in functional transfers, ADLs  Patient requiring verbal cues for safety, verbal cues for correct technique and frequent rest periods  Patient continues to be functioning below baseline level, occupational performance remains limited secondary to factors listed above and increased risk for falls and injury  From OT standpoint, recommendation at time of d/c would be Home OT  Patient to benefit from continued Occupational Therapy treatment while in the hospital to address deficits as defined above and maximize level of functional independence with ADLs and functional mobility  Plan   Treatment Interventions ADL retraining;Functional transfer training;Patient/family training; Activityengagement   Goal Expiration Date 02/28/23   OT Treatment Day 1   OT Frequency 3-5x/wk   Recommendation   OT Discharge Recommendation Home with home health rehabilitation   Additional Comments  pt  call bell and all needs in reach upon conclusion of treatment session  Additional Comments 2 The patient's AM-PAC Daily Activity Inpatient Short Form Raw Score is 19  A Raw Score of 19 suggests the patient may benefit from discharge to home  Please refer to the recommendation of the Occupational Therapist for safe discharge planning  AM-PAC Daily Activity Inpatient   Lower Body Dressing 3   Bathing 3   Toileting 3   Upper Body Dressing 3   Grooming 3   Eating 4   Daily Activity Raw Score 19   Daily Activity Standardized Score (Calc for Raw Score >=11) 40 22   AM-PAC Applied Cognition Inpatient   Following a Speech/Presentation 3   Understanding Ordinary Conversation 4   Taking Medications 3   Remembering Where Things Are Placed or Put Away 4   Remembering List of 4-5 Errands 4   Taking Care of Complicated Tasks 4   Applied Cognition Raw Score 22   Applied Cognition Standardized Score 47 83   Pt goals to be met by 2/28/2023     Pt will demonstrate ability to complete LB dressing @ Mod I in order to increase safety and independence during meaningful tasks  Pt will demonstrate ability to ailyn/doff socks/shoes while sitting EOB @ Mod I in order to increase safety and independence during meaningful tasks  Pt will demonstrate ability to complete toileting tasks including CM and pericare @ Mod I in order to increase safety and independence during meaningful tasks  Pt will demonstrate ability to complete EOB, chair, toilet/commode transfers @ Mod I in order to increase performance and participation during functional tasks  Pt will demonstrate ability to stand for 7-8 minutes while maintaining G balance with use of RW for UB support PRN    Pt will demonstrate ability to tolerate 30-35 minute OT session with no vc'ing for deep breathing or use of energy conservation techniques in order to increase activity tolerance during functional tasks  Pt will demonstrate Good carryover of use of energy conservation/compensatory strategies during ADLs and functional tasks in order to increase safety and reduce risk for falls  Pt will demonstrate Good attention and participation in continued evaluation of functional ambulation house hold distances in order to assist with safe d/c planning  Pt will attend to continued cognitive assessments 100% of the time in order to provide most appropriate d/c recommendations  Pt will follow 100% simple 2-step commands and be A&O x4 consistently with environmental cues to increase participation in functional activities  Pt will identify 3 areas of interest/hobbies and 1 intervention on how to incorporate into daily life in order to increase interaction with environment and peers as well as increase participation in meaningful tasks  Pt will demonstrate 100% carryover of BUE HEP in order to increase BUE MS and increase performance during functional tasks upon d/c home     LEONARD Segura/MINE

## 2023-02-15 NOTE — PLAN OF CARE
Problem: OCCUPATIONAL THERAPY ADULT  Goal: Performs self-care activities at highest level of function for planned discharge setting  See evaluation for individualized goals  Description: Treatment Interventions: ADL retraining, Functional transfer training, UE strengthening/ROM, Endurance training, Patient/family training, Equipment evaluation/education, Neuromuscular reeducation, Compensatory technique education, Continued evaluation, Energy conservation, Activityengagement          See flowsheet documentation for full assessment, interventions and recommendations  Outcome: Progressing  Note: Limitation: Decreased ADL status, Decreased UE strength, Decreased Safe judgement during ADL, Decreased endurance, Decreased self-care trans, Decreased high-level ADLs  Prognosis: Good  Assessment: Patient participated in Skilled OT session this date with interventions consisting of ADL re training with the use of correct body mechnaics, safety awareness and fall prevention techniques,  therapeutic activities to: increase activity tolerance, increase standing tolerance time with unilateral UE support to complete sink level ADLs and increase dynamic sit/ stand balance during functional activity    Patient agreeable to OT treatment session, upon arrival patient was found supine in bed  In comparison to previous session, patient with improvements in functional transfers, ADLs  Patient requiring verbal cues for safety, verbal cues for correct technique and frequent rest periods  Patient continues to be functioning below baseline level, occupational performance remains limited secondary to factors listed above and increased risk for falls and injury  From OT standpoint, recommendation at time of d/c would be Home OT  Patient to benefit from continued Occupational Therapy treatment while in the hospital to address deficits as defined above and maximize level of functional independence with ADLs and functional mobility  OT Discharge Recommendation: Home with home health rehabilitation       ts

## 2023-02-15 NOTE — ASSESSMENT & PLAN NOTE
· History paroxysmal atrial fibrillation chronically maintained on metoprolol succinate 25 mg daily   · chronic anticoagulation with Eliquis 2 5 mg twice daily for elevated [id number] stroke risk  · Presented A-fib RVR with rates 143 bpm requiring oral metoprolol for rate control  Having low normal blood pressures and hence hard to give medications tried few doses of IV Lopressor and IV Cardizem  · Continue to monitor on telemetry    Increase Toprol-XL to 50 mg twice daily and add midodrine 5 mg 3 times daily  · Appreciate cardiology consultation  · Patient was placed on beta-blocker, amiodarone and also recently placed Cardizem short-acting-since patient developed small bowel obstruction, patient placed on n p o , continue IV Lopressor as needed, instead of Eliquis, patient placed on heparin drip as per [facility] protocol  · Monitor labs, if heart rate remained uncontrolled, consider amiodarone drip

## 2023-02-15 NOTE — ASSESSMENT & PLAN NOTE
Lab Results   Component Value Date    EGFR 52 02/14/2023    EGFR 43 02/13/2023    EGFR 30 02/12/2023    CREATININE 0 96 02/14/2023    CREATININE 1 14 02/13/2023    CREATININE 1 52 (H) 02/12/2023   · History CKD 3  · creatinine worsened to 1 5  Placed on gentle hydration hold diuretics and observe  Improved to baseline of 1 1    Stop IV fluids and restart oral Lasix as per home regimen  · Renally dose medications, avoid nephrotoxic medication

## 2023-02-15 NOTE — ASSESSMENT & PLAN NOTE
Lab Results   Component Value Date    EGFR 55 02/15/2023    EGFR 52 02/14/2023    EGFR 43 02/13/2023    CREATININE 0 93 02/15/2023    CREATININE 0 96 02/14/2023    CREATININE 1 14 02/13/2023   · History CKD 3  · creatinine worsened to 1 5  Placed on gentle hydration hold diuretics and observe  Improved to baseline of 1 1    Stop IV fluids and restart oral Lasix as per home regimen  · Renally dose medications, avoid nephrotoxic medication

## 2023-02-16 PROBLEM — R78.81 BACTEREMIA: Status: ACTIVE | Noted: 2023-01-01

## 2023-02-16 PROBLEM — Z45.2 PICC (PERIPHERALLY INSERTED CENTRAL CATHETER) IN PLACE: Status: ACTIVE | Noted: 2023-01-01

## 2023-02-16 NOTE — ASSESSMENT & PLAN NOTE
Lab Results   Component Value Date    EGFR 55 02/16/2023    EGFR 55 02/15/2023    EGFR 52 02/14/2023    CREATININE 0 92 02/16/2023    CREATININE 0 93 02/15/2023    CREATININE 0 96 02/14/2023   · History CKD 3  · creatinine worsened to 1 5  Placed on gentle hydration hold diuretics and observe  Improved to baseline of 1 1    Stop IV fluids and restart oral Lasix as per home regimen  · Renally dose medications, avoid nephrotoxic medication

## 2023-02-16 NOTE — PROGRESS NOTES
Progress Note - General Surgery   Nguyen Tinoco 80 y o  female MRN: [id number]  Unit/Bed#: -01 Encounter: [id number]    Assessment:  80 y o  female w/ gastroenterocoloitis, SBO on repeat imaging  - Afebrile, RVR, episodes of hypotension, stable on room air  - WBC 8 36 (9 80, 8 30)  - Hgb stable 11 8 (14 7, 14 6)  - 400 out NGT documented x last 24 hours with green output flowing into cannister, confirmed with nurse cannister has not been changed, minimal output today  - Stool studies normal  - Blood cultures 1/2 final result: parabacteroides distasonis - susceptible to flagyl  - Hx CAD, CKD, CHF    Plan:  - Continue NGT, monitor output, consider clamp trial tomorrow  - Chloraseptic spray to assist with throat irritation  - NPO  - IVF  - IV abx - Ceftriaxone, Flagyl  - Monitor I/Os  - Will hold discussion with IR based on possible fluid collection seen on scan  - Serial abdominal exams  - Co-morbidities per primary service    Subjective:   Patient states she is doing okay  Her hearing aid broke so she is having significantly more trouble hearing today  She feels her abdomen is much less distended and denies pain at this time  Throughout exam there are times she is reaching for emesis bag but when offered something for nausea she states she is not nauseous just feels something in her throat  Seems there may be some irritation second to tube as well, will order chloraseptic spray  She states she has continued to experience diarrhea  Denies fevers or chills  Objective:   Blood pressure 104/56, pulse 100, temperature 97 7 °F (36 5 °C), temperature source Temporal, resp  rate 18, height 5' 2" (1 575 m), weight 53 3 kg (117 lb 8 1 oz), SpO2 95 %  ,Body mass index is 21 49 kg/m²      Intake/Output Summary (Last 24 hours) at 2/16/2023 0732  Last data filed at 2/16/2023 0630  Gross per 24 hour   Intake --   Output 900 ml   Net -900 ml     Invasive Devices     Peripheral Intravenous Line  Duration Peripheral IV 02/15/23 Right Forearm <1 day          Drain  Duration           NG/OG/Enteral Tube Nasogastric 16 Fr Right nare <1 day              Physical Exam:  General: no acute distress, pt appears well and comfortable, NGT in place with green drainage  Skin: warm and dry to touch  Pulmonary: normal effort  Abdominal: softly distended, minimal tenderness to palpation across abdomen, most significantly periumbilical, no guarding or rebound, decreased bowel sounds appreciated  Neuro: alert and oriented     Lab, Imaging and other studies:  I have personally reviewed pertinent lab results    , CBC:   Lab Results   Component Value Date    WBC 8 36 02/16/2023    HGB 11 8 02/16/2023    HCT 36 5 02/16/2023    MCV 87 02/16/2023     02/16/2023    MCH 28 2 02/16/2023    MCHC 32 3 02/16/2023    RDW 14 4 02/16/2023    MPV 9 0 02/16/2023    NRBC 0 02/16/2023   , CMP: No results found for: SODIUM, K, CL, CO2, ANIONGAP, BUN, CREATININE, GLUCOSE, CALCIUM, AST, ALT, ALKPHOS, PROT, BILITOT, EGFR  VTE Pharmacologic Prophylaxis: Heparin  VTE Mechanical Prophylaxis: sequential compression device    Lexy Hu  2/16/2023

## 2023-02-16 NOTE — PLAN OF CARE
Problem: Nutrition/Hydration-ADULT  Goal: Nutrient/Hydration intake appropriate for improving, restoring or maintaining nutritional needs  Description: Monitor and assess patient's nutrition/hydration status for malnutrition  Collaborate with interdisciplinary team and initiate plan and interventions as ordered  Monitor patient's weight and dietary intake as ordered or per policy  Utilize nutrition screening tool and intervene as necessary  Determine patient's food preferences and provide high-protein, high-caloric foods as appropriate       INTERVENTIONS:  - Monitor oral intake, urinary output, labs, and treatment plans  - Assess nutrition and hydration status and recommend course of action  - Evaluate amount of meals eaten  - Assist patient with eating if necessary   - Allow adequate time for meals  - Recommend/ encourage appropriate diets, oral nutritional supplements, and vitamin/mineral supplements  - Order, calculate, and assess calorie counts as needed  - Recommend, monitor, and adjust tube feedings and TPN/PPN based on assessed needs  - Assess need for intravenous fluids  - Provide specific nutrition/hydration education as appropriate  - Include patient/family/caregiver in decisions related to nutrition  Outcome: Progressing     Problem: PAIN - ADULT  Goal: Verbalizes/displays adequate comfort level or baseline comfort level  Description: Interventions:  - Encourage patient to monitor pain and request assistance  - Assess pain using appropriate pain scale0-10  - Administer analgesics based on type and severity of pain and evaluate response  - Implement non-pharmacological measures as appropriate and evaluate response  - Consider cultural and social influences on pain and pain management  - Notify physician/advanced practitioner if interventions unsuccessful or patient reports new pain  Outcome: Progressing     Problem: INFECTION - ADULT  Goal: Absence or prevention of progression during hospitalization  Description: INTERVENTIONS:  - Assess and monitor for signs and symptoms of infection  - Monitor lab/diagnostic results  - Monitor all insertion sites, i e  indwelling lines, tubes, and drains  - Monitor endotracheal if appropriate and nasal secretions for changes in amount and color  - [place] appropriate cooling/warming therapies per order  - Administer medications as ordered  - Instruct and encourage patient and family to use good hand hygiene technique  - Identify and instruct in appropriate isolation precautions for identified infection/condition  Outcome: Progressing     Problem: SAFETY ADULT  Goal: Patient will remain free of falls  Description: INTERVENTIONS:  - Educate patient/family on patient safety including physical limitations  - Instruct patient to call for assistance with activity   - Consult OT/PT to assist with strengthening/mobility   - Keep Call bell within reach  - Keep bed low and locked with side rails adjusted as appropriate  - Keep care items and personal belongings within reach  - Initiate and maintain comfort rounds  - Make Fall Risk Sign visible to staff  - Apply yellow socks and bracelet for high fall risk patients  - Consider moving patient to room near nurses station  Outcome: Progressing  Goal: Maintain or return to baseline ADL function  Description: INTERVENTIONS:  -  Assess patient's ability to carry out ADLs; assess patient's baseline for ADL function and identify physical deficits which impact ability to perform ADLs (bathing, care of mouth/teeth, toileting, grooming, dressing, etc )  - Assess/evaluate cause of self-care deficits   - Assess range of motion  - Assess patient's mobility; develop plan if impaired  - Assess patient's need for assistive devices and provide as appropriate  - Encourage maximum independence but intervene and supervise when necessary  - Involve family in performance of ADLs  - Assess for home care needs following discharge   - Consider OT consult to assist with ADL evaluation and planning for discharge  - Provide patient education as appropriate  Outcome: Progressing  Goal: Maintains/Returns to pre admission functional level  Description: INTERVENTIONS:  - Perform BMAT or MOVE assessment daily    - Set and communicate daily mobility goal to care team and patient/family/caregiver  - Collaborate with rehabilitation services on mobility goals if consulted  - Perform Range of Motion 3 times a day  - Reposition patient every 2 hours if pt unable to reposition self  - Out of bed for toileting  - Record patient progress and toleration of activity level   Outcome: Progressing     Problem: DISCHARGE PLANNING  Goal: Discharge to home or other facility with appropriate resources  Description: INTERVENTIONS:  - Identify barriers to discharge w/patient and caregiver  - Arrange for needed discharge resources and transportation as appropriate  - Identify discharge learning needs (meds, wound care, etc )  - Arrange for interpretive services to assist at discharge as needed  - Refer to Case Management Department for coordinating discharge planning if the patient needs post-hospital services based on physician/advanced practitioner order or complex needs related to functional status, cognitive ability, or social support system  Outcome: Progressing     Problem: Knowledge Deficit  Goal: Patient/family/caregiver demonstrates understanding of disease process, treatment plan, medications, and discharge instructions  Description: Complete learning assessment and assess knowledge base    Interventions:  - Provide teaching at level of understanding  - Provide teaching via preferred learning methods  Outcome: Progressing     Problem: CARDIOVASCULAR - ADULT  Goal: Maintains optimal cardiac output and hemodynamic stability  Description: INTERVENTIONS:  - Monitor I/O, vital signs and rhythm  - Monitor for S/S and trends of decreased cardiac output  - Administer and titrate ordered vasoactive medications to optimize hemodynamic stability  - Assess quality of pulses, skin color and temperature  - Assess for signs of decreased coronary artery perfusion  - Instruct patient to report change in severity of symptoms  Outcome: Progressing  Goal: Absence of cardiac dysrhythmias or at baseline rhythm  Description: INTERVENTIONS:  - Continuous cardiac monitoring, vital signs, obtain 12 lead EKG if ordered  - Administer antiarrhythmic and heart rate control medications as ordered  - Monitor electrolytes and administer replacement therapy as ordered  Outcome: Progressing     Problem: GASTROINTESTINAL - ADULT  Goal: Minimal or absence of nausea and/or vomiting  Description: INTERVENTIONS:  - Administer IV fluids if ordered to ensure adequate hydration  - Maintain NPO status until nausea and vomiting are resolved  - Nasogastric tube if ordered  - Administer ordered antiemetic medications as needed  - Provide nonpharmacologic comfort measures as appropriate  - Advance diet as tolerated, if ordered  - Consider nutrition services referral to assist patient with adequate nutrition and appropriate food choices  Outcome: Progressing  Goal: Maintains or returns to baseline bowel function  Description: INTERVENTIONS:  - Assess bowel function  - Encourage oral fluids to ensure adequate hydration  - Administer IV fluids if ordered to ensure adequate hydration  - Administer ordered medications as needed  - Encourage mobilization and activity  - Consider nutritional services referral to assist patient with adequate nutrition and appropriate food choices  Outcome: Progressing  Goal: Maintains adequate nutritional intake  Description: INTERVENTIONS:  - Monitor percentage of each meal consumed  - Identify factors contributing to decreased intake, treat as appropriate  - Assist with meals as needed  - Monitor I&O, weight, and lab values if indicated  - Obtain nutrition services referral as needed  Outcome: Progressing     Problem: MOBILITY - ADULT  Goal: Maintain or return to baseline ADL function  Description: INTERVENTIONS:  -  Assess patient's ability to carry out ADLs; assess patient's baseline for ADL function and identify physical deficits which impact ability to perform ADLs (bathing, care of mouth/teeth, toileting, grooming, dressing, etc )  - Assess/evaluate cause of self-care deficits   - Assess range of motion  - Assess patient's mobility; develop plan if impaired  - Assess patient's need for assistive devices and provide as appropriate  - Encourage maximum independence but intervene and supervise when necessary  - Involve family in performance of ADLs  - Assess for home care needs following discharge   - Consider OT consult to assist with ADL evaluation and planning for discharge  - Provide patient education as appropriate  Outcome: Progressing  Goal: Maintains/Returns to pre admission functional level  Description: INTERVENTIONS:  - Perform BMAT or MOVE assessment daily    - Set and communicate daily mobility goal to care team and patient/family/caregiver  - Collaborate with rehabilitation services on mobility goals if consulted  - Perform Range of Motion 3 times a day    - Reposition patient every 2 hours if pt unable to reposition self  - Out of bed for toileting  - Record patient progress and toleration of activity level   Outcome: Progressing     Problem: Prexisting or High Potential for Compromised Skin Integrity  Goal: Skin integrity is maintained or improved  Description: INTERVENTIONS:  - Identify patients at risk for skin breakdown  - Assess and monitor skin integrity  - Assess and monitor nutrition and hydration status  - Monitor labs   - Assess for incontinence   - Turn and reposition patient  - Assist with mobility/ambulation  - Relieve pressure over bony prominences  - Avoid friction and shearing  - Provide appropriate hygiene as needed including keeping skin clean and dry  - Evaluate need for skin moisturizer/barrier cream  - Collaborate with interdisciplinary team   - Patient/family teaching  - Consider wound care consult   Outcome: Progressing

## 2023-02-16 NOTE — ASSESSMENT & PLAN NOTE
· History paroxysmal atrial fibrillation chronically maintained on metoprolol succinate 25 mg daily   · chronic anticoagulation with Eliquis 2 5 mg twice daily for elevated [id number] stroke risk  · Initially patient received some IV Cardizem, then placed on beta-blocker, later on Cardizem short-acting added after that as per cardiology recommendation, amiodarone added which then transition to amiodarone drip due to n p o  status from small bowel obstruction  · P o  medication remain on hold-consider to resume once patient able to tolerate p o    · Was on Eliquis, transition to heparin drip as per [facility] protocol due to atrial fibrillation to prevent complications-'s, benefits, side effects discussed in details with patient's son her family member including bleeding risk

## 2023-02-16 NOTE — PROGRESS NOTES
Progress Note:Cardiology  Eli York 1934, 80 y o  female MRN: [id number]    Unit/Bed#: -01 Encounter: [id number]  Attending Physician: Emmy Rebolledo MD   Primary Care Provider: Eugenia Tate DO   Date admitted to hospital: 2/10/2023  Length of stay: 6         Atrial fibrillation with RVR [facility])  Assessment & Plan  Patient has known atrial fibrillation, previously paroxysmal but now persistent  Follows with Dr Kolton Sellers with [facility] EP  Maintained on Eliquis 2 5 mg BID (based on age and weight), metoprolol succinate 25 mg daily in the outpatient setting  Presented with atrial fibrillation with RVR in the setting of acute enterocolitis and acute cystitis  Heart rates have improved but not optimized with increase in metoprolol succinate to 50 mg BID  Amiodarone load started 2/14/23 at 200 mg BID x 14 days then 200 mg daily  Patient confirms no missed Eliquis doses  Discussed need for liver, lung, and thyroid monitoring with ongoing use  Dr Kolton Sellers made aware of this plan  Given NPO status I have converted her to amiodarone drip 1 mg/hr x 24 hours then 0 5 mg/hr thereafter  IV Lopressor 2 5 mg every 4 hours, hold for SBP < 95  Optimize electrolytes for K+ >4, Mag >2  Continue telemetry  PVC's (premature ventricular contractions)  Assessment & Plan  Frequent PVC's noted on telemetry with one 5-beat run noted 2/13/2023  Improved with potassium repletion  Hypotension-resolved as of 2/14/2023  Assessment & Plan  Patient has been started on midodrine 5 mg TID for hypotension during admission to allow for up-titration of beta blocker  This is in the setting of dehydration from enterocolitis as well as a fib with RVR  Not currently requiring midodrine  Coronary artery disease involving native coronary artery of native heart  Assessment & Plan  Mild nonobstructive CAD by cardiac catheterization at [facility] 9/2022    Continue beta blocker and statin    Stage 3b chronic kidney disease [facility])  Assessment & Plan  Lab Results   Component Value Date    EGFR 43 02/13/2023    EGFR 30 02/12/2023    EGFR 39 02/11/2023    CREATININE 1 14 02/13/2023    CREATININE 1 52 (H) 02/12/2023    CREATININE 1 23 02/11/2023     Renal function improved with IV hydration  Would avoid diuresis and monitor for now  Acute cystitis  Assessment & Plan  Management as per medicine team    Chronic combined systolic and diastolic congestive heart failure (HCC)  Assessment & Plan  Wt Readings from Last 3 Encounters:   02/10/23 53 3 kg (117 lb 8 1 oz)   06/23/21 55 3 kg (121 lb 14 6 oz)   03/15/19 59 kg (130 lb)     Echocardiogram at [facility] 12/21/2022 with EF 50% and mild global hypokinesis with mod-severe MR  She was taking furosemide 40 mg daily in the outpatient setting, recently started by her primary cardiologist   This was stopped and IV hydration provided during admission  Received 40 mg PO furosemide 2/13/23  Avoiding due to concerns for dehydration with ongoing N/V/D  Currently receiving IV hydration given NPO status  Will monitor volume status closely  Strict I's/O's, standing daily weights  Optimize electrolytes for K+ > 4, Mag > 2  Pleural effusion, bilateral  Assessment & Plan  Noted on chest imaging  Wheezing is resolved today  Enterocolitis  Assessment & Plan  Management as per general surgery and medicine team      * Small bowel obstruction [facility])  Assessment & Plan  Patient continued with increasing abdominal distention/pain/N/V  Repeat CT abdomen 2/15/23 revealed SBO  Currently NPO with NG tube  Symptoms improving  Management as per SLIM and surgical teams  Subjective:   Patient seen and examined  NPO with NG tube to suction  She states her nausea and abdominal pain are improved  She tells me she feels feverish this morning  PO meds are on hold with HR's 's on telemetry  She denies chest pain, shortness of breath, or lightheadedness      Review of Systems   Constitutional: Positive for fever  HENT: Negative  Cardiovascular: Negative for chest pain, dyspnea on exertion, irregular heartbeat, leg swelling, near-syncope, orthopnea and palpitations  Respiratory: Negative for cough, shortness of breath and snoring  Endocrine: Negative  Skin: Negative  Musculoskeletal: Negative  Gastrointestinal: Positive for bloating, anorexia and diarrhea  Genitourinary: Negative  Neurological: Negative  Psychiatric/Behavioral: Negative  Objective:     Vitals: Blood pressure 104/56, pulse 100, temperature 97 7 °F (36 5 °C), temperature source Temporal, resp  rate 18, height 5' 2" (1 575 m), weight 53 3 kg (117 lb 8 1 oz), SpO2 95 %  , Body mass index is 21 49 kg/m² ,     Orthostatic Blood Pressures    Flowsheet Row Most Recent Value   Blood Pressure 104/56 filed at 02/16/2023 5897   Patient Position - Orthostatic VS Lying filed at 02/16/2023 0773          Physical Exam  Vitals and nursing note reviewed  Constitutional:       General: She is not in acute distress  Appearance: She is well-developed  HENT:      Head: Normocephalic and atraumatic  Eyes:      Conjunctiva/sclera: Conjunctivae normal    Cardiovascular:      Rate and Rhythm: Tachycardia present  Rhythm irregular  Heart sounds: No murmur heard  Pulmonary:      Effort: Pulmonary effort is normal  No respiratory distress  Breath sounds: Normal breath sounds  Comments: Breathing comfortably on room air  Abdominal:      Palpations: Abdomen is soft  Tenderness: There is no abdominal tenderness  Musculoskeletal:         General: No swelling  Cervical back: Neck supple  Right lower leg: No edema  Left lower leg: No edema  Skin:     General: Skin is warm and dry  Capillary Refill: Capillary refill takes less than 2 seconds  Neurological:      Mental Status: She is alert     Psychiatric:         Mood and Affect: Mood normal          Speech: Speech normal          Behavior: Behavior normal  Behavior is cooperative           Cognition and Memory: Cognition and memory normal             Intake/Output Summary (Last 24 hours) at 2/16/2023 1218  Last data filed at 2/16/2023 0630  Gross per 24 hour   Intake --   Output 900 ml   Net -900 ml       Weight (last 2 days)     None             Medications:      Current Facility-Administered Medications:   •  amiodarone (CORDARONE) 900 mg in dextrose 5 % 500 mL infusion, 1 mg/min, Intravenous, Continuous, HENRY Ken  •  [START ON 2/17/2023] amiodarone (CORDARONE) 900 mg in dextrose 5 % 500 mL infusion, 0 5 mg/min, Intravenous, Continuous, HENRY Ken  •  cefTRIAXone (ROCEPHIN) IVPB (premix in dextrose) 1,000 mg 50 mL, 1,000 mg, Intravenous, Q24H, HENRY Boss, Last Rate: 100 mL/hr at 02/16/23 0529, 1,000 mg at 02/16/23 0529  •  heparin (porcine) 25,000 units in 0 45% NaCl 250 mL infusion (premix), 3-20 Units/kg/hr (Order-Specific), Intravenous, Titrated, Leviblanka Vazquez MD, Last Rate: 8 mL/hr at 02/16/23 0655, 16 Units/kg/hr at 02/16/23 0655  •  latanoprost (XALATAN) 0 005 % ophthalmic solution 1 drop, 1 drop, Both Eyes, HS, HENRY Boss, 1 drop at 02/15/23 2122  •  metoprolol (LOPRESSOR) injection 2 5 mg, 2 5 mg, Intravenous, Q4H, HENRY Edmond  •  metoprolol (LOPRESSOR) injection 5 mg, 5 mg, Intravenous, Q6H PRN, HENRY Capps, 5 mg at 02/15/23 0215  •  metroNIDAZOLE (FLAGYL) IVPB (premix) 500 mg 100 mL, 500 mg, Intravenous, Q8H, Luis Fernando Vazquez MD, Last Rate: 200 mL/hr at 02/16/23 0608, 500 mg at 02/16/23 0608  •  midodrine (PROAMATINE) tablet 2 5 mg, 2 5 mg, Oral, TID AC, Ekaterina C Gibble, CRNP  •  ondansetron (ZOFRAN) injection 4 mg, 4 mg, Intravenous, Q4H PRN, Levi Vazquez MD, 4 mg at 02/15/23 1036  •  phenol (CHLORASEPTIC) 1 4 % mucosal liquid 1 spray, 1 spray, Mouth/Throat, Q2H PRN, [facility] JOHN Gatica, 1 spray at 02/16/23 1057  •  sodium chloride 0 9 % infusion, 75 mL/hr, Intravenous, Continuous, Tree Cr MD, Last Rate: 75 mL/hr at 02/15/23 1536, 75 mL/hr at 02/15/23 1536     Labs & Results:        Results from last 7 days   Lab Units 02/16/23  0620 02/15/23  1533 02/15/23  0534   WBC Thousand/uL 8 36 9 80 8 30   HEMOGLOBIN g/dL 11 8 14 7 14 6   HEMATOCRIT % 36 5 44 6 44 7   PLATELETS Thousands/uL 288 320 304         Results from last 7 days   Lab Units 02/16/23  0620 02/15/23  0534 02/14/23  0615 02/11/23  0543 02/10/23  0050   POTASSIUM mmol/L 3 9 4 0 3 8   < > 4 3   CHLORIDE mmol/L 104 97 103   < > 102   CO2 mmol/L 30 32 30   < > 32   BUN mg/dL 22 20 17   < > 15   CREATININE mg/dL 0 92 0 93 0 96   < > 1 16   CALCIUM mg/dL 7 1* 8 0* 7 7*   < > 8 1*   ALK PHOS U/L 36 43  --   --  46   ALT U/L 5* 7  --   --  10   AST U/L 11* 14  --   --  24    < > = values in this interval not displayed  Results from last 7 days   Lab Units 02/16/23  0620 02/15/23  2333 02/15/23  1533   INR   --   --  1 90*   PTT seconds 59* 56* 34     Results from last 7 days   Lab Units 02/15/23  0534 02/12/23  0550 02/10/23  0050   MAGNESIUM mg/dL 1 9 2 1 1 8*     Results from last 7 days   Lab Units 02/10/23  0050   BNP pg/mL 396*      Telemetry: atrial fibrillation, rate 's    Counseling / Coordination of Care  Total floor / unit time spent today 25 minutes  Greater than 50% of total time was spent with the patient and / or family counseling and / or coordination of care    A description of the counseling / coordination of care: Discussed case with Dr Leah Sung

## 2023-02-16 NOTE — PROGRESS NOTES
[address]  Progress Note - Domenica Schreiber 1934, 80 y o  female MRN: [id number]  Unit/Bed#: -[facility] Encounter: [id number]  Primary Care Provider: Gissell Urbina DO   Date and time admitted to hospital: 2/10/2023 12:28 AM    Bacteremia  Assessment & Plan  1 set of blood culture shows gram-negative rods, para Bacteroides distasonis-sensitive to metronidazole, patient already on metronidazole since 2/10/2023  Second set of blood culture shows no growth      Atrial fibrillation with RVR (HCC)  Assessment & Plan  · History paroxysmal atrial fibrillation chronically maintained on metoprolol succinate 25 mg daily   · chronic anticoagulation with Eliquis 2 5 mg twice daily for elevated [id number] stroke risk  · Initially patient received some IV Cardizem, then placed on beta-blocker, later on Cardizem short-acting added after that as per cardiology recommendation, amiodarone added which then transition to amiodarone drip due to n p o  status from small bowel obstruction  · P o  medication remain on hold-consider to resume once patient able to tolerate p o  · Was on Eliquis, transition to heparin drip as per ACS protocol due to atrial fibrillation to prevent complications-'s, benefits, side effects discussed in details with patient's son her family member including bleeding risk    * Small bowel obstruction ([address] )  Assessment & Plan  Since patient was throwing up, repeat CAT scan done which shows a small bowel obstruction  General surgery consult appreciated, recommends conservative management,  Status post NG tube in place, continue suction  Patient was on Eliquis-transition to heparin drip patient remain n p o  Continue gentle hydration, while patient remained on IV hydration follow for signs symptoms of volume overload-patient is status post PICC placement for consideration of TPN    Once TPN is started, consider to discontinue IV hydration considering patient's cardiac conditions    PICC (peripherally inserted central catheter) in place  Assessment & Plan  Status post right brachial PICC placement for possible TPN    PVC's (premature ventricular contractions)  Assessment & Plan  Continue beta-blocker  Maintain electrolytes    Moderate protein-calorie malnutrition (HCC)  Assessment & Plan  Malnutrition Findings:   Adult Malnutrition type: Chronic illness  Adult Degree of Malnutrition: Malnutrition of moderate degree  Malnutrition Characteristics: Muscle loss, Fat loss, Inadequate energy                  360 Statement: Chronic moderate malnutrition related to decreased oral intake, difficulties with meal preparation at home as evidenced by < 75% estimated energy intake > 1 mo; moderate muscle loss to temporalis, pectoralis, deltoid muscles; moderate to severe fat loss to orbitals, moderate fat loss to buccal pads  Treated with: Advance diet as medically appropriate to modest 4gm DENY given hx of CHF  Will discuss supplements with pt once diet initiated  Recommend daily weights for nutrition monitoring  Did discuss Mom's Meals with pt for ease of meal preparation at home  BMI Findings: Body mass index is 21 49 kg/m²  Coronary artery disease involving native coronary artery of native heart  Assessment & Plan  · NSTEMI September 2022  · Cardiac catheterization with minimal atherosclerotic disease, no PCI  · Continue Eliquis and simvastatin    Stage 3b chronic kidney disease [facility])  Assessment & Plan  Lab Results   Component Value Date    EGFR 55 02/16/2023    EGFR 55 02/15/2023    EGFR 52 02/14/2023    CREATININE 0 92 02/16/2023    CREATININE 0 93 02/15/2023    CREATININE 0 96 02/14/2023   · History CKD 3  · creatinine worsened to 1 5  Placed on gentle hydration hold diuretics and observe  Improved to baseline of 1 1    Stop IV fluids and restart oral Lasix as per home regimen  · Renally dose medications, avoid nephrotoxic medication    Acute cystitis  Assessment & Plan  · Presented with complaints of abdominal pain and foul-smelling urine  · UA with bacteriuria, pyuria  · Urine did not reflex to culture  · Blood culture 1 out of 2 gram-negative rods  · Empiric ceftriaxone    Chronic combined systolic and diastolic congestive heart failure (HCC)  Assessment & Plan  Wt Readings from Last 3 Encounters:   02/10/23 53 3 kg (117 lb 8 1 oz)   06/23/21 55 3 kg (121 lb 14 6 oz)   03/15/19 59 kg (130 lb)     · History chronic congestive heart failure, follows with [facility] cardiology  · Per their notes - Probable Takotsubo cardiomyopathy with recovered EF and at least moderate MR and moderate TR  · Utilizes diuretics per cardiology direction as needed if weight increases otherwise does not take them on a daily basis  · Continue Toprol   · Lisinopril 2 5 mg daily on hold due to hypotension  · Received 40 mg IV furosemide in the ED  · Patient placed on gentle IV hydration since developed a small bowel obstruction  Continue to monitor for volume overload-patient is status post PICC for TPN  Once TPN started, consider to discontinue IV hydration    Pleural effusion, bilateral  Assessment & Plan  · Moderate bilateral pleural effusions  · History of chronic systolic heart failure, reviewed care everywhere there were small bilateral pleural effusions on chest x-ray September  · Reports chronic dyspnea  repeat cxray ordered for today  restart lasix 40 mg daily today  · Continue to monitor    Enterocolitis  Assessment & Plan  · Reports 1 day of severe generalized abdominal pain associated with nausea and dry heaves  · Normal lactic acid  · CT abdomen pelvis with gastroenterocolitis  · Stool Culture including C  difficile and enteric panel negative  · Blood Culture 1 out of 2 gram-negative rods  · Empiric ceftriaxone/metronidazole -elevated procalcitonin  · Pain improved significantly at time of admission, abdomen soft, having some diarrhea but is slowly    advance diet to soft diet  · Appreciate general surgery      VTE Pharmacologic Prophylaxis: VTE Score: 4 Moderate Risk (Score 3-4) - Pharmacological DVT Prophylaxis Ordered: heparin drip  Patient Centered Rounds: I performed bedside rounds with nursing staff today  Discussions with Specialists or Other Care Team Provider: General surgery, cardiology, IR    Education and Discussions with Family / Patient: Updated  (daughter) at bedside  Total Time Spent on Date of Encounter in care of patient: 10 minutes This time was spent on one or more of the following: performing physical exam; counseling and coordination of care; obtaining or reviewing history; documenting in the medical record; reviewing/ordering tests, medications or procedures; communicating with other healthcare professionals and discussing with patient's family/caregivers  Current Length of Stay: 6 day(s)  Current Patient Status: Inpatient   Certification Statement: The patient will continue to require additional inpatient hospital stay due to To monitor above condition  Discharge Plan: Anticipate discharge in 48-72 hrs to To be due to mild    Code Status: Level 1 - Full Code    Subjective:   Seen and evaluated during the rounds  Resting comfortably  Denies any significant complaint other than mild abdominal distention  And discomfort in the throat due to NG tube    Objective:     Vitals:   Temp (24hrs), Av 6 °F (36 4 °C), Min:97 3 °F (36 3 °C), Max:97 9 °F (36 6 °C)    Temp:  [97 3 °F (36 3 °C)-97 9 °F (36 6 °C)] 97 9 °F (36 6 °C)  HR:  [] 103  Resp:  [18-20] 18  BP: ()/(53-72) 105/64  SpO2:  [94 %-95 %] 95 %  Body mass index is 21 49 kg/m²  Input and Output Summary (last 24 hours): Intake/Output Summary (Last 24 hours) at 2023 1606  Last data filed at 2023 0630  Gross per 24 hour   Intake --   Output 900 ml   Net -900 ml       Physical Exam:   Physical Exam  Vitals and nursing note reviewed     Constitutional:       Appearance: Normal appearance  She is not ill-appearing or diaphoretic  HENT:      Nose: Nose normal       Mouth/Throat:      Mouth: Mucous membranes are moist       Pharynx: No oropharyngeal exudate  Eyes:      General: No scleral icterus  Left eye: No discharge  Extraocular Movements: Extraocular movements intact  Conjunctiva/sclera: Conjunctivae normal       Pupils: Pupils are equal, round, and reactive to light  Cardiovascular:      Rate and Rhythm: Tachycardia present  Rhythm irregular  Heart sounds: No friction rub  No gallop  Pulmonary:      Effort: Pulmonary effort is normal  No respiratory distress  Breath sounds: No stridor  No wheezing or rhonchi  Abdominal:      General: Abdomen is flat  Bowel sounds are normal       Palpations: Abdomen is soft  Tenderness: There is abdominal tenderness  There is no rebound  Hernia: No hernia is present  Comments: NG tube in place   Musculoskeletal:         General: No swelling or tenderness  Cervical back: Normal range of motion  No rigidity  Lymphadenopathy:      Cervical: No cervical adenopathy  Skin:     General: Skin is warm  Capillary Refill: Capillary refill takes less than 2 seconds  Coloration: Skin is not jaundiced or pale  Findings: No bruising or erythema  Neurological:      General: No focal deficit present  Mental Status: She is alert and oriented to person, place, and time  Cranial Nerves: No cranial nerve deficit  Sensory: No sensory deficit  Motor: No weakness        Coordination: Coordination normal          Additional Data:     Labs:  Results from last 7 days   Lab Units 02/16/23  0620   WBC Thousand/uL 8 36   HEMOGLOBIN g/dL 11 8   HEMATOCRIT % 36 5   PLATELETS Thousands/uL 288   NEUTROS PCT % 79*   LYMPHS PCT % 11*   MONOS PCT % 8   EOS PCT % 1     Results from last 7 days   Lab Units 02/16/23  0620   SODIUM mmol/L 136   POTASSIUM mmol/L 3 9   CHLORIDE mmol/L 104   CO2 mmol/L 30   BUN mg/dL 22   CREATININE mg/dL 0 92   ANION GAP mmol/L 2*   CALCIUM mg/dL 7 1*   ALBUMIN g/dL 2 4*   TOTAL BILIRUBIN mg/dL 0 49   ALK PHOS U/L 36   ALT U/L 5*   AST U/L 11*   GLUCOSE RANDOM mg/dL 82     Results from last 7 days   Lab Units 02/15/23  1533   INR  1 90*     Results from last 7 days   Lab Units 02/12/23  2047 02/12/23  1540   POC GLUCOSE mg/dl 155* 109         Results from last 7 days   Lab Units 02/10/23  0050   LACTIC ACID mmol/L 1 4   PROCALCITONIN ng/ml 1 28*       Lines/Drains:  Invasive Devices     Peripherally Inserted Central Catheter Line  Duration           PICC Line 02/16/23 <1 day          Peripheral Intravenous Line  Duration           Peripheral IV 02/15/23 Right Forearm 1 day    Peripheral IV 02/16/23 Right;Ventral (anterior) Forearm <1 day          Drain  Duration           NG/OG/Enteral Tube Nasogastric 16 Fr Right nare 1 day                Central Line:  Goal for removal: Continuing for TPN  Telemetry:  Telemetry Orders (From admission, onward)             48 Hour Telemetry Monitoring  Continuous x 48 hours        Expiring   References:    Telemetry Guidelines   Question:  Reason for 48 Hour Telemetry  Answer:  Arrhythmias Requiring Medical Therapy (eg  SVT, Vtach/fib, Bradycardia, Uncontrolled A-fib)                 Telemetry Reviewed: Atrial fibrillation  HR averaging Variable heart rate  Indication for Continued Telemetry Use: Arrthymias requiring medical therapy             Imaging: No pertinent imaging reviewed  Recent Cultures (last 7 days):   Results from last 7 days   Lab Units 02/11/23  1910 02/10/23  0052   BLOOD CULTURE   --  No Growth After 5 Days    Parabacteroides distasonis*   GRAM STAIN RESULT   --  Gram negative rods*   C DIFF TOXIN B BY PCR  Negative  --        Last 24 Hours Medication List:   Current Facility-Administered Medications   Medication Dose Route Frequency Provider Last Rate   • amiodarone  1 mg/min Intravenous Continuous Sanjuana Pedersen HENRY Blount 1 mg/min (02/16/23 1236)   • [START ON 2/17/2023] amiodarone  0 5 mg/min Intravenous Continuous AnaNESSA MiltonNP     • cefTRIAXone  1,000 mg Intravenous Q24H Liz S Germaine, CRNP 1,000 mg (02/16/23 0529)   • heparin (porcine)  3-20 Units/kg/hr (Order-Specific) Intravenous Titrated Leartis Collet, MD 16 Units/kg/hr (02/16/23 1323)   • latanoprost  1 drop Both Eyes HS Liz S Germaine, CRNP     • metoprolol  2 5 mg Intravenous Q4H NESSA AlvarengaNP     • metoprolol  5 mg Intravenous Q6H PRN HENRY Vegas     • metroNIDAZOLE  500 mg Intravenous Q8H Soila Vazquez  mg (02/16/23 1331)   • midodrine  2 5 mg Oral TID AC HENRY Edmond     • ondansetron  4 mg Intravenous Q4H PRN Leartis Collet, MD     • phenol  1 spray Mouth/Throat Q2H PRN Lexy Cooley PA-C     • sodium chloride  75 mL/hr Intravenous Continuous Leartis Collet, MD 75 mL/hr (02/15/23 1536)        Today, Patient Was Seen By: Leartis Collet, MD    **Please Note: This note may have been constructed using a voice recognition system  **

## 2023-02-16 NOTE — PROCEDURES
PICC Line Insertion    Date/Time: 2/16/2023 2:37 PM  Performed by: Severo Villeda MD  Authorized by: Alta Anderson MD     Patient location:  IR  Consent:     Consent obtained:  Written    Consent given by:  Guardian    Risks discussed:  Arterial puncture, incorrect placement, nerve damage, bleeding, infection and pneumothorax    Alternatives discussed:  No treatment, delayed treatment and alternative treatment  Universal protocol:     Procedure explained and questions answered to patient or proxy's satisfaction: yes      Relevant documents present and verified: yes      Test results available and properly labeled: yes      Radiology Images displayed and confirmed  If images not available, report reviewed: yes      Required blood products, implants, devices, and special equipment available: yes      Site/side marked: yes      Immediately prior to procedure, a time out was called: yes      Patient identity confirmed:  Verbally with patient and arm band  Pre-procedure details:     Hand hygiene: Hand hygiene performed prior to insertion      Sterile barrier technique: All elements of maximal sterile technique followed      Skin preparation:  ChloraPrep    Skin preparation agent: Skin preparation agent completely dried prior to procedure    Indications:     PICC line indications: medications requiring central line    Anesthesia (see MAR for exact dosages):      Anesthesia method:  Local infiltration    Local anesthetic:  Lidocaine 1% w/o epi  Procedure details:     Location:  Basilic    Vessel type: vein      Laterality:  Right    Approach: percutaneous technique used      Patient position:  Flat    Procedural supplies:  Single lumen    Catheter size:  4 Fr    Landmarks identified: yes      Ultrasound guidance: yes      Ultrasound image availability:  Images available in PACS    Sterile ultrasound techniques: Sterile gel and sterile probe covers were used      Number of attempts:  1    Successful placement: yes      Total catheter length (cm):  43    Catheter out on skin (cm):  0    Max flow rate:  999    Arm circumference:  21 cm  Post-procedure details:     Post-procedure:  Securement device placed and dressing applied    Assessment:  Blood return through all ports, no pneumothorax on x-ray, placement verified by x-ray and free fluid flow    Post-procedure complications: none      Patient tolerance of procedure:   Tolerated well, no immediate complications    Observer: Yes      Observer name:  Guido Samson RN

## 2023-02-16 NOTE — ASSESSMENT & PLAN NOTE
Wt Readings from Last 3 Encounters:   02/10/23 53 3 kg (117 lb 8 1 oz)   06/23/21 55 3 kg (121 lb 14 6 oz)   03/15/19 59 kg (130 lb)     · History chronic congestive heart failure, follows with [facility] cardiology  · Per their notes - Probable Takotsubo cardiomyopathy with recovered EF and at least moderate MR and moderate TR  · Utilizes diuretics per cardiology direction as needed if weight increases otherwise does not take them on a daily basis  · Continue Toprol   · Lisinopril 2 5 mg daily on hold due to hypotension  · Received 40 mg IV furosemide in the ED  · Patient placed on gentle IV hydration since developed a small bowel obstruction  Continue to monitor for volume overload-patient is status post PICC for TPN    Once TPN started, consider to discontinue IV hydration

## 2023-02-16 NOTE — ASSESSMENT & PLAN NOTE
1 set of blood culture shows gram-negative rods, para Bacteroides distasonis-sensitive to metronidazole, patient already on metronidazole since 2/10/2023  Second set of blood culture shows no growth

## 2023-02-16 NOTE — PLAN OF CARE
Problem: Nutrition/Hydration-ADULT  Goal: Nutrient/Hydration intake appropriate for improving, restoring or maintaining nutritional needs  Description: Monitor and assess patient's nutrition/hydration status for malnutrition  Collaborate with interdisciplinary team and initiate plan and interventions as ordered  Monitor patient's weight and dietary intake as ordered or per policy  Utilize nutrition screening tool and intervene as necessary  Determine patient's food preferences and provide high-protein, high-caloric foods as appropriate       INTERVENTIONS:  - Monitor oral intake, urinary output, labs, and treatment plans  - Assess nutrition and hydration status and recommend course of action  - Evaluate amount of meals eaten  - Assist patient with eating if necessary   - Allow adequate time for meals  - Recommend/ encourage appropriate diets, oral nutritional supplements, and vitamin/mineral supplements  - Order, calculate, and assess calorie counts as needed  - Recommend, monitor, and adjust tube feedings and TPN/PPN based on assessed needs  - Assess need for intravenous fluids  - Provide specific nutrition/hydration education as appropriate  - Include patient/family/caregiver in decisions related to nutrition  Outcome: Progressing     Problem: PAIN - ADULT  Goal: Verbalizes/displays adequate comfort level or baseline comfort level  Description: Interventions:  - Encourage patient to monitor pain and request assistance  - Assess pain using appropriate pain scale0-10  - Administer analgesics based on type and severity of pain and evaluate response  - Implement non-pharmacological measures as appropriate and evaluate response  - Consider cultural and social influences on pain and pain management  - Notify physician/advanced practitioner if interventions unsuccessful or patient reports new pain  Outcome: Progressing     Problem: INFECTION - ADULT  Goal: Absence or prevention of progression during hospitalization  Description: INTERVENTIONS:  - Assess and monitor for signs and symptoms of infection  - Monitor lab/diagnostic results  - Monitor all insertion sites, i e  indwelling lines, tubes, and drains  - Monitor endotracheal if appropriate and nasal secretions for changes in amount and color  - [place] appropriate cooling/warming therapies per order  - Administer medications as ordered  - Instruct and encourage patient and family to use good hand hygiene technique  - Identify and instruct in appropriate isolation precautions for identified infection/condition  Outcome: Progressing     Problem: SAFETY ADULT  Goal: Maintain or return to baseline ADL function  Description: INTERVENTIONS:  -  Assess patient's ability to carry out ADLs; assess patient's baseline for ADL function and identify physical deficits which impact ability to perform ADLs (bathing, care of mouth/teeth, toileting, grooming, dressing, etc )  - Assess/evaluate cause of self-care deficits   - Assess range of motion  - Assess patient's mobility; develop plan if impaired  - Assess patient's need for assistive devices and provide as appropriate  - Encourage maximum independence but intervene and supervise when necessary  - Involve family in performance of ADLs  - Assess for home care needs following discharge   - Consider OT consult to assist with ADL evaluation and planning for discharge  - Provide patient education as appropriate  Outcome: Progressing     Problem: DISCHARGE PLANNING  Goal: Discharge to home or other facility with appropriate resources  Description: INTERVENTIONS:  - Identify barriers to discharge w/patient and caregiver  - Arrange for needed discharge resources and transportation as appropriate  - Identify discharge learning needs (meds, wound care, etc )  - Arrange for interpretive services to assist at discharge as needed  - Refer to Case Management Department for coordinating discharge planning if the patient needs post-hospital services based on physician/advanced practitioner order or complex needs related to functional status, cognitive ability, or social support system  Outcome: Progressing     Problem: Knowledge Deficit  Goal: Patient/family/caregiver demonstrates understanding of disease process, treatment plan, medications, and discharge instructions  Description: Complete learning assessment and assess knowledge base    Interventions:  - Provide teaching at level of understanding  - Provide teaching via preferred learning methods  Outcome: Progressing     Problem: MOBILITY - ADULT  Goal: Maintain or return to baseline ADL function  Description: INTERVENTIONS:  -  Assess patient's ability to carry out ADLs; assess patient's baseline for ADL function and identify physical deficits which impact ability to perform ADLs (bathing, care of mouth/teeth, toileting, grooming, dressing, etc )  - Assess/evaluate cause of self-care deficits   - Assess range of motion  - Assess patient's mobility; develop plan if impaired  - Assess patient's need for assistive devices and provide as appropriate  - Encourage maximum independence but intervene and supervise when necessary  - Involve family in performance of ADLs  - Assess for home care needs following discharge   - Consider OT consult to assist with ADL evaluation and planning for discharge  - Provide patient education as appropriate  Outcome: Progressing

## 2023-02-16 NOTE — ASSESSMENT & PLAN NOTE
Patient continued with increasing abdominal distention/pain/N/V  Repeat CT abdomen 2/15/23 revealed SBO  Currently NPO with NG tube  Symptoms improving  Management as per SLIM and surgical teams

## 2023-02-16 NOTE — BRIEF OP NOTE (RAD/CATH)
INTERVENTIONAL RADIOLOGY PROCEDURE NOTE    Date: 2/16/2023    Procedure: PICC  Procedure Summary     Date:  Room / Location:     Anesthesia Start:  Anesthesia Stop:     Procedure:  Diagnosis:     Scheduled Providers:  Responsible Provider:     Anesthesia Type: Not recorded ASA Status: Not recorded          Preoperative diagnosis:   1  Acute CHF (congestive heart failure) ([facility] [address] )    2  Atrial fibrillation with RVR (HCC)    3  UTI (urinary tract infection)    4  Enterocolitis    5  Hypomagnesemia    6  Small bowel obstruction (HCC)         Postoperative diagnosis: Same  Surgeon: Orly Sepulveda MD     Assistant: None  No qualified resident was available  Blood loss: None    Specimens: None     Findings: Right brachial 4 Fr SL PICC placed  Complications: None immediate      Anesthesia: local

## 2023-02-16 NOTE — ASSESSMENT & PLAN NOTE
Since patient was throwing up, repeat CAT scan done which shows a small bowel obstruction  General surgery consult appreciated, recommends conservative management,  Status post NG tube in place, continue suction  Patient was on Eliquis-transition to heparin drip patient remain n p o  Continue gentle hydration, while patient remained on IV hydration follow for signs symptoms of volume overload-patient is status post PICC placement for consideration of TPN    Once TPN is started, consider to discontinue IV hydration considering patient's cardiac conditions

## 2023-02-17 NOTE — PLAN OF CARE
Problem: Nutrition/Hydration-ADULT  Goal: Nutrient/Hydration intake appropriate for improving, restoring or maintaining nutritional needs  Description: Monitor and assess patient's nutrition/hydration status for malnutrition  Collaborate with interdisciplinary team and initiate plan and interventions as ordered  Monitor patient's weight and dietary intake as ordered or per policy  Utilize nutrition screening tool and intervene as necessary  Determine patient's food preferences and provide high-protein, high-caloric foods as appropriate       INTERVENTIONS:  - Monitor oral intake, urinary output, labs, and treatment plans  - Assess nutrition and hydration status and recommend course of action  - Evaluate amount of meals eaten  - Assist patient with eating if necessary   - Allow adequate time for meals  - Recommend/ encourage appropriate diets, oral nutritional supplements, and vitamin/mineral supplements  - Order, calculate, and assess calorie counts as needed  - Recommend, monitor, and adjust tube feedings and TPN/PPN based on assessed needs  - Assess need for intravenous fluids  - Provide specific nutrition/hydration education as appropriate  - Include patient/family/caregiver in decisions related to nutrition  Outcome: Progressing     Problem: PAIN - ADULT  Goal: Verbalizes/displays adequate comfort level or baseline comfort level  Description: Interventions:  - Encourage patient to monitor pain and request assistance  - Assess pain using appropriate pain scale0-10  - Administer analgesics based on type and severity of pain and evaluate response  - Implement non-pharmacological measures as appropriate and evaluate response  - Consider cultural and social influences on pain and pain management  - Notify physician/advanced practitioner if interventions unsuccessful or patient reports new pain  Outcome: Progressing     Problem: PAIN - ADULT  Goal: Verbalizes/displays adequate comfort level or baseline comfort level  Description: Interventions:  - Encourage patient to monitor pain and request assistance  - Assess pain using appropriate pain scale0-10  - Administer analgesics based on type and severity of pain and evaluate response  - Implement non-pharmacological measures as appropriate and evaluate response  - Consider cultural and social influences on pain and pain management  - Notify physician/advanced practitioner if interventions unsuccessful or patient reports new pain  Outcome: Progressing     Problem: INFECTION - ADULT  Goal: Absence or prevention of progression during hospitalization  Description: INTERVENTIONS:  - Assess and monitor for signs and symptoms of infection  - Monitor lab/diagnostic results  - Monitor all insertion sites, i e  indwelling lines, tubes, and drains  - Monitor endotracheal if appropriate and nasal secretions for changes in amount and color  - [place] appropriate cooling/warming therapies per order  - Administer medications as ordered  - Instruct and encourage patient and family to use good hand hygiene technique  - Identify and instruct in appropriate isolation precautions for identified infection/condition  Outcome: Progressing     Problem: SAFETY ADULT  Goal: Patient will remain free of falls  Description: INTERVENTIONS:  - Educate patient/family on patient safety including physical limitations  - Instruct patient to call for assistance with activity   - Consult OT/PT to assist with strengthening/mobility   - Keep Call bell within reach  - Keep bed low and locked with side rails adjusted as appropriate  - Keep care items and personal belongings within reach  - Initiate and maintain comfort rounds  - Make Fall Risk Sign visible to staff  - Apply yellow socks and bracelet for high fall risk patients  - Consider moving patient to room near nurses station  Outcome: Progressing  Goal: Maintain or return to baseline ADL function  Description: INTERVENTIONS:  -  Assess patient's ability to carry out ADLs; assess patient's baseline for ADL function and identify physical deficits which impact ability to perform ADLs (bathing, care of mouth/teeth, toileting, grooming, dressing, etc )  - Assess/evaluate cause of self-care deficits   - Assess range of motion  - Assess patient's mobility; develop plan if impaired  - Assess patient's need for assistive devices and provide as appropriate  - Encourage maximum independence but intervene and supervise when necessary  - Involve family in performance of ADLs  - Assess for home care needs following discharge   - Consider OT consult to assist with ADL evaluation and planning for discharge  - Provide patient education as appropriate  Outcome: Progressing  Goal: Maintains/Returns to pre admission functional level  Description: INTERVENTIONS:  - Perform BMAT or MOVE assessment daily    - Set and communicate daily mobility goal to care team and patient/family/caregiver  - Collaborate with rehabilitation services on mobility goals if consulted  - Perform Range of Motion 3 times a day    - Reposition patient every 2 hours if pt unable to reposition self  - Out of bed for toileting  - Record patient progress and toleration of activity level   Outcome: Progressing     Problem: DISCHARGE PLANNING  Goal: Discharge to home or other facility with appropriate resources  Description: INTERVENTIONS:  - Identify barriers to discharge w/patient and caregiver  - Arrange for needed discharge resources and transportation as appropriate  - Identify discharge learning needs (meds, wound care, etc )  - Arrange for interpretive services to assist at discharge as needed  - Refer to Case Management Department for coordinating discharge planning if the patient needs post-hospital services based on physician/advanced practitioner order or complex needs related to functional status, cognitive ability, or social support system  Outcome: Progressing     Problem: Knowledge Deficit  Goal: Patient/family/caregiver demonstrates understanding of disease process, treatment plan, medications, and discharge instructions  Description: Complete learning assessment and assess knowledge base    Interventions:  - Provide teaching at level of understanding  - Provide teaching via preferred learning methods  Outcome: Progressing     Problem: CARDIOVASCULAR - ADULT  Goal: Maintains optimal cardiac output and hemodynamic stability  Description: INTERVENTIONS:  - Monitor I/O, vital signs and rhythm  - Monitor for S/S and trends of decreased cardiac output  - Administer and titrate ordered vasoactive medications to optimize hemodynamic stability  - Assess quality of pulses, skin color and temperature  - Assess for signs of decreased coronary artery perfusion  - Instruct patient to report change in severity of symptoms  Outcome: Progressing  Goal: Absence of cardiac dysrhythmias or at baseline rhythm  Description: INTERVENTIONS:  - Continuous cardiac monitoring, vital signs, obtain 12 lead EKG if ordered  - Administer antiarrhythmic and heart rate control medications as ordered  - Monitor electrolytes and administer replacement therapy as ordered  Outcome: Progressing     Problem: GASTROINTESTINAL - ADULT  Goal: Minimal or absence of nausea and/or vomiting  Description: INTERVENTIONS:  - Administer IV fluids if ordered to ensure adequate hydration  - Maintain NPO status until nausea and vomiting are resolved  - Nasogastric tube if ordered  - Administer ordered antiemetic medications as needed  - Provide nonpharmacologic comfort measures as appropriate  - Advance diet as tolerated, if ordered  - Consider nutrition services referral to assist patient with adequate nutrition and appropriate food choices  Outcome: Progressing  Goal: Maintains or returns to baseline bowel function  Description: INTERVENTIONS:  - Assess bowel function  - Encourage oral fluids to ensure adequate hydration  - Administer IV fluids if ordered to ensure adequate hydration  - Administer ordered medications as needed  - Encourage mobilization and activity  - Consider nutritional services referral to assist patient with adequate nutrition and appropriate food choices  Outcome: Progressing  Goal: Maintains adequate nutritional intake  Description: INTERVENTIONS:  - Monitor percentage of each meal consumed  - Identify factors contributing to decreased intake, treat as appropriate  - Assist with meals as needed  - Monitor I&O, weight, and lab values if indicated  - Obtain nutrition services referral as needed  Outcome: Progressing     Problem: MOBILITY - ADULT  Goal: Maintain or return to baseline ADL function  Description: INTERVENTIONS:  -  Assess patient's ability to carry out ADLs; assess patient's baseline for ADL function and identify physical deficits which impact ability to perform ADLs (bathing, care of mouth/teeth, toileting, grooming, dressing, etc )  - Assess/evaluate cause of self-care deficits   - Assess range of motion  - Assess patient's mobility; develop plan if impaired  - Assess patient's need for assistive devices and provide as appropriate  - Encourage maximum independence but intervene and supervise when necessary  - Involve family in performance of ADLs  - Assess for home care needs following discharge   - Consider OT consult to assist with ADL evaluation and planning for discharge  - Provide patient education as appropriate  Outcome: Progressing  Goal: Maintains/Returns to pre admission functional level  Description: INTERVENTIONS:  - Perform BMAT or MOVE assessment daily    - Set and communicate daily mobility goal to care team and patient/family/caregiver  - Collaborate with rehabilitation services on mobility goals if consulted  - Perform Range of Motion 3 times a day    - Reposition patient every 2 hours if pt unable to reposition self  - Out of bed for toileting  - Record patient progress and toleration of activity level   Outcome: Progressing     Problem: Prexisting or High Potential for Compromised Skin Integrity  Goal: Skin integrity is maintained or improved  Description: INTERVENTIONS:  - Identify patients at risk for skin breakdown  - Assess and monitor skin integrity  - Assess and monitor nutrition and hydration status  - Monitor labs   - Assess for incontinence   - Turn and reposition patient  - Assist with mobility/ambulation  - Relieve pressure over bony prominences  - Avoid friction and shearing  - Provide appropriate hygiene as needed including keeping skin clean and dry  - Evaluate need for skin moisturizer/barrier cream  - Collaborate with interdisciplinary team   - Patient/family teaching  - Consider wound care consult   Outcome: Progressing

## 2023-02-17 NOTE — PROGRESS NOTES
[address]  Progress Note - Shivam Pearce 1934, 80 y o  female MRN: [id number]  Unit/Bed#: -01 Encounter: [id number]  Primary Care Provider: Pati Slef DO   Date and time admitted to hospital: 2/10/2023 12:28 AM    Bacteremia  Assessment & Plan  1 set of blood culture shows gram-negative rods, para Bacteroides distasonis-sensitive to metronidazole, patient already on metronidazole since 2/10/2023  Second set of blood culture shows no growth      Atrial fibrillation with RVR (HCC)  Assessment & Plan  · History paroxysmal atrial fibrillation chronically maintained on metoprolol succinate 25 mg daily   · chronic anticoagulation with Eliquis 2 5 mg twice daily for elevated [id number] stroke risk  · Initially patient received some IV Cardizem, then placed on beta-blocker, later on Cardizem short-acting added after that as per cardiology recommendation, amiodarone added which then transition to amiodarone drip due to n p o  status from small bowel obstruction  · P o  medication remain on hold-consider to resume once patient able to tolerate p o  · Was on Eliquis, transition to heparin drip as per [facility] protocol due to atrial fibrillation to prevent complications-'s, benefits, side effects discussed in details with patient's son her family member including bleeding risk    * Small bowel obstruction ([address] )  Assessment & Plan  Since patient was throwing up, repeat CAT scan done which shows a small bowel obstruction  General surgery consult appreciated, recommends conservative management,  Status post NG tube in place, continue suction  Patient was on Eliquis-transition to heparin drip patient remain n p o  Continue gentle hydration, while patient remained on IV hydration follow for signs symptoms of volume overload-patient is status post PICC placement for consideration of TPN    Once TPN is started, consider to discontinue IV hydration considering patient's cardiac conditions    PICC (peripherally inserted central catheter) in place  Assessment & Plan  Status post right brachial PICC placement for possible TPN    PVC's (premature ventricular contractions)  Assessment & Plan  Continue beta-blocker  Maintain electrolytes    Moderate protein-calorie malnutrition (HCC)  Assessment & Plan  Malnutrition Findings:   Adult Malnutrition type: Chronic illness  Adult Degree of Malnutrition: Malnutrition of moderate degree  Malnutrition Characteristics: Muscle loss, Fat loss, Inadequate energy                  360 Statement: Chronic moderate malnutrition related to decreased oral intake, difficulties with meal preparation at home as evidenced by < 75% estimated energy intake > 1 mo; moderate muscle loss to temporalis, pectoralis, deltoid muscles; moderate to severe fat loss to orbitals, moderate fat loss to buccal pads  Treated with: Advance diet as medically appropriate to modest 4gm DENY given hx of CHF  Will discuss supplements with pt once diet initiated  Recommend daily weights for nutrition monitoring  Did discuss Mom's Meals with pt for ease of meal preparation at home  BMI Findings: Body mass index is 21 49 kg/m²  Coronary artery disease involving native coronary artery of native heart  Assessment & Plan  · NSTEMI September 2022  · Cardiac catheterization with minimal atherosclerotic disease, no PCI  · Continue Eliquis and simvastatin    Stage 3b chronic kidney disease [facility])  Assessment & Plan  Lab Results   Component Value Date    EGFR 55 02/16/2023    EGFR 55 02/15/2023    EGFR 52 02/14/2023    CREATININE 0 92 02/16/2023    CREATININE 0 93 02/15/2023    CREATININE 0 96 02/14/2023   · History CKD 3  · creatinine worsened to 1 5  Placed on gentle hydration hold diuretics and observe  Improved to baseline of 1 1    Stop IV fluids and restart oral Lasix as per home regimen  · Renally dose medications, avoid nephrotoxic medication    Acute cystitis  Assessment & Plan  · Presented with complaints of abdominal pain and foul-smelling urine  · UA with bacteriuria, pyuria  · Urine did not reflex to culture  · Blood culture 1 out of 2 gram-negative rods  · Empiric ceftriaxone    Chronic combined systolic and diastolic congestive heart failure (HCC)  Assessment & Plan  Wt Readings from Last 3 Encounters:   02/10/23 53 3 kg (117 lb 8 1 oz)   06/23/21 55 3 kg (121 lb 14 6 oz)   03/15/19 59 kg (130 lb)     · History chronic congestive heart failure, follows with [facility] cardiology  · Per their notes - Probable Takotsubo cardiomyopathy with recovered EF and at least moderate MR and moderate TR  · Utilizes diuretics per cardiology direction as needed if weight increases otherwise does not take them on a daily basis  · Continue Toprol   · Lisinopril 2 5 mg daily on hold due to hypotension  · Received 40 mg IV furosemide in the ED  · Patient placed on gentle IV hydration since developed a small bowel obstruction  Continue to monitor for volume overload-patient is status post PICC for TPN  Once TPN started, consider to discontinue IV hydration    Pleural effusion, bilateral  Assessment & Plan  · Moderate bilateral pleural effusions  · History of chronic systolic heart failure, reviewed care everywhere there were small bilateral pleural effusions on chest x-ray September  · Reports chronic dyspnea  repeat cxray ordered for today  restart lasix 40 mg daily today  · Continue to monitor    Enterocolitis  Assessment & Plan  · Reports 1 day of severe generalized abdominal pain associated with nausea and dry heaves  · Normal lactic acid  · CT abdomen pelvis with gastroenterocolitis  · Stool Culture including C  difficile and enteric panel negative  · Blood Culture 1 out of 2 gram-negative rods  · Empiric ceftriaxone/metronidazole -elevated procalcitonin  · Pain improved significantly at time of admission, abdomen soft, having some diarrhea but is slowly    advance diet to soft diet  · Appreciate general surgery          VTE Pharmacologic Prophylaxis: VTE Score: 4 Moderate Risk (Score 3-4) - Pharmacological DVT Prophylaxis Ordered: heparin drip  Patient Centered Rounds: I performed bedside rounds with nursing staff today  Discussions with Specialists or Other Care Team Provider: Cardiology, general surgery    Education and Discussions with Family / Patient: Updated  (daughter) via phone  Total Time Spent on Date of Encounter in care of patient: 15 minutes This time was spent on one or more of the following: performing physical exam; counseling and coordination of care; obtaining or reviewing history; documenting in the medical record; reviewing/ordering tests, medications or procedures; communicating with other healthcare professionals and discussing with patient's family/caregivers  Current Length of Stay: 6 day(s)  Current Patient Status: Inpatient   Certification Statement: The patient will continue to require additional inpatient hospital stay due to to monitor above conditions  Discharge Plan: Anticipate discharge in 48-72 hrs to home  Code Status: Level 1 - Full Code    Subjective:   Patient seen and evaluated and examined  Resting comfortably  Objective:     Vitals:   Temp (24hrs), Av 7 °F (36 5 °C), Min:97 3 °F (36 3 °C), Max:97 9 °F (36 6 °C)    Temp:  [97 3 °F (36 3 °C)-97 9 °F (36 6 °C)] 97 9 °F (36 6 °C)  HR:  [] 101  Resp:  [16-20] 16  BP: ()/(56-72) 109/69  SpO2:  [94 %-95 %] 94 %  Body mass index is 21 49 kg/m²  Input and Output Summary (last 24 hours): Intake/Output Summary (Last 24 hours) at 2023 1939  Last data filed at 2023 1851  Gross per 24 hour   Intake 1886 25 ml   Output 932 ml   Net 954 25 ml       Physical Exam:   Physical Exam  Vitals and nursing note reviewed  Constitutional:       Appearance: Normal appearance  She is not ill-appearing or diaphoretic  HENT:      Head: Normocephalic and atraumatic        Nose: Nose normal  No congestion  Mouth/Throat:      Mouth: Mucous membranes are moist       Pharynx: Oropharynx is clear  No oropharyngeal exudate  Eyes:      Extraocular Movements: Extraocular movements intact  Conjunctiva/sclera: Conjunctivae normal       Pupils: Pupils are equal, round, and reactive to light  Cardiovascular:      Rate and Rhythm: Tachycardia present  Rhythm irregular  Heart sounds: No murmur heard  No friction rub  No gallop  Pulmonary:      Effort: Pulmonary effort is normal  No respiratory distress  Breath sounds: No stridor  No wheezing or rhonchi  Abdominal:      General: Bowel sounds are normal  There is distension  Palpations: Abdomen is soft  Tenderness: There is no abdominal tenderness  There is no rebound  Hernia: No hernia is present  Comments: Ng tube in place   Musculoskeletal:         General: No swelling, tenderness or deformity  Normal range of motion  Cervical back: Normal range of motion  Skin:     General: Skin is warm  Capillary Refill: Capillary refill takes less than 2 seconds  Coloration: Skin is not jaundiced or pale  Findings: No bruising or erythema  Neurological:      General: No focal deficit present  Mental Status: She is alert and oriented to person, place, and time  Cranial Nerves: No cranial nerve deficit  Sensory: No sensory deficit  Motor: No weakness        Coordination: Coordination normal    Psychiatric:         Mood and Affect: Mood normal          Additional Data:     Labs:  Results from last 7 days   Lab Units 02/16/23  0620   WBC Thousand/uL 8 36   HEMOGLOBIN g/dL 11 8   HEMATOCRIT % 36 5   PLATELETS Thousands/uL 288   NEUTROS PCT % 79*   LYMPHS PCT % 11*   MONOS PCT % 8   EOS PCT % 1     Results from last 7 days   Lab Units 02/16/23  0620   SODIUM mmol/L 136   POTASSIUM mmol/L 3 9   CHLORIDE mmol/L 104   CO2 mmol/L 30   BUN mg/dL 22   CREATININE mg/dL 0 92   ANION GAP mmol/L 2*   CALCIUM mg/dL 7 1*   ALBUMIN g/dL 2 4*   TOTAL BILIRUBIN mg/dL 0 49   ALK PHOS U/L 36   ALT U/L 5*   AST U/L 11*   GLUCOSE RANDOM mg/dL 82     Results from last 7 days   Lab Units 02/15/23  1533   INR  1 90*     Results from last 7 days   Lab Units 02/12/23  2047 02/12/23  1540   POC GLUCOSE mg/dl 155* 109         Results from last 7 days   Lab Units 02/10/23  0050   LACTIC ACID mmol/L 1 4   PROCALCITONIN ng/ml 1 28*       Lines/Drains:  Invasive Devices     Peripherally Inserted Central Catheter Line  Duration           PICC Line 02/16/23 <1 day          Peripheral Intravenous Line  Duration           Peripheral IV 02/15/23 Right Forearm 1 day    Peripheral IV 02/16/23 Right;Ventral (anterior) Forearm <1 day          Drain  Duration           NG/OG/Enteral Tube Nasogastric 16 Fr Right nare 1 day                Central Line:  Goal for removal: Continuing for TPN  Telemetry:  Telemetry Orders (From admission, onward)             48 Hour Telemetry Monitoring  Continuous x 48 hours        Expiring   References:    Telemetry Guidelines   Question:  Reason for 48 Hour Telemetry  Answer:  Arrhythmias Requiring Medical Therapy (eg  SVT, Vtach/fib, Bradycardia, Uncontrolled A-fib)                 Telemetry Reviewed: Atrial fibrillation  HR averaging Variable heart rate  Indication for Continued Telemetry Use: Arrthymias requiring medical therapy             Imaging: No pertinent imaging reviewed  Recent Cultures (last 7 days):   Results from last 7 days   Lab Units 02/11/23  1910 02/10/23  0052   BLOOD CULTURE   --  No Growth After 5 Days    Parabacteroides distasonis*   GRAM STAIN RESULT   --  Gram negative rods*   C DIFF TOXIN B BY PCR  Negative  --        Last 24 Hours Medication List:   Current Facility-Administered Medications   Medication Dose Route Frequency Provider Last Rate   • amiodarone  1 mg/min Intravenous Continuous HENRY Simpson 1 mg/min (02/16/23 1236)   • [START ON 2/17/2023] amiodarone  0 5 mg/min Intravenous Continuous HENRY Spencer     • cefTRIAXone  1,000 mg Intravenous Q24H Liz S HENRY Herron 1,000 mg (02/16/23 0597)   • heparin (porcine)  3-20 Units/kg/hr (Order-Specific) Intravenous Titrated Imani Benz MD 16 Units/kg/hr (02/16/23 1323)   • latanoprost  1 drop Both Eyes HS Liz S HENRY Herron     • metoprolol  2 5 mg Intravenous Q4H HENRY Spencer     • metoprolol  5 mg Intravenous Q6H PRN HENRY Leija     • metroNIDAZOLE  500 mg Intravenous Q8H Kye Vazquez  mg (02/16/23 1331)   • midodrine  2 5 mg Oral TID AC HENRY Edmond     • ondansetron  4 mg Intravenous Q4H PRN Imani Benz MD     • phenol  1 spray Mouth/Throat Q2H PRN Lexy Del Toro PA-C     • sodium chloride  75 mL/hr Intravenous Continuous Imani Benz MD 75 mL/hr (02/16/23 1645)        Today, Patient Was Seen By: Imani Benz MD    **Please Note: This note may have been constructed using a voice recognition system  **

## 2023-02-17 NOTE — PROGRESS NOTES
Progress Note:Cardiology  Bobbi Garibay 1934, 80 y o  female MRN: [id number]    Unit/Bed#: -[facility] Encounter: [id number]  Attending Physician: Karyle Mariner, MD   Primary Care Provider: Arnaud Cruz,    Date admitted to hospital: 2/10/2023  Length of stay: 7         Atrial fibrillation with RVR [facility])  Assessment & Plan  Patient has known atrial fibrillation, previously paroxysmal but now persistent  Follows with Dr Lizzette Kaufman with [facility]  Maintained on Eliquis 2 5 mg BID (based on age and weight), metoprolol succinate 25 mg daily in the outpatient setting  Presented with atrial fibrillation with RVR in the setting of acute enterocolitis and acute cystitis  Heart rates have improved but not optimized with increase in metoprolol succinate to 50 mg BID  Amiodarone load started 2/14/23 at 200 mg BID x 14 days then 200 mg daily  Patient confirms no missed Eliquis doses  Discussed need for liver, lung, and thyroid monitoring with ongoing use  Dr Lizzette Kaufman made aware of this plan  Given NPO status I have converted her to amiodarone drip 1 mg/hr x 24 hours then 0 5 mg/hr thereafter  IV Lopressor 2 5 mg every 4 hours, hold for SBP < 95  Optimize electrolytes for K+ >4, Mag >2  Continue telemetry  PVC's (premature ventricular contractions)  Assessment & Plan  Frequent PVC's noted on telemetry with one 5-beat run noted 2/13/2023  Improved with potassium repletion  Hypotension-resolved as of 2/14/2023  Assessment & Plan  Patient has been started on midodrine 5 mg TID for hypotension during admission to allow for up-titration of beta blocker  This is in the setting of dehydration from enterocolitis as well as a fib with RVR  PRN midodrine for SBP < 110    Coronary artery disease involving native coronary artery of native heart  Assessment & Plan  Mild nonobstructive CAD by cardiac catheterization at [facility] 9/2022    Continue beta blocker and statin    Stage 3b chronic kidney disease [facility])  Assessment & Plan  Lab Results   Component Value Date    EGFR 43 02/13/2023    EGFR 30 02/12/2023    EGFR 39 02/11/2023    CREATININE 1 14 02/13/2023    CREATININE 1 52 (H) 02/12/2023    CREATININE 1 23 02/11/2023     Renal function improved with IV hydration  Would avoid diuresis and monitor for now  Acute cystitis  Assessment & Plan  Management as per medicine team    Chronic combined systolic and diastolic congestive heart failure (HCC)  Assessment & Plan  Wt Readings from Last 3 Encounters:   02/10/23 53 3 kg (117 lb 8 1 oz)   06/23/21 55 3 kg (121 lb 14 6 oz)   03/15/19 59 kg (130 lb)     Echocardiogram at [facility] 12/21/2022 with EF 50% and mild global hypokinesis with mod-severe MR  She was taking furosemide 40 mg daily in the outpatient setting, recently started by her primary cardiologist   This was stopped and IV hydration provided during admission  Received 40 mg PO furosemide 2/13/23  Avoiding due to concerns for dehydration with ongoing N/V/D  Currently receiving IV hydration given NPO status  Will monitor volume status closely  Strict I's/O's, standing daily weights  Optimize electrolytes for K+ > 4, Mag > 2  Pleural effusion, bilateral  Assessment & Plan  Noted on chest imaging  Wheezing is resolved today  Enterocolitis  Assessment & Plan  Management as per general surgery and medicine team      * Small bowel obstruction [facility])  Assessment & Plan  Patient continued with increasing abdominal distention/pain/N/V  Repeat CT abdomen 2/15/23 revealed SBO  Currently NPO with NG tube  Symptoms improving  Management as per SLIM and surgical teams  Subjective:   Patient seen and examined  NG tube in place but clamped  She is tired  No chest pain or shortness of breath  She ambulated to the chair at bedside and noted slight lightheadedness which resolved quickly  Remains nauseous at times  Continues with diarrhea  HR's improved on telemetry overnight      Review of Systems Constitutional: Negative  HENT: Negative  Cardiovascular: Negative for chest pain, dyspnea on exertion, irregular heartbeat, leg swelling, near-syncope, orthopnea and palpitations  Respiratory: Negative for cough and snoring  Endocrine: Negative  Skin: Negative  Musculoskeletal: Negative  Gastrointestinal: Positive for bloating, anorexia, diarrhea, nausea and vomiting  Genitourinary: Negative  Neurological: Negative  Psychiatric/Behavioral: Negative  Objective:     Vitals: Blood pressure 120/72, pulse 90, temperature 97 9 °F (36 6 °C), temperature source Temporal, resp  rate 18, height 5' 2" (1 575 m), weight 53 3 kg (117 lb 8 1 oz), SpO2 93 %  , Body mass index is 21 49 kg/m² ,     Orthostatic Blood Pressures    Flowsheet Row Most Recent Value   Blood Pressure 120/72 filed at 02/17/2023 1053   Patient Position - Orthostatic VS Lying filed at 02/17/2023 1053          Physical Exam  Vitals and nursing note reviewed  Constitutional:       General: She is not in acute distress  Appearance: She is well-developed  HENT:      Head: Normocephalic and atraumatic  Eyes:      Conjunctiva/sclera: Conjunctivae normal    Cardiovascular:      Rate and Rhythm: Normal rate  Rhythm irregular  Heart sounds: No murmur heard  Pulmonary:      Effort: Pulmonary effort is normal  No respiratory distress  Breath sounds: Normal breath sounds  Comments: Breathing comfortably on room air  Abdominal:      Palpations: Abdomen is soft  Tenderness: There is no abdominal tenderness  Musculoskeletal:         General: No swelling  Cervical back: Neck supple  Right lower leg: No edema  Left lower leg: No edema  Skin:     General: Skin is warm and dry  Capillary Refill: Capillary refill takes less than 2 seconds  Neurological:      Mental Status: She is alert     Psychiatric:         Mood and Affect: Mood and affect normal          Speech: Speech normal  Behavior: Behavior normal  Behavior is cooperative           Cognition and Memory: Cognition and memory normal             Intake/Output Summary (Last 24 hours) at 2/17/2023 1256  Last data filed at 2/17/2023 9121  Gross per 24 hour   Intake 1886 25 ml   Output 582 ml   Net 1304 25 ml       Weight (last 2 days)     None             Medications:      Current Facility-Administered Medications:   •  Adult 3-in-1 TPN (standard base / standard electrolytes), , Intravenous, Continuous TPN, Ephririley Vazquez MD  •  amiodarone (CORDARONE) 900 mg in dextrose 5 % 500 mL infusion, 0 5 mg/min, Intravenous, Continuous, Wilfredo Muñiz CRNP, Last Rate: 16 7 mL/hr at 02/17/23 1044, 0 5 mg/min at 02/17/23 1044  •  cefTRIAXone (ROCEPHIN) IVPB (premix in dextrose) 1,000 mg 50 mL, 1,000 mg, Intravenous, Q24H, Liz Herron, CRNP, Last Rate: 100 mL/hr at 02/17/23 0440, 1,000 mg at 02/17/23 0440  •  heparin (porcine) 25,000 units in 0 45% NaCl 250 mL infusion (premix), 3-20 Units/kg/hr (Order-Specific), Intravenous, Titrated, Luis Fernando Vazquez MD, Last Rate: 6 5 mL/hr at 02/17/23 1043, 13 Units/kg/hr at 02/17/23 1043  •  latanoprost (XALATAN) 0 005 % ophthalmic solution 1 drop, 1 drop, Both Eyes, HS, Liz Knoxx, CRNP, 1 drop at 02/16/23 2018  •  metoprolol (LOPRESSOR) injection 2 5 mg, 2 5 mg, Intravenous, Q4H, Ekaterina Blount CRNP, 2 5 mg at 02/17/23 1135  •  metoprolol (LOPRESSOR) injection 5 mg, 5 mg, Intravenous, Q6H PRN, Kay Aguila, CRNP, 5 mg at 02/15/23 1639  •  metroNIDAZOLE (FLAGYL) IVPB (premix) 500 mg 100 mL, 500 mg, Intravenous, Q8H, Luis Fernando Vazquez MD, Last Rate: 200 mL/hr at 02/17/23 0618, 500 mg at 02/17/23 7403  •  midodrine (PROAMATINE) tablet 2 5 mg, 2 5 mg, Oral, TID AC, HENRY Edmond, 2 5 mg at 02/17/23 1135  •  ondansetron (ZOFRAN) injection 4 mg, 4 mg, Intravenous, Q4H PRN, Dom Villalobos MD, 4 mg at 02/15/23 1036  •  phenol (CHLORASEPTIC) 1 4 % mucosal liquid 1 spray, 1 spray, Mouth/Throat, Q2H PRN, Katrina Junie Harada, PA-C, 1 spray at 02/16/23 1331  •  sodium chloride 0 9 % infusion, 75 mL/hr, Intravenous, Continuous, Oswaldo Champion MD     Labs & Results:        Results from last 7 days   Lab Units 02/17/23  0439 02/16/23  0620 02/15/23  1533   WBC Thousand/uL 7 85 8 36 9 80   HEMOGLOBIN g/dL 11 5 11 8 14 7   HEMATOCRIT % 36 5 36 5 44 6   PLATELETS Thousands/uL 361 288 320         Results from last 7 days   Lab Units 02/17/23  0439 02/16/23  0620 02/15/23  0534   POTASSIUM mmol/L 3 5 3 9 4 0   CHLORIDE mmol/L 105 104 97   CO2 mmol/L 26 30 32   BUN mg/dL 17 22 20   CREATININE mg/dL 0 79 0 92 0 93   CALCIUM mg/dL 6 9* 7 1* 8 0*   ALK PHOS U/L 37 36 43   ALT U/L 6* 5* 7   AST U/L 13 11* 14     Results from last 7 days   Lab Units 02/17/23  0804 02/17/23  0630 02/17/23  0439 02/15/23  2333 02/15/23  1533   INR   --   --   --   --  1 90*   PTT seconds 199* 142* 176*   < > 34    < > = values in this interval not displayed  Results from last 7 days   Lab Units 02/15/23  0534 02/12/23  0550   MAGNESIUM mg/dL 1 9 2 1            Telemetry: atrial fibrillation, rate     Counseling / Coordination of Care  Total floor / unit time spent today 25 minutes  Greater than 50% of total time was spent with the patient and / or family counseling and / or coordination of care    A description of the counseling / coordination of care: Discussed case with Dr Valente Osorio

## 2023-02-17 NOTE — PHYSICAL THERAPY NOTE
PHYSICAL THERAPY TREATMENT NOTE  NAME:  Prerna Campbell  DATE: 02/17/23    Length Of Stay: 7  Performed at least 2 patient identifiers during session: Name and Birthday  Treatment time: 9054-0226  Treatment length: 38 min       02/17/23 1257   Note Type   Note Type Treatment   Pain Assessment   Pain Assessment Tool 0-10   Pain Score No Pain   Restrictions/Precautions   Other Precautions Chair Alarm; Bed Alarm;Multiple lines; Fall Risk   General   Chart Reviewed Yes   Response to Previous Treatment Patient with no complaints from previous session  Cognition   Overall Cognitive Status WFL   Orientation Level Oriented X4   Following Commands Follows one step commands without difficulty   Bed Mobility   Additional Comments Pt seated OOB in recliner at start and end of session; bed mobility not assessed this session   Transfers   Sit to Stand   (SBA)   Additional items Increased time required;Verbal cues   Stand to Sit   (SBA)   Additional items Increased time required;Verbal cues   Stand pivot   (steadying assist)   Additional items Increased time required;Verbal cues   Additional Comments with RW, VC for hand placement and SBA for STS, steadying assist for SPT for safety   Ambulation/Elevation   Gait pattern Wide TYRESE; Foward flexed; Short stride; Excessively slow   Gait Assistance   (steadying assist)   Additional items Verbal cues   Assistive Device Rolling walker   Distance 12' x 2 + 30' with RW, VC for RW management, VC for increased step length   Balance   Static Sitting Good   Dynamic Sitting Fair +   Static Standing Fair   Dynamic Standing Fair -   Ambulatory Fair -   Endurance Deficit   Endurance Deficit Yes   Endurance Deficit Description fatigue   Activity Tolerance   Activity Tolerance Patient limited by fatigue   Medical Staff Made Aware FRANK Medley   Nurse Made Aware RN, Katelynn   Exercises   Glute Sets Sitting;10 reps;AROM; Bilateral   Hip Flexion Sitting;10 reps;AROM; Bilateral   Hip Abduction Sitting;10 reps;AROM; Bilateral   Hip Adduction Sitting;10 reps;AROM; Bilateral   Knee AROM Long Arc Quad Sitting;10 reps;AROM; Bilateral   Ankle Pumps Sitting;10 reps;AROM; Bilateral   Assessment   Prognosis Good   Problem List Decreased strength;Decreased endurance; Impaired balance;Decreased mobility   Barriers to Discharge Decreased caregiver support   Barriers to Discharge Comments Pt is primary caretake for spouse   Goals   PT Treatment Day 2   Plan   Treatment/Interventions Functional transfer training;LE strengthening/ROM; Elevations; Therapeutic exercise; Endurance training;Patient/family training;Equipment eval/education; Bed mobility;Gait training; Compensatory technique education;Spoke to nursing   Progress Progressing toward goals   PT Frequency 3-5x/wk   Recommendation   PT Discharge Recommendation Home with home health rehabilitation   Equipment Recommended   (pt owns RW)   Additional Comments Recommend increased assistance upon return home   Jimmy 8 in Flat Bed Without Bedrails 3   Lying on Back to Sitting on Edge of Flat Bed Without Bedrails 3   Moving Bed to Chair 3   Standing Up From Chair Using Arms 3   Walk in Room 3   Climb 3-5 Stairs With Railing 3   Basic Mobility Inpatient Raw Score 18   Basic Mobility Standardized Score 41 05   Highest Level Of Mobility   JH-HLM Goal 6: Walk 10 steps or more   JH-HLM Achieved 7: Walk 25 feet or more   Education   Education Provided Mobility training;Home exercise program;Assistive device   Patient Demonstrates acceptance/verbal understanding   End of Consult   Patient Position at End of Consult Bedside chair;Bed/Chair alarm activated; All needs within reach     The patient's AM-PAC Basic Mobility Inpatient Short Form Raw Score is 18  A Raw score of greater than 16 suggests the patient may benefit from discharge to home  Please also refer to the recommendation of the Physical Therapist for safe discharge planning      Assessment: Pt seen for PT treatment session this date with interventions consisting of gait training w/ emphasis on improving pt's ability to ambulate level surfaces x 12' x 2 + 30' with steadying assist provided by therapist with RW and Therapeutic exercise consisting of: AROM 10 reps B LE in sitting position  Pt agreeable to PT treatment session upon arrival, pt found seated OOB in recliner, in no apparent distress  In comparison to previous session, pt with improvements in pt able to ambulate increased distance and complete assigned therex   Post session: pt returned back to recliner, chair alarm engaged, all needs in reach, and RN notified of session findings/recommendations Continue to recommend Home PT at time of d/c in order to maximize pt's functional independence and safety w/ mobility  Pt continues to be functioning below baseline level, and remains limited 2* factors listed above and including decreased strength, balance, mobility, and activity tolerance  PT will continue to see pt while here in order to address the deficits listed above and provide interventions consistent w/ POC in effort to achieve STGs       Herlinda Tricia, PT,DPT

## 2023-02-17 NOTE — PROGRESS NOTES
NGT clamped during the morning since 6:30 am  No nausea, no pain  Hooked to suction and there was no residual  Patient expresses discomfort having the NG tube in place  Given lack of residual and symptoms with clamping will remove NG tube at this time  OK for sips of clear liquids only  She should have TPN and will not advance her diet until more definitive signs of resolution of her symptoms are present  Daughter was at bedside and they both expressed that they would not pursue any aggressive surgical intervention given her age and co-morbidities and rather wish to continue to treat her as conservatively as possible  Will follow along

## 2023-02-17 NOTE — PLAN OF CARE
Problem: PHYSICAL THERAPY ADULT  Goal: Performs mobility at highest level of function for planned discharge setting  See evaluation for individualized goals  Description: Treatment/Interventions: ADL retraining, Functional transfer training, LE strengthening/ROM, Elevations, Therapeutic exercise, Endurance training, Patient/family training, Bed mobility, Equipment eval/education, Gait training, Compensatory technique education, Spoke to nursing, Spoke to case management, OT  Equipment Recommended:  (RW-pt has)       See flowsheet documentation for full assessment, interventions and recommendations  Outcome: Progressing  Note: Prognosis: Good  Problem List: Decreased strength, Decreased endurance, Impaired balance, Decreased mobility  Assessment: Pt seen for PT treatment session this date with interventions consisting of gait training w/ emphasis on improving pt's ability to ambulate level surfaces x 12' x 2 + 30' with steadying assist provided by therapist with RW and Therapeutic exercise consisting of: AROM 10 reps B LE in sitting position  Pt agreeable to PT treatment session upon arrival, pt found seated OOB in recliner, in no apparent distress  In comparison to previous session, pt with improvements in pt able to ambulate increased distance and complete assigned therex   Post session: pt returned back to recliner, chair alarm engaged, all needs in reach, and RN notified of session findings/recommendations Continue to recommend Home PT at time of d/c in order to maximize pt's functional independence and safety w/ mobility  Pt continues to be functioning below baseline level, and remains limited 2* factors listed above and including decreased strength, balance, mobility, and activity tolerance  PT will continue to see pt while here in order to address the deficits listed above and provide interventions consistent w/ POC in effort to achieve STGs    Barriers to Discharge: Decreased caregiver support  Barriers to Discharge Comments: Pt is primary caretake for spouse  PT Discharge Recommendation: Home with home health rehabilitation    See flowsheet documentation for full assessment

## 2023-02-17 NOTE — PROGRESS NOTES
Progress Note - General Surgery   Domenica Schreiber 80 y o  female MRN: [id number]  Unit/Bed#: -01 Encounter: [id number]    Assessment:  80 y o  female w/ gastroenterocoloitis, SBO on repeat imaging  - Afebrile, RVR, episodes of hypotension, stable on room air  - WBC 7 85 (8 36, 9 80)  - Hgb stable   - 50 ml output from NG overnight  - Stool studies normal  - Blood cultures 1/2 final result: parabacteroides distasonis - susceptible to flagyl  - Hx CAD, CKD, CHF    Plan:  - Clamp NG tube x 6 hours and check residual - if residual low then may remove  - Chloraseptic spray to assist with throat irritation  - NPO -PICC placed yesterday for possible TPN- anticipate several more days before she would be tolerating a full diet  - IVF  - IV abx - Ceftriaxone, Flagyl for enterocolitis  - Monitor I/Os  - Serial abdominal exams  - Co-morbidities per primary service    Subjective:   Seen this morning on rounds  States she is doing OK  Had bowel movements yesterday  Feels less bloated  No vomiting, though feels irritation in her throat from the NG tube  Minimal output overnight  Objective:   Blood pressure 118/71, pulse 98, temperature 97 9 °F (36 6 °C), resp  rate 17, height 5' 2" (1 575 m), weight 53 3 kg (117 lb 8 1 oz), SpO2 92 %  ,Body mass index is 21 49 kg/m²      Intake/Output Summary (Last 24 hours) at 2/17/2023 0736  Last data filed at 2/17/2023 4640  Gross per 24 hour   Intake 1886 25 ml   Output 582 ml   Net 1304 25 ml     Invasive Devices     Peripherally Inserted Central Catheter Line  Duration           PICC Line 02/16/23 <1 day          Peripheral Intravenous Line  Duration           Peripheral IV 02/15/23 Right Forearm 1 day    Peripheral IV 02/16/23 Right;Ventral (anterior) Forearm <1 day          Drain  Duration           NG/OG/Enteral Tube Nasogastric 16 Fr Right nare 1 day    External Urinary Catheter <1 day              Physical Exam:  General: no acute distress, pt appears well and comfortable, NGT in place with green drainage  Skin: warm and dry to touch  Pulmonary: normal effort  Abdominal: softly distended, minimal tenderness to palpation across abdomen, most significantly periumbilical, no guarding or rebound, decreased bowel sounds appreciated  Neuro: alert and oriented     Lab, Imaging and other studies:  I have personally reviewed pertinent lab results    , CBC:   Lab Results   Component Value Date    WBC 7 85 02/17/2023    HGB 11 5 02/17/2023    HCT 36 5 02/17/2023    MCV 88 02/17/2023     02/17/2023    MCH 27 6 02/17/2023    MCHC 31 5 02/17/2023    RDW 14 6 02/17/2023    MPV 8 8 (L) 02/17/2023    NRBC 0 02/17/2023   , CMP:   Lab Results   Component Value Date    SODIUM 136 02/17/2023    K 3 5 02/17/2023     02/17/2023    CO2 26 02/17/2023    BUN 17 02/17/2023    CREATININE 0 79 02/17/2023    CALCIUM 6 9 (L) 02/17/2023    AST 13 02/17/2023    ALT 6 (L) 02/17/2023    ALKPHOS 37 02/17/2023    EGFR 67 02/17/2023     VTE Pharmacologic Prophylaxis: Heparin  VTE Mechanical Prophylaxis: sequential compression device    Jennifer James  2/17/2023

## 2023-02-17 NOTE — PLAN OF CARE
Problem: Nutrition/Hydration-ADULT  Goal: Nutrient/Hydration intake appropriate for improving, restoring or maintaining nutritional needs  Description: Monitor and assess patient's nutrition/hydration status for malnutrition  Collaborate with interdisciplinary team and initiate plan and interventions as ordered  Monitor patient's weight and dietary intake as ordered or per policy  Utilize nutrition screening tool and intervene as necessary  Determine patient's food preferences and provide high-protein, high-caloric foods as appropriate       INTERVENTIONS:  - Monitor oral intake, urinary output, labs, and treatment plans  - Assess nutrition and hydration status and recommend course of action  - Evaluate amount of meals eaten  - Assist patient with eating if necessary   - Allow adequate time for meals  - Recommend/ encourage appropriate diets, oral nutritional supplements, and vitamin/mineral supplements  - Order, calculate, and assess calorie counts as needed  - Recommend, monitor, and adjust tube feedings and TPN/PPN based on assessed needs  - Assess need for intravenous fluids  - Provide specific nutrition/hydration education as appropriate  - Include patient/family/caregiver in decisions related to nutrition  Outcome: Progressing     Problem: PAIN - ADULT  Goal: Verbalizes/displays adequate comfort level or baseline comfort level  Description: Interventions:  - Encourage patient to monitor pain and request assistance  - Assess pain using appropriate pain scale0-10  - Administer analgesics based on type and severity of pain and evaluate response  - Implement non-pharmacological measures as appropriate and evaluate response  - Consider cultural and social influences on pain and pain management  - Notify physician/advanced practitioner if interventions unsuccessful or patient reports new pain  Outcome: Progressing     Problem: INFECTION - ADULT  Goal: Absence or prevention of progression during hospitalization  Description: INTERVENTIONS:  - Assess and monitor for signs and symptoms of infection  - Monitor lab/diagnostic results  - Monitor all insertion sites, i e  indwelling lines, tubes, and drains  - Monitor endotracheal if appropriate and nasal secretions for changes in amount and color  - [place] appropriate cooling/warming therapies per order  - Administer medications as ordered  - Instruct and encourage patient and family to use good hand hygiene technique  - Identify and instruct in appropriate isolation precautions for identified infection/condition  Outcome: Progressing     Problem: SAFETY ADULT  Goal: Patient will remain free of falls  Description: INTERVENTIONS:  - Educate patient/family on patient safety including physical limitations  - Instruct patient to call for assistance with activity   - Consult OT/PT to assist with strengthening/mobility   - Keep Call bell within reach  - Keep bed low and locked with side rails adjusted as appropriate  - Keep care items and personal belongings within reach  - Initiate and maintain comfort rounds  - Make Fall Risk Sign visible to staff  - Apply yellow socks and bracelet for high fall risk patients  - Consider moving patient to room near nurses station  Outcome: Progressing  Goal: Maintain or return to baseline ADL function  Description: INTERVENTIONS:  -  Assess patient's ability to carry out ADLs; assess patient's baseline for ADL function and identify physical deficits which impact ability to perform ADLs (bathing, care of mouth/teeth, toileting, grooming, dressing, etc )  - Assess/evaluate cause of self-care deficits   - Assess range of motion  - Assess patient's mobility; develop plan if impaired  - Assess patient's need for assistive devices and provide as appropriate  - Encourage maximum independence but intervene and supervise when necessary  - Involve family in performance of ADLs  - Assess for home care needs following discharge   - Consider OT consult to assist with ADL evaluation and planning for discharge  - Provide patient education as appropriate  Outcome: Progressing  Goal: Maintains/Returns to pre admission functional level  Description: INTERVENTIONS:  - Perform BMAT or MOVE assessment daily    - Set and communicate daily mobility goal to care team and patient/family/caregiver  - Collaborate with rehabilitation services on mobility goals if consulted  - Perform Range of Motion 3 times a day  - Reposition patient every 2 hours if pt unable to reposition self  - Out of bed for toileting  - Record patient progress and toleration of activity level   Outcome: Progressing     Problem: DISCHARGE PLANNING  Goal: Discharge to home or other facility with appropriate resources  Description: INTERVENTIONS:  - Identify barriers to discharge w/patient and caregiver  - Arrange for needed discharge resources and transportation as appropriate  - Identify discharge learning needs (meds, wound care, etc )  - Arrange for interpretive services to assist at discharge as needed  - Refer to Case Management Department for coordinating discharge planning if the patient needs post-hospital services based on physician/advanced practitioner order or complex needs related to functional status, cognitive ability, or social support system  Outcome: Progressing     Problem: Knowledge Deficit  Goal: Patient/family/caregiver demonstrates understanding of disease process, treatment plan, medications, and discharge instructions  Description: Complete learning assessment and assess knowledge base    Interventions:  - Provide teaching at level of understanding  - Provide teaching via preferred learning methods  Outcome: Progressing     Problem: CARDIOVASCULAR - ADULT  Goal: Maintains optimal cardiac output and hemodynamic stability  Description: INTERVENTIONS:  - Monitor I/O, vital signs and rhythm  - Monitor for S/S and trends of decreased cardiac output  - Administer and titrate ordered vasoactive medications to optimize hemodynamic stability  - Assess quality of pulses, skin color and temperature  - Assess for signs of decreased coronary artery perfusion  - Instruct patient to report change in severity of symptoms  Outcome: Progressing  Goal: Absence of cardiac dysrhythmias or at baseline rhythm  Description: INTERVENTIONS:  - Continuous cardiac monitoring, vital signs, obtain 12 lead EKG if ordered  - Administer antiarrhythmic and heart rate control medications as ordered  - Monitor electrolytes and administer replacement therapy as ordered  Outcome: Progressing     Problem: GASTROINTESTINAL - ADULT  Goal: Minimal or absence of nausea and/or vomiting  Description: INTERVENTIONS:  - Administer IV fluids if ordered to ensure adequate hydration  - Maintain NPO status until nausea and vomiting are resolved  - Nasogastric tube if ordered  - Administer ordered antiemetic medications as needed  - Provide nonpharmacologic comfort measures as appropriate  - Advance diet as tolerated, if ordered  - Consider nutrition services referral to assist patient with adequate nutrition and appropriate food choices  Outcome: Progressing  Goal: Maintains or returns to baseline bowel function  Description: INTERVENTIONS:  - Assess bowel function  - Encourage oral fluids to ensure adequate hydration  - Administer IV fluids if ordered to ensure adequate hydration  - Administer ordered medications as needed  - Encourage mobilization and activity  - Consider nutritional services referral to assist patient with adequate nutrition and appropriate food choices  Outcome: Progressing  Goal: Maintains adequate nutritional intake  Description: INTERVENTIONS:  - Monitor percentage of each meal consumed  - Identify factors contributing to decreased intake, treat as appropriate  - Assist with meals as needed  - Monitor I&O, weight, and lab values if indicated  - Obtain nutrition services referral as needed  Outcome: Progressing     Problem: MOBILITY - ADULT  Goal: Maintain or return to baseline ADL function  Description: INTERVENTIONS:  -  Assess patient's ability to carry out ADLs; assess patient's baseline for ADL function and identify physical deficits which impact ability to perform ADLs (bathing, care of mouth/teeth, toileting, grooming, dressing, etc )  - Assess/evaluate cause of self-care deficits   - Assess range of motion  - Assess patient's mobility; develop plan if impaired  - Assess patient's need for assistive devices and provide as appropriate  - Encourage maximum independence but intervene and supervise when necessary  - Involve family in performance of ADLs  - Assess for home care needs following discharge   - Consider OT consult to assist with ADL evaluation and planning for discharge  - Provide patient education as appropriate  Outcome: Progressing  Goal: Maintains/Returns to pre admission functional level  Description: INTERVENTIONS:  - Perform BMAT or MOVE assessment daily    - Set and communicate daily mobility goal to care team and patient/family/caregiver  - Collaborate with rehabilitation services on mobility goals if consulted  - Perform Range of Motion 3 times a day  - Reposition patient every 2 hours if pt unable to reposition self  - Out of bed for toileting  - Record patient progress and toleration of activity level   Outcome: Progressing     Problem: Prexisting or High Potential for Compromised Skin Integrity  Goal: Skin integrity is maintained or improved  Description: INTERVENTIONS:  - Identify patients at risk for skin breakdown  - Assess and monitor skin integrity  - Assess and monitor nutrition and hydration status  - Monitor labs   - Assess for incontinence   - Turn and reposition patient     - Assist with mobility/ambulation  - Relieve pressure over bony prominences  - Avoid friction and shearing  - Provide appropriate hygiene as needed including keeping skin clean and dry  - Evaluate need for skin moisturizer/barrier cream  - Collaborate with interdisciplinary team   - Patient/family teaching  - Consider wound care consult   Outcome: Progressing

## 2023-02-17 NOTE — ASSESSMENT & PLAN NOTE
· History paroxysmal atrial fibrillation chronically maintained on metoprolol succinate 25 mg daily   · chronic anticoagulation with Eliquis 2 5 mg twice daily for elevated [id number] stroke risk  · Initially patient received some IV Cardizem, then placed on beta-blocker, later on Cardizem short-acting added after that as per cardiology recommendation, amiodarone added which then transition to amiodarone drip due to n p o  status from small bowel obstruction  · P o  medication remain on hold-consider to resume once patient able to tolerate p o    · Was on Eliquis, transition to heparin drip as per [facility] protocol due to atrial fibrillation to prevent complications-'s, benefits, side effects discussed in details with patient's son her family member including bleeding risk

## 2023-02-17 NOTE — PROGRESS NOTES
Pt has been NPO since 2/15, PICC line placed for possible TPN  Recommend obtaining baseline TRIG, check K/P/Mg levels daily, noting currently ordered continuous amiodarone in dextrose  Would benefit from daily weights  Recommend standard TPN x 24 - 48 h pending lab values  Goal TPN Recommendations: D30% 675 ml, AA15% 500ml, Lipids 20% 225ml  This will provide 1439 kcal, 1400 ml fluid, 75 g AA  CHO load of 2 64 mg/kg/min, lipid load of 0 84 g/kg/day  Micronutrients per pharmacy

## 2023-02-18 NOTE — PLAN OF CARE
Problem: Nutrition/Hydration-ADULT  Goal: Nutrient/Hydration intake appropriate for improving, restoring or maintaining nutritional needs  Description: Monitor and assess patient's nutrition/hydration status for malnutrition  Collaborate with interdisciplinary team and initiate plan and interventions as ordered  Monitor patient's weight and dietary intake as ordered or per policy  Utilize nutrition screening tool and intervene as necessary  Determine patient's food preferences and provide high-protein, high-caloric foods as appropriate       INTERVENTIONS:  - Monitor oral intake, urinary output, labs, and treatment plans  - Assess nutrition and hydration status and recommend course of action  - Evaluate amount of meals eaten  - Assist patient with eating if necessary   - Allow adequate time for meals  - Recommend/ encourage appropriate diets, oral nutritional supplements, and vitamin/mineral supplements  - Order, calculate, and assess calorie counts as needed  - Recommend, monitor, and adjust tube feedings and TPN/PPN based on assessed needs  - Assess need for intravenous fluids  - Provide specific nutrition/hydration education as appropriate  - Include patient/family/caregiver in decisions related to nutrition  Outcome: Progressing     Problem: PAIN - ADULT  Goal: Verbalizes/displays adequate comfort level or baseline comfort level  Description: Interventions:  - Encourage patient to monitor pain and request assistance  - Assess pain using appropriate pain scale0-10  - Administer analgesics based on type and severity of pain and evaluate response  - Implement non-pharmacological measures as appropriate and evaluate response  - Consider cultural and social influences on pain and pain management  - Notify physician/advanced practitioner if interventions unsuccessful or patient reports new pain  Outcome: Progressing     Problem: INFECTION - ADULT  Goal: Absence or prevention of progression during hospitalization  Description: INTERVENTIONS:  - Assess and monitor for signs and symptoms of infection  - Monitor lab/diagnostic results  - Monitor all insertion sites, i e  indwelling lines, tubes, and drains  - Monitor endotracheal if appropriate and nasal secretions for changes in amount and color  - [place] appropriate cooling/warming therapies per order  - Administer medications as ordered  - Instruct and encourage patient and family to use good hand hygiene technique  - Identify and instruct in appropriate isolation precautions for identified infection/condition  Outcome: Progressing     Problem: SAFETY ADULT  Goal: Patient will remain free of falls  Description: INTERVENTIONS:  - Educate patient/family on patient safety including physical limitations  - Instruct patient to call for assistance with activity   - Consult OT/PT to assist with strengthening/mobility   - Keep Call bell within reach  - Keep bed low and locked with side rails adjusted as appropriate  - Keep care items and personal belongings within reach  - Initiate and maintain comfort rounds  - Make Fall Risk Sign visible to staff  - Apply yellow socks and bracelet for high fall risk patients  - Consider moving patient to room near nurses station  Outcome: Progressing  Goal: Maintain or return to baseline ADL function  Description: INTERVENTIONS:  -  Assess patient's ability to carry out ADLs; assess patient's baseline for ADL function and identify physical deficits which impact ability to perform ADLs (bathing, care of mouth/teeth, toileting, grooming, dressing, etc )  - Assess/evaluate cause of self-care deficits   - Assess range of motion  - Assess patient's mobility; develop plan if impaired  - Assess patient's need for assistive devices and provide as appropriate  - Encourage maximum independence but intervene and supervise when necessary  - Involve family in performance of ADLs  - Assess for home care needs following discharge   - Consider OT consult to assist with ADL evaluation and planning for discharge  - Provide patient education as appropriate  Outcome: Progressing  Goal: Maintains/Returns to pre admission functional level  Description: INTERVENTIONS:  - Perform BMAT or MOVE assessment daily    - Set and communicate daily mobility goal to care team and patient/family/caregiver  - Collaborate with rehabilitation services on mobility goals if consulted  - Perform Range of Motion 3 times a day  - Reposition patient every 2 hours if pt unable to reposition self  - Out of bed for toileting  - Record patient progress and toleration of activity level   Outcome: Progressing     Problem: DISCHARGE PLANNING  Goal: Discharge to home or other facility with appropriate resources  Description: INTERVENTIONS:  - Identify barriers to discharge w/patient and caregiver  - Arrange for needed discharge resources and transportation as appropriate  - Identify discharge learning needs (meds, wound care, etc )  - Arrange for interpretive services to assist at discharge as needed  - Refer to Case Management Department for coordinating discharge planning if the patient needs post-hospital services based on physician/advanced practitioner order or complex needs related to functional status, cognitive ability, or social support system  Outcome: Progressing     Problem: Knowledge Deficit  Goal: Patient/family/caregiver demonstrates understanding of disease process, treatment plan, medications, and discharge instructions  Description: Complete learning assessment and assess knowledge base    Interventions:  - Provide teaching at level of understanding  - Provide teaching via preferred learning methods  Outcome: Progressing     Problem: CARDIOVASCULAR - ADULT  Goal: Maintains optimal cardiac output and hemodynamic stability  Description: INTERVENTIONS:  - Monitor I/O, vital signs and rhythm  - Monitor for S/S and trends of decreased cardiac output  - Administer and titrate ordered vasoactive medications to optimize hemodynamic stability  - Assess quality of pulses, skin color and temperature  - Assess for signs of decreased coronary artery perfusion  - Instruct patient to report change in severity of symptoms  Outcome: Progressing  Goal: Absence of cardiac dysrhythmias or at baseline rhythm  Description: INTERVENTIONS:  - Continuous cardiac monitoring, vital signs, obtain 12 lead EKG if ordered  - Administer antiarrhythmic and heart rate control medications as ordered  - Monitor electrolytes and administer replacement therapy as ordered  Outcome: Progressing     Problem: GASTROINTESTINAL - ADULT  Goal: Minimal or absence of nausea and/or vomiting  Description: INTERVENTIONS:  - Administer IV fluids if ordered to ensure adequate hydration  - Maintain NPO status until nausea and vomiting are resolved  - Nasogastric tube if ordered  - Administer ordered antiemetic medications as needed  - Provide nonpharmacologic comfort measures as appropriate  - Advance diet as tolerated, if ordered  - Consider nutrition services referral to assist patient with adequate nutrition and appropriate food choices  Outcome: Progressing  Goal: Maintains or returns to baseline bowel function  Description: INTERVENTIONS:  - Assess bowel function  - Encourage oral fluids to ensure adequate hydration  - Administer IV fluids if ordered to ensure adequate hydration  - Administer ordered medications as needed  - Encourage mobilization and activity  - Consider nutritional services referral to assist patient with adequate nutrition and appropriate food choices  Outcome: Progressing  Goal: Maintains adequate nutritional intake  Description: INTERVENTIONS:  - Monitor percentage of each meal consumed  - Identify factors contributing to decreased intake, treat as appropriate  - Assist with meals as needed  - Monitor I&O, weight, and lab values if indicated  - Obtain nutrition services referral as needed  Outcome: Progressing     Problem: MOBILITY - ADULT  Goal: Maintain or return to baseline ADL function  Description: INTERVENTIONS:  -  Assess patient's ability to carry out ADLs; assess patient's baseline for ADL function and identify physical deficits which impact ability to perform ADLs (bathing, care of mouth/teeth, toileting, grooming, dressing, etc )  - Assess/evaluate cause of self-care deficits   - Assess range of motion  - Assess patient's mobility; develop plan if impaired  - Assess patient's need for assistive devices and provide as appropriate  - Encourage maximum independence but intervene and supervise when necessary  - Involve family in performance of ADLs  - Assess for home care needs following discharge   - Consider OT consult to assist with ADL evaluation and planning for discharge  - Provide patient education as appropriate  Outcome: Progressing  Goal: Maintains/Returns to pre admission functional level  Description: INTERVENTIONS:  - Perform BMAT or MOVE assessment daily    - Set and communicate daily mobility goal to care team and patient/family/caregiver  - Collaborate with rehabilitation services on mobility goals if consulted  - Perform Range of Motion 3 times a day    - Reposition patient every 2 hours if pt unable to reposition self  - Out of bed for toileting  - Record patient progress and toleration of activity level   Outcome: Progressing     Problem: Prexisting or High Potential for Compromised Skin Integrity  Goal: Skin integrity is maintained or improved  Description: INTERVENTIONS:  - Identify patients at risk for skin breakdown  - Assess and monitor skin integrity  - Assess and monitor nutrition and hydration status  - Monitor labs   - Assess for incontinence   - Turn and reposition patient  - Assist with mobility/ambulation  - Relieve pressure over bony prominences  - Avoid friction and shearing  - Provide appropriate hygiene as needed including keeping skin clean and dry  - Evaluate need for skin moisturizer/barrier cream  - Collaborate with interdisciplinary team   - Patient/family teaching  - Consider wound care consult   Outcome: Progressing

## 2023-02-18 NOTE — ASSESSMENT & PLAN NOTE
Lab Results   Component Value Date    EGFR 68 02/18/2023    EGFR 67 02/17/2023    EGFR 55 02/16/2023    CREATININE 0 78 02/18/2023    CREATININE 0 79 02/17/2023    CREATININE 0 92 02/16/2023   · History CKD 3  · creatinine worsened to 1 5  Placed on gentle hydration hold diuretics and observe  Improved to baseline of 1 1    Stop IV fluids and restart oral Lasix as per home regimen  · Ren[place] dose medications, avoid nephrotoxic medication

## 2023-02-18 NOTE — ASSESSMENT & PLAN NOTE
Since patient was throwing up, repeat CAT scan done which shows a small bowel obstruction  General surgery consult appreciated, recommends conservative management,  Status post NG tube in place, continue suction  Patient was on Eliquis-transition to heparin drip patient remain n p o  Continue gentle hydration, while patient remained on IV hydration follow for signs symptoms of volume overload-patient is status post PICC placement for consideration of TPN    Once TPN is started, consider to discontinue IV hydration considering patient's cardiac conditions  Status post NG tube removal, continue sips of clear

## 2023-02-18 NOTE — PROGRESS NOTES
[address]  Progress Note - Kenneth Goodwin 1934, 80 y o  female MRN: [id number]  Unit/Bed#: -[facility] Encounter: [id number]  Primary Care Provider: Johnny Deluca DO   Date and time admitted to hospital: 2/10/2023 12:28 AM    Bacteremia  Assessment & Plan  1 set of blood culture shows gram-negative rods, para Bacteroides distasonis-sensitive to metronidazole, patient already on metronidazole since 2/10/2023  Second set of blood culture shows no growth  Since blood culture is gram-negative rods, does not need repeat blood culture  Continue IV antibiotic in total of 7 to 10 days      Atrial fibrillation with RVR ([facility])  Assessment & Plan  · History paroxysmal atrial fibrillation chronically maintained on metoprolol succinate 25 mg daily   · chronic anticoagulation with Eliquis 2 5 mg twice daily for elevated [id number] stroke risk  · Initially patient received some IV Cardizem, then placed on beta-blocker, later on Cardizem short-acting added after that as per cardiology recommendation, amiodarone added which then transition to amiodarone drip due to n p o  status from small bowel obstruction  · P o  medication remain on hold-consider to resume once patient able to tolerate p o  · Was on Eliquis, transition to heparin drip as per ACS protocol due to atrial fibrillation to prevent complications-'s, benefits, side effects discussed in details with patient's son her family member including bleeding risk  · Patient heart rate remained uncontrolled, patient is receiving amiodarone drip without titration  We will use IV metoprolol-monitor the effect, if remains uncontrolled, can consider titrating amiodarone drip    Patient also started clear liquid, if tolerated, can consider to resume p o  medication    * Small bowel obstruction ([address] )  Assessment & Plan  Since patient was throwing up, repeat CAT scan done which shows a small bowel obstruction  General surgery consult appreciated, recommends conservative management,  Status post NG tube in place, continue suction  Patient was on Eliquis-transition to heparin drip patient remain n p o  Continue gentle hydration, while patient remained on IV hydration follow for signs symptoms of volume overload-patient is status post PICC placement for consideration of TPN  Once TPN is started, consider to discontinue IV hydration considering patient's cardiac conditions  Status post NG tube removal, continue sips of clear    PICC (peripherally inserted central catheter) in place  Assessment & Plan  Status post right brachial PICC placement for possible TPN    Moderate protein-calorie malnutrition (HCC)  Assessment & Plan  Malnutrition Findings:   Adult Malnutrition type: Chronic illness  Adult Degree of Malnutrition: Malnutrition of moderate degree  Malnutrition Characteristics: Muscle loss, Fat loss, Inadequate energy                  360 Statement: Chronic moderate malnutrition related to decreased oral intake, difficulties with meal preparation at home as evidenced by < 75% estimated energy intake > 1 mo; moderate muscle loss to temporalis, pectoralis, deltoid muscles; moderate to severe fat loss to orbitals, moderate fat loss to buccal pads  Treated with: Advance diet as medically appropriate to modest 4gm DENY given hx of CHF  Will discuss supplements with pt once diet initiated  Recommend daily weights for nutrition monitoring  Did discuss Mom's Meals with pt for ease of meal preparation at home  BMI Findings: Body mass index is 21 49 kg/m²         Coronary artery disease involving native coronary artery of native heart  Assessment & Plan  · NSTEMI September 2022  · Cardiac catheterization with minimal atherosclerotic disease, no PCI  · Continue Eliquis and simvastatin    Stage 3b chronic kidney disease [facility])  Assessment & Plan  Lab Results   Component Value Date    EGFR 68 02/18/2023    EGFR 67 02/17/2023    EGFR 55 02/16/2023 CREATININE 0 78 02/18/2023    CREATININE 0 79 02/17/2023    CREATININE 0 92 02/16/2023   · History CKD 3  · creatinine worsened to 1 5  Placed on gentle hydration hold diuretics and observe  Improved to baseline of 1 1  Stop IV fluids and restart oral Lasix as per home regimen  · Renally dose medications, avoid nephrotoxic medication    Acute cystitis  Assessment & Plan  · Presented with complaints of abdominal pain and foul-smelling urine  · UA with bacteriuria, pyuria  · Urine did not reflex to culture  · Blood culture 1 out of 2 gram-negative rods  · Empiric ceftriaxone    Chronic combined systolic and diastolic congestive heart failure (HCC)  Assessment & Plan  Wt Readings from Last 3 Encounters:   02/10/23 53 3 kg (117 lb 8 1 oz)   06/23/21 55 3 kg (121 lb 14 6 oz)   03/15/19 59 kg (130 lb)     · History chronic congestive heart failure, follows with [facility] cardiology  · Per their notes - Probable Takotsubo cardiomyopathy with recovered EF and at least moderate MR and moderate TR  · Utilizes diuretics per cardiology direction as needed if weight increases otherwise does not take them on a daily basis  · Continue Toprol   · Lisinopril 2 5 mg daily on hold due to hypotension  · Received 40 mg IV furosemide in the ED  · Patient placed on gentle IV hydration since developed a small bowel obstruction  Continue to monitor for volume overload-patient is status post PICC for TPN  Once TPN started, consider to discontinue IV hydration    Pleural effusion, bilateral  Assessment & Plan  · Moderate bilateral pleural effusions  · History of chronic systolic heart failure, reviewed care everywhere there were small bilateral pleural effusions on chest x-ray September  · Reports chronic dyspnea  repeat cxray ordered for today  restart lasix 40 mg daily today  · Continue to monitor    Enterocolitis  Assessment & Plan  · Reports 1 day of severe generalized abdominal pain associated with nausea and dry heaves  · Normal lactic acid  · CT abdomen pelvis with gastroenterocolitis  · Stool Culture including C  difficile and enteric panel negative  · Blood Culture 1 out of 2 gram-negative rods  · Empiric ceftriaxone/metronidazole -elevated procalcitonin  · Pain improved significantly at time of admission, abdomen soft, having some diarrhea but is slowly  advance diet to soft diet  · Appreciate general surgery          VTE Pharmacologic Prophylaxis: VTE Score: 4 Moderate Risk (Score 3-4) - Pharmacological DVT Prophylaxis Ordered: heparin drip  Patient Centered Rounds: I performed bedside rounds with nursing staff today  Discussions with Specialists or Other Care Team Provider: General surgery    Education and Discussions with Family / Patient: Updated  (daughter) at bedside  Total Time Spent on Date of Encounter in care of patient: 6 to 8-minute This time was spent on one or more of the following: performing physical exam; counseling and coordination of care; obtaining or reviewing history; documenting in the medical record; reviewing/ordering tests, medications or procedures; communicating with other healthcare professionals and discussing with patient's family/caregivers  Current Length of Stay: 8 day(s)  Current Patient Status: Inpatient   Certification Statement: The patient will continue to require additional inpatient hospital stay due to To monitor above condition  Discharge Plan: Anticipate discharge in 48-72 hrs to home  Code Status: Level 1 - Full Code    Subjective:   Seen and evaluated during the rounds  Resting comfortably  Denies any significant complaint  Objective:     Vitals:   Temp (24hrs), Av 8 °F (36 6 °C), Min:97 7 °F (36 5 °C), Max:97 9 °F (36 6 °C)    Temp:  [97 7 °F (36 5 °C)-97 9 °F (36 6 °C)] 97 7 °F (36 5 °C)  HR:  [] 108  Resp:  [17-19] 19  BP: (101-155)/() 147/109  SpO2:  [94 %] 94 %  Body mass index is 21 49 kg/m²       Input and Output Summary (last 24 hours): Intake/Output Summary (Last 24 hours) at 2/18/2023 1157  Last data filed at 2/18/2023 2283  Gross per 24 hour   Intake 120 ml   Output 400 ml   Net -280 ml       Physical Exam:   Physical Exam  Vitals and nursing note reviewed  Constitutional:       Appearance: Normal appearance  She is not ill-appearing or diaphoretic  HENT:      Head: Normocephalic  Nose: Nose normal  No congestion  Mouth/Throat:      Mouth: Mucous membranes are moist       Pharynx: Oropharynx is clear  No oropharyngeal exudate  Eyes:      General: No scleral icterus  Left eye: No discharge  Extraocular Movements: Extraocular movements intact  Conjunctiva/sclera: Conjunctivae normal       Pupils: Pupils are equal, round, and reactive to light  Cardiovascular:      Rate and Rhythm: Tachycardia present  Rhythm irregular  Heart sounds: No friction rub  No gallop  Pulmonary:      Effort: Pulmonary effort is normal  No respiratory distress  Breath sounds: No stridor  No wheezing or rhonchi  Abdominal:      General: Abdomen is flat  There is distension (minimal)  Palpations: Abdomen is soft  Tenderness: There is no abdominal tenderness  There is no rebound  Hernia: No hernia is present  Musculoskeletal:      Cervical back: Normal range of motion  Right lower leg: No edema  Left lower leg: No edema  Skin:     General: Skin is warm  Capillary Refill: Capillary refill takes less than 2 seconds  Coloration: Skin is not jaundiced or pale  Findings: No bruising or erythema  Neurological:      General: No focal deficit present  Mental Status: She is alert and oriented to person, place, and time  Cranial Nerves: No cranial nerve deficit  Sensory: No sensory deficit  Motor: No weakness        Coordination: Coordination normal    Psychiatric:         Mood and Affect: Mood normal          Additional Data:     Labs:  Results from last 7 days Lab Units 02/18/23  0834   WBC Thousand/uL 7 49   HEMOGLOBIN g/dL 13 2   HEMATOCRIT % 40 4   PLATELETS Thousands/uL 449*   NEUTROS PCT % 82*   LYMPHS PCT % 10*   MONOS PCT % 7   EOS PCT % 0     Results from last 7 days   Lab Units 02/18/23  0834   SODIUM mmol/L 136   POTASSIUM mmol/L 3 6   CHLORIDE mmol/L 104   CO2 mmol/L 24   BUN mg/dL 13   CREATININE mg/dL 0 78   ANION GAP mmol/L 8   CALCIUM mg/dL 7 0*   ALBUMIN g/dL 3 0*   TOTAL BILIRUBIN mg/dL 0 49   ALK PHOS U/L 42   ALT U/L 7   AST U/L 19   GLUCOSE RANDOM mg/dL 213*     Results from last 7 days   Lab Units 02/15/23  1533   INR  1 90*     Results from last 7 days   Lab Units 02/12/23  2047 02/12/23  1540   POC GLUCOSE mg/dl 155* 109               Lines/Drains:  Invasive Devices     Peripherally Inserted Central Catheter Line  Duration           PICC Line 02/16/23 1 day          Peripheral Intravenous Line  Duration           Peripheral IV 02/15/23 Right Forearm 2 days    Peripheral IV 02/16/23 Right;Ventral (anterior) Forearm 1 day          Drain  Duration           External Urinary Catheter 1 day                Central Line:  Goal for removal: Continuing for TPN  Telemetry:  Telemetry Orders (From admission, onward)             48 Hour Telemetry Monitoring  Continuous x 48 hours        References:    Telemetry Guidelines   Question:  Reason for 48 Hour Telemetry  Answer:  Arrhythmias Requiring Medical Therapy (eg  SVT, Vtach/fib, Bradycardia, Uncontrolled A-fib)                 Telemetry Reviewed: Atrial fibrillation  HR averaging rates varies  Indication for Continued Telemetry Use: Arrthymias requiring medical therapy             Imaging: No pertinent imaging reviewed      Recent Cultures (last 7 days):   Results from last 7 days   Lab Units 02/11/23  1910   C DIFF TOXIN B BY PCR  Negative       Last 24 Hours Medication List:   Current Facility-Administered Medications   Medication Dose Route Frequency Provider Last Rate   • Adult TPN (CUSTOM BASE/STANDARD ELECTROLYTE)   Intravenous Continuous TPN Emmy Rebolledo MD     • Adult TPN (STANDARD BASE/STANDARD ELECTROLYTE)   Intravenous Continuous TPN Emmy Rebolledo MD 42 mL/hr at 02/17/23 2220   • amiodarone  0 5 mg/min Intravenous Continuous HENRY Duran 0 5 mg/min (02/18/23 0404)   • cefTRIAXone  1,000 mg Intravenous Q24H NESSA BossNP 1,000 mg (02/18/23 0514)   • heparin (porcine)  3-20 Units/kg/hr (Order-Specific) Intravenous Titrated Emmy Rebolledo MD 17 Units/kg/hr (02/18/23 7122)   • latanoprost  1 drop Both Eyes HS HENRY Boss     • metoprolol  2 5 mg Intravenous Q4H HENRY Duran     • metoprolol  5 mg Intravenous Q6H PRN HENRY Pierce     • metroNIDAZOLE  500 mg Intravenous Q8H Emmy Rebolledo  mg (02/18/23 0596)   • midodrine  2 5 mg Oral TID HENRY Ramírez     • ondansetron  4 mg Intravenous Q4H PRN Emmy Rebolledo MD     • phenol  1 spray Mouth/Throat Q2H PRN Lexy Moyer PA-C          Today, Patient Was Seen By: Emmy Rebolledo MD    **Please Note: This note may have been constructed using a voice recognition system  **

## 2023-02-18 NOTE — ASSESSMENT & PLAN NOTE
1 set of blood culture shows gram-negative rods, para Bacteroides distasonis-sensitive to metronidazole, patient already on metronidazole since 2/10/2023  Second set of blood culture shows no growth  Since blood culture is gram-negative rods, does not need repeat blood culture    Continue IV antibiotic in total of 7 to 10 days

## 2023-02-18 NOTE — PROGRESS NOTES
Progress Note - General Surgery   Evalene Nails 80 y o  female MRN: [id number]  Unit/Bed#: -01 Encounter: [id number]    Assessment:  80 y o  female w/ gastroenterocoloitis, SBO on repeat imaging  - Afebrile, RVR, episodes of hypotension, stable on room air  - WBC wnl  - Hgb stable   - Stool studies normal  - Hx CAD, CKD, CHF    Plan:  -NG tube removed on 2/17 - tolerating sips of clears but at this time does not feel like trialing anything more PO - she is having bowel function so I think if she wishes, she may have a clear liquid diet, but patient is declining to be advanced at this time    -Continue TPN  - IVF  - IV abx - Ceftriaxone, Flagyl for enterocolitis  - Monitor I/Os  - Serial abdominal exams  - Co-morbidities per primary service    Subjective:   Seen this morning on rounds  States she gets short of breath and nauseous when she gets up from laying or seated position, but otherwise is comfortable laying or sitting  No abdominal pain  No nausea currently  Had a bowel movement this morning and is passing flatus  Objective:   Blood pressure (!) 147/109, pulse (!) 108, temperature 97 7 °F (36 5 °C), temperature source Temporal, resp  rate 19, height 5' 2" (1 575 m), weight 53 3 kg (117 lb 8 1 oz), SpO2 94 %  ,Body mass index is 21 49 kg/m²      Intake/Output Summary (Last 24 hours) at 2/18/2023 0843  Last data filed at 2/18/2023 6182  Gross per 24 hour   Intake 120 ml   Output 400 ml   Net -280 ml     Invasive Devices     Peripherally Inserted Central Catheter Line  Duration           PICC Line 02/16/23 1 day          Peripheral Intravenous Line  Duration           Peripheral IV 02/15/23 Right Forearm 2 days    Peripheral IV 02/16/23 Right;Ventral (anterior) Forearm 1 day          Drain  Duration           External Urinary Catheter 1 day              Physical Exam:  General: no acute distress, pt appears well and comfortable, NGT in place with green drainage  Skin: warm and dry to touch  Pulmonary: normal effort  Abdominal: softly distended, minimal tenderness to palpation across abdomen, most significantly periumbilical, no guarding or rebound  Neuro: alert and oriented     Lab, Imaging and other studies:  I have personally reviewed pertinent lab results    , CBC:   Lab Results   Component Value Date    WBC 7 49 02/18/2023    HGB 13 2 02/18/2023    HCT 40 4 02/18/2023    MCV 86 02/18/2023     (H) 02/18/2023    MCH 28 0 02/18/2023    MCHC 32 7 02/18/2023    RDW 14 6 02/18/2023    MPV 8 6 (L) 02/18/2023    NRBC 0 02/18/2023   , CMP:   No results found for: SODIUM, K, CL, CO2, ANIONGAP, BUN, CREATININE, GLUCOSE, CALCIUM, AST, ALT, ALKPHOS, PROT, BILITOT, EGFR  VTE Pharmacologic Prophylaxis: Heparin  VTE Mechanical Prophylaxis: sequential compression device    [place]  2/18/2023

## 2023-02-18 NOTE — ASSESSMENT & PLAN NOTE
· History paroxysmal atrial fibrillation chronically maintained on metoprolol succinate 25 mg daily   · chronic anticoagulation with Eliquis 2 5 mg twice daily for elevated [id number] stroke risk  · Initially patient received some IV Cardizem, then placed on beta-blocker, later on Cardizem short-acting added after that as per cardiology recommendation, amiodarone added which then transition to amiodarone drip due to n p o  status from small bowel obstruction  · P o  medication remain on hold-consider to resume once patient able to tolerate p o  · Was on Eliquis, transition to heparin drip as per [facility] protocol due to atrial fibrillation to prevent complications-'s, benefits, side effects discussed in details with patient's son her family member including bleeding risk  · Patient heart rate remained uncontrolled, patient is receiving amiodarone drip without titration  We will use IV metoprolol-monitor the effect, if remains uncontrolled, can consider titrating amiodarone drip    Patient also started clear liquid, if tolerated, can consider to resume p o  medication

## 2023-02-19 PROBLEM — Z71.89 GOALS OF CARE, COUNSELING/DISCUSSION: Status: ACTIVE | Noted: 2023-01-01

## 2023-02-19 NOTE — ASSESSMENT & PLAN NOTE
Lab Results   Component Value Date    EGFR 54 02/19/2023    EGFR 68 02/18/2023    EGFR 67 02/17/2023    CREATININE 0 94 02/19/2023    CREATININE 0 78 02/18/2023    CREATININE 0 79 02/17/2023   · History CKD 3  · creatinine worsened to 1 5  Placed on gentle hydration hold diuretics and observe  Improved to baseline of 1 1    Stop IV fluids and restart oral Lasix as per home regimen  · Renally dose medications, avoid nephrotoxic medication

## 2023-02-19 NOTE — ASSESSMENT & PLAN NOTE
Goals of care discussion is done with patient, patient's 2 daughters in the bedside and caring nurse  Patient prefers to be DNR/DNI-no heroic measurement    But will consider to continue antibiotic and fluid for nutrition  POLST form signed and placed in her paper chart

## 2023-02-19 NOTE — PLAN OF CARE
Problem: Nutrition/Hydration-ADULT  Goal: Nutrient/Hydration intake appropriate for improving, restoring or maintaining nutritional needs  Description: Monitor and assess patient's nutrition/hydration status for malnutrition  Collaborate with interdisciplinary team and initiate plan and interventions as ordered  Monitor patient's weight and dietary intake as ordered or per policy  Utilize nutrition screening tool and intervene as necessary  Determine patient's food preferences and provide high-protein, high-caloric foods as appropriate       INTERVENTIONS:  - Monitor oral intake, urinary output, labs, and treatment plans  - Assess nutrition and hydration status and recommend course of action  - Evaluate amount of meals eaten  - Assist patient with eating if necessary   - Allow adequate time for meals  - Recommend/ encourage appropriate diets, oral nutritional supplements, and vitamin/mineral supplements  - Order, calculate, and assess calorie counts as needed  - Recommend, monitor, and adjust tube feedings and TPN/PPN based on assessed needs  - Assess need for intravenous fluids  - Provide specific nutrition/hydration education as appropriate  - Include patient/family/caregiver in decisions related to nutrition  Outcome: Progressing     Problem: CARDIOVASCULAR - ADULT  Goal: Maintains optimal cardiac output and hemodynamic stability  Description: INTERVENTIONS:  - Monitor I/O, vital signs and rhythm  - Monitor for S/S and trends of decreased cardiac output  - Administer and titrate ordered vasoactive medications to optimize hemodynamic stability  - Assess quality of pulses, skin color and temperature  - Assess for signs of decreased coronary artery perfusion  - Instruct patient to report change in severity of symptoms  Outcome: Progressing  Goal: Absence of cardiac dysrhythmias or at baseline rhythm  Description: INTERVENTIONS:  - Continuous cardiac monitoring, vital signs, obtain 12 lead EKG if ordered  - Administer antiarrhythmic and heart rate control medications as ordered  - Monitor electrolytes and administer replacement therapy as ordered  Outcome: Progressing     Problem: GASTROINTESTINAL - ADULT  Goal: Minimal or absence of nausea and/or vomiting  Description: INTERVENTIONS:  - Administer IV fluids if ordered to ensure adequate hydration  - Maintain NPO status until nausea and vomiting are resolved  - Nasogastric tube if ordered  - Administer ordered antiemetic medications as needed  - Provide nonpharmacologic comfort measures as appropriate  - Advance diet as tolerated, if ordered  - Consider nutrition services referral to assist patient with adequate nutrition and appropriate food choices  Outcome: Progressing  Goal: Maintains or returns to baseline bowel function  Description: INTERVENTIONS:  - Assess bowel function  - Encourage oral fluids to ensure adequate hydration  - Administer IV fluids if ordered to ensure adequate hydration  - Administer ordered medications as needed  - Encourage mobilization and activity  - Consider nutritional services referral to assist patient with adequate nutrition and appropriate food choices  Outcome: Progressing  Goal: Maintains adequate nutritional intake  Description: INTERVENTIONS:  - Monitor percentage of each meal consumed  - Identify factors contributing to decreased intake, treat as appropriate  - Assist with meals as needed  - Monitor I&O, weight, and lab values if indicated  - Obtain nutrition services referral as needed  Outcome: Progressing

## 2023-02-19 NOTE — ASSESSMENT & PLAN NOTE
· History paroxysmal atrial fibrillation chronically maintained on metoprolol succinate 25 mg daily   · chronic anticoagulation with Eliquis 2 5 mg twice daily for elevated [id number] stroke risk  · Initially patient received some IV Cardizem, then placed on beta-blocker, later on Cardizem short-acting added after that as per cardiology recommendation, amiodarone added which then transition to amiodarone drip due to n p o  status from small bowel obstruction  · P o  medication remain on hold-consider to resume once patient able to tolerate p o  · Was on Eliquis, transition to heparin drip as per [facility] protocol due to atrial fibrillation to prevent complications-'s, benefits, side effects discussed in details with patient's son her family member including bleeding risk  · Continue IV metoprolol, continue amiodarone drip-as general surgery advancing the diet, if patient tolerate, consider to transition to p o

## 2023-02-19 NOTE — PLAN OF CARE
Problem: Nutrition/Hydration-ADULT  Goal: Nutrient/Hydration intake appropriate for improving, restoring or maintaining nutritional needs  Description: Monitor and assess patient's nutrition/hydration status for malnutrition  Collaborate with interdisciplinary team and initiate plan and interventions as ordered  Monitor patient's weight and dietary intake as ordered or per policy  Utilize nutrition screening tool and intervene as necessary  Determine patient's food preferences and provide high-protein, high-caloric foods as appropriate       INTERVENTIONS:  - Monitor oral intake, urinary output, labs, and treatment plans  - Assess nutrition and hydration status and recommend course of action  - Evaluate amount of meals eaten  - Assist patient with eating if necessary   - Allow adequate time for meals  - Recommend/ encourage appropriate diets, oral nutritional supplements, and vitamin/mineral supplements  - Order, calculate, and assess calorie counts as needed  - Recommend, monitor, and adjust tube feedings and TPN/PPN based on assessed needs  - Assess need for intravenous fluids  - Provide specific nutrition/hydration education as appropriate  - Include patient/family/caregiver in decisions related to nutrition  Outcome: Progressing     Problem: PAIN - ADULT  Goal: Verbalizes/displays adequate comfort level or baseline comfort level  Description: Interventions:  - Encourage patient to monitor pain and request assistance  - Assess pain using appropriate pain scale0-10  - Administer analgesics based on type and severity of pain and evaluate response  - Implement non-pharmacological measures as appropriate and evaluate response  - Consider cultural and social influences on pain and pain management  - Notify physician/advanced practitioner if interventions unsuccessful or patient reports new pain  Outcome: Progressing     Problem: INFECTION - ADULT  Goal: Absence or prevention of progression during hospitalization  Description: INTERVENTIONS:  - Assess and monitor for signs and symptoms of infection  - Monitor lab/diagnostic results  - Monitor all insertion sites, i e  indwelling lines, tubes, and drains  - Monitor endotracheal if appropriate and nasal secretions for changes in amount and color  - Ken[place] appropriate cooling/warming therapies per order  - Administer medications as ordered  - Instruct and encourage patient and family to use good hand hygiene technique  - Identify and instruct in appropriate isolation precautions for identified infection/condition  Outcome: Progressing     Problem: SAFETY ADULT  Goal: Patient will remain free of falls  Description: INTERVENTIONS:  - Educate patient/family on patient safety including physical limitations  - Instruct patient to call for assistance with activity   - Consult OT/PT to assist with strengthening/mobility   - Keep Call bell within reach  - Keep bed low and locked with side rails adjusted as appropriate  - Keep care items and personal belongings within reach  - Initiate and maintain comfort rounds  - Make Fall Risk Sign visible to staff  - Apply yellow socks and bracelet for high fall risk patients  - Consider moving patient to room near nurses station  Outcome: Progressing  Goal: Maintain or return to baseline ADL function  Description: INTERVENTIONS:  -  Assess patient's ability to carry out ADLs; assess patient's baseline for ADL function and identify physical deficits which impact ability to perform ADLs (bathing, care of mouth/teeth, toileting, grooming, dressing, etc )  - Assess/evaluate cause of self-care deficits   - Assess range of motion  - Assess patient's mobility; develop plan if impaired  - Assess patient's need for assistive devices and provide as appropriate  - Encourage maximum independence but intervene and supervise when necessary  - Involve family in performance of ADLs  - Assess for home care needs following discharge   - Consider OT consult to assist with ADL evaluation and planning for discharge  - Provide patient education as appropriate  Outcome: Progressing  Goal: Maintains/Returns to pre admission functional level  Description: INTERVENTIONS:  - Perform BMAT or MOVE assessment daily    - Set and communicate daily mobility goal to care team and patient/family/caregiver  - Collaborate with rehabilitation services on mobility goals if consulted  - Perform Range of Motion 3 times a day  - Reposition patient every 2 hours if pt unable to reposition self  - Out of bed for toileting  - Record patient progress and toleration of activity level   Outcome: Progressing     Problem: DISCHARGE PLANNING  Goal: Discharge to home or other facility with appropriate resources  Description: INTERVENTIONS:  - Identify barriers to discharge w/patient and caregiver  - Arrange for needed discharge resources and transportation as appropriate  - Identify discharge learning needs (meds, wound care, etc )  - Arrange for interpretive services to assist at discharge as needed  - Refer to Case Management Department for coordinating discharge planning if the patient needs post-hospital services based on physician/advanced practitioner order or complex needs related to functional status, cognitive ability, or social support system  Outcome: Progressing     Problem: Knowledge Deficit  Goal: Patient/family/caregiver demonstrates understanding of disease process, treatment plan, medications, and discharge instructions  Description: Complete learning assessment and assess knowledge base    Interventions:  - Provide teaching at level of understanding  - Provide teaching via preferred learning methods  Outcome: Progressing     Problem: CARDIOVASCULAR - ADULT  Goal: Maintains optimal cardiac output and hemodynamic stability  Description: INTERVENTIONS:  - Monitor I/O, vital signs and rhythm  - Monitor for S/S and trends of decreased cardiac output  - Administer and titrate ordered vasoactive medications to optimize hemodynamic stability  - Assess quality of pulses, skin color and temperature  - Assess for signs of decreased coronary artery perfusion  - Instruct patient to report change in severity of symptoms  Outcome: Progressing  Goal: Absence of cardiac dysrhythmias or at baseline rhythm  Description: INTERVENTIONS:  - Continuous cardiac monitoring, vital signs, obtain 12 lead EKG if ordered  - Administer antiarrhythmic and heart rate control medications as ordered  - Monitor electrolytes and administer replacement therapy as ordered  Outcome: Progressing     Problem: GASTROINTESTINAL - ADULT  Goal: Minimal or absence of nausea and/or vomiting  Description: INTERVENTIONS:  - Administer IV fluids if ordered to ensure adequate hydration  - Maintain NPO status until nausea and vomiting are resolved  - Nasogastric tube if ordered  - Administer ordered antiemetic medications as needed  - Provide nonpharmacologic comfort measures as appropriate  - Advance diet as tolerated, if ordered  - Consider nutrition services referral to assist patient with adequate nutrition and appropriate food choices  Outcome: Progressing  Goal: Maintains or returns to baseline bowel function  Description: INTERVENTIONS:  - Assess bowel function  - Encourage oral fluids to ensure adequate hydration  - Administer IV fluids if ordered to ensure adequate hydration  - Administer ordered medications as needed  - Encourage mobilization and activity  - Consider nutritional services referral to assist patient with adequate nutrition and appropriate food choices  Outcome: Progressing  Goal: Maintains adequate nutritional intake  Description: INTERVENTIONS:  - Monitor percentage of each meal consumed  - Identify factors contributing to decreased intake, treat as appropriate  - Assist with meals as needed  - Monitor I&O, weight, and lab values if indicated  - Obtain nutrition services referral as needed  Outcome: Progressing     Problem: MOBILITY - ADULT  Goal: Maintain or return to baseline ADL function  Description: INTERVENTIONS:  -  Assess patient's ability to carry out ADLs; assess patient's baseline for ADL function and identify physical deficits which impact ability to perform ADLs (bathing, care of mouth/teeth, toileting, grooming, dressing, etc )  - Assess/evaluate cause of self-care deficits   - Assess range of motion  - Assess patient's mobility; develop plan if impaired  - Assess patient's need for assistive devices and provide as appropriate  - Encourage maximum independence but intervene and supervise when necessary  - Involve family in performance of ADLs  - Assess for home care needs following discharge   - Consider OT consult to assist with ADL evaluation and planning for discharge  - Provide patient education as appropriate  Outcome: Progressing  Goal: Maintains/Returns to pre admission functional level  Description: INTERVENTIONS:  - Perform BMAT or MOVE assessment daily    - Set and communicate daily mobility goal to care team and patient/family/caregiver  - Collaborate with rehabilitation services on mobility goals if consulted  - Perform Range of Motion 3 times a day    - Reposition patient every 2 hours if pt unable to reposition self  - Out of bed for toileting  - Record patient progress and toleration of activity level   Outcome: Progressing     Problem: Prexisting or High Potential for Compromised Skin Integrity  Goal: Skin integrity is maintained or improved  Description: INTERVENTIONS:  - Identify patients at risk for skin breakdown  - Assess and monitor skin integrity  - Assess and monitor nutrition and hydration status  - Monitor labs   - Assess for incontinence   - Turn and reposition patient  - Assist with mobility/ambulation  - Relieve pressure over bony prominences  - Avoid friction and shearing  - Provide appropriate hygiene as needed including keeping skin clean and dry  - Evaluate need for skin moisturizer/barrier cream  - Collaborate with interdisciplinary team   - Patient/family teaching  - Consider wound care consult   Outcome: Progressing

## 2023-02-19 NOTE — ASSESSMENT & PLAN NOTE
Since patient was throwing up, repeat CAT scan done which shows a small bowel obstruction  General surgery consult appreciated, recommends conservative management,  Status post NG tube in place, continue suction  Patient was on Eliquis-transition to heparin drip patient remain n p o  Continue gentle hydration, while patient remained on IV hydration follow for signs symptoms of volume overload-patient is status post PICC placement for consideration of TPN    Once TPN is started, consider to discontinue IV hydration considering patient's cardiac conditions  Patient is having bowel movement, advance diet to clear liquid

## 2023-02-19 NOTE — PROGRESS NOTES
[address]  Progress Note Frank Cason 1934, 80 y o  female MRN: [id number]  Unit/Bed#: -01 Encounter: [id number]  Primary Care Provider: Jay Davis DO   Date and time admitted to hospital: 2/10/2023 12:28 AM    Bacteremia  Assessment & Plan  1 set of blood culture shows gram-negative rods, para Bacteroides distasonis-sensitive to metronidazole, patient already on metronidazole since 2/10/2023  Second set of blood culture shows no growth  Since blood culture is gram-negative rods, does not need repeat blood culture  Continue IV antibiotic in total of 7 to 10 days      Atrial fibrillation with RVR ([facility])  Assessment & Plan  · History paroxysmal atrial fibrillation chronically maintained on metoprolol succinate 25 mg daily   · chronic anticoagulation with Eliquis 2 5 mg twice daily for elevated [id number] stroke risk  · Initially patient received some IV Cardizem, then placed on beta-blocker, later on Cardizem short-acting added after that as per cardiology recommendation, amiodarone added which then transition to amiodarone drip due to n p o  status from small bowel obstruction  · P o  medication remain on hold-consider to resume once patient able to tolerate p o  · Was on Eliquis, transition to heparin drip as per [facility] protocol due to atrial fibrillation to prevent complications-'s, benefits, side effects discussed in details with patient's son her family member including bleeding risk  · Continue IV metoprolol, continue amiodarone drip-as general surgery advancing the diet, if patient tolerate, consider to transition to p o      * Small bowel obstruction ([address] )  Assessment & Plan  Since patient was throwing up, repeat CAT scan done which shows a small bowel obstruction  General surgery consult appreciated, recommends conservative management,  Status post NG tube in place, continue suction  Patient was on Eliquis-transition to heparin drip patient remain n p o  Continue gentle hydration, while patient remained on IV hydration follow for signs symptoms of volume overload-patient is status post PICC placement for consideration of TPN  Once TPN is started, consider to discontinue IV hydration considering patient's cardiac conditions  Patient is having bowel movement, advance diet to clear liquid    Goals of care, counseling/discussion  Assessment & Plan  Goals of care discussion is done with patient, patient's 2 daughters in the bedside and caring nurse  Patient prefers to be DNR/DNI-no heroic measurement  But will consider to continue antibiotic and fluid for nutrition  POLST form signed and placed in her paper chart    PICC (peripherally inserted central catheter) in place  Assessment & Plan  Status post right brachial PICC placement for possible TPN    PVC's (premature ventricular contractions)  Assessment & Plan  Continue beta-blocker  Maintain electrolytes    Moderate protein-calorie malnutrition (HCC)  Assessment & Plan  Malnutrition Findings:   Adult Malnutrition type: Chronic illness  Adult Degree of Malnutrition: Malnutrition of moderate degree  Malnutrition Characteristics: Muscle loss, Fat loss, Inadequate energy                  360 Statement: Chronic moderate malnutrition related to decreased oral intake, difficulties with meal preparation at home as evidenced by < 75% estimated energy intake > 1 mo; moderate muscle loss to temporalis, pectoralis, deltoid muscles; moderate to severe fat loss to orbitals, moderate fat loss to buccal pads  Treated with: Advance diet as medically appropriate to modest 4gm DENY given hx of CHF  Will discuss supplements with pt once diet initiated  Recommend daily weights for nutrition monitoring  Did discuss Mom's Meals with pt for ease of meal preparation at home  BMI Findings: Body mass index is 21 49 kg/m²         Coronary artery disease involving native coronary artery of native heart  Assessment & Plan  · NSTEMI September 2022  · Cardiac catheterization with minimal atherosclerotic disease, no PCI  · Continue Eliquis and simvastatin    Stage 3b chronic kidney disease [facility])  Assessment & Plan  Lab Results   Component Value Date    EGFR 54 02/19/2023    EGFR 68 02/18/2023    EGFR 67 02/17/2023    CREATININE 0 94 02/19/2023    CREATININE 0 78 02/18/2023    CREATININE 0 79 02/17/2023   · History CKD 3  · creatinine worsened to 1 5  Placed on gentle hydration hold diuretics and observe  Improved to baseline of 1 1  Stop IV fluids and restart oral Lasix as per home regimen  · Renally dose medications, avoid nephrotoxic medication    Acute cystitis  Assessment & Plan  · Presented with complaints of abdominal pain and foul-smelling urine  · UA with bacteriuria, pyuria  · Urine did not reflex to culture  · Blood culture 1 out of 2 gram-negative rods  · Empiric ceftriaxone    Chronic combined systolic and diastolic congestive heart failure (HCC)  Assessment & Plan  Wt Readings from Last 3 Encounters:   02/10/23 53 3 kg (117 lb 8 1 oz)   06/23/21 55 3 kg (121 lb 14 6 oz)   03/15/19 59 kg (130 lb)     · History chronic congestive heart failure, follows with [facility] cardiology  · Per their notes - Probable Takotsubo cardiomyopathy with recovered EF and at least moderate MR and moderate TR  · Utilizes diuretics per cardiology direction as needed if weight increases otherwise does not take them on a daily basis  · Continue Toprol   · Lisinopril 2 5 mg daily on hold due to hypotension  · Received 40 mg IV furosemide in the ED  · Patient placed on gentle IV hydration since developed a small bowel obstruction  Continue to monitor for volume overload-patient is status post PICC for TPN    Once TPN started, consider to discontinue IV hydration    Pleural effusion, bilateral  Assessment & Plan  · Moderate bilateral pleural effusions  · History of chronic systolic heart failure, reviewed care everywhere there were small bilateral pleural effusions on chest x-ray September  · Reports chronic dyspnea  repeat cxray ordered for today  restart lasix 40 mg daily today  · Continue to monitor    Enterocolitis  Assessment & Plan  · Reports 1 day of severe generalized abdominal pain associated with nausea and dry heaves  · Normal lactic acid  · CT abdomen pelvis with gastroenterocolitis  · Stool Culture including C  difficile and enteric panel negative  · Blood Culture 1 out of 2 gram-negative rods  · Empiric ceftriaxone/metronidazole -elevated procalcitonin  · Pain improved significantly at time of admission, abdomen soft, having some diarrhea but is slowly  advance diet to soft diet  · Appreciate general surgery        VTE Pharmacologic Prophylaxis: VTE Score: 4 Moderate Risk (Score 3-4) - Pharmacological DVT Prophylaxis Ordered: heparin drip  Patient Centered Rounds: I performed bedside rounds with nursing staff today  Discussions with Specialists or Other Care Team Provider: General surgery    Education and Discussions with Family / Patient: Updated  (daughter) at bedside  Total Time Spent on Date of Encounter in care of patient: 10 minutes This time was spent on one or more of the following: performing physical exam; counseling and coordination of care; obtaining or reviewing history; documenting in the medical record; reviewing/ordering tests, medications or procedures; communicating with other healthcare professionals and discussing with patient's family/caregivers  Current Length of Stay: 9 day(s)  Current Patient Status: Inpatient   Certification Statement: The patient will continue to require additional inpatient hospital stay due to To monitor above condition  Discharge Plan: Anticipate discharge in 48-72 hrs to home  Code Status: Level 3 - DNAR and DNI    Subjective:   Seen and evaluated during the rounds  Resting comfortably  Denies any significant complaint  Heart rate is still varies    Patient is having bowel movement  Objective:     Vitals:   Temp (24hrs), Av 9 °F (36 6 °C), Min:97 6 °F (36 4 °C), Max:98 3 °F (36 8 °C)    Temp:  [97 6 °F (36 4 °C)-98 3 °F (36 8 °C)] 98 3 °F (36 8 °C)  HR:  [] 106  Resp:  [20-24] 24  BP: (108-127)/(78-95) 122/78  SpO2:  [98 %-99 %] 98 %  Body mass index is 21 49 kg/m²  Input and Output Summary (last 24 hours): Intake/Output Summary (Last 24 hours) at 2023 1824  Last data filed at 2023 1301  Gross per 24 hour   Intake 4667 88 ml   Output 350 ml   Net 4317 88 ml       Physical Exam:   Physical Exam  Vitals and nursing note reviewed  Constitutional:       Appearance: Normal appearance  She is not ill-appearing or diaphoretic  HENT:      Head: Normocephalic  Mouth/Throat:      Mouth: Mucous membranes are moist       Pharynx: Oropharynx is clear  No oropharyngeal exudate  Eyes:      General: No scleral icterus  Left eye: No discharge  Extraocular Movements: Extraocular movements intact  Conjunctiva/sclera: Conjunctivae normal       Pupils: Pupils are equal, round, and reactive to light  Cardiovascular:      Rate and Rhythm: Tachycardia present  Rhythm irregular  Heart sounds: No friction rub  No gallop  Pulmonary:      Effort: Pulmonary effort is normal  No respiratory distress  Breath sounds: No stridor  No wheezing or rhonchi  Abdominal:      General: Abdomen is flat  Bowel sounds are normal  There is no distension  Palpations: There is no mass  Tenderness: There is no abdominal tenderness  Hernia: No hernia is present  Musculoskeletal:      Cervical back: Normal range of motion  Right lower leg: No edema  Left lower leg: No edema  Skin:     General: Skin is warm  Capillary Refill: Capillary refill takes less than 2 seconds  Coloration: Skin is not jaundiced or pale  Findings: No bruising  Neurological:      General: No focal deficit present        Mental Status: She is alert and oriented to person, place, and time  Cranial Nerves: No cranial nerve deficit  Sensory: No sensory deficit  Motor: No weakness  Coordination: Coordination normal    Psychiatric:         Mood and Affect: Mood normal          Additional Data:     Labs:  Results from last 7 days   Lab Units 02/19/23  0628   WBC Thousand/uL 8 05   HEMOGLOBIN g/dL 11 4*   HEMATOCRIT % 35 8   PLATELETS Thousands/uL 378   NEUTROS PCT % 81*   LYMPHS PCT % 9*   MONOS PCT % 9   EOS PCT % 0     Results from last 7 days   Lab Units 02/19/23  0628   SODIUM mmol/L 135   POTASSIUM mmol/L 4 1   CHLORIDE mmol/L 103   CO2 mmol/L 28   BUN mg/dL 19   CREATININE mg/dL 0 94   ANION GAP mmol/L 4   CALCIUM mg/dL 6 8*   ALBUMIN g/dL 2 7*   TOTAL BILIRUBIN mg/dL 0 36   ALK PHOS U/L 37   ALT U/L 7   AST U/L 17   GLUCOSE RANDOM mg/dL 247*     Results from last 7 days   Lab Units 02/15/23  1533   INR  1 90*     Results from last 7 days   Lab Units 02/12/23  2047   POC GLUCOSE mg/dl 155*               Lines/Drains:  Invasive Devices     Peripherally Inserted Central Catheter Line  Duration           PICC Line 02/16/23 3 days          Peripheral Intravenous Line  Duration           Peripheral IV 02/15/23 Right Forearm 4 days    Peripheral IV 02/16/23 Right;Ventral (anterior) Forearm 3 days    Peripheral IV 02/18/23 Right Arm 1 day          Drain  Duration           External Urinary Catheter 2 days                Central Line:  Goal for removal: Continuing for TPN  Telemetry:  Telemetry Orders (From admission, onward)             48 Hour Telemetry Monitoring  Continuous x 48 hours        References:    Telemetry Guidelines   Question:  Reason for 48 Hour Telemetry  Answer:  Arrhythmias Requiring Medical Therapy (eg  SVT, Vtach/fib, Bradycardia, Uncontrolled A-fib)                 Telemetry Reviewed: Atrial fibrillation   HR averaging varies  Indication for Continued Telemetry Use: Arrthymias requiring medical therapy             Imaging: No pertinent imaging reviewed  Recent Cultures (last 7 days):         Last 24 Hours Medication List:   Current Facility-Administered Medications   Medication Dose Route Frequency Provider Last Rate   • Adult TPN (CUSTOM BASE/STANDARD ELECTROLYTE)   Intravenous Continuous TPN Liz S Germaine, CRNP 61 4 mL/hr at 02/19/23 0521   • Adult TPN (CUSTOM BASE/STANDARD ELECTROLYTE)   Intravenous Continuous TPN Rafita Vazquez MD     • amiodarone  0 5 mg/min Intravenous Continuous Karlee Quill, CRNP 0 5 mg/min (02/19/23 1022)   • cefTRIAXone  1,000 mg Intravenous Q24H Liz S Germaine, CRNP 1,000 mg (02/19/23 0429)   • gabapentin  300 mg Oral HS Liz S Germaine, CRNP     • heparin (porcine)  3-20 Units/kg/hr (Order-Specific) Intravenous Titrated Katerin Agustin MD 14 Units/kg/hr (02/19/23 0736)   • latanoprost  1 drop Both Eyes HS Liz S Germaine, CRNP     • LORazepam  0 5 mg Intravenous Q6H PRN Rafita Vazquez MD     • metoprolol  5 mg Intravenous Q6H PRN HENRY Alfaro     • metoprolol  5 mg Intravenous Q4H Rafita Vazquez MD     • metroNIDAZOLE  500 mg Intravenous Q8H Rafita Vazquez  mg (02/19/23 1407)   • midodrine  2 5 mg Oral TID AC HENRY Edmond     • ondansetron  4 mg Intravenous Q4H PRN Katerin Agustin MD     • phenol  1 spray Mouth/Throat Q2H PRN Lexy Brooks PA-C     • traZODone  100 mg Oral HS Liz S Germaine, CRNP          Today, Patient Was Seen By: Katerin Agustin MD    **Please Note: This note may have been constructed using a voice recognition system  **

## 2023-02-19 NOTE — PROGRESS NOTES
Progress Note - General Surgery   Gloria Antony 80 y o  female MRN: [id number]  Unit/Bed#: -01 Encounter: [id number]    Assessment:  80 y o  female w/ gastroenterocoloitis, SBO on repeat imaging - having bowel function  - Afebrile, RVR  - WBC wnl  - Hgb stable   - Stool studies normal  - Hx CAD, CKD, CHF    Plan:  -OK to advance to clear liquid  Patient continues to have bowel funciton and is comfortable  -Continue TPN  - IVF  - IV abx - Ceftriaxone, Flagyl for enterocolitis  - Monitor I/Os  - Co-morbidities per primary service    Subjective:   Patient was taken to ICU for a-fib with RVR  Otherwise feeling fine  She denies any abdominal pain or nausea  She has had several bowel movements  Objective:   Blood pressure 127/91, pulse 99, temperature 97 7 °F (36 5 °C), temperature source Oral, resp  rate 22, height 5' 2" (1 575 m), weight 53 3 kg (117 lb 8 1 oz), SpO2 99 %  ,Body mass index is 21 49 kg/m²  Intake/Output Summary (Last 24 hours) at 2/19/2023 8319  Last data filed at 2/19/2023 0801  Gross per 24 hour   Intake 1593 27 ml   Output 900 ml   Net 693 27 ml     Invasive Devices     Peripherally Inserted Central Catheter Line  Duration           PICC Line 02/16/23 2 days          Peripheral Intravenous Line  Duration           Peripheral IV 02/15/23 Right Forearm 3 days    Peripheral IV 02/16/23 Right;Ventral (anterior) Forearm 2 days    Peripheral IV 02/18/23 Right Arm <1 day          Drain  Duration           External Urinary Catheter 2 days              Physical Exam:  General: no acute distress, pt appears well and comfortable  Skin: warm and dry to touch  Pulmonary: normal effort  Abdominal: softly distended, minimal tenderness to palpation across abdomen, most significantly periumbilical, no guarding or rebound  Neuro: alert and oriented     Lab, Imaging and other studies:  I have personally reviewed pertinent lab results    , CBC:   Lab Results   Component Value Date    WBC 8 05 02/19/2023 HGB 11 4 (L) 02/19/2023    HCT 35 8 02/19/2023    MCV 87 02/19/2023     02/19/2023    MCH 27 8 02/19/2023    MCHC 31 8 02/19/2023    RDW 14 8 02/19/2023    MPV 9 2 02/19/2023    NRBC 0 02/19/2023   , CMP:   Lab Results   Component Value Date    SODIUM 135 02/19/2023    K 4 1 02/19/2023     02/19/2023    CO2 28 02/19/2023    BUN 19 02/19/2023    CREATININE 0 94 02/19/2023    CALCIUM 6 8 (L) 02/19/2023    AST 17 02/19/2023    ALT 7 02/19/2023    ALKPHOS 37 02/19/2023    EGFR 54 02/19/2023     VTE Pharmacologic Prophylaxis: Heparin  VTE Mechanical Prophylaxis: sequential compression device    Daly Harms  2/19/2023

## 2023-02-20 NOTE — PROGRESS NOTES
[address]  Progress Note Frank Cason 1934, 80 y o  female MRN: [id number]  Unit/Bed#: -01 Encounter: [id number]  Primary Care Provider: Jay Davis DO   Date and time admitted to hospital: 2/10/2023 12:28 AM    Bacteremia  Assessment & Plan  1 set of blood culture shows gram-negative rods, para Bacteroides distasonis-sensitive to metronidazole, patient already on metronidazole since 2/10/2023  Second set of blood culture shows no growth  Since blood culture is gram-negative rods, does not need repeat blood culture  Continue IV antibiotic in total of 7 to 10 days  Pending ID consult      Atrial fibrillation with RVR ([facility])  Assessment & Plan  · History paroxysmal atrial fibrillation chronically maintained on metoprolol succinate 25 mg daily   · chronic anticoagulation with Eliquis 2 5 mg twice daily for elevated [id number] stroke risk  · Initially patient received some IV Cardizem, then placed on beta-blocker, later on Cardizem short-acting added after that as per cardiology recommendation, amiodarone added which then transition to amiodarone drip due to n p o  status from small bowel obstruction  · P o  medication remain on hold-consider to resume once patient able to tolerate p o  · Was on Eliquis, transition to heparin drip as per ACS protocol due to atrial fibrillation to prevent complications-'s, benefits, side effects discussed in details with patient's son her family member including bleeding risk  · Transition to p o  metoprolol and amiodarone-continue to monitor  · Patient has poor IV access, and have trouble to check PTT on regular basis  Discussed with cardiology and general surgery, agrees to proceed with p o  Eliquis  Heparin discontinued, patient placed on Eliquis    Daughter updated at bedside    * Small bowel obstruction [facility])  Assessment & Plan  Since patient was throwing up, repeat CAT scan done which shows a small bowel obstruction  General surgery consult appreciated, recommends conservative management,  Status post NG tube in place, continue suction  Patient was on Eliquis-transition to heparin drip patient remain n p o  Continue gentle hydration, while patient remained on IV hydration follow for signs symptoms of volume overload-patient is status post PICC placement for consideration of TPN  Once TPN is started, consider to discontinue IV hydration considering patient's cardiac conditions  Patient placed back to n p o  status except medications as per surgery team    Goals of care, counseling/discussion  Assessment & Plan  Goals of care discussion is done with patient, patient's 2 daughters in the bedside and caring nurse  Patient prefers to be :  DNR/DNI-no heroic measurement IF her condition is terminal-and no other measurement is effective  POLST form signed and placed in her paper chart  Continue current treatment and adjust treatment plan based on patient response    PICC (peripherally inserted central catheter) in place  Assessment & Plan  Status post right brachial PICC placement for possible TPN    PVC's (premature ventricular contractions)  Assessment & Plan  Continue beta-blocker  Maintain electrolytes    Moderate protein-calorie malnutrition (HCC)  Assessment & Plan  Malnutrition Findings:   Adult Malnutrition type: Chronic illness  Adult Degree of Malnutrition: Malnutrition of moderate degree  Malnutrition Characteristics: Muscle loss, Fat loss, Inadequate energy                  360 Statement: Chronic moderate malnutrition related to decreased oral intake, difficulties with meal preparation at home as evidenced by < 75% estimated energy intake > 1 mo; moderate muscle loss to temporalis, pectoralis, deltoid muscles; moderate to severe fat loss to orbitals, moderate fat loss to buccal pads  Treated with: Advance diet as medically appropriate to modest 4gm DENY given hx of CHF   Will discuss supplements with pt once diet initiated  Recommend daily weights for nutrition monitoring  Did discuss Mom's Meals with pt for ease of meal preparation at home  BMI Findings: Body mass index is 25 65 kg/m²  Coronary artery disease involving native coronary artery of native heart  Assessment & Plan  · NSTEMI September 2022  · Cardiac catheterization with minimal atherosclerotic disease, no PCI  · Continue Eliquis and simvastatin    Stage 3b chronic kidney disease [facility])  Assessment & Plan  Lab Results   Component Value Date    EGFR 67 02/20/2023    EGFR 54 02/19/2023    EGFR 68 02/18/2023    CREATININE 0 79 02/20/2023    CREATININE 0 94 02/19/2023    CREATININE 0 78 02/18/2023   · History CKD 3  · creatinine worsened to 1 5  Placed on gentle hydration hold diuretics and observe  Improved to baseline of 1 1  Stop IV fluids and restart oral Lasix as per home regimen  · Renally dose medications, avoid nephrotoxic medication    Acute cystitis  Assessment & Plan  · Presented with complaints of abdominal pain and foul-smelling urine  · UA with bacteriuria, pyuria  · Urine did not reflex to culture  · Blood culture 1 out of 2 gram-negative rods  · Empiric ceftriaxone    Chronic combined systolic and diastolic congestive heart failure (HCC)  Assessment & Plan  Wt Readings from Last 3 Encounters:   02/20/23 63 6 kg (140 lb 3 4 oz)   06/23/21 55 3 kg (121 lb 14 6 oz)   03/15/19 59 kg (130 lb)     · History chronic congestive heart failure, follows with [facility] cardiology  · Per their notes - Probable Takotsubo cardiomyopathy with recovered EF and at least moderate MR and moderate TR    · Utilizes diuretics per cardiology direction as needed if weight increases otherwise does not take them on a daily basis  · Continue Toprol   · Lisinopril 2 5 mg daily on hold due to hypotension  · Received 40 mg IV furosemide in the ED  · Patient receiving TPN, repeat chest x-ray shows vascular congestion, will place 1 dose of the Lasix and monitor the effect    Pleural effusion, bilateral  Assessment & Plan  · Moderate bilateral pleural effusions  · History of chronic systolic heart failure, reviewed care everywhere there were small bilateral pleural effusions on chest x-ray September  · Reports chronic dyspnea  repeat cxray ordered for today  restart lasix 40 mg daily today  · Continue to monitor    Enterocolitis  Assessment & Plan  · Reports 1 day of severe generalized abdominal pain associated with nausea and dry heaves  · Normal lactic acid  · CT abdomen pelvis with gastroenterocolitis  · Stool Culture including C  difficile and enteric panel negative  · Blood Culture 1 out of 2 gram-negative rods  · Empiric ceftriaxone/metronidazole -elevated procalcitonin  · Pain improved significantly at time of admission, abdomen soft, having some diarrhea but is slowly  advance diet to soft diet  · Appreciate general surgery        VTE Pharmacologic Prophylaxis: VTE Score: 4 Moderate Risk (Score 3-4) - Pharmacological DVT Prophylaxis Ordered: heparin drip  Patient Centered Rounds: I performed bedside rounds with nursing staff today  Discussions with Specialists or Other Care Team Provider: General surgery, cardiology    Education and Discussions with Family / Patient: Updated  (daughter) at bedside  Total Time Spent on Date of Encounter in care of patient: 10 minutes This time was spent on one or more of the following: performing physical exam; counseling and coordination of care; obtaining or reviewing history; documenting in the medical record; reviewing/ordering tests, medications or procedures; communicating with other healthcare professionals and discussing with patient's family/caregivers      Current Length of Stay: 10 day(s)  Current Patient Status: Inpatient   Certification Statement: The patient will continue to require additional inpatient hospital stay due to To monitor above condition  Discharge Plan: Anticipate discharge in >72 hrs to To be due to marked    Code Status: Level 3 - DNAR and DNI    Subjective:   Seen and evaluated and examined  Patient resting comfortably  Patient also has severe anxiety  Once patient gets anxious, started having tachypneic and tachycardic and short of breath  Objective:     Vitals:   Temp (24hrs), Av 5 °F (36 9 °C), Min:97 2 °F (36 2 °C), Max:99 3 °F (37 4 °C)    Temp:  [97 2 °F (36 2 °C)-99 3 °F (37 4 °C)] 99 3 °F (37 4 °C)  HR:  [] 102  Resp:  [20-48] 20  BP: (105-134)/(77-94) 105/77  SpO2:  [97 %-99 %] 97 %  Body mass index is 25 65 kg/m²  Input and Output Summary (last 24 hours): Intake/Output Summary (Last 24 hours) at 2023 1507  Last data filed at 2023 1301  Gross per 24 hour   Intake 1522 9 ml   Output 850 ml   Net 672 9 ml       Physical Exam:   Physical Exam  Vitals and nursing note reviewed  Constitutional:       Appearance: She is not diaphoretic  HENT:      Mouth/Throat:      Mouth: Mucous membranes are moist       Pharynx: Oropharynx is clear  No oropharyngeal exudate  Eyes:      General: No scleral icterus  Left eye: No discharge  Extraocular Movements: Extraocular movements intact  Conjunctiva/sclera: Conjunctivae normal       Pupils: Pupils are equal, round, and reactive to light  Cardiovascular:      Rate and Rhythm: Tachycardia present  Rhythm irregular  Heart sounds: No murmur heard  No friction rub  No gallop  Pulmonary:      Effort: Pulmonary effort is normal  No respiratory distress  Breath sounds: No stridor  No wheezing or rhonchi  Abdominal:      General: Abdomen is flat  Bowel sounds are normal  There is distension  Palpations: There is no mass  Tenderness: There is no abdominal tenderness  Hernia: No hernia is present  Musculoskeletal:         General: No swelling, tenderness or deformity  Normal range of motion  Cervical back: Normal range of motion     Skin:     Capillary Refill: Capillary refill takes less than 2 seconds  Findings: No bruising, erythema or rash  Neurological:      General: No focal deficit present  Mental Status: She is alert and oriented to person, place, and time  Cranial Nerves: No cranial nerve deficit  Motor: No weakness  Psychiatric:         Mood and Affect: Mood normal          Additional Data:     Labs:  Results from last 7 days   Lab Units 02/20/23  0546   WBC Thousand/uL 12 58*   HEMOGLOBIN g/dL 12 3   HEMATOCRIT % 37 2   PLATELETS Thousands/uL 414*   NEUTROS PCT % 80*   LYMPHS PCT % 10*   MONOS PCT % 9   EOS PCT % 0     Results from last 7 days   Lab Units 02/20/23  0546   SODIUM mmol/L 132*   POTASSIUM mmol/L 4 4   CHLORIDE mmol/L 104   CO2 mmol/L 23   BUN mg/dL 23   CREATININE mg/dL 0 79   ANION GAP mmol/L 5   CALCIUM mg/dL 7 0*   ALBUMIN g/dL 2 9*   TOTAL BILIRUBIN mg/dL 0 36   ALK PHOS U/L 39   ALT U/L 9   AST U/L 22   GLUCOSE RANDOM mg/dL 190*     Results from last 7 days   Lab Units 02/15/23  1533   INR  1 90*                   Lines/Drains:  Invasive Devices     Peripherally Inserted Central Catheter Line  Duration           PICC Line 02/16/23 4 days          Peripheral Intravenous Line  Duration           Peripheral IV 02/15/23 Right Forearm 4 days    Peripheral IV 02/16/23 Right;Ventral (anterior) Forearm 4 days    Peripheral IV 02/18/23 Right Arm 1 day          Drain  Duration           External Urinary Catheter 3 days                Central Line:  Goal for removal: Continuing for TPN  Telemetry:  Telemetry Orders (From admission, onward)             48 Hour Telemetry Monitoring  Continuous x 48 hours        References:    Telemetry Guidelines   Question:  Reason for 48 Hour Telemetry  Answer:  Arrhythmias Requiring Medical Therapy (eg  SVT, Vtach/fib, Bradycardia, Uncontrolled A-fib)                 Telemetry Reviewed: Atrial fibrillation   HR averaging rates varies  Indication for Continued Telemetry Use: Arrthymias requiring medical therapy             Imaging: No pertinent imaging reviewed  Recent Cultures (last 7 days):         Last 24 Hours Medication List:   Current Facility-Administered Medications   Medication Dose Route Frequency Provider Last Rate   • Adult TPN (CUSTOM BASE/STANDARD ELECTROLYTE)   Intravenous Continuous TPN Moriah Frederick MD 61 4 mL/hr at 02/19/23 2303   • Adult TPN (CUSTOM BASE/STANDARD ELECTROLYTE)   Intravenous Continuous TPN Musa Vazquez MD     • amiodarone  200 mg Oral BID With Meals Moriah Frederick MD     • apixaban  2 5 mg Oral BID Musa Vazquez MD     • cefTRIAXone  1,000 mg Intravenous Q24H Liz S Germaine, CRNP Stopped (02/20/23 3022)   • gabapentin  300 mg Oral HS Liz S Germaine, CRNP     • latanoprost  1 drop Both Eyes HS Liz S Germaine, CRNP     • levalbuterol  1 25 mg Nebulization Q4H PRN Luis Fernando Vazquez MD     • LORazepam  0 5 mg Intravenous Q6H PRN Musa Vazquez MD     • metoprolol  5 mg Intravenous Q6H PRN HENRY Ibarra     • metoprolol succinate  50 mg Oral BID Musa Vazquez MD     • metroNIDAZOLE  500 mg Intravenous Q8H Luis Fernando Vazquez  mg (02/20/23 1500)   • midodrine  2 5 mg Oral TID HENRY Ramírez     • ondansetron  4 mg Intravenous Q4H PRN Moriah Frederick MD     • phenol  1 spray Mouth/Throat Q2H PRN Lexy Waller PA-C     • traZODone  100 mg Oral HS Liz S Germaine, CRNP          Today, Patient Was Seen By: Moriah Frederick MD    **Please Note: This note may have been constructed using a voice recognition system  **

## 2023-02-20 NOTE — ASSESSMENT & PLAN NOTE
· History paroxysmal atrial fibrillation chronically maintained on metoprolol succinate 25 mg daily   · chronic anticoagulation with Eliquis 2 5 mg twice daily for elevated [id number] stroke risk  · Initially patient received some IV Cardizem, then placed on beta-blocker, later on Cardizem short-acting added after that as per cardiology recommendation, amiodarone added which then transition to amiodarone drip due to n p o  status from small bowel obstruction  · P o  medication remain on hold-consider to resume once patient able to tolerate p o  · Was on Eliquis, transition to heparin drip as per ACS protocol due to atrial fibrillation to prevent complications-'s, benefits, side effects discussed in details with patient's son her family member including bleeding risk  · Transition to p o  metoprolol and amiodarone-continue to monitor  · Patient has poor IV access, and have trouble to check PTT on regular basis  Discussed with cardiology and general surgery, agrees to proceed with p o  Eliquis  Heparin discontinued, patient placed on Eliquis    Daughter updated at bedside

## 2023-02-20 NOTE — ASSESSMENT & PLAN NOTE
Lab Results   Component Value Date    EGFR 67 02/20/2023    EGFR 54 02/19/2023    EGFR 68 02/18/2023    CREATININE 0 79 02/20/2023    CREATININE 0 94 02/19/2023    CREATININE 0 78 02/18/2023   · History CKD 3  · creatinine worsened to 1 5  Placed on gentle hydration hold diuretics and observe  Improved to baseline of 1 1    Stop IV fluids and restart oral Lasix as per home regimen  · Renally dose medications, avoid nephrotoxic medication

## 2023-02-20 NOTE — ASSESSMENT & PLAN NOTE
Since patient was throwing up, repeat CAT scan done which shows a small bowel obstruction  General surgery consult appreciated, recommends conservative management,  Status post NG tube in place, continue suction  Patient was on Eliquis-transition to heparin drip patient remain n p o  Continue gentle hydration, while patient remained on IV hydration follow for signs symptoms of volume overload-patient is status post PICC placement for consideration of TPN    Once TPN is started, consider to discontinue IV hydration considering patient's cardiac conditions  Patient placed back to n p o  status except medications as per surgery team

## 2023-02-20 NOTE — ASSESSMENT & PLAN NOTE
Malnutrition Findings:   Adult Malnutrition type: Chronic illness  Adult Degree of Malnutrition: Malnutrition of moderate degree  Malnutrition Characteristics: Muscle loss, Fat loss, Inadequate energy                  360 Statement: Chronic moderate malnutrition related to decreased oral intake, difficulties with meal preparation at home as evidenced by < 75% estimated energy intake > 1 mo; moderate muscle loss to temporalis, pectoralis, deltoid muscles; moderate to severe fat loss to orbitals, moderate fat loss to buccal pads  Treated with: Advance diet as medically appropriate to modest 4gm DENY given hx of CHF  Will discuss supplements with pt once diet initiated  Recommend daily weights for nutrition monitoring  Did discuss Mom's Meals with pt for ease of meal preparation at home  BMI Findings: Body mass index is 25 65 kg/m²

## 2023-02-20 NOTE — PROGRESS NOTES
Progress Note - General Surgery   Eli York 80 y o  female MRN: [id number]  Unit/Bed#: -01 Encounter: [id number]    Assessment:  80 y o  female w/ gastroenterocoloitis, SBO on repeat imaging   -NGT removed 2/17/23, diet advanced to clear liquids yesterday  -intermittent nausea and increased abdominal distension today, no vomiting  -passing flatus, no further BM's, last was early yesterday  - WBC elevated today at 12 58 (8 05, 7 49)  - Afebrile, RVR  - Hgb stable   - Stool studies normal  - Hx CAD, CKD, CHF, A-fib with RVR    Plan:   -NPO  -Continue TPN  - IVF  - IV abx - Ceftriaxone, Flagyl for enterocolitis  - Monitor I/Os  - Co-morbidities per primary service    Subjective:   She is having nausea intermittently, no vomiting and denies any abdominal pain  Her last bowel movement was approx 24 hours ago  She states she is drinking small amounts of her clears but does not have much appetite  Objective:   Blood pressure 116/87, pulse (!) 110, temperature 98 7 °F (37 1 °C), temperature source Temporal, resp  rate 22, height 5' 2" (1 575 m), weight 53 3 kg (117 lb 8 1 oz), SpO2 99 %  ,Body mass index is 21 49 kg/m²      Intake/Output Summary (Last 24 hours) at 2/20/2023 8569  Last data filed at 2/20/2023 2977  Gross per 24 hour   Intake 2589 18 ml   Output 150 ml   Net 2439 18 ml     Invasive Devices     Peripherally Inserted Central Catheter Line  Duration           PICC Line 02/16/23 3 days          Peripheral Intravenous Line  Duration           Peripheral IV 02/15/23 Right Forearm 4 days    Peripheral IV 02/16/23 Right;Ventral (anterior) Forearm 3 days    Peripheral IV 02/18/23 Right Arm 1 day          Drain  Duration           External Urinary Catheter 3 days              Physical Exam:  General: no acute distress, pt appears well and comfortable  Skin: warm and dry to touch  Pulmonary: normal effort  Abdominal: +distended, +hyperactive BS x 4, no tenderness to palpation across abdomen, no guarding or rebound  Neuro: alert and oriented     Lab, Imaging and other studies:  I have personally reviewed pertinent lab results    , CBC:   Lab Results   Component Value Date    WBC 12 58 (H) 02/20/2023    HGB 12 3 02/20/2023    HCT 37 2 02/20/2023    MCV 86 02/20/2023     (H) 02/20/2023    MCH 28 3 02/20/2023    MCHC 33 1 02/20/2023    RDW 14 8 02/20/2023    MPV 9 3 02/20/2023    NRBC 0 02/20/2023   , CMP:   Lab Results   Component Value Date    SODIUM 132 (L) 02/20/2023    K 4 4 02/20/2023     02/20/2023    CO2 23 02/20/2023    BUN 23 02/20/2023    CREATININE 0 79 02/20/2023    CALCIUM 7 0 (L) 02/20/2023    AST 22 02/20/2023    ALT 9 02/20/2023    ALKPHOS 39 02/20/2023    EGFR 67 02/20/2023     VTE Pharmacologic Prophylaxis: Heparin  VTE Mechanical Prophylaxis: sequential compression device    Olga Cunningham  2/20/2023

## 2023-02-20 NOTE — PHYSICAL THERAPY NOTE
Physical Therapy Cancellation Note    Patient Name: Clint De Los Santos  [id number] Date: 2/20/23 02/20/23 2071   Note Type   Note Type Cancelled Session   Cancel Reasons Refusal     Attempted to see pt this PM for therapy however cancelled due to pt refusal  Pt reporting she doesn't feel well, feels weak and tired  Will continue to follow pt and provide care as able      Ilana Pinzon, PT, DPT

## 2023-02-20 NOTE — ASSESSMENT & PLAN NOTE
Goals of care discussion is done with patient, patient's 2 daughters in the bedside and caring nurse  Patient prefers to be :  DNR/DNI-no heroic measurement IF her condition is terminal-and no other measurement is effective    POLST form signed and placed in her paper chart  Continue current treatment and adjust treatment plan based on patient response

## 2023-02-20 NOTE — PROGRESS NOTES
Pt was on clear liquids diet 2/19, per surgery note had intermittent nausea and abdominal distention, pt was placed back on NPO status  Last weight obtained 2/10, requested current weight to RN, ?daily weights indicated for CHF  Discussed with MD, continue with current fluid amount via TPN and NPO  Pt likely to have exceeded 1500 ml fluids yesterday while on clear liquids + TPN  Will provide ongoing TPN recommendations as indicated  Micronutrients per pharmacy

## 2023-02-20 NOTE — ASSESSMENT & PLAN NOTE
Wt Readings from Last 3 Encounters:   02/20/23 63 6 kg (140 lb 3 4 oz)   06/23/21 55 3 kg (121 lb 14 6 oz)   03/15/19 59 kg (130 lb)     · History chronic congestive heart failure, follows with [facility] cardiology  · Per their notes - Probable Takotsubo cardiomyopathy with recovered EF and at least moderate MR and moderate TR    · Utilizes diuretics per cardiology direction as needed if weight increases otherwise does not take them on a daily basis  · Continue Toprol   · Lisinopril 2 5 mg daily on hold due to hypotension  · Received 40 mg IV furosemide in the ED  · Patient receiving TPN, repeat chest x-ray shows vascular congestion, will place 1 dose of the Lasix and monitor the effect

## 2023-02-20 NOTE — ASSESSMENT & PLAN NOTE
1 set of blood culture shows gram-negative rods, para Bacteroides distasonis-sensitive to metronidazole, patient already on metronidazole since 2/10/2023  Second set of blood culture shows no growth  Since blood culture is gram-negative rods, does not need repeat blood culture    Continue IV antibiotic in total of 7 to 10 days  Pending ID consult

## 2023-02-20 NOTE — PLAN OF CARE
Problem: Nutrition/Hydration-ADULT  Goal: Nutrient/Hydration intake appropriate for improving, restoring or maintaining nutritional needs  Description: Monitor and assess patient's nutrition/hydration status for malnutrition  Collaborate with interdisciplinary team and initiate plan and interventions as ordered  Monitor patient's weight and dietary intake as ordered or per policy  Utilize nutrition screening tool and intervene as necessary  Determine patient's food preferences and provide high-protein, high-caloric foods as appropriate       INTERVENTIONS:  - Monitor oral intake, urinary output, labs, and treatment plans  - Assess nutrition and hydration status and recommend course of action  - Evaluate amount of meals eaten  - Assist patient with eating if necessary   - Allow adequate time for meals  - Recommend/ encourage appropriate diets, oral nutritional supplements, and vitamin/mineral supplements  - Order, calculate, and assess calorie counts as needed  - Recommend, monitor, and adjust tube feedings and TPN/PPN based on assessed needs  - Assess need for intravenous fluids  - Provide specific nutrition/hydration education as appropriate  - Include patient/family/caregiver in decisions related to nutrition  Outcome: Progressing     Problem: PAIN - ADULT  Goal: Verbalizes/displays adequate comfort level or baseline comfort level  Description: Interventions:  - Encourage patient to monitor pain and request assistance  - Assess pain using appropriate pain scale0-10  - Administer analgesics based on type and severity of pain and evaluate response  - Implement non-pharmacological measures as appropriate and evaluate response  - Consider cultural and social influences on pain and pain management  - Notify physician/advanced practitioner if interventions unsuccessful or patient reports new pain  Outcome: Progressing     Problem: INFECTION - ADULT  Goal: Absence or prevention of progression during hospitalization  Description: INTERVENTIONS:  - Assess and monitor for signs and symptoms of infection  - Monitor lab/diagnostic results  - Monitor all insertion sites, i e  indwelling lines, tubes, and drains  - Monitor endotracheal if appropriate and nasal secretions for changes in amount and color  - [place] appropriate cooling/warming therapies per order  - Administer medications as ordered  - Instruct and encourage patient and family to use good hand hygiene technique  - Identify and instruct in appropriate isolation precautions for identified infection/condition  Outcome: Progressing     Problem: SAFETY ADULT  Goal: Patient will remain free of falls  Description: INTERVENTIONS:  - Educate patient/family on patient safety including physical limitations  - Instruct patient to call for assistance with activity   - Consult OT/PT to assist with strengthening/mobility   - Keep Call bell within reach  - Keep bed low and locked with side rails adjusted as appropriate  - Keep care items and personal belongings within reach  - Initiate and maintain comfort rounds  - Make Fall Risk Sign visible to staff  - Apply yellow socks and bracelet for high fall risk patients  - Consider moving patient to room near nurses station  Outcome: Progressing  Goal: Maintain or return to baseline ADL function  Description: INTERVENTIONS:  -  Assess patient's ability to carry out ADLs; assess patient's baseline for ADL function and identify physical deficits which impact ability to perform ADLs (bathing, care of mouth/teeth, toileting, grooming, dressing, etc )  - Assess/evaluate cause of self-care deficits   - Assess range of motion  - Assess patient's mobility; develop plan if impaired  - Assess patient's need for assistive devices and provide as appropriate  - Encourage maximum independence but intervene and supervise when necessary  - Involve family in performance of ADLs  - Assess for home care needs following discharge   - Consider OT consult to assist with ADL evaluation and planning for discharge  - Provide patient education as appropriate  Outcome: Progressing  Goal: Maintains/Returns to pre admission functional level  Description: INTERVENTIONS:  - Perform BMAT or MOVE assessment daily    - Set and communicate daily mobility goal to care team and patient/family/caregiver  - Collaborate with rehabilitation services on mobility goals if consulted  - Perform Range of Motion 3 times a day  - Reposition patient every 2 hours if pt unable to reposition self  - Out of bed for toileting  - Record patient progress and toleration of activity level   Outcome: Progressing     Problem: DISCHARGE PLANNING  Goal: Discharge to home or other facility with appropriate resources  Description: INTERVENTIONS:  - Identify barriers to discharge w/patient and caregiver  - Arrange for needed discharge resources and transportation as appropriate  - Identify discharge learning needs (meds, wound care, etc )  - Arrange for interpretive services to assist at discharge as needed  - Refer to Case Management Department for coordinating discharge planning if the patient needs post-hospital services based on physician/advanced practitioner order or complex needs related to functional status, cognitive ability, or social support system  Outcome: Progressing     Problem: Knowledge Deficit  Goal: Patient/family/caregiver demonstrates understanding of disease process, treatment plan, medications, and discharge instructions  Description: Complete learning assessment and assess knowledge base    Interventions:  - Provide teaching at level of understanding  - Provide teaching via preferred learning methods  Outcome: Progressing     Problem: CARDIOVASCULAR - ADULT  Goal: Maintains optimal cardiac output and hemodynamic stability  Description: INTERVENTIONS:  - Monitor I/O, vital signs and rhythm  - Monitor for S/S and trends of decreased cardiac output  - Administer and titrate ordered vasoactive medications to optimize hemodynamic stability  - Assess quality of pulses, skin color and temperature  - Assess for signs of decreased coronary artery perfusion  - Instruct patient to report change in severity of symptoms  Outcome: Progressing  Goal: Absence of cardiac dysrhythmias or at baseline rhythm  Description: INTERVENTIONS:  - Continuous cardiac monitoring, vital signs, obtain 12 lead EKG if ordered  - Administer antiarrhythmic and heart rate control medications as ordered  - Monitor electrolytes and administer replacement therapy as ordered  Outcome: Progressing     Problem: GASTROINTESTINAL - ADULT  Goal: Minimal or absence of nausea and/or vomiting  Description: INTERVENTIONS:  - Administer IV fluids if ordered to ensure adequate hydration  - Maintain NPO status until nausea and vomiting are resolved  - Nasogastric tube if ordered  - Administer ordered antiemetic medications as needed  - Provide nonpharmacologic comfort measures as appropriate  - Advance diet as tolerated, if ordered  - Consider nutrition services referral to assist patient with adequate nutrition and appropriate food choices  Outcome: Progressing  Goal: Maintains or returns to baseline bowel function  Description: INTERVENTIONS:  - Assess bowel function  - Encourage oral fluids to ensure adequate hydration  - Administer IV fluids if ordered to ensure adequate hydration  - Administer ordered medications as needed  - Encourage mobilization and activity  - Consider nutritional services referral to assist patient with adequate nutrition and appropriate food choices  Outcome: Progressing  Goal: Maintains adequate nutritional intake  Description: INTERVENTIONS:  - Monitor percentage of each meal consumed  - Identify factors contributing to decreased intake, treat as appropriate  - Assist with meals as needed  - Monitor I&O, weight, and lab values if indicated  - Obtain nutrition services referral as needed  Outcome: Progressing     Problem: MOBILITY - ADULT  Goal: Maintain or return to baseline ADL function  Description: INTERVENTIONS:  -  Assess patient's ability to carry out ADLs; assess patient's baseline for ADL function and identify physical deficits which impact ability to perform ADLs (bathing, care of mouth/teeth, toileting, grooming, dressing, etc )  - Assess/evaluate cause of self-care deficits   - Assess range of motion  - Assess patient's mobility; develop plan if impaired  - Assess patient's need for assistive devices and provide as appropriate  - Encourage maximum independence but intervene and supervise when necessary  - Involve family in performance of ADLs  - Assess for home care needs following discharge   - Consider OT consult to assist with ADL evaluation and planning for discharge  - Provide patient education as appropriate  Outcome: Progressing  Goal: Maintains/Returns to pre admission functional level  Description: INTERVENTIONS:  - Perform BMAT or MOVE assessment daily    - Set and communicate daily mobility goal to care team and patient/family/caregiver  - Collaborate with rehabilitation services on mobility goals if consulted  - Perform Range of Motion 3 times a day    - Reposition patient every 2 hours if pt unable to reposition self  - Out of bed for toileting  - Record patient progress and toleration of activity level   Outcome: Progressing     Problem: Prexisting or High Potential for Compromised Skin Integrity  Goal: Skin integrity is maintained or improved  Description: INTERVENTIONS:  - Identify patients at risk for skin breakdown  - Assess and monitor skin integrity  - Assess and monitor nutrition and hydration status  - Monitor labs   - Assess for incontinence   - Turn and reposition patient  - Assist with mobility/ambulation  - Relieve pressure over bony prominences  - Avoid friction and shearing  - Provide appropriate hygiene as needed including keeping skin clean and dry  - Evaluate need for skin moisturizer/barrier cream  - Collaborate with interdisciplinary team   - Patient/family teaching  - Consider wound care consult   Outcome: Progressing

## 2023-02-20 NOTE — PROGRESS NOTES
Progress Note - Cardiology   Sam Michaels 80 y o  female MRN: [id number]  Unit/Bed#: -01 Encounter: [id number]    Assessment:  Enterocolitis with SBO, on clear liquids over weekend with worsening symptoms now NPO with sips w meds   Persistent afib with rvr (previously paroxysmal prior to admission)   - was followed by [facility] cardiology last week    - due to issues with hypotension unable to titrate BB, on midodrine   - discussion with EP was had regarding amiodarone use given patient has not missed doses of Eliquis and on heparin drip   - now sp amiodarone loading on oral amiodarone   Chronic HFpEF   CKD3  CAD with mild non obstructive CAD by [address] [place] [address] 9/2022    Plan:  1  Given patient is already on amiodarone and has been loaded w no evidence of missed doses of Eliquis and on Heparin will continue for now  Previous did already talk to EP provider who cares for this patient who agreed to this plan  2  Continue anticoagulation a plan to transition to Eliquis when able   3  Treat underlying SBO per medicine/surgical team   4  No further changes in regimen at this time  Monitor on telemetry  Subjective/Objective     Subjective: sleeping  Daughter at bedside  Reports patient had a lot of anxiety over the morning hours  Objective: now NPO with sips w meds  On oral amiodarone  Remains on heparin drip       Vitals: /89   Pulse 102   Temp (!) 97 2 °F (36 2 °C) (Temporal)   Resp (!) 25   Ht 5' 2" (1 575 m)   Wt 63 6 kg (140 lb 3 4 oz)   SpO2 99%   BMI 25 65 kg/m²   Vitals:    02/10/23 0515 02/20/23 0910   Weight: 53 3 kg (117 lb 8 1 oz) 63 6 kg (140 lb 3 4 oz)     Orthostatic Blood Pressures    Flowsheet Row Most Recent Value   Blood Pressure 131/89 filed at 02/20/2023 4646   Patient Position - Orthostatic VS Lying filed at 02/20/2023 0530            Intake/Output Summary (Last 24 hours) at 2/20/2023 1042  Last data filed at 2/20/2023 0612  Gross per 24 hour   Intake 2239 42 ml   Output 150 ml   Net 6949 56 ml       Invasive Devices     Peripherally Inserted Central Catheter Line  Duration           PICC Line 02/16/23 3 days          Peripheral Intravenous Line  Duration           Peripheral IV 02/15/23 Right Forearm 4 days    Peripheral IV 02/16/23 Right;Ventral (anterior) Forearm 3 days    Peripheral IV 02/18/23 Right Arm 1 day          Drain  Duration           External Urinary Catheter 3 days                  Physical Exam: /89   Pulse 102   Temp (!) 97 2 °F (36 2 °C) (Temporal)   Resp (!) 25   Ht 5' 2" (1 575 m)   Wt 63 6 kg (140 lb 3 4 oz)   SpO2 99%   BMI 25 65 kg/m²   General appearance: sleeping  Head: Normocephalic, without obvious abnormality, atraumatic  Lungs: clear to auscultation bilaterally  Heart: irregular rhythm  Extremities: extremities normal, warm and well-perfused; no cyanosis, clubbing, or edema  Skin: Skin color, texture, turgor normal  No rashes or lesions    Lab Results:   I have personally reviewed pertinent lab results      CBC with diff:   Results from last 7 days   Lab Units 02/20/23  0546   WBC Thousand/uL 12 58*   RBC Million/uL 4 34   HEMOGLOBIN g/dL 12 3   HEMATOCRIT % 37 2   MCV fL 86   MCH pg 28 3   MCHC g/dL 33 1   RDW % 14 8   MPV fL 9 3   PLATELETS Thousands/uL 414*     CMP:   Results from last 7 days   Lab Units 02/20/23  0546   SODIUM mmol/L 132*   CHLORIDE mmol/L 104   CO2 mmol/L 23   BUN mg/dL 23   CREATININE mg/dL 0 79   CALCIUM mg/dL 7 0*   AST U/L 22   ALT U/L 9   ALK PHOS U/L 39   EGFR ml/min/1 73sq m 67     HS Troponin:   0   Lab Value Date/Time    HSTNI0 15 02/10/2023 0138     BNP:   Results from last 7 days   Lab Units 02/20/23  0546   POTASSIUM mmol/L 4 4   CHLORIDE mmol/L 104   CO2 mmol/L 23   BUN mg/dL 23   CREATININE mg/dL 0 79   CALCIUM mg/dL 7 0*   EGFR ml/min/1 73sq m 67     Coags:   Results from last 7 days   Lab Units 02/20/23  0546 02/15/23  2333 02/15/23  1533   PTT seconds 46*   < > 34   INR   --   --  1 90*    < > = values in this interval not displayed  TSH:     Magnesium:   Results from last 7 days   Lab Units 02/20/23  0546   MAGNESIUM mg/dL 2 1     Lipid Profile:   Results from last 7 days   Lab Units 02/19/23  0628   TRIGLYCERIDES mg/dL 72     Imaging: I have personally reviewed pertinent reports      EKG: afib with ventricular rate 72 bpm

## 2023-02-21 NOTE — OCCUPATIONAL THERAPY NOTE
Occupational Therapy Progress Note     Patient Name: Monse Rubio  PKGIE'K Date: 2/21/2023  Problem List  Principal Problem:    Small bowel obstruction ([facility] )  Active Problems:    Atrial fibrillation with RVR (HCC)    Enterocolitis    Pleural effusion, bilateral    Chronic combined systolic and diastolic congestive heart failure (HCC)    Acute cystitis    Stage 3b chronic kidney disease ([facility] )    Coronary artery disease involving native coronary artery of native heart    Moderate protein-calorie malnutrition ([facility] )    Bacteremia    PICC (peripherally inserted central catheter) in place    Goals of care, counseling/discussion       02/21/23 1404   Note Type   Note Type Treatment   Pain Assessment   Pain Assessment Tool 0-10   Pain Score No Pain   Restrictions/Precautions   Other Precautions Chair Alarm; Bed Alarm;Multiple lines; Fall Risk;Hard of hearing   ADL   LB Dressing Assistance 4  Minimal Assistance   LB Dressing Deficit Setup; Requires assistive device for steadying;Verbal cueing;Supervision/safety; Increased time to complete   LB Dressing Comments Pt able to thread briefs and pull up while standing with Min A and significant verbal cueing  Bed Mobility   Supine to Sit 4  Minimal assistance   Additional items Assist x 1; Increased time required;Verbal cues  (trunk management)   Additional Comments Pt supine in bed at beginning of session  Supine to sit @ Min A for trunk management  Pt able to maintain seated at EOB for approximately 5 minutes in between standing trials @ S with c/o nausea     Transfers   Sit to Stand   [facility])   Additional items Increased time required;Verbal cues   Stand to Sit   (Steadying)   Additional items Increased time required;Verbal cues   Stand pivot   (Steadying assist)   Additional items Increased time required;Verbal cues   Additional Comments Initial STS from EOB to RW @ Steadying assist  Pt able to stand for approximately 1-2 minutes while completing LB dressing but then requesting seat d/t fatigue and anxiety  Second STS @ Steadying assist  Pt able to complete short-distance functional mobility in room with RW @ Steadying assist progressing to SBA  Pt seated OOB in chair at end of session with chair alarm intact, call bell within reach and all needs mett  Cognition   Overall Cognitive Status WFL   Arousal/Participation Alert; Responsive; Cooperative   Attention Attends with cues to redirect   Orientation Level Oriented X4   Memory Within functional limits   Following Commands Follows one step commands with increased time or repetition   Comments Pt with increased verbal cueing for task initiation and sequencing of task  Daughter present and reports pt cognition is intact, she is just significantly [facility]   Activity Tolerance   Activity Tolerance Patient limited by fatigue   Medical Staff Made Aware Spoke with PT Vangie Hawley and PAIGE Stubbs   Assessment   Assessment Pt completed OT tx session #2 focused on ADL performance and functional mobility  Pt alert and agreeable to participate  PT/OT co-treat completed d/t significant mobility deficits and safety concerns  Pt demonstrated improvements in LB ADL performance and endurance this session but continues to be limited by fatigue and anxiety  Pt currently completing UB ADLs @ Min A, LB ADLs @ Min A, and functional mobility with RW @ Steadying assist  Pt demonstrating Good participation and Good potential to achieve goals but is currently demonstrating deficits in decrease activity tolerance, decrease standing balance, decrease performance during ADL tasks, decrease generalized strength, decrease activity engagement and decrease performance during functional transfers  Continue to recommend [address] [place] [address] services upon discharge to increase safety and independence in ADL tasks and functional mobility  Plan   Treatment Interventions ADL retraining;Functional transfer training; Endurance training   Goal Expiration Date 02/28/23   OT Treatment Day 2   OT Frequency 3-5x/wk   Recommendation   OT Discharge Recommendation Home with home health rehabilitation   Additional Comments  (S)  Daughter present and reports she will be staying with her upon discharge until she recovers in a home with 5 [address] and first floor set-up  Daughter is an OT at a SNF and will alter her schedule to best fit pt's needs  Agreeable to [facility]  AM-[facility] Daily Activity Inpatient   Lower Body Dressing 3   Bathing 3   Toileting 3   Upper Body Dressing 3   Grooming 3   Eating 4   Daily Activity Raw Score 19   Daily Activity Standardized Score (Calc for Raw Score >=11) 40 22   [facility]   Following a Speech/Presentation 3   Understanding Ordinary Conversation 4   Taking Medications 3   Remembering Where Things Are Placed or Put Away 4   Remembering List of 4-5 Errands 4   Taking Care of Complicated Tasks 4   Applied Cognition Raw Score 22   Applied Cognition Standardized Score 47 83     The patient's raw score on the AM-PAC Daily Activity Inpatient Short Form is 19  A raw score of greater than or equal to 19 suggests the patient may benefit from discharge to home  Please refer to the recommendation of the Occupational Therapist for safe discharge planning  Pt goals to be met by 2/28/2023     1  Pt will demonstrate ability to complete LB dressing @ Mod I in order to increase safety and independence during meaningful tasks  2  Pt will demonstrate ability to ailyn/doff socks/shoes while sitting EOB @ Mod I in order to increase safety and independence during meaningful tasks  3  Pt will demonstrate ability to complete toileting tasks including CM and pericare @ Mod I in order to increase safety and independence during meaningful tasks  4  Pt will demonstrate ability to complete EOB, chair, toilet/commode transfers @ Mod I in order to increase performance and participation during functional tasks    5  Pt will demonstrate ability to stand for 7-8 minutes while maintaining G balance with use of RW for UB support PRN  6  Pt will demonstrate ability to tolerate 30-35 minute OT session with no vc'ing for deep breathing or use of energy conservation techniques in order to increase activity tolerance during functional tasks  7  Pt will demonstrate Good carryover of use of energy conservation/compensatory strategies during ADLs and functional tasks in order to increase safety and reduce risk for falls  8  Pt will demonstrate Good attention and participation in continued evaluation of functional ambulation house hold distances in order to assist with safe d/c planning  9  Pt will attend to continued cognitive assessments 100% of the time in order to provide most appropriate d/c recommendations  10  Pt will follow 100% simple 2-step commands and be A&O x4 consistently with environmental cues to increase participation in functional activities  11  Pt will identify 3 areas of interest/hobbies and 1 intervention on how to incorporate into daily life in order to increase interaction with environment and peers as well as increase participation in meaningful tasks  12  Pt will demonstrate 100% carryover of BUE HEP in order to increase BUE MS and increase performance during functional tasks upon d/c home      Michale Kocher, OTR/L

## 2023-02-21 NOTE — PLAN OF CARE
Problem: OCCUPATIONAL THERAPY ADULT  Goal: Performs self-care activities at highest level of function for planned discharge setting  See evaluation for individualized goals  Description: Treatment Interventions: ADL retraining, Functional transfer training, UE strengthening/ROM, Endurance training, Patient/family training, Equipment evaluation/education, Neuromuscular reeducation, Compensatory technique education, Continued evaluation, Energy conservation, Activityengagement          See flowsheet documentation for full assessment, interventions and recommendations  Outcome: Progressing  Note: Limitation: Decreased ADL status, Decreased UE strength, Decreased Safe judgement during ADL, Decreased endurance, Decreased self-care trans, Decreased high-level ADLs  Prognosis: Good  Assessment: Pt completed OT tx session #2 focused on ADL performance and functional mobility  Pt alert and agreeable to participate  PT/OT co-treat completed d/t significant mobility deficits and safety concerns  Pt demonstrated improvements in LB ADL performance and endurance this session but continues to be limited by fatigue and anxiety  Pt currently completing UB ADLs @ Min A, LB ADLs @ Min A, and functional mobility with RW @ Steadying assist  Pt demonstrating Good participation and Good potential to achieve goals but is currently demonstrating deficits in decrease activity tolerance, decrease standing balance, decrease performance during ADL tasks, decrease generalized strength, decrease activity engagement and decrease performance during functional transfers  Continue to recommend [address] [place] [address] services upon discharge to increase safety and independence in ADL tasks and functional mobility       OT Discharge Recommendation: Home with home health rehabilitation

## 2023-02-21 NOTE — PHYSICAL THERAPY NOTE
PHYSICAL THERAPY TREATMENT NOTE  NAME:  Remy Chandler  DATE: 02/21/23    Length Of Stay: 11  Performed at least 2 patient identifiers during session: Name and Birthday    TREATMENT FLOW SHEET:    02/21/23 1359   PT Last Visit   PT Visit Date 02/21/23   Note Type   Note Type Treatment   Pain Assessment   Pain Assessment Tool 0-10   Restrictions/Precautions   Other Precautions Chair Alarm; Bed Alarm;Multiple lines; Fall Risk;Hard of hearing   General   Chart Reviewed Yes   Additional Pertinent History Edema noted B/L UE/LE's   Response to Previous Treatment Patient with no complaints from previous session  Family/Caregiver Present Yes  (daughter Celso Aquino present)   Cognition   Overall Cognitive Status WFL   Arousal/Participation Responsive; Alert; Cooperative   Attention Attends with cues to redirect   Orientation Level Oriented X4   Memory Within functional limits   Bed Mobility   Rolling R 5  Supervision   Additional items Assist x 1;Bedrails; Impulsive;Verbal cues   Supine to Sit 4  Minimal assistance   Additional items Assist x 1; Increased time required;Verbal cues; Bedrails  (trunk management)   Additional Comments Patient supine in bed at start of session with alarms in place and all needs within reach  Rolling to R with use of bed enabler, VC's and increased time @ S  Patient completed supine > with min A x 1 for trunk management with increased time, use of bed enabler and VC's for sequencing   Transfers   Sit to Stand   (steadying assist)   Additional items Increased time required;Verbal cues; Assist x 1   Stand to Sit   (steadying assist)   Additional items Assist x 1; Increased time required;Verbal cues   Stand pivot   (steadying assist)   Additional items Assist x 1; Increased time required;Verbal cues   Additional Comments Patient completed StS EOB with streaying assist x 1, increased time and Vc's  Rw for UB support t/o    Patient tolerated standing for 1-2' during hygiene and clothing management with steadying assist/fair/fair  minus balance  Patient provided with reassurance, encouragement and cues D/T increased anxiety  Additional STS/SPT at SBA/steadying assist x 1 RW for UB support  Ambulation/Elevation   Gait pattern Wide TYRESE; Decreased foot clearance; Foward flexed; Excessively slow; Short stride   Gait Assistance   (steadying assist)   Additional items Assist x 1;Verbal cues   Assistive Device Rolling walker   Distance gait training 12' with Rw for UB support  Patient initally provided with steadying assist D/T anxiety and overall hesitation, but reducde A level to SBA after a few steps  VC's for AD management, directional changes and improved gait quality  Balance   Static Sitting Good   Dynamic Sitting Fair +   Static Standing Fair   Dynamic Standing Fair -   Ambulatory Fair -   Endurance Deficit   Endurance Deficit Yes   Endurance Deficit Description   (fatigue)   Activity Tolerance   Activity Tolerance Patient limited by fatigue   Medical Staff Made Aware Spoke with OT Betty 48 with PAIGE forte   Assessment   Prognosis Good   Problem List Decreased strength;Decreased endurance; Impaired balance;Decreased mobility; Impaired hearing;Decreased skin integrity   Barriers to Discharge Inaccessible home environment;Decreased caregiver support   Barriers to Discharge Comments Patient requires assist for all mobility at this time and is the primary caregiver for spouse  Patient with plans to D/C to daughters home with a first floor set-up and 5 JOSH in order to recuperate  Goals   Patient Goals "to go home"   PT Treatment Day 3   Plan   Treatment/Interventions Functional transfer training;LE strengthening/ROM; Elevations; Therapeutic exercise; Endurance training;Patient/family training;Equipment eval/education; Bed mobility;Gait training;Spoke to nursing;OT   Progress Progressing toward goals   PT Frequency 3-5x/wk   Recommendation   PT Discharge Recommendation Home with home health rehabilitation Equipment Recommended   (Owns a RW)   Additional Comments Patient with plans to D/C with increased support to daughters home with a first floor set-up and 5 JOSH in order to recuperate  AM-P[facility] Basic Mobility Inpatient   Turning in Flat Bed Without Bedrails 3   Lying on Back to Sitting on Edge of Flat Bed Without Bedrails 3   Moving Bed to Chair 3   Standing Up From Chair Using Arms 3   Walk in Room 3   Climb 3-5 Stairs With Railing 3   Basic Mobility Inpatient Raw Score 18   Basic Mobility Standardized Score 41 05   Highest Level Of Mobility   JH-HLM Goal 6: Walk 10 steps or more   JH-HLM Achieved 6: Walk 10 steps or more   Education   Education Provided Mobility training;Home exercise program;Assistive device   Patient Reinforcement needed;Demonstrates acceptance/verbal understanding   End of Consult   Patient Position at End of Consult Bedside chair;Bed/Chair alarm activated; All needs within reach       The patient's AM-[facility] Basic Mobility Inpatient Short Form Raw Score is 18  A Raw score of greater than 16 suggests the patient may benefit from discharge to home with HHPT and family support  Please also refer to the recommendation of the Physical Therapist for safe discharge planning  Patient was seen for skilled PT session 3 this date with interventions to include therapeutic activities for BM, transfer training, balance and functional activity tolerance as well as gait training with Rw  Patient completed all mobility tasks SB/steadying assist x 1 including ambulation 12' with cues for increased foot clearance, AD management and directional changes  Patient tolerated standing for 1-2' with Rw for UB support SB/steadying assist fair balance during clothing management nad hygiene  Dtg present for session and provided encouragement and support as patient was noted to have overall anxiety which was increased prior to mobility tasks  Patient on RA t/o session with stable vital response to treatment    Patient demonstrated positive progress toward goals with increased mobility tolerance and performance since transition to ICU  Patient with plans to D/C to daughters home with increased family support and a first floor set-up and 5 JOSH, in order to recuperate  Patient tolerated session well and should cont  Skilled PT during her acute inpatient stay as she is functioning below her baseline f/b D/C to dtg's home with increased family support, Rw and HHPT           Dianna Goel, PT, MSPT

## 2023-02-21 NOTE — CONSULTS
Consultation - Infectious Disease   Sam Michaels 80 y o  female MRN: [id number]  Unit/Bed#: -01 Encounter: [id number]      Inpatient consult to Infectious Diseases  Consult performed by: Eben Lopez MD  Consult ordered by: Aracely Esposito MD            REQUIRED DOCUMENTATION:     1  This service was provided via Telemedicine  2  Provider located at [facility]  3  TeleMed provider: Eben Lopez MD   4  Identify all parties in room with patient during tele consult:RN  5  After connecting through [employer], patient was identified by name and date of birth and assistant checked wristband  Patient was then informed that this was a Telemedicine visit and that the exam was being conducted confidentially over secure lines  My office door was closed  No one else was in the room  Patient acknowledged consent and understanding of privacy and security of the Telemedicine visit, and gave us permission to have the assistant stay in the room in order to assist with the history and to conduct the exam   I informed the patient that I have reviewed their record in Epic and presented the opportunity for them to ask any questions regarding the visit today  The patient agreed to participate  Assessment/Recommendations     1  Parabacteroides bacteremia  - Source of bacteremia is likely acute gastroenterocolitis/gut translocation  Rule out intra-abdominal abscess  - Clinically improving, afebrile but with new worsening leukocytosis    · Continue current antibiotic therapy with metronidazole 500 3 times daily anticipate completing course through 2/23  · Monitor clinical course, additional management as below    2    Acute gastroenterocolitis  -Diagnosis suspected clinically and on imaging  -Enteric panel and C  difficile negative  -Complicated by SBO, rule out abscess, GI symptoms improved except for mild diarrhea    · Continue supportive care, monitor stool output and abdominal exams, additional management as below    3  Leukocytosis, small bowel obstruction  -Complicating hospital course, recent CT on 2/15 noted small bowel obstruction with a point of obstruction likely in the midline to lower right pelvis where they also noted a possible multiloculated fluid collection   -Afebrile but with new worsening leukocytosis    · Continue ceftriaxone, Flagyl  · Repeat CT abdomen with contrast, if negative for abscess would discontinue Ceftriaxone  · If patient has more than 3 loose BMs in 24 hrs would repeat stool for c diff  · Continue serial abdominal exams, additional management per general surgery  · Final choice and duration of therapy to be determined by imaging and clinical course    4  Possible UTI  -Urinalysis with trace pyuria, no urine culture sent, patient was symptomatic with urinary urgency  -No current localizing urinary symptoms    · Completed empiric course of therapy, no additional work-up    5  CHF, bilateral pleural effusion  -No evidence of underlying pneumonia    · Management per primary team    Thank you for involving me in the care of your patient  Please call with questions, change in clinical status or if tests recommended above are abnormal      Discussed in detail with the primary service  History     Reason for Consult: Bacteremia  HPI: David Rtichie is a 80y o  year old female with A-fib, history of NSTEMI and chronic systolic CHF, chronic kidney disease  She presented to the ER on 2/10 with generalized weakness and left-sided abdominal pain, nausea, vomiting with increased urinary urgency and frequency and subsequently had incontinence with loose stool     In the ER she was dyspneic with rapid A-fib, hypoxia  Labs noted trace pyuria, mild leukopenia  CT of the abdomen suggestive of severe gastroenterocolitis with associated mesenteric edema, moderate bilateral pleural effusions  EKG noted A-fib with tachycardia  She was admitted on ceftriaxone and Flagyl    Blood cultures from admission, 1 set has grown para Bacteroides and stool C  difficile and enteric PCR was negative  Hospital course was complicated by development of small bowel obstruction which has been improving with conservative management  She has been started on TPN with plan to advance diet today  She has been afebrile but with new leukocytosis  She had a large loose bowel movement today but otherwise has no abdominal pain, denies nausea, vomiting  No dysuria  She still feels generally weak  Infectious disease is being consulted for diagnostic work up and antibiotic management  Review of Systems  Pertinent positives and negatives as noted in HPI  Rest complete 12 point system-based review of systems is otherwise negative  PAST MEDICAL HISTORY:  Past Medical History:   Diagnosis Date   • Arthritis    • CHF (congestive heart failure) ([facility] [address] )    • Coronary artery disease    • Glaucoma    • Hypertension    • Kidney disease      Past Surgical History:   Procedure Laterality Date   • CHOLECYSTECTOMY     • HYSTERECTOMY     • IR PICC PLACEMENT SINGLE LUMEN  2/16/2023   • KNEE ARTHROPLASTY Left    • THYROID LOBECTOMY         FAMILY HISTORY:  Non-contributory    SOCIAL HISTORY:  Social History   /Civil Union  Social History     Substance and Sexual Activity   Alcohol Use Not Currently     Social History     Substance and Sexual Activity   Drug Use Never     Social History     Tobacco Use   Smoking Status Former   Smokeless Tobacco Never   Tobacco Comments    quit over 50 years ago       ALLERGIES:  Allergies   Allergen Reactions   • Diclofenac Other (See Comments)     liver problems  liver problems     • Meloxicam Other (See Comments)     Kidney failure  Kidney failure     • Sertraline Other (See Comments)     Other reaction(s): increase in anxiety  Other reaction(s): increase in anxiety         MEDICATIONS:  All current active medications have been reviewed      Physical Exam     Temp:  [97 °F (36 1 °C)-99 3 °F (37 4 °C)] 97 8 °F (36 6 °C)  HR:  [] 94  Resp:  [20-36] 36  BP: ()/(68-92) 110/80  SpO2:  [96 %-100 %] 99 %  Temp (24hrs), Av 8 °F (36 6 °C), Min:97 °F (36 1 °C), Max:99 3 °F (37 4 °C)  Current: Temperature: 97 8 °F (36 6 °C)    Intake/Output Summary (Last 24 hours) at 2023 1116  Last data filed at 2023 6247  Gross per 24 hour   Intake 3618 07 ml   Output 1150 ml   Net 2468 07 ml         Physical exam findings reported by bedside and primary medical team staff    General Appearance:  Appearing pale, frail and ill, nontoxic, and in no distress, appears stated age   Head:  Normocephalic, without obvious abnormality, atraumatic   Eyes:  PERRL, conjunctiva pale and sclera anicteric, both eyes   Nose: Nares normal, mucosa normal, no drainage   Throat: Oropharynx moist without lesions; lips, mucosa, and tongue normal; teeth and gums normal   Neck: Supple, symmetrical, trachea midline, no adenopathy, no tenderness/mass/nodules   Back:   Symmetric, no curvature, ROM normal, no CVA tenderness   Lungs:    Diminished to auscultation bilaterally, no audible wheezes, rhonchi and rales, respirations mildly labored   Chest Wall:  No tenderness or deformity   Heart:  Irregular rate and rhythm, S1, S2 normal, no murmur, rub or gallop   Abdomen:   Soft, non-tender, distended, hypoactive bowel sounds, no masses, no organomegaly    No CVA tenderness   Extremities: Extremities normal, atraumatic, no cyanosis, clubbing or edema   Skin: Skin color, texture, turgor normal, no rashes or lesions  No draining wounds noted     Lymph nodes: Cervical, supraclavicular, and axillary nodes normal   Neurologic: Alert and oriented times 3, generally weak       Invasive Devices:   PICC Line 23 (Active)   Reasons to continue PICC Total parenteral nutrition 23   Goal for Removal N/A- Continuing for TPN 23   Line Necessity Reviewed Yes, reviewed with provider 23   Site Assessment Clean;Dry; Intact 02/20/23 2000   #1 Lumen Color/Status Flushed; Infusing;Blood return noted 02/20/23 2000   Dressing Type Chlorhexidine dressing 02/20/23 2000   Dressing Status Clean;Dry; Intact 02/20/23 2000   Dressing Change Due 02/23/23 02/20/23 0800       Peripheral IV 02/15/23 Right Forearm (Active)   Site Assessment Clean;Dry; Intact 02/20/23 2000   Dressing Type Transparent 02/20/23 2000   Line Status Flushed;Saline locked; No blood return 02/20/23 2000   Dressing Status Clean;Dry; Intact 02/20/23 2000   Dressing Change Due 02/23/23 02/20/23 0800   Reason Not Rotated Poor venous access 02/20/23 2000       Peripheral IV 02/16/23 Right;Ventral (anterior) Forearm (Active)   Site Assessment Clean;Dry; Intact 02/20/23 2000   Dressing Type Transparent 02/20/23 2000   Line Status Flushed;Saline locked; No blood return 02/20/23 2000   Dressing Status Clean;Dry; Intact 02/20/23 2000   Dressing Change Due 02/20/23 02/20/23 0800   Reason Not Rotated Poor venous access 02/20/23 2000       Peripheral IV 02/18/23 Right Arm (Active)   Site Assessment Clean;Dry; Intact 02/20/23 2000   Dressing Type Transparent 02/20/23 2000   Line Status Flushed;Saline locked; No blood return 02/20/23 2000   Dressing Status Clean;Dry; Intact 02/20/23 2000   Dressing Change Due 02/22/23 02/20/23 0800   Reason Not Rotated Not due 02/20/23 2000       External Urinary Catheter (Active)   Collection Container Canister and suction tubing (For Female) 02/20/23 2130   Suction Pressure (mmHg) 100 mmHg 02/20/23 2130   Interventions Removed and skin assessed; Pericare performed;Device changed;Suction canister changed;Suction tubing changed 02/21/23 0429   Output (mL) 300 mL 02/21/23 0429       Labs, Imaging, & Other Studies     Lab Results:    I have personally reviewed pertinent labs      Results from last 7 days   Lab Units 02/21/23  0515 02/20/23  0546 02/19/23  0628   WBC Thousand/uL 13 16* 12 58* 8 05   HEMOGLOBIN g/dL 13 0 12 3 11 4*   PLATELETS Thousands/uL 391* 414* 378     Results from last 7 days   Lab Units 02/21/23  0515 02/20/23  0546 02/19/23  0628   POTASSIUM mmol/L 3 9 4 4 4 1   CHLORIDE mmol/L 99 104 103   CO2 mmol/L 26 23 28   BUN mg/dL 30* 23 19   CREATININE mg/dL 0 87 0 79 0 94   EGFR ml/min/1 73sq m 59 67 54   CALCIUM mg/dL 6 9* 7 0* 6 8*   AST U/L 51* 22 17   ALT U/L 19 9 7   ALK PHOS U/L 38 39 37           Imaging Studies:   I have personally reviewed pertinent imaging study reports and images in PACS  EKG, Pathology, and Other Studies:   I have personally reviewed pertinent reports and reviewed external records  Counseling/Coordination of care: Total 70 minutes communication with the patient via telehealth  Labs, medical tests and imaging studies were independently and extensively reviewed by me as noted above in HPI and old records were obtained and summarized as noted above in HPI  My recommendations were discussed with the patient in detail who verbalized understanding

## 2023-02-21 NOTE — PLAN OF CARE
Problem: PHYSICAL THERAPY ADULT  Goal: Performs mobility at highest level of function for planned discharge setting  See evaluation for individualized goals  Description: Treatment/Interventions: ADL retraining, Functional transfer training, LE strengthening/ROM, Elevations, Therapeutic exercise, Endurance training, Patient/family training, Bed mobility, Equipment eval/education, Gait training, Compensatory technique education, Spoke to nursing, Spoke to case management, OT  Equipment Recommended:  (RW-pt has)       See flowsheet documentation for full assessment, interventions and recommendations  Outcome: Progressing  Note: Prognosis: Good  Problem List: Decreased strength, Decreased endurance, Impaired balance, Decreased mobility, Impaired hearing, Decreased skin integrity  Assessment: Patient was seen for skilled PT session 3 this date with interventions to include therapeutic activities for BM, transfer training, balance and functional activity tolerance as well as gait training with Rw  Patient completed all mobility tasks SB/steadying assist x 1 including ambulation 12' with cues for increased foot clearance, AD management and directional changes  Patient tolerated standing for 1-2' with Rw for UB support SB/steadying assist fair balance during clothing management nad hygiene  Dtg present for session and provided encouragement and support as patient was noted to have overall anxiety which was increased prior to mobility tasks  Patient on RA t/o session with stable vital response to treatment  Patient demonstrated positive progress toward goals with increased mobility tolerance and performance since transition to ICU  Patient with plans to D/C to daughters home with increased family support and a first floor set-up and 5 JOSH, in order to recuperate  Patient tolerated session well and should cont   Skilled PT during her acute inpatient stay as she is functioning below her baseline f/b D/C to dtg's home with increased family support, Rw and HHPT  Barriers to Discharge: Inaccessible home environment, Decreased caregiver support  Barriers to Discharge Comments: Patient requires assist for all mobility at this time and is the primary caregiver for spouse  Patient with plans to D/C to daughters home with a first floor set-up and 5 JOSH in order to recuperate  PT Discharge Recommendation: Home with home health rehabilitation    See flowsheet documentation for full assessment

## 2023-02-21 NOTE — ASSESSMENT & PLAN NOTE
· History paroxysmal atrial fibrillation chronically maintained on metoprolol succinate 25 mg daily   · chronic anticoagulation with Eliquis 2 5 mg twice daily for elevated [id number] stroke risk  · Initially patient received some IV Cardizem, then placed on beta-blocker, later on Cardizem short-acting added after that as per cardiology recommendation, amiodarone added  · Continue Toprol-XL  Continue midodrine for hypotension    And Eliquis for anticoagulation

## 2023-02-21 NOTE — ASSESSMENT & PLAN NOTE
Lab Results   Component Value Date    EGFR 59 02/21/2023    EGFR 67 02/20/2023    EGFR 54 02/19/2023    CREATININE 0 87 02/21/2023    CREATININE 0 79 02/20/2023    CREATININE 0 94 02/19/2023   · History CKD 3  · creatinine worsened to 1 5    Placed on gentle hydration and now back to baseline creatinine  · Renally dose medications, avoid nephrotoxic medication

## 2023-02-21 NOTE — ASSESSMENT & PLAN NOTE
· Moderate bilateral pleural effusions  · History of chronic systolic heart failure, reviewed care everywhere there were small bilateral pleural effusions on chest x-ray September  · lasix on hold currently  On TPN  reassess volume status daily    Poor oral intake

## 2023-02-21 NOTE — ASSESSMENT & PLAN NOTE
· Reports severe generalized abdominal pain associated with nausea and dry heaves  · Normal lactic acid  · CT abdomen pelvis with gastroenterocolitis  · Stool Culture including C  difficile and enteric panel negative  · Blood Culture 1 out of 2 gram-negative rods  · Empiric ceftriaxone/metronidazole -elevated procalcitonin  · abdominal pain improving but still having diarrhea with worsening leukocytosis noted today    discussed with infectious disease we will repeat CT abdomen pelvis with contrast for further evaluation to rule out underlying abscess

## 2023-02-21 NOTE — QUICK NOTE
Tele check  HR improved since yesterday  Diet being advanced by surgical team  No further changes from a cardiac stand point  Call with questions

## 2023-02-21 NOTE — PROGRESS NOTES
[address]  Progress Note - Jovanni Campbell 1934, 80 y o  female MRN: [id number]  Unit/Bed#: -01 Encounter: [id number]  Primary Care Provider: Alejandro Thomas DO   Date and time admitted to hospital: 2/10/2023 12:28 AM    Atrial fibrillation with RVR (HCC)  Assessment & Plan  · History paroxysmal atrial fibrillation chronically maintained on metoprolol succinate 25 mg daily   · chronic anticoagulation with Eliquis 2 5 mg twice daily for elevated [id number] stroke risk  · Initially patient received some IV Cardizem, then placed on beta-blocker, later on Cardizem short-acting added after that as per cardiology recommendation, amiodarone added  · Continue Toprol-XL  Continue midodrine for hypotension  And Eliquis for anticoagulation    * Small bowel obstruction ([facility] )  Assessment & Plan  Since patient was throwing up, repeat CAT scan done which shows a small bowel obstruction  General surgery consult appreciated, recommends conservative management,  Status post NG tube   Now removed  Gradually advance diet  TPN to continue today but will not reorder it for tonight  Repeat CT abdomen pelvis ordered    Enterocolitis  Assessment & Plan  · Reports severe generalized abdominal pain associated with nausea and dry heaves  · Normal lactic acid  · CT abdomen pelvis with gastroenterocolitis  · Stool Culture including C  difficile and enteric panel negative  · Blood Culture 1 out of 2 gram-negative rods  · Empiric ceftriaxone/metronidazole -elevated procalcitonin  · abdominal pain improving but still having diarrhea with worsening leukocytosis noted today    discussed with infectious disease we will repeat CT abdomen pelvis with contrast for further evaluation to rule out underlying abscess    Moderate protein-calorie malnutrition ([facility] )  Assessment & Plan  Malnutrition Findings:   Adult Malnutrition type: Chronic illness  Adult Degree of Malnutrition: Malnutrition of moderate degree  Malnutrition Characteristics: Muscle loss, Fat loss, Inadequate energy                  360 Statement: Chronic moderate malnutrition related to decreased oral intake, difficulties with meal preparation at home as evidenced by < 75% estimated energy intake > 1 mo; moderate muscle loss to temporalis, pectoralis, deltoid muscles; moderate to severe fat loss to orbitals, moderate fat loss to buccal pads  Treated with: Advance diet as medically appropriate to modest 4gm DENY given hx of CHF  Will discuss supplements with pt once diet initiated  Recommend daily weights for nutrition monitoring  Did discuss Mom's Meals with pt for ease of meal preparation at home  BMI Findings: Body mass index is 26 13 kg/m²  Coronary artery disease involving native coronary artery of native heart  Assessment & Plan  · NSTEMI September 2022  · Cardiac catheterization with minimal atherosclerotic disease, no PCI  · Continue Eliquis and simvastatin    Stage 3b chronic kidney disease [facility])  Assessment & Plan  Lab Results   Component Value Date    EGFR 59 02/21/2023    EGFR 67 02/20/2023    EGFR 54 02/19/2023    CREATININE 0 87 02/21/2023    CREATININE 0 79 02/20/2023    CREATININE 0 94 02/19/2023   · History CKD 3  · creatinine worsened to 1 5    Placed on gentle hydration and now back to baseline creatinine  · Renally dose medications, avoid nephrotoxic medication    Acute cystitis  Assessment & Plan  · Presented with complaints of abdominal pain and foul-smelling urine  · UA with bacteriuria, pyuria  · Urine did not reflex to culture  · Blood culture 1 out of 2 gram-negative rods  · Empiric ceftriaxone    Chronic combined systolic and diastolic congestive heart failure (HCC)  Assessment & Plan  Wt Readings from Last 3 Encounters:   02/21/23 64 8 kg (142 lb 13 7 oz)   06/23/21 55 3 kg (121 lb 14 6 oz)   03/15/19 59 kg (130 lb)     · History chronic congestive heart failure, follows with [facility] cardiology  · Per their notes - Probable Takotsubo cardiomyopathy with recovered EF and at least moderate MR and moderate TR  · Utilizes diuretics per cardiology direction as needed if weight increases otherwise does not take them on a daily basis  · Continue Toprol   · Lisinopril 2 5 mg daily on hold due to hypotension  · Received 40 mg IV furosemide in the ED  · Patient receiving TPN, prn lasix based on volume status    Pleural effusion, bilateral  Assessment & Plan  · Moderate bilateral pleural effusions  · History of chronic systolic heart failure, reviewed care everywhere there were small bilateral pleural effusions on chest x-ray September  · lasix on hold currently  On TPN  reassess volume status daily  Poor oral intake      VTE Pharmacologic Prophylaxis:   Pharmacologic: Apixaban (Eliquis)  Mechanical VTE Prophylaxis in Place: Yes    Patient Centered Rounds: I have performed bedside rounds with nursing staff today  Discussions with Specialists or Other Care Team Provider: surgery    Education and Discussions with Family / Patient: Discussed with patient at bedside and will update family    Time Spent for Care: 45 minutes  More than 50% of total time spent on counseling and coordination of care as described above  Current Length of Stay: 11 day(s)    Current Patient Status: Inpatient   Certification Statement: The patient will continue to require additional inpatient hospital stay due to Small bowel obstruction    Discharge Plan: pEnding progress    Code Status: Level 3 - DNAR and DNI      Subjective:   Patient is quite fatigued and tired  Denies any pain today  Still having some diarrhea and decreased oral intake    As per nursing staff she gets quite anxious    Objective:     Vitals:   Temp (24hrs), Av 8 °F (36 6 °C), Min:97 °F (36 1 °C), Max:99 3 °F (37 4 °C)    Temp:  [97 °F (36 1 °C)-99 3 °F (37 4 °C)] 97 8 °F (36 6 °C)  HR:  [] 94  Resp:  [20-36] 36  BP: ()/(68-92) 110/80  SpO2:  [96 %-100 %] 99 %  Body mass index is 26 13 kg/m²  Input and Output Summary (last 24 hours): Intake/Output Summary (Last 24 hours) at 2/21/2023 1135  Last data filed at 2/21/2023 0716  Gross per 24 hour   Intake 3618 07 ml   Output 1150 ml   Net 2468 07 ml       Physical Exam:     Physical Exam  Vitals and nursing note reviewed  Constitutional:       Appearance: She is ill-appearing  HENT:      Head: Normocephalic and atraumatic  Right Ear: External ear normal       Left Ear: External ear normal       Nose: Nose normal       Mouth/Throat:      Pharynx: Oropharynx is clear  Eyes:      Pupils: Pupils are equal, round, and reactive to light  Cardiovascular:      Rate and Rhythm: Normal rate and regular rhythm  Heart sounds: Normal heart sounds  Pulmonary:      Effort: Pulmonary effort is normal       Comments: decreased breath sounds bilateral bases  Abdominal:      General: Bowel sounds are normal       Palpations: Abdomen is soft  Tenderness: There is abdominal tenderness  Musculoskeletal:         General: Normal range of motion  Cervical back: Normal range of motion and neck supple  Skin:     General: Skin is warm and dry  Capillary Refill: Capillary refill takes less than 2 seconds  Neurological:      General: No focal deficit present  Mental Status: She is alert  Comments: Somnolent but arousable    Oriented to person   Psychiatric:         Mood and Affect: Mood normal            Additional Data:     Labs:    Results from last 7 days   Lab Units 02/21/23  0515   WBC Thousand/uL 13 16*   HEMOGLOBIN g/dL 13 0   HEMATOCRIT % 40 2   PLATELETS Thousands/uL 391*   NEUTROS PCT % 86*   LYMPHS PCT % 6*   MONOS PCT % 7   EOS PCT % 0     Results from last 7 days   Lab Units 02/21/23  0515   SODIUM mmol/L 130*   POTASSIUM mmol/L 3 9   CHLORIDE mmol/L 99   CO2 mmol/L 26   BUN mg/dL 30*   CREATININE mg/dL 0 87   ANION GAP mmol/L 5   CALCIUM mg/dL 6 9*   ALBUMIN g/dL 2 9*   TOTAL BILIRUBIN mg/dL 0 37   ALK PHOS U/L 38   ALT U/L 19   AST U/L 51*   GLUCOSE RANDOM mg/dL 204*     Results from last 7 days   Lab Units 02/15/23  1533   INR  1 90*                       * I Have Reviewed All Lab Data Listed Above  * Additional Pertinent Lab Tests Reviewed: Jv 66 Admission Reviewed    Imaging:    Imaging Reports Reviewed Today Include:c T abdomen pelvis ordered  Imaging Personally Reviewed by Myself Includes: CT abdomen pelvis ordered will be reviewed later today    Recent Cultures (last 7 days):           Last 24 Hours Medication List:   Current Facility-Administered Medications   Medication Dose Route Frequency Provider Last Rate   • Adult TPN (CUSTOM BASE/STANDARD ELECTROLYTE)   Intravenous Continuous TPN Zane Nunez MD 61 4 mL/hr at 02/20/23 2129   • amiodarone  200 mg Oral BID With Meals Denzel Vazquez MD     • apixaban  2 5 mg Oral BID Denzel Vazquez MD     • cefTRIAXone  1,000 mg Intravenous Q24H Liz S Germaine, CRNP Stopped (02/21/23 7166)   • gabapentin  300 mg Oral HS Liz S Germaine, CRNP     • latanoprost  1 drop Both Eyes HS Liz S Germaine, CRNP     • levalbuterol  1 25 mg Nebulization Q4H PRN Luis Fernando Vazquez MD     • LORazepam  0 5 mg Intravenous Q6H PRN Denzel Vazquez MD     • metoprolol  5 mg Intravenous Q6H PRN HENRY Jewell     • metoprolol succinate  50 mg Oral BID Zane Nunez MD     • metroNIDAZOLE  500 mg Intravenous Hamlet Vazquez MD Stopped (02/21/23 1144)   • midodrine  2 5 mg Oral TID HENRY Ramírez     • ondansetron  4 mg Intravenous Q4H PRN Zane Nunez MD     • phenol  1 spray Mouth/Throat Q2H PRN Lexy Crespo PA-C     • traZODone  100 mg Oral HS Liz S Germaine, CRNP          Today, Patient Was Seen By: Yahaira Dawn MD    ** Please Note: Dictation voice to text software may have been used in the creation of this document   **

## 2023-02-21 NOTE — ASSESSMENT & PLAN NOTE
Since patient was throwing up, repeat CAT scan done which shows a small bowel obstruction  General surgery consult appreciated, recommends conservative management,  Status post NG tube   Now removed    Gradually advance diet  TPN to continue today but will not reorder it for tonight  Repeat CT abdomen pelvis ordered

## 2023-02-21 NOTE — ASSESSMENT & PLAN NOTE
Wt Readings from Last 3 Encounters:   02/21/23 64 8 kg (142 lb 13 7 oz)   06/23/21 55 3 kg (121 lb 14 6 oz)   03/15/19 59 kg (130 lb)     · History chronic congestive heart failure, follows with [facility] cardiology  · Per their notes - Probable Takotsubo cardiomyopathy with recovered EF and at least moderate MR and moderate TR    · Utilizes diuretics per cardiology direction as needed if weight increases otherwise does not take them on a daily basis  · Continue Toprol   · Lisinopril 2 5 mg daily on hold due to hypotension  · Received 40 mg IV furosemide in the ED  · Patient receiving TPN, prn lasix based on volume status

## 2023-02-21 NOTE — PROGRESS NOTES
Progress Note - General Surgery   Remy Chandler 80 y o  female MRN: [id number]  Unit/Bed#: -01 Encounter: [id number]    Assessment:  Small bowel obstruction, improved, passing gas and moving bowels  Gastroenterocolitis  Bacteremia, 1 set of blood culture w GNR, parabacteroides distasonis  Leukocytosis, WBC 13 16 (12 58, 8 05, 7 49, 7 85)  Hyponatremia  Hypocalcemia  Cdiff negative on 2/11      Plan:  Advance to fulls w T&C  Continue TPN today, may discontinue if tolerates diet advancement  IVF hydration  Monitor I/Os  IV abx as indicate, appreciate ID rec's  Medicate PRN pain/nausea  Management of co-morbidities per primary team    Subjective/Objective     Subjective: Passed two small loose BM yesterday and again this AM    Objective:   Blood pressure 109/83, pulse 97, temperature 97 5 °F (36 4 °C), temperature source Temporal, resp  rate (!) 27, height 5' 2" (1 575 m), weight 64 8 kg (142 lb 13 7 oz), SpO2 97 %  ,Body mass index is 26 13 kg/m²  Intake/Output Summary (Last 24 hours) at 2/21/2023 0610  Last data filed at 2/21/2023 0516  Gross per 24 hour   Intake 2981 7 ml   Output 1150 ml   Net 1831 7 ml       Invasive Devices     Peripherally Inserted Central Catheter Line  Duration           PICC Line 02/16/23 4 days          Peripheral Intravenous Line  Duration           Peripheral IV 02/15/23 Right Forearm 5 days    Peripheral IV 02/16/23 Right;Ventral (anterior) Forearm 4 days    Peripheral IV 02/18/23 Right Arm 2 days          Drain  Duration           External Urinary Catheter 4 days                Physical Exam:   Gen: AxOx3  Abd: soft, bowel sounds present, non tender, no guarding or rebound    Lab, Imaging and other studies:  I have personally reviewed pertinent lab results      CBC:   Lab Results   Component Value Date    WBC 13 16 (H) 02/21/2023    HGB 13 0 02/21/2023    HCT 40 2 02/21/2023    MCV 87 02/21/2023     (H) 02/21/2023    MCH 28 2 02/21/2023    MCHC 32 3 02/21/2023    RDW 14 9 02/21/2023    MPV 9 7 02/21/2023    NRBC 0 02/21/2023     CMP:   Lab Results   Component Value Date    SODIUM 130 (L) 02/21/2023    K 3 9 02/21/2023    CL 99 02/21/2023    CO2 26 02/21/2023    BUN 30 (H) 02/21/2023    CREATININE 0 87 02/21/2023    CALCIUM 6 9 (L) 02/21/2023    AST 51 (H) 02/21/2023    ALT 19 02/21/2023    ALKPHOS 38 02/21/2023    EGFR 59 02/21/2023     VTE Pharmacologic Prophylaxis: Heparin  VTE Mechanical Prophylaxis: sequential compression device     [facility]

## 2023-02-21 NOTE — ASSESSMENT & PLAN NOTE
Malnutrition Findings:   Adult Malnutrition type: Chronic illness  Adult Degree of Malnutrition: Malnutrition of moderate degree  Malnutrition Characteristics: Muscle loss, Fat loss, Inadequate energy                  360 Statement: Chronic moderate malnutrition related to decreased oral intake, difficulties with meal preparation at home as evidenced by < 75% estimated energy intake > 1 mo; moderate muscle loss to temporalis, pectoralis, deltoid muscles; moderate to severe fat loss to orbitals, moderate fat loss to buccal pads  Treated with: Advance diet as medically appropriate to modest 4gm DENY given hx of CHF  Will discuss supplements with pt once diet initiated  Recommend daily weights for nutrition monitoring  Did discuss Mom's Meals with pt for ease of meal preparation at home  BMI Findings: Body mass index is 26 13 kg/m²

## 2023-02-22 PROBLEM — I50.43 ACUTE ON CHRONIC COMBINED SYSTOLIC AND DIASTOLIC CONGESTIVE HEART FAILURE (HCC): Status: ACTIVE | Noted: 2023-01-01

## 2023-02-22 PROBLEM — K57.20 DIVERTICULITIS OF LARGE INTESTINE WITH PERFORATION AND ABSCESS WITHOUT BLEEDING: Status: ACTIVE | Noted: 2023-01-01

## 2023-02-22 NOTE — PROGRESS NOTES
Progress Note - General Surgery   Abel Coates 80 y o  female MRN: [id number]  Unit/Bed#: -01 Encounter: [id number]    Assessment:  -Gastroenterocolitis  -Small bowel obstruction, improved, passing gas and moving bowels  -Fluid collection in right hemipelvis near sigmoid now noted to have adjacent extraluminal gas   Repeat CTAP 2/21/23 IMPRESSION:    1   Limited study due to early phase of contrast enhancement  Loculated fluid collection in the right hemipelvis adjacent to the sigmoid colon is grossly stable in size though there are now appears to be a collection of extraluminal gas, not definitely   present previously  Given the location, this constellation of findings may represent perforated diverticulitis  The appendix is not visualized and perforated appendicitis is not excluded    2   Resolution of previous dilated small bowel though there now appears to be wall thickening  This may be reactive and related to peritonitis    3   Bilateral moderate pleural effusions with overlying atelectasis  4   Anasarca  -Multiple episodes of small amounts of diarrhea since yesterday  -Leukocytosis, WBC 13 16 (12 58, 8 05, 7 49, 7 85)    Bacteremia, 1 set of blood culture w GNR, parabacteroides distasonis  Hyponatremia  Hypocalcemia  Cdiff negative on 2/11      Plan:  Continue with full liquid diet  IVF hydration  Monitor I/Os  IV abx as indicate, appreciate ID rec's  Medicate PRN pain/nausea  Management of co-morbidities per primary team    Subjective/Objective     Subjective: Multiple episodes of small amounts of diarrhea since yesterday  Tolerating full liquids with ensure without nausea / vomiting  She continues to have pain across the upper abdomen  +flatus  No F/C, hematochezia, CP  Objective:   Blood pressure 130/79, pulse 88, temperature 98 1 °F (36 7 °C), temperature source Temporal, resp  rate 19, height 5' 2" (1 575 m), weight 64 8 kg (142 lb 13 7 oz), SpO2 97 %  ,Body mass index is 26 13 kg/m²  Intake/Output Summary (Last 24 hours) at 2/22/2023 0954  Last data filed at 2/22/2023 2809  Gross per 24 hour   Intake 2589 04 ml   Output 255 ml   Net 2334 04 ml       Invasive Devices     Peripherally Inserted Central Catheter Line  Duration           PICC Line 02/16/23 5 days          Peripheral Intravenous Line  Duration           Peripheral IV 02/15/23 Right Forearm 6 days    Peripheral IV 02/16/23 Right;Ventral (anterior) Forearm 5 days    Peripheral IV 02/18/23 Right Arm 3 days          Drain  Duration           External Urinary Catheter 5 days                Physical Exam:   Gen: AxOx3  Abd: softly distended, bowel sounds present, non tender, no guarding or rebound    Lab, Imaging and other studies:  I have personally reviewed pertinent lab results      CBC:   Lab Results   Component Value Date    WBC 13 34 (H) 02/22/2023    HGB 12 2 02/22/2023    HCT 37 7 02/22/2023    MCV 87 02/22/2023     (H) 02/22/2023    MCH 28 1 02/22/2023    MCHC 32 4 02/22/2023    RDW 14 7 02/22/2023    MPV 9 7 02/22/2023     CMP:   Lab Results   Component Value Date    SODIUM 129 (L) 02/22/2023    K 4 3 02/22/2023    CL 99 02/22/2023    CO2 27 02/22/2023    BUN 33 (H) 02/22/2023    CREATININE 0 76 02/22/2023    CALCIUM 7 0 (L) 02/22/2023    EGFR 70 02/22/2023     VTE Pharmacologic Prophylaxis: Heparin  VTE Mechanical Prophylaxis: sequential compression device     Thania Cevallos

## 2023-02-22 NOTE — ASSESSMENT & PLAN NOTE
· Moderate bilateral pleural effusions did on imaging and also third spacing noted with anasarca mostly of bilateral upper extremities  Decreased oral intake for the last 2 weeks which is probably causing her to have third spacing    · Get 2D echo to evaluate LV function  · Ultrasound of upper extremity to rule out underlying DVT is less likely as she is already on Eliquis  · Based on Lasix 40 mg IV x1 today and continue diuresis regularly for the next few days as tolerated  · May consider thoracentesis also

## 2023-02-22 NOTE — ASSESSMENT & PLAN NOTE
Wt Readings from Last 3 Encounters:   02/21/23 64 8 kg (142 lb 13 7 oz)   06/23/21 55 3 kg (121 lb 14 6 oz)   03/15/19 59 kg (130 lb)     · History chronic congestive heart failure, follows with [facility] cardiology  · Per their notes - Probable Takotsubo cardiomyopathy with recovered EF and at least moderate MR and moderate TR  · check 2D echo  · Continue Toprol  · Lisinopril 2 5 mg daily on hold due to hypotension  · place on Lasix 40 mg IV now followed by lasix 20 mg iv bid daily     Noted To have 10 to 20 pound weight gain

## 2023-02-22 NOTE — ASSESSMENT & PLAN NOTE
· Presented with complaints of abdominal pain and foul-smelling urine  · UA with bacteriuria, pyuria  · Urine did not reflex to culture  · Blood culture 1 out of 2 gram-negative rods  · Already completed course of antibiotics for acute cystitis

## 2023-02-22 NOTE — ASSESSMENT & PLAN NOTE
CT abdomen pelvis shows Loculated fluid collection in the right hemipelvis adjacent to the sigmoid colon is grossly stable in size though there are now appears to be a collection of extraluminal gas, not definitely present previously    Given the location, this constellation of findings may represent perforated diverticulitis  · Stool Culture including C  difficile and enteric panel negative  · Blood Culture 1 out of 2 gram-negative rods  · Empiric ceftriaxone/metronidazole -elevated procalcitonin worsening leukocytosis noted and switch to Zosyn and Flagyl  · Consult placed to IR to place drain in the loculated fluid collection

## 2023-02-22 NOTE — PLAN OF CARE
Problem: Nutrition/Hydration-ADULT  Goal: Nutrient/Hydration intake appropriate for improving, restoring or maintaining nutritional needs  Description: Monitor and assess patient's nutrition/hydration status for malnutrition  Collaborate with interdisciplinary team and initiate plan and interventions as ordered  Monitor patient's weight and dietary intake as ordered or per policy  Utilize nutrition screening tool and intervene as necessary  Determine patient's food preferences and provide high-protein, high-caloric foods as appropriate       INTERVENTIONS:  - Monitor oral intake, urinary output, labs, and treatment plans  - Assess nutrition and hydration status and recommend course of action  - Evaluate amount of meals eaten  - Assist patient with eating if necessary   - Allow adequate time for meals  - Recommend/ encourage appropriate diets, oral nutritional supplements, and vitamin/mineral supplements  - Order, calculate, and assess calorie counts as needed  - Recommend, monitor, and adjust tube feedings and TPN/PPN based on assessed needs  - Assess need for intravenous fluids  - Provide specific nutrition/hydration education as appropriate  - Include patient/family/caregiver in decisions related to nutrition  Outcome: Progressing     Problem: PAIN - ADULT  Goal: Verbalizes/displays adequate comfort level or baseline comfort level  Description: Interventions:  - Encourage patient to monitor pain and request assistance  - Assess pain using appropriate pain scale0-10  - Administer analgesics based on type and severity of pain and evaluate response  - Implement non-pharmacological measures as appropriate and evaluate response  - Consider cultural and social influences on pain and pain management  - Notify physician/advanced practitioner if interventions unsuccessful or patient reports new pain  Outcome: Progressing     Problem: INFECTION - ADULT  Goal: Absence or prevention of progression during hospitalization  Description: INTERVENTIONS:  - Assess and monitor for signs and symptoms of infection  - Monitor lab/diagnostic results  - Monitor all insertion sites, i e  indwelling lines, tubes, and drains  - Monitor endotracheal if appropriate and nasal secretions for changes in amount and color  - [place] appropriate cooling/warming therapies per order  - Administer medications as ordered  - Instruct and encourage patient and family to use good hand hygiene technique  - Identify and instruct in appropriate isolation precautions for identified infection/condition  Outcome: Progressing     Problem: SAFETY ADULT  Goal: Patient will remain free of falls  Description: INTERVENTIONS:  - Educate patient/family on patient safety including physical limitations  - Instruct patient to call for assistance with activity   - Consult OT/PT to assist with strengthening/mobility   - Keep Call bell within reach  - Keep bed low and locked with side rails adjusted as appropriate  - Keep care items and personal belongings within reach  - Initiate and maintain comfort rounds  - Make Fall Risk Sign visible to staff  - Apply yellow socks and bracelet for high fall risk patients  - Consider moving patient to room near nurses station  Outcome: Progressing  Goal: Maintain or return to baseline ADL function  Description: INTERVENTIONS:  -  Assess patient's ability to carry out ADLs; assess patient's baseline for ADL function and identify physical deficits which impact ability to perform ADLs (bathing, care of mouth/teeth, toileting, grooming, dressing, etc )  - Assess/evaluate cause of self-care deficits   - Assess range of motion  - Assess patient's mobility; develop plan if impaired  - Assess patient's need for assistive devices and provide as appropriate  - Encourage maximum independence but intervene and supervise when necessary  - Involve family in performance of ADLs  - Assess for home care needs following discharge   - Consider OT consult to assist with ADL evaluation and planning for discharge  - Provide patient education as appropriate  Outcome: Progressing  Goal: Maintains/Returns to pre admission functional level  Description: INTERVENTIONS:  - Perform BMAT or MOVE assessment daily    - Set and communicate daily mobility goal to care team and patient/family/caregiver  - Collaborate with rehabilitation services on mobility goals if consulted  - Perform Range of Motion 3 times a day  - Reposition patient every 2 hours if pt unable to reposition self  - Out of bed for toileting  - Record patient progress and toleration of activity level   Outcome: Progressing     Problem: DISCHARGE PLANNING  Goal: Discharge to home or other facility with appropriate resources  Description: INTERVENTIONS:  - Identify barriers to discharge w/patient and caregiver  - Arrange for needed discharge resources and transportation as appropriate  - Identify discharge learning needs (meds, wound care, etc )  - Arrange for interpretive services to assist at discharge as needed  - Refer to Case Management Department for coordinating discharge planning if the patient needs post-hospital services based on physician/advanced practitioner order or complex needs related to functional status, cognitive ability, or social support system  Outcome: Progressing     Problem: Knowledge Deficit  Goal: Patient/family/caregiver demonstrates understanding of disease process, treatment plan, medications, and discharge instructions  Description: Complete learning assessment and assess knowledge base    Interventions:  - Provide teaching at level of understanding  - Provide teaching via preferred learning methods  Outcome: Progressing     Problem: CARDIOVASCULAR - ADULT  Goal: Maintains optimal cardiac output and hemodynamic stability  Description: INTERVENTIONS:  - Monitor I/O, vital signs and rhythm  - Monitor for S/S and trends of decreased cardiac output  - Administer and titrate ordered vasoactive medications to optimize hemodynamic stability  - Assess quality of pulses, skin color and temperature  - Assess for signs of decreased coronary artery perfusion  - Instruct patient to report change in severity of symptoms  Outcome: Progressing  Goal: Absence of cardiac dysrhythmias or at baseline rhythm  Description: INTERVENTIONS:  - Continuous cardiac monitoring, vital signs, obtain 12 lead EKG if ordered  - Administer antiarrhythmic and heart rate control medications as ordered  - Monitor electrolytes and administer replacement therapy as ordered  Outcome: Progressing     Problem: GASTROINTESTINAL - ADULT  Goal: Minimal or absence of nausea and/or vomiting  Description: INTERVENTIONS:  - Administer IV fluids if ordered to ensure adequate hydration  - Maintain NPO status until nausea and vomiting are resolved  - Nasogastric tube if ordered  - Administer ordered antiemetic medications as needed  - Provide nonpharmacologic comfort measures as appropriate  - Advance diet as tolerated, if ordered  - Consider nutrition services referral to assist patient with adequate nutrition and appropriate food choices  Outcome: Progressing  Goal: Maintains or returns to baseline bowel function  Description: INTERVENTIONS:  - Assess bowel function  - Encourage oral fluids to ensure adequate hydration  - Administer IV fluids if ordered to ensure adequate hydration  - Administer ordered medications as needed  - Encourage mobilization and activity  - Consider nutritional services referral to assist patient with adequate nutrition and appropriate food choices  Outcome: Progressing  Goal: Maintains adequate nutritional intake  Description: INTERVENTIONS:  - Monitor percentage of each meal consumed  - Identify factors contributing to decreased intake, treat as appropriate  - Assist with meals as needed  - Monitor I&O, weight, and lab values if indicated  - Obtain nutrition services referral as needed  Outcome: Progressing     Problem: MOBILITY - ADULT  Goal: Maintain or return to baseline ADL function  Description: INTERVENTIONS:  -  Assess patient's ability to carry out ADLs; assess patient's baseline for ADL function and identify physical deficits which impact ability to perform ADLs (bathing, care of mouth/teeth, toileting, grooming, dressing, etc )  - Assess/evaluate cause of self-care deficits   - Assess range of motion  - Assess patient's mobility; develop plan if impaired  - Assess patient's need for assistive devices and provide as appropriate  - Encourage maximum independence but intervene and supervise when necessary  - Involve family in performance of ADLs  - Assess for home care needs following discharge   - Consider OT consult to assist with ADL evaluation and planning for discharge  - Provide patient education as appropriate  Outcome: Progressing  Goal: Maintains/Returns to pre admission functional level  Description: INTERVENTIONS:  - Perform BMAT or MOVE assessment daily    - Set and communicate daily mobility goal to care team and patient/family/caregiver  - Collaborate with rehabilitation services on mobility goals if consulted  - Perform Range of Motion 3 times a day    - Reposition patient every 2 hours if pt unable to reposition self  - Out of bed for toileting  - Record patient progress and toleration of activity level   Outcome: Progressing     Problem: Prexisting or High Potential for Compromised Skin Integrity  Goal: Skin integrity is maintained or improved  Description: INTERVENTIONS:  - Identify patients at risk for skin breakdown  - Assess and monitor skin integrity  - Assess and monitor nutrition and hydration status  - Monitor labs   - Assess for incontinence   - Turn and reposition patient  - Assist with mobility/ambulation  - Relieve pressure over bony prominences  - Avoid friction and shearing  - Provide appropriate hygiene as needed including keeping skin clean and dry  - Evaluate need for skin moisturizer/barrier cream  - Collaborate with interdisciplinary team   - Patient/family teaching  - Consider wound care consult   Outcome: Progressing

## 2023-02-22 NOTE — PLAN OF CARE
Problem: Nutrition/Hydration-ADULT  Goal: Nutrient/Hydration intake appropriate for improving, restoring or maintaining nutritional needs  Description: Monitor and assess patient's nutrition/hydration status for malnutrition  Collaborate with interdisciplinary team and initiate plan and interventions as ordered  Monitor patient's weight and dietary intake as ordered or per policy  Utilize nutrition screening tool and intervene as necessary  Determine patient's food preferences and provide high-protein, high-caloric foods as appropriate       INTERVENTIONS:  - Monitor oral intake, urinary output, labs, and treatment plans  - Assess nutrition and hydration status and recommend course of action  - Evaluate amount of meals eaten  - Assist patient with eating if necessary   - Allow adequate time for meals  - Recommend/ encourage appropriate diets, oral nutritional supplements, and vitamin/mineral supplements  - Order, calculate, and assess calorie counts as needed  - Recommend, monitor, and adjust tube feedings and TPN/PPN based on assessed needs  - Assess need for intravenous fluids  - Provide specific nutrition/hydration education as appropriate  - Include patient/family/caregiver in decisions related to nutrition  Outcome: Progressing     Problem: PAIN - ADULT  Goal: Verbalizes/displays adequate comfort level or baseline comfort level  Description: Interventions:  - Encourage patient to monitor pain and request assistance  - Assess pain using appropriate pain scale0-10  - Administer analgesics based on type and severity of pain and evaluate response  - Implement non-pharmacological measures as appropriate and evaluate response  - Consider cultural and social influences on pain and pain management  - Notify physician/advanced practitioner if interventions unsuccessful or patient reports new pain  Outcome: Progressing     Problem: INFECTION - ADULT  Goal: Absence or prevention of progression during hospitalization  Description: INTERVENTIONS:  - Assess and monitor for signs and symptoms of infection  - Monitor lab/diagnostic results  - Monitor all insertion sites, i e  indwelling lines, tubes, and drains  - Monitor endotracheal if appropriate and nasal secretions for changes in amount and color  - [place] appropriate cooling/warming therapies per order  - Administer medications as ordered  - Instruct and encourage patient and family to use good hand hygiene technique  - Identify and instruct in appropriate isolation precautions for identified infection/condition  Outcome: Progressing     Problem: SAFETY ADULT  Goal: Patient will remain free of falls  Description: INTERVENTIONS:  - Educate patient/family on patient safety including physical limitations  - Instruct patient to call for assistance with activity   - Consult OT/PT to assist with strengthening/mobility   - Keep Call bell within reach  - Keep bed low and locked with side rails adjusted as appropriate  - Keep care items and personal belongings within reach  - Initiate and maintain comfort rounds  - Make Fall Risk Sign visible to staff  - Apply yellow socks and bracelet for high fall risk patients  - Consider moving patient to room near nurses station  Outcome: Progressing  Goal: Maintain or return to baseline ADL function  Description: INTERVENTIONS:  -  Assess patient's ability to carry out ADLs; assess patient's baseline for ADL function and identify physical deficits which impact ability to perform ADLs (bathing, care of mouth/teeth, toileting, grooming, dressing, etc )  - Assess/evaluate cause of self-care deficits   - Assess range of motion  - Assess patient's mobility; develop plan if impaired  - Assess patient's need for assistive devices and provide as appropriate  - Encourage maximum independence but intervene and supervise when necessary  - Involve family in performance of ADLs  - Assess for home care needs following discharge   - Consider OT consult to assist with ADL evaluation and planning for discharge  - Provide patient education as appropriate  Outcome: Progressing  Goal: Maintains/Returns to pre admission functional level  Description: INTERVENTIONS:  - Perform BMAT or MOVE assessment daily    - Set and communicate daily mobility goal to care team and patient/family/caregiver  - Collaborate with rehabilitation services on mobility goals if consulted  - Perform Range of Motion 3 times a day  - Reposition patient every 2 hours if pt unable to reposition self  - Out of bed for toileting  - Record patient progress and toleration of activity level   Outcome: Progressing     Problem: DISCHARGE PLANNING  Goal: Discharge to home or other facility with appropriate resources  Description: INTERVENTIONS:  - Identify barriers to discharge w/patient and caregiver  - Arrange for needed discharge resources and transportation as appropriate  - Identify discharge learning needs (meds, wound care, etc )  - Arrange for interpretive services to assist at discharge as needed  - Refer to Case Management Department for coordinating discharge planning if the patient needs post-hospital services based on physician/advanced practitioner order or complex needs related to functional status, cognitive ability, or social support system  Outcome: Progressing     Problem: Knowledge Deficit  Goal: Patient/family/caregiver demonstrates understanding of disease process, treatment plan, medications, and discharge instructions  Description: Complete learning assessment and assess knowledge base    Interventions:  - Provide teaching at level of understanding  - Provide teaching via preferred learning methods  Outcome: Progressing     Problem: CARDIOVASCULAR - ADULT  Goal: Maintains optimal cardiac output and hemodynamic stability  Description: INTERVENTIONS:  - Monitor I/O, vital signs and rhythm  - Monitor for S/S and trends of decreased cardiac output  - Administer and titrate ordered vasoactive medications to optimize hemodynamic stability  - Assess quality of pulses, skin color and temperature  - Assess for signs of decreased coronary artery perfusion  - Instruct patient to report change in severity of symptoms  Outcome: Progressing  Goal: Absence of cardiac dysrhythmias or at baseline rhythm  Description: INTERVENTIONS:  - Continuous cardiac monitoring, vital signs, obtain 12 lead EKG if ordered  - Administer antiarrhythmic and heart rate control medications as ordered  - Monitor electrolytes and administer replacement therapy as ordered  Outcome: Progressing     Problem: GASTROINTESTINAL - ADULT  Goal: Minimal or absence of nausea and/or vomiting  Description: INTERVENTIONS:  - Administer IV fluids if ordered to ensure adequate hydration  - Maintain NPO status until nausea and vomiting are resolved  - Nasogastric tube if ordered  - Administer ordered antiemetic medications as needed  - Provide nonpharmacologic comfort measures as appropriate  - Advance diet as tolerated, if ordered  - Consider nutrition services referral to assist patient with adequate nutrition and appropriate food choices  Outcome: Progressing  Goal: Maintains or returns to baseline bowel function  Description: INTERVENTIONS:  - Assess bowel function  - Encourage oral fluids to ensure adequate hydration  - Administer IV fluids if ordered to ensure adequate hydration  - Administer ordered medications as needed  - Encourage mobilization and activity  - Consider nutritional services referral to assist patient with adequate nutrition and appropriate food choices  Outcome: Progressing  Goal: Maintains adequate nutritional intake  Description: INTERVENTIONS:  - Monitor percentage of each meal consumed  - Identify factors contributing to decreased intake, treat as appropriate  - Assist with meals as needed  - Monitor I&O, weight, and lab values if indicated  - Obtain nutrition services referral as needed  Outcome: Progressing     Problem: MOBILITY - ADULT  Goal: Maintain or return to baseline ADL function  Description: INTERVENTIONS:  -  Assess patient's ability to carry out ADLs; assess patient's baseline for ADL function and identify physical deficits which impact ability to perform ADLs (bathing, care of mouth/teeth, toileting, grooming, dressing, etc )  - Assess/evaluate cause of self-care deficits   - Assess range of motion  - Assess patient's mobility; develop plan if impaired  - Assess patient's need for assistive devices and provide as appropriate  - Encourage maximum independence but intervene and supervise when necessary  - Involve family in performance of ADLs  - Assess for home care needs following discharge   - Consider OT consult to assist with ADL evaluation and planning for discharge  - Provide patient education as appropriate  Outcome: Progressing  Goal: Maintains/Returns to pre admission functional level  Description: INTERVENTIONS:  - Perform BMAT or MOVE assessment daily    - Set and communicate daily mobility goal to care team and patient/family/caregiver  - Collaborate with rehabilitation services on mobility goals if consulted  - Perform Range of Motion 3 times a day    - Reposition patient every 2 hours if pt unable to reposition self  - Out of bed for toileting  - Record patient progress and toleration of activity level   Outcome: Progressing     Problem: Prexisting or High Potential for Compromised Skin Integrity  Goal: Skin integrity is maintained or improved  Description: INTERVENTIONS:  - Identify patients at risk for skin breakdown  - Assess and monitor skin integrity  - Assess and monitor nutrition and hydration status  - Monitor labs   - Assess for incontinence   - Turn and reposition patient  - Assist with mobility/ambulation  - Relieve pressure over bony prominences  - Avoid friction and shearing  - Provide appropriate hygiene as needed including keeping skin clean and dry  - Evaluate need for skin moisturizer/barrier cream  - Collaborate with interdisciplinary team   - Patient/family teaching  - Consider wound care consult   Outcome: Progressing

## 2023-02-22 NOTE — PROGRESS NOTES
Progress Note - Infectious Disease   Frank Man 80 y o  female MRN: [id number]  Unit/Bed#: -01 Encounter: [id number]        REQUIRED DOCUMENTATION:     1  This service was provided via Telemedicine  2  Provider located at [facility]  3  TeleMed provider: Mesha Davidson MD   4  Identify all parties in room with patient during tele consult:RN  5  After connecting through televideo, patient was identified by name and date of birth and assistant checked wristband  Patient was then informed that this was a Telemedicine visit and that the exam was being conducted confidentially over secure lines  My office door was closed  No one else was in the room  Patient acknowledged consent and understanding of privacy and security of the Telemedicine visit, and gave us permission to have the assistant stay in the room in order to assist with the history and to conduct the exam   I informed the patient that I have reviewed their record in Epic and presented the opportunity for them to ask any questions regarding the visit today  The patient agreed to participate  Assessment/Recommendations     1  Possible acute perforated diverticulitis, SBO  - Initially presenting with acute gastroenterocolitis as suggested clinically and on imaging   Hospital course complicated by persistent vomiting and fu CT on 2/15 noted small bowel obstruction with a point of obstruction likely in the midline to lower right pelvis where they also noted a possible multiloculated fluid collection  - Patient has continued to feel ill with worsening leukocytosis for which CT was repeated on 2/21 which notes persistent loculated collection with new extraluminal gas - this constellation of findings most likely represents perforated diverticulitis (hx appendectomy)  -Afebrile without leukocytosis, ongoing abdominal pain and recurrent diarrhea    · Continue Pip-tazo 3 375 q6h  · Discontinue flagyl  · Repeat stool for C  difficile if she has more than 3 loose stools in 24 hours  · Recommend IR consult for aspiration/drainage of collection, patient declining any surgical intervention  · Monitor clinical course and white count, serial abdominal exams  · Final choice and duration of antibiotics to be determined    2  Parabacteroides bacteremia  - Source of bacteremia is likely acute gastroenterocolitis/perforated diverticulitis/rule out intra-abdominal abscess  - Afebrile but with new worsening leukocytosis     • Completed 10 days of therapy with metronidazole  • Monitor clinical course, additional management as above     3  Possible UTI  -Urinalysis with trace pyuria, no urine culture sent, patient was symptomatic with urinary urgency  -No current localizing urinary symptoms     • Completed empiric course of therapy, no additional work-up     4  CHF, A fib, CAD, bilateral pleural effusion  -No evidence of underlying pneumonia     • Management per primary team     5  Chronic kidney disease    · Continue renal dosing of abx    Discussed in detail with the primary service  History       Subjective: The patient continues to feel generally weak with poor appetite, reports small but multiple loose stools and crampy right lower quadrant pain  Denies fevers, chills, or sweats  Denies nausea, vomiting, or diarrhea      Antibiotics:  Pip-Tazo      Physical Exam     Temp:  [97 6 °F (36 4 °C)-98 2 °F (36 8 °C)] 98 1 °F (36 7 °C)  HR:  [] 88  Resp:  [19-25] 19  BP: (101-130)/(68-86) 130/79  SpO2:  [95 %-99 %] 97 %  Temp (24hrs), Av 9 °F (36 6 °C), Min:97 6 °F (36 4 °C), Max:98 2 °F (36 8 °C)  Current: Temperature: 98 1 °F (36 7 °C)    Intake/Output Summary (Last 24 hours) at 2023 6443  Last data filed at 2023 7042  Gross per 24 hour   Intake 2589 04 ml   Output 255 ml   Net 2334 04 ml       Physical exam findings reported by bedside and primary medical team staff      General Appearance:  Appearing ill, frail, nontoxic, and in no distress, appears stated age   Throat: Oropharynx moist without lesions; lips, mucosa, and tongue normal; teeth and gums normal   Lungs:   Diminished to auscultation bilaterally, no audible wheezes, rhonchi and rales, respirations unlabored   Heart:  Irregular rate and rhythm, S1, S2 normal, no murmur, rub or gallop   Abdomen:   Soft, non-tender, distended, hypoactive bowel sounds, no masses, no organomegaly    No CVA tenderness   Extremities: Extremities normal, atraumatic, no cyanosis, clubbing or edema   Skin: Skin color, texture, turgor normal, no rashes or lesions  No draining wounds noted  Neurologic: Alert and oriented times 3, generally weak         Invasive Devices:   PICC Line 02/16/23 (Active)   Reasons to continue PICC Total parenteral nutrition 02/21/23 2100   Goal for Removal N/A- Continuing for TPN 02/21/23 2100   Line Necessity Reviewed Yes, reviewed with provider 02/21/23 2100   Site Assessment Clean;Dry; Intact 02/21/23 2100   #1 Lumen Color/Status Flushed;Normal saline locked; Blood return noted; Purple lumen 02/21/23 2100   Dressing Type Chlorhexidine dressing 02/21/23 2100   Dressing Status Clean;Dry; Intact 02/21/23 2100   Dressing Change Due 02/23/23 02/20/23 0800       Peripheral IV 02/15/23 Right Forearm (Active)   Site Assessment Clean;Dry; Intact 02/21/23 2100   Dressing Type Transparent 02/21/23 2100   Line Status Flushed;Saline locked; No blood return 02/21/23 2100   Dressing Status Clean;Dry; Intact 02/21/23 2100   Dressing Change Due 02/23/23 02/20/23 0800   Reason Not Rotated Poor venous access 02/21/23 2100       Peripheral IV 02/16/23 Right;Ventral (anterior) Forearm (Active)   Site Assessment Clean;Dry; Intact 02/21/23 2100   Dressing Type Transparent 02/21/23 2100   Line Status Flushed;Saline locked; No blood return 02/21/23 2100   Dressing Status Clean;Dry; Intact 02/21/23 2100   Dressing Change Due 02/20/23 02/20/23 0800   Reason Not Rotated Poor venous access 02/21/23 2100       Peripheral IV 02/18/23 Right Arm (Active)   Site Assessment Clean;Dry; Intact 02/21/23 2100   Dressing Type Transparent 02/21/23 2100   Line Status Flushed;Saline locked; No blood return 02/21/23 2100   Dressing Status Clean;Dry; Intact 02/21/23 2100   Dressing Change Due 02/22/23 02/20/23 0800   Reason Not Rotated Poor venous access 02/21/23 2100       External Urinary Catheter (Active)   Collection Container Canister and suction tubing (For Female) 02/21/23 2122   Suction Pressure (mmHg) 100 mmHg 02/21/23 2122   Interventions Removed and skin assessed; Pericare performed;Device changed 02/21/23 2122   Output (mL) 155 mL 02/22/23 0557       Labs, Imaging, & Other Studies     Lab Results:    I have personally reviewed pertinent labs  Results from last 7 days   Lab Units 02/22/23  0558 02/21/23  0515 02/20/23  0546   WBC Thousand/uL 13 34* 13 16* 12 58*   HEMOGLOBIN g/dL 12 2 13 0 12 3   PLATELETS Thousands/uL 401* 391* 414*     Results from last 7 days   Lab Units 02/22/23  0558 02/21/23  0515 02/20/23  0546 02/19/23  0628   POTASSIUM mmol/L 4 3 3 9 4 4 4 1   CHLORIDE mmol/L 99 99 104 103   CO2 mmol/L 27 26 23 28   BUN mg/dL 33* 30* 23 19   CREATININE mg/dL 0 76 0 87 0 79 0 94   EGFR ml/min/1 73sq m 70 59 67 54   CALCIUM mg/dL 7 0* 6 9* 7 0* 6 8*   AST U/L  --  51* 22 17   ALT U/L  --  19 9 7   ALK PHOS U/L  --  38 39 37           Imaging Studies:   I have personally reviewed pertinent imaging study reports and images in PACS  EKG, Pathology, and Other Studies:   I have personally reviewed pertinent reports  Counseling/Coordination of care: Total 35 minutes communication with the patient via telehealth  Labs, medical tests and imaging studies were independently reviewed by me as noted above

## 2023-02-22 NOTE — ASSESSMENT & PLAN NOTE
Lab Results   Component Value Date    EGFR 70 02/22/2023    EGFR 59 02/21/2023    EGFR 67 02/20/2023    CREATININE 0 76 02/22/2023    CREATININE 0 87 02/21/2023    CREATININE 0 79 02/20/2023   · History CKD 3  · creatinine worsened to 1 5    Placed on gentle hydration and now back to baseline creatinine  · Renally dose medications, avoid nephrotoxic medication

## 2023-02-22 NOTE — ASSESSMENT & PLAN NOTE
· History paroxysmal atrial fibrillation chronically maintained on metoprolol succinate 25 mg daily   · chronic anticoagulation with Eliquis 2 5 mg twice daily for elevated [id number] stroke risk  · Initially patient received some IV Cardizem, then placed on beta-blocker, later on Cardizem short-acting added after that as per cardiology recommendation, amiodarone added  · Continue Toprol-XL  Continue midodrine for hypotension  And Eliquis for anticoagulation    Currently rate controlled

## 2023-02-22 NOTE — PROGRESS NOTES
[address]  Progress Note - Axel Macedo 1934, 80 y o  female MRN: [id number]  Unit/Bed#: -01 Encounter: [id number]  Primary Care Provider: Sena Garcia DO   Date and time admitted to hospital: 2/10/2023 12:28 AM    Atrial fibrillation with RVR ([facility])  Assessment & Plan  · History paroxysmal atrial fibrillation chronically maintained on metoprolol succinate 25 mg daily   · chronic anticoagulation with Eliquis 2 5 mg twice daily for elevated [id number] stroke risk  · Initially patient received some IV Cardizem, then placed on beta-blocker, later on Cardizem short-acting added after that as per cardiology recommendation, amiodarone added  · Continue Toprol-XL  Continue midodrine for hypotension  And Eliquis for anticoagulation  Currently rate controlled    * Small bowel obstruction [facility])  Assessment & Plan  Since patient was throwing up,  CT abd 2/15  which showed a small bowel obstruction which has now resolved with conservative management  cT scan done on 2/21 shows resolution of small bowel obstruction  Status post NG tube   Now removed  Cont clear liquid diet  stop tpn due to third spacing    Diverticulitis of large intestine with perforation and abscess without bleeding  Assessment & Plan    CT abdomen pelvis shows Loculated fluid collection in the right hemipelvis adjacent to the sigmoid colon is grossly stable in size though there are now appears to be a collection of extraluminal gas, not definitely present previously    Given the location, this constellation of findings may represent perforated diverticulitis  · Stool Culture including C  difficile and enteric panel negative  · Blood Culture 1 out of 2 gram-negative rods  · Empiric ceftriaxone/metronidazole -elevated procalcitonin worsening leukocytosis noted and switch to Zosyn and Flagyl  · Consult placed to IR to place drain in the loculated fluid collection    Moderate protein-calorie malnutrition [facility])  Assessment & Plan  Malnutrition Findings:   Adult Malnutrition type: Chronic illness  Adult Degree of Malnutrition: Malnutrition of moderate degree  Malnutrition Characteristics: Muscle loss, Fat loss, Inadequate energy                  360 Statement: Chronic moderate malnutrition related to decreased oral intake, difficulties with meal preparation at home as evidenced by < 75% estimated energy intake > 1 mo; moderate muscle loss to temporalis, pectoralis, deltoid muscles; moderate to severe fat loss to orbitals, moderate fat loss to buccal pads  Treated with: Advance diet as medically appropriate to modest 4gm DENY given hx of CHF  Will discuss supplements with pt once diet initiated  Recommend daily weights for nutrition monitoring  Did discuss Mom's Meals with pt for ease of meal preparation at home  BMI Findings: Body mass index is 26 13 kg/m²  Coronary artery disease involving native coronary artery of native heart  Assessment & Plan  · NSTEMI September 2022  · Cardiac catheterization with minimal atherosclerotic disease, no PCI  · Continue Eliquis and simvastatin    Stage 3b chronic kidney disease [facility])  Assessment & Plan  Lab Results   Component Value Date    EGFR 70 02/22/2023    EGFR 59 02/21/2023    EGFR 67 02/20/2023    CREATININE 0 76 02/22/2023    CREATININE 0 87 02/21/2023    CREATININE 0 79 02/20/2023   · History CKD 3  · creatinine worsened to 1 5    Placed on gentle hydration and now back to baseline creatinine  · Renally dose medications, avoid nephrotoxic medication    Acute cystitis  Assessment & Plan  · Presented with complaints of abdominal pain and foul-smelling urine  · UA with bacteriuria, pyuria  · Urine did not reflex to culture  · Blood culture 1 out of 2 gram-negative rods  · Already completed course of antibiotics for acute cystitis    Acute on chronic combined systolic and diastolic congestive heart failure (HCC)  Assessment & Plan  Wt Readings from Last 3 Encounters:   02/21/23 64 8 kg (142 lb 13 7 oz)   06/23/21 55 3 kg (121 lb 14 6 oz)   03/15/19 59 kg (130 lb)     · History chronic congestive heart failure, follows with [facility] cardiology  · Per their notes - Probable Takotsubo cardiomyopathy with recovered EF and at least moderate MR and moderate TR  · check 2D echo  · Continue Toprol  · Lisinopril 2 5 mg daily on hold due to hypotension  · place on Lasix 40 mg IV now followed by lasix 20 mg iv bid daily   Noted To have 10 to 20 pound weight gain    Pleural effusion, bilateral  Assessment & Plan  · Moderate bilateral pleural effusions did on imaging and also third spacing noted with anasarca mostly of bilateral upper extremities  Decreased oral intake for the last 2 weeks which is probably causing her to have third spacing  · Get 2D echo to evaluate LV function  · Ultrasound of upper extremity to rule out underlying DVT is less likely as she is already on Eliquis  · Based on Lasix 40 mg IV x1 today and continue diuresis regularly for the next few days as tolerated  · May consider thoracentesis also      VTE Pharmacologic Prophylaxis:   Pharmacologic: Apixaban (Eliquis)  Mechanical VTE Prophylaxis in Place: Yes    Patient Centered Rounds: I have performed bedside rounds with nursing staff today  Discussions with Specialists or Other Care Team Provider: Discussed with infectious disease    Education and Discussions with Family / Patient: discussed with patient and daughter    Time Spent for Care: 45 minutes  More than 50% of total time spent on counseling and coordination of care as described above      Current Length of Stay: 12 day(s)    Current Patient Status: Inpatient   Certification Statement: The patient will continue to require additional inpatient hospital stay due to  perforated diverticulitis with abscess    Discharge Plan: Discharge after 2 to 3 days to go to live with daughter    Code Status: Level 3 - DNAR and DNI      Subjective:   Patient complains of abdominal discomfort  Also noticed a lot of swelling around her body especially in her arms    Objective:     Vitals:   Temp (24hrs), Av 9 °F (36 6 °C), Min:97 6 °F (36 4 °C), Max:98 2 °F (36 8 °C)    Temp:  [97 6 °F (36 4 °C)-98 2 °F (36 8 °C)] 98 1 °F (36 7 °C)  HR:  [] 88  Resp:  [19-25] 19  BP: (101-130)/(68-86) 130/79  SpO2:  [95 %-99 %] 97 %  Body mass index is 26 13 kg/m²  Input and Output Summary (last 24 hours): Intake/Output Summary (Last 24 hours) at 2023 1046  Last data filed at 2023 2930  Gross per 24 hour   Intake 2589 04 ml   Output 255 ml   Net 2334 04 ml       Physical Exam:     Physical Exam  Vitals and nursing note reviewed  Constitutional:       Appearance: She is ill-appearing  HENT:      Head: Normocephalic and atraumatic  Right Ear: External ear normal       Left Ear: External ear normal       Nose: Nose normal       Mouth/Throat:      Pharynx: Oropharynx is clear  Eyes:      Pupils: Pupils are equal, round, and reactive to light  Cardiovascular:      Rate and Rhythm: Normal rate  Rhythm irregular  Heart sounds: Normal heart sounds  Pulmonary:      Effort: Pulmonary effort is normal       Breath sounds: Normal breath sounds  Abdominal:      General: Bowel sounds are normal       Palpations: Abdomen is soft  Tenderness: There is abdominal tenderness  Musculoskeletal:         General: Swelling present  Normal range of motion  Cervical back: Normal range of motion and neck supple  Comments: swelling noted of bilateral upper arms   Skin:     General: Skin is warm and dry  Capillary Refill: Capillary refill takes less than 2 seconds  Neurological:      General: No focal deficit present  Mental Status: She is alert and oriented to person, place, and time     Psychiatric:         Mood and Affect: Mood normal            Additional Data:     Labs:    Results from last 7 days   Lab Units 23  0583 02/21/23  0515   WBC Thousand/uL 13 34* 13 16*   HEMOGLOBIN g/dL 12 2 13 0   HEMATOCRIT % 37 7 40 2   PLATELETS Thousands/uL 401* 391*   NEUTROS PCT %  --  86*   LYMPHS PCT %  --  6*   MONOS PCT %  --  7   EOS PCT %  --  0     Results from last 7 days   Lab Units 02/22/23  0558 02/21/23  0515   SODIUM mmol/L 129* 130*   POTASSIUM mmol/L 4 3 3 9   CHLORIDE mmol/L 99 99   CO2 mmol/L 27 26   BUN mg/dL 33* 30*   CREATININE mg/dL 0 76 0 87   ANION GAP mmol/L 3* 5   CALCIUM mg/dL 7 0* 6 9*   ALBUMIN g/dL  --  2 9*   TOTAL BILIRUBIN mg/dL  --  0 37   ALK PHOS U/L  --  38   ALT U/L  --  19   AST U/L  --  51*   GLUCOSE RANDOM mg/dL 122 204*     Results from last 7 days   Lab Units 02/15/23  1533   INR  1 90*                       * I Have Reviewed All Lab Data Listed Above  * Additional Pertinent Lab Tests Reviewed:  Jv 66 Admission Reviewed    Imaging:    Imaging Reports Reviewed Today Include: CT abdomen pelvis  Imaging Personally Reviewed by Myself Includes: cT abdomen pelvis    Recent Cultures (last 7 days):           Last 24 Hours Medication List:   Current Facility-Administered Medications   Medication Dose Route Frequency Provider Last Rate   • amiodarone  200 mg Oral BID With Meals Luis Fernando Vazquez MD     • apixaban  2 5 mg Oral BID Moriah Frederick MD     • furosemide  20 mg Intravenous BID (diuretic) Alexys Hong MD     • furosemide  40 mg Intravenous Once Alexys Hong MD     • gabapentin  300 mg Oral HS Liz S HENRY Herron     • latanoprost  1 drop Both Eyes HS Liz S HENRY Herron     • levalbuterol  1 25 mg Nebulization Q4H PRN Musa Vazquez MD     • metoprolol  5 mg Intravenous Q6H PRN HENRY Ibarra     • metoprolol succinate  50 mg Oral BID Moriah Frederick MD     • metroNIDAZOLE  500 mg Intravenous Aung Santa MD Stopped (02/22/23 1081)   • midodrine  2 5 mg Oral TID AC HENRY Edmond     • ondansetron  4 mg Intravenous Q4H PRN Moriah Frederick MD • phenol  1 spray Mouth/Throat Q2H PRN Katrina Brenda Lennox, PA-C     • piperacillin-tazobactam  3 375 g Intravenous Q6H Oswaldo Gallego MD 3 375 g (02/22/23 0358)   • traZODone  100 mg Oral HS HENRY Boss          Today, Patient Was Seen By: Oswaldo Gallego MD    ** Please Note: Dictation voice to text software may have been used in the creation of this document   **

## 2023-02-22 NOTE — ASSESSMENT & PLAN NOTE
Since patient was throwing up,  CT abd 2/15  which showed a small bowel obstruction which has now resolved with conservative management  cT scan done on 2/21 shows resolution of small bowel obstruction  Status post NG tube   Now removed  Cont clear liquid diet  stop tpn due to third spacing

## 2023-02-23 PROBLEM — K57.00 DIVERTICULITIS OF SMALL INTESTINE WITH PERFORATION AND ABSCESS WITHOUT BLEEDING: Status: ACTIVE | Noted: 2023-01-01

## 2023-02-23 NOTE — ASSESSMENT & PLAN NOTE
· Moderate bilateral pleural effusions did on imaging and also third spacing noted with anasarca mostly of bilateral upper extremities  Decreased oral intake for the last 2 weeks which is probably causing her to have third spacing  · Get 2D echo to evaluate LV function  · Ultrasound of upper extremity shows superficial thrombophlebitis    Already on Eliquis is currently on hold temporarily in case of any surgical intervention or IR procedure  · May consider thoracentesis also today and continue IV Lasix

## 2023-02-23 NOTE — BRIEF OP NOTE (RAD/CATH)
INTERVENTIONAL RADIOLOGY PROCEDURE NOTE    Date: 2/23/2023    Procedure: Right thoracentesis  Procedure Summary     Date:  Room / Location:     Anesthesia Start:  Anesthesia Stop:     Procedure:  Diagnosis:     Scheduled Providers:  Responsible Provider:     Anesthesia Type: Not recorded ASA Status: Not recorded          Preoperative diagnosis:   1  Acute CHF (congestive heart failure) ([facility] [address] )    2  Atrial fibrillation with RVR (HCC)    3  UTI (urinary tract infection)    4  Enterocolitis    5  Hypomagnesemia    6  Small bowel obstruction ([facility] )    7  Bacteremia         Postoperative diagnosis: Same  Surgeon: Kierra Ivey MD     Assistant: None  No qualified resident was available  Blood loss: None    Specimens: Right pleural fluid     Findings: Right thoracentesis and 900 ml of serous fluid aspirated  Complications: None immediate      Anesthesia: local

## 2023-02-23 NOTE — PLAN OF CARE
Problem: Nutrition/Hydration-ADULT  Goal: Nutrient/Hydration intake appropriate for improving, restoring or maintaining nutritional needs  Description: Monitor and assess patient's nutrition/hydration status for malnutrition  Collaborate with interdisciplinary team and initiate plan and interventions as ordered  Monitor patient's weight and dietary intake as ordered or per policy  Utilize nutrition screening tool and intervene as necessary  Determine patient's food preferences and provide high-protein, high-caloric foods as appropriate       INTERVENTIONS:  - Monitor oral intake, urinary output, labs, and treatment plans  - Assess nutrition and hydration status and recommend course of action  - Evaluate amount of meals eaten  - Assist patient with eating if necessary   - Allow adequate time for meals  - Recommend/ encourage appropriate diets, oral nutritional supplements, and vitamin/mineral supplements  - Order, calculate, and assess calorie counts as needed  - Recommend, monitor, and adjust tube feedings and TPN/PPN based on assessed needs  - Assess need for intravenous fluids  - Provide specific nutrition/hydration education as appropriate  - Include patient/family/caregiver in decisions related to nutrition  Outcome: Progressing     Problem: PAIN - ADULT  Goal: Verbalizes/displays adequate comfort level or baseline comfort level  Description: Interventions:  - Encourage patient to monitor pain and request assistance  - Assess pain using appropriate pain scale0-10  - Administer analgesics based on type and severity of pain and evaluate response  - Implement non-pharmacological measures as appropriate and evaluate response  - Consider cultural and social influences on pain and pain management  - Notify physician/advanced practitioner if interventions unsuccessful or patient reports new pain  Outcome: Progressing     Problem: INFECTION - ADULT  Goal: Absence or prevention of progression during hospitalization  Description: INTERVENTIONS:  - Assess and monitor for signs and symptoms of infection  - Monitor lab/diagnostic results  - Monitor all insertion sites, i e  indwelling lines, tubes, and drains  - Monitor endotracheal if appropriate and nasal secretions for changes in amount and color  - [place] appropriate cooling/warming therapies per order  - Administer medications as ordered  - Instruct and encourage patient and family to use good hand hygiene technique  - Identify and instruct in appropriate isolation precautions for identified infection/condition  Outcome: Progressing     Problem: SAFETY ADULT  Goal: Patient will remain free of falls  Description: INTERVENTIONS:  - Educate patient/family on patient safety including physical limitations  - Instruct patient to call for assistance with activity   - Consult OT/PT to assist with strengthening/mobility   - Keep Call bell within reach  - Keep bed low and locked with side rails adjusted as appropriate  - Keep care items and personal belongings within reach  - Initiate and maintain comfort rounds  - Make Fall Risk Sign visible to staff  - Apply yellow socks and bracelet for high fall risk patients  - Consider moving patient to room near nurses station  Outcome: Progressing  Goal: Maintain or return to baseline ADL function  Description: INTERVENTIONS:  -  Assess patient's ability to carry out ADLs; assess patient's baseline for ADL function and identify physical deficits which impact ability to perform ADLs (bathing, care of mouth/teeth, toileting, grooming, dressing, etc )  - Assess/evaluate cause of self-care deficits   - Assess range of motion  - Assess patient's mobility; develop plan if impaired  - Assess patient's need for assistive devices and provide as appropriate  - Encourage maximum independence but intervene and supervise when necessary  - Involve family in performance of ADLs  - Assess for home care needs following discharge   - Consider OT consult to assist with ADL evaluation and planning for discharge  - Provide patient education as appropriate  Outcome: Progressing  Goal: Maintains/Returns to pre admission functional level  Description: INTERVENTIONS:  - Perform BMAT or MOVE assessment daily    - Set and communicate daily mobility goal to care team and patient/family/caregiver  - Collaborate with rehabilitation services on mobility goals if consulted  - Perform Range of Motion 3 times a day  - Reposition patient every 2 hours if pt unable to reposition self  - Out of bed for toileting  - Record patient progress and toleration of activity level   Outcome: Progressing     Problem: DISCHARGE PLANNING  Goal: Discharge to home or other facility with appropriate resources  Description: INTERVENTIONS:  - Identify barriers to discharge w/patient and caregiver  - Arrange for needed discharge resources and transportation as appropriate  - Identify discharge learning needs (meds, wound care, etc )  - Arrange for interpretive services to assist at discharge as needed  - Refer to Case Management Department for coordinating discharge planning if the patient needs post-hospital services based on physician/advanced practitioner order or complex needs related to functional status, cognitive ability, or social support system  Outcome: Progressing     Problem: Knowledge Deficit  Goal: Patient/family/caregiver demonstrates understanding of disease process, treatment plan, medications, and discharge instructions  Description: Complete learning assessment and assess knowledge base    Interventions:  - Provide teaching at level of understanding  - Provide teaching via preferred learning methods  Outcome: Progressing     Problem: CARDIOVASCULAR - ADULT  Goal: Maintains optimal cardiac output and hemodynamic stability  Description: INTERVENTIONS:  - Monitor I/O, vital signs and rhythm  - Monitor for S/S and trends of decreased cardiac output  - Administer and titrate ordered vasoactive medications to optimize hemodynamic stability  - Assess quality of pulses, skin color and temperature  - Assess for signs of decreased coronary artery perfusion  - Instruct patient to report change in severity of symptoms  Outcome: Progressing  Goal: Absence of cardiac dysrhythmias or at baseline rhythm  Description: INTERVENTIONS:  - Continuous cardiac monitoring, vital signs, obtain 12 lead EKG if ordered  - Administer antiarrhythmic and heart rate control medications as ordered  - Monitor electrolytes and administer replacement therapy as ordered  Outcome: Progressing     Problem: GASTROINTESTINAL - ADULT  Goal: Minimal or absence of nausea and/or vomiting  Description: INTERVENTIONS:  - Administer IV fluids if ordered to ensure adequate hydration  - Maintain NPO status until nausea and vomiting are resolved  - Nasogastric tube if ordered  - Administer ordered antiemetic medications as needed  - Provide nonpharmacologic comfort measures as appropriate  - Advance diet as tolerated, if ordered  - Consider nutrition services referral to assist patient with adequate nutrition and appropriate food choices  Outcome: Progressing  Goal: Maintains or returns to baseline bowel function  Description: INTERVENTIONS:  - Assess bowel function  - Encourage oral fluids to ensure adequate hydration  - Administer IV fluids if ordered to ensure adequate hydration  - Administer ordered medications as needed  - Encourage mobilization and activity  - Consider nutritional services referral to assist patient with adequate nutrition and appropriate food choices  Outcome: Progressing  Goal: Maintains adequate nutritional intake  Description: INTERVENTIONS:  - Monitor percentage of each meal consumed  - Identify factors contributing to decreased intake, treat as appropriate  - Assist with meals as needed  - Monitor I&O, weight, and lab values if indicated  - Obtain nutrition services referral as needed  Outcome: Progressing     Problem: MOBILITY - ADULT  Goal: Maintain or return to baseline ADL function  Description: INTERVENTIONS:  -  Assess patient's ability to carry out ADLs; assess patient's baseline for ADL function and identify physical deficits which impact ability to perform ADLs (bathing, care of mouth/teeth, toileting, grooming, dressing, etc )  - Assess/evaluate cause of self-care deficits   - Assess range of motion  - Assess patient's mobility; develop plan if impaired  - Assess patient's need for assistive devices and provide as appropriate  - Encourage maximum independence but intervene and supervise when necessary  - Involve family in performance of ADLs  - Assess for home care needs following discharge   - Consider OT consult to assist with ADL evaluation and planning for discharge  - Provide patient education as appropriate  Outcome: Progressing  Goal: Maintains/Returns to pre admission functional level  Description: INTERVENTIONS:  - Perform BMAT or MOVE assessment daily    - Set and communicate daily mobility goal to care team and patient/family/caregiver  - Collaborate with rehabilitation services on mobility goals if consulted  - Perform Range of Motion 3 times a day    - Reposition patient every 2 hours if pt unable to reposition self  - Out of bed for toileting  - Record patient progress and toleration of activity level   Outcome: Progressing     Problem: Prexisting or High Potential for Compromised Skin Integrity  Goal: Skin integrity is maintained or improved  Description: INTERVENTIONS:  - Identify patients at risk for skin breakdown  - Assess and monitor skin integrity  - Assess and monitor nutrition and hydration status  - Monitor labs   - Assess for incontinence   - Turn and reposition patient  - Assist with mobility/ambulation  - Relieve pressure over bony prominences  - Avoid friction and shearing  - Provide appropriate hygiene as needed including keeping skin clean and dry  - Evaluate need for skin moisturizer/barrier cream  - Collaborate with interdisciplinary team   - Patient/family teaching  - Consider wound care consult   Outcome: Progressing

## 2023-02-23 NOTE — PROGRESS NOTES
Progress Note - General Surgery   [facility] 80 [facility]  [facility] MRN: [id number]  Unit/Bed#: -01 Encounter: [id number]    Assessment:  -81yo female admitted with gastroenterocolitis on 2/10/23  -Small bowel obstruction, now resolved  -Perforated Diverticulitis -Repeat CTAP done 2/21/23 shows the fluid collection in her right hemipelvis near the sigmoid to be stable in size but now noted to have adjacent extraluminal gas  -Worsening pain and tenderness in lower abdomen   -Worsening Leukocytosis, WBC 21 87  (13 16,12 58, 8 05, 7 49, 7 85)  -Diarrhea -multiple episodes daily  -Bacteremia,  blood culture 1 of 2 positive for GNR, parabacteroides distasonis  -Hyponatremia   -Hypocalcemia  -Cdiff negative on 2/11    Plan:  Stat CTAP   IR Consulted for possible drainage of fluid collection  NPO  IVF hydration  Monitor I/Os  IV abx as indicate, appreciate ID rec's  Medicate PRN pain/nausea  Management of co-morbidities per primary team    Subjective/Objective     Subjective: She is alert, pleasant and sitting up in bed  She continues to have abdominal pain and states that it has moved from upper abdomen into both sides of her lower abdomen and is now mostly when she pushes on it  Denies N/V, CP, SOB  Has been NPO since midnight for possible IR procedures  Objective:   Blood pressure 99/63, pulse (!) 107, temperature 97 5 °F (36 4 °C), resp  rate 16, height 5' 2" (1 575 m), weight 64 4 kg (142 lb), SpO2 93 %  ,Body mass index is 25 97 kg/m²        Intake/Output Summary (Last 24 hours) at 2/23/2023 0726  Last data filed at 2/22/2023 1230  Gross per 24 hour   Intake 100 ml   Output 400 ml   Net -300 ml       Invasive Devices     Peripherally Inserted Central Catheter Line  Duration           PICC Line 02/16/23 6 days          Drain  Duration           External Urinary Catheter 6 days    Rectal Tube With balloon <1 day                Physical Exam:   Gen: AxOx3, pleasant, alert, [place]  Abd: softly distended, bowel sounds present throughout, +no tenderness in keeley upper abd or periumbilically  +Tenderness to deep palpation with mild guarding across entire lower abdomen, worst in the midline  Extremities: marked edema of right upper extremity, none noted of left arm, mild edema both legs  Lab, Imaging and other studies:  I have personally reviewed pertinent lab results      CBC:   No results found for: WBC, HGB, HCT, MCV, PLT, ADJUSTEDWBC, MCH, MCHC, RDW, MPV, NRBC  CMP:   Lab Results   Component Value Date    SODIUM 132 (L) 02/23/2023    K 3 6 02/23/2023     02/23/2023    CO2 28 02/23/2023    BUN 32 (H) 02/23/2023    CREATININE 0 93 02/23/2023    CALCIUM 6 7 (L) 02/23/2023    EGFR 55 02/23/2023     VTE Pharmacologic Prophylaxis: Heparin  VTE Mechanical Prophylaxis: sequential compression device     Emanuel

## 2023-02-23 NOTE — ASSESSMENT & PLAN NOTE
Wt Readings from Last 3 Encounters:   02/23/23 66 9 kg (147 lb 7 8 oz)   06/23/21 55 3 kg (121 lb 14 6 oz)   03/15/19 59 kg (130 lb)     · History chronic congestive heart failure, follows with [facility] cardiology  · Per their notes - Probable Takotsubo cardiomyopathy with recovered EF and at least moderate MR and moderate TR   ·  2D echo EF has dropped to 25 to 30%  · Continue Toprol  · Lisinopril 2 5 mg daily on hold due to hypotension  · place on Lasix 40 mg IV bid  Noted To have 10 to 20 pound weight gain

## 2023-02-23 NOTE — ASSESSMENT & PLAN NOTE
· History paroxysmal atrial fibrillation chronically maintained on metoprolol succinate 25 mg daily   · chronic anticoagulation with Eliquis 2 5 mg twice daily for elevated [id number] stroke risk  · Initially patient received some IV Cardizem, then placed on beta-blocker, later on Cardizem short-acting added after that as per cardiology recommendation, amiodarone added  · Continue Toprol-XL  Continue midodrine for hypotension  And Eliquis for anticoagulation  mild  Tachycardia noted today with heart rate 106-107

## 2023-02-23 NOTE — ASSESSMENT & PLAN NOTE
concern for ischemia of the small bowel with perforation and fluid collection  CT abdomen pelvis shows Most of the thickened loops of bowel described above are thickened segments of left-sided small bowel  Mild mucosal hyperemia of the proximal/mid colon  Unchanged right hemipelvis collection measuring 5 3 x 3 1 cm #2/131 adjacent to the mid sigmoid colon      · Stool Culture including C  difficile and enteric panel negative today however diarrhea has worsened and will retest for C  difficile and placed on oral vancomycin prophylactically for now  · Blood Culture 1 out of 2 gram-negative rods  · Empiric ceftriaxone/metronidazole -elevated procalcitonin worsening leukocytosis noted and switch to Zosyn 2/22  · discussed CT scan findings with IR, general surgery and infectious disease  Overall prognosis is guarded at this time  Discussed with patient that she might need surgical intervention which will be difficult due to her overall clinical condition and EF dropping to 25 to 30% or consider comfort care/hospice care    Patient and family to decide on further course of care by this afternoon

## 2023-02-23 NOTE — PROGRESS NOTES
Progress Note - Infectious Disease   Rachael Parker 80 y o  female MRN: [id number]  Unit/Bed#: -01 Encounter: [id number]        REQUIRED DOCUMENTATION:     1  This service was provided via Telemedicine  2  Provider located at [facility]  3  TeleMed provider: Dora Vang MD   4  Identify all parties in room with patient during tele consult:RN  5  After connecting through televideo, patient was identified by name and date of birth and assistant checked wristband  Patient was then informed that this was a Telemedicine visit and that the exam was being conducted confidentially over secure lines  My office door was closed  No one else was in the room  Patient acknowledged consent and understanding of privacy and security of the Telemedicine visit, and gave us permission to have the assistant stay in the room in order to assist with the history and to conduct the exam   I informed the patient that I have reviewed their record in Epic and presented the opportunity for them to ask any questions regarding the visit today  The patient agreed to participate  Assessment/Recommendations     1  Possible acute perforated diverticulitis, SBO  - Initially presenting with acute gastroenterocolitis as suggested clinically and on imaging   Hospital course complicated by persistent vomiting and fu CT on 2/15 noted small bowel obstruction with a point of obstruction likely in the midline to lower right pelvis where they also noted a possible multiloculated fluid collection  - Patient has continued to feel ill with worsening leukocytosis, repeat CT notes persistent loculated collection with new extraluminal gas - this constellation of findings most likely represents perforated diverticulitis (hx appendectomy)  -Afebrile with worsening leukocytosis, ongoing abdominal pain and recurrent diarrhea    · Continue Pip-tazo 3 375 q6h  · Await IR guided aspiration/drainage of collection, patient declining any surgical intervention  · Monitor clinical course and white count, serial abdominal exams  · Final choice and duration of antibiotics to be determined    2  Parabacteroides bacteremia  - Source of bacteremia is likely acute gastroenterocolitis/perforated diverticulitis/rule out intra-abdominal abscess  - Afebrile but with new worsening leukocytosis     • Completed 10 days of therapy with metronidazole  • Monitor clinical course, additional management as above     3  Possible UTI  -Urinalysis with trace pyuria, no urine culture sent, patient was symptomatic with urinary urgency  -No current localizing urinary symptoms     • Completed empiric course of therapy, no additional work-up     4  CHF, A fib, CAD, bilateral pleural effusion  -No evidence of underlying pneumonia     • Management per primary team     5  Chronic kidney disease    · Continue renal dosing of abx    Discussed in detail with the primary service  History       Subjective: The patient continues to feel sick, reports ongoing weakness, multiple loose stools and crampy right lower quadrant pain  Denies fevers, chills, or sweats  Denies nausea, vomiting, or diarrhea      Antibiotics:  Pip-Tazo      Physical Exam     Temp:  [97 3 °F (36 3 °C)-97 7 °F (36 5 °C)] 97 5 °F (36 4 °C)  HR:  [] 106  Resp:  [16-28] 16  BP: ()/(57-73) 113/63  SpO2:  [93 %-100 %] 93 %  Temp (24hrs), Av 5 °F (36 4 °C), Min:97 3 °F (36 3 °C), Max:97 7 °F (36 5 °C)  Current: Temperature: 97 5 °F (36 4 °C)    Intake/Output Summary (Last 24 hours) at 2023 1023  Last data filed at 2023 1230  Gross per 24 hour   Intake --   Output 400 ml   Net -400 ml       Physical exam findings reported by bedside and primary medical team staff      General Appearance:  Appearing ill, frail, nontoxic, and in some distress, appears stated age   Throat: Oropharynx moist without lesions; lips, mucosa, and tongue normal; teeth and gums normal   Lungs:   Diminished to auscultation bilaterally, no audible wheezes, rhonchi and rales, respirations unlabored   Heart:  Irregular rate and rhythm, S1, S2 normal, no murmur, rub or gallop   Abdomen:   Soft, non-tender, distended, hypoactive bowel sounds, no masses, no organomegaly    No CVA tenderness   Extremities: Extremities normal, atraumatic, no cyanosis, clubbing or edema   Skin: Skin color, texture, turgor normal, no rashes or lesions  No draining wounds noted  Neurologic: Alert and oriented times 3, generally weak         Invasive Devices:   PICC Line 02/16/23 (Active)   Reasons to continue PICC Total parenteral nutrition 02/21/23 2100   Goal for Removal N/A- Continuing for TPN 02/21/23 2100   Line Necessity Reviewed Yes, reviewed with provider 02/21/23 2100   Site Assessment Clean;Dry; Intact 02/21/23 2100   #1 Lumen Color/Status Flushed;Normal saline locked; Blood return noted; Purple lumen 02/21/23 2100   Dressing Type Chlorhexidine dressing 02/21/23 2100   Dressing Status Clean;Dry; Intact 02/21/23 2100   Dressing Change Due 02/23/23 02/20/23 0800       Peripheral IV 02/15/23 Right Forearm (Active)   Site Assessment Clean;Dry; Intact 02/21/23 2100   Dressing Type Transparent 02/21/23 2100   Line Status Flushed;Saline locked; No blood return 02/21/23 2100   Dressing Status Clean;Dry; Intact 02/21/23 2100   Dressing Change Due 02/23/23 02/20/23 0800   Reason Not Rotated Poor venous access 02/21/23 2100       Peripheral IV 02/16/23 Right;Ventral (anterior) Forearm (Active)   Site Assessment Clean;Dry; Intact 02/21/23 2100   Dressing Type Transparent 02/21/23 2100   Line Status Flushed;Saline locked; No blood return 02/21/23 2100   Dressing Status Clean;Dry; Intact 02/21/23 2100   Dressing Change Due 02/20/23 02/20/23 0800   Reason Not Rotated Poor venous access 02/21/23 2100       Peripheral IV 02/18/23 Right Arm (Active)   Site Assessment Clean;Dry; Intact 02/21/23 2100   Dressing Type Transparent 02/21/23 2100   Line Status Flushed;Saline locked; No blood return 02/21/23 2100   Dressing Status Clean;Dry; Intact 02/21/23 2100   Dressing Change Due 02/22/23 02/20/23 0800   Reason Not Rotated Poor venous access 02/21/23 2100       External Urinary Catheter (Active)   Collection Container Canister and suction tubing (For Female) 02/21/23 2122   Suction Pressure (mmHg) 100 mmHg 02/21/23 2122   Interventions Removed and skin assessed; Pericare performed;Device changed 02/21/23 2122   Output (mL) 155 mL 02/22/23 0557       Labs, Imaging, & Other Studies     Lab Results:    I have personally reviewed pertinent labs  Results from last 7 days   Lab Units 02/23/23  0833 02/22/23  0558 02/21/23  0515   WBC Thousand/uL 21 87* 13 34* 13 16*   HEMOGLOBIN g/dL 12 5 12 2 13 0   PLATELETS Thousands/uL 428* 401* 391*     Results from last 7 days   Lab Units 02/23/23  0510 02/22/23  0558 02/21/23  0515 02/20/23  0546 02/19/23  0628   POTASSIUM mmol/L 3 6   < > 3 9 4 4 4 1   CHLORIDE mmol/L 100   < > 99 104 103   CO2 mmol/L 28   < > 26 23 28   BUN mg/dL 32*   < > 30* 23 19   CREATININE mg/dL 0 93   < > 0 87 0 79 0 94   EGFR ml/min/1 73sq m 55   < > 59 67 54   CALCIUM mg/dL 6 7*   < > 6 9* 7 0* 6 8*   AST U/L  --   --  51* 22 17   ALT U/L  --   --  19 9 7   ALK PHOS U/L  --   --  38 39 37    < > = values in this interval not displayed  Imaging Studies:   I have personally reviewed pertinent imaging study reports and images in PACS  EKG, Pathology, and Other Studies:   I have personally reviewed pertinent reports  Counseling/Coordination of care: Total 35 minutes communication with the patient via telehealth  Labs, medical tests and imaging studies were independently reviewed by me as noted above

## 2023-02-23 NOTE — CASE MANAGEMENT
Case Management Discharge Planning Note    Patient name Monse Rubio  Location [address] [id number] MRN [id number]  : 1934 Date 2023       Current Admission Date: 2/10/2023  Current Admission Diagnosis:Small bowel obstruction [facility])   Patient Active Problem List    Diagnosis Date Noted   • Goals of care, counseling/discussion 2023   • Bacteremia 2023   • PICC (peripherally inserted central catheter) in place 2023   • Small bowel obstruction ([facility] ) 02/15/2023   • PVC's (premature ventricular contractions) 2023   • Atrial fibrillation with RVR ([facility] ) 02/10/2023   • Diverticulitis of large intestine with perforation and abscess without bleeding 02/10/2023   • Pleural effusion, bilateral 02/10/2023   • Acute on chronic combined systolic and diastolic congestive heart failure ([facility] ) 02/10/2023   • Acute cystitis 02/10/2023   • Stage 3b chronic kidney disease ([facility] ) 02/10/2023   • Coronary artery disease involving native coronary artery of native heart 02/10/2023   • Moderate protein-calorie malnutrition ([facility] ) 02/10/2023      LOS (days): 13  Geometric Mean LOS (GMLOS) (days): 3 50  Days to GMLOS:-9 8     OBJECTIVE:  Risk of Unplanned Readmission Score: 17 43         Current admission status: Inpatient   Preferred Pharmacy:   [place], [place] - [address] [place] [id number]  Phone: [phone] Fax: [phone]    Primary Care Provider: Patric Berry DO    Primary Insurance: MEDICARE  Secondary Insurance: AAR    DISCHARGE DETAILS:     CM met with patient to discuss [facility] vs STR  Patient indicated in the current position she is in she does not feel safe returning home as she does not feel strong enough  Patient willing to explore STR, if recommended  Patient to received IR procedure today and CM to follow for results  CM to follow for medical stability and to make STR referrals for patient, if needed

## 2023-02-23 NOTE — PLAN OF CARE
Problem: Nutrition/Hydration-ADULT  Goal: Nutrient/Hydration intake appropriate for improving, restoring or maintaining nutritional needs  Description: Monitor and assess patient's nutrition/hydration status for malnutrition  Collaborate with interdisciplinary team and initiate plan and interventions as ordered  Monitor patient's weight and dietary intake as ordered or per policy  Utilize nutrition screening tool and intervene as necessary  Determine patient's food preferences and provide high-protein, high-caloric foods as appropriate       INTERVENTIONS:  - Monitor oral intake, urinary output, labs, and treatment plans  - Assess nutrition and hydration status and recommend course of action  - Evaluate amount of meals eaten  - Assist patient with eating if necessary   - Allow adequate time for meals  - Recommend/ encourage appropriate diets, oral nutritional supplements, and vitamin/mineral supplements  - Order, calculate, and assess calorie counts as needed  - Recommend, monitor, and adjust tube feedings and TPN/PPN based on assessed needs  - Assess need for intravenous fluids  - Provide specific nutrition/hydration education as appropriate  - Include patient/family/caregiver in decisions related to nutrition  Outcome: Progressing     Problem: PAIN - ADULT  Goal: Verbalizes/displays adequate comfort level or baseline comfort level  Description: Interventions:  - Encourage patient to monitor pain and request assistance  - Assess pain using appropriate pain scale0-10  - Administer analgesics based on type and severity of pain and evaluate response  - Implement non-pharmacological measures as appropriate and evaluate response  - Consider cultural and social influences on pain and pain management  - Notify physician/advanced practitioner if interventions unsuccessful or patient reports new pain  Outcome: Progressing     Problem: INFECTION - ADULT  Goal: Absence or prevention of progression during hospitalization  Description: INTERVENTIONS:  - Assess and monitor for signs and symptoms of infection  - Monitor lab/diagnostic results  - Monitor all insertion sites, i e  indwelling lines, tubes, and drains  - Monitor endotracheal if appropriate and nasal secretions for changes in amount and color  - [place] appropriate cooling/warming therapies per order  - Administer medications as ordered  - Instruct and encourage patient and family to use good hand hygiene technique  - Identify and instruct in appropriate isolation precautions for identified infection/condition  Outcome: Progressing     Problem: SAFETY ADULT  Goal: Patient will remain free of falls  Description: INTERVENTIONS:  - Educate patient/family on patient safety including physical limitations  - Instruct patient to call for assistance with activity   - Consult OT/PT to assist with strengthening/mobility   - Keep Call bell within reach  - Keep bed low and locked with side rails adjusted as appropriate  - Keep care items and personal belongings within reach  - Initiate and maintain comfort rounds  - Make Fall Risk Sign visible to staff  - Apply yellow socks and bracelet for high fall risk patients  - Consider moving patient to room near nurses station  Outcome: Progressing  Goal: Maintain or return to baseline ADL function  Description: INTERVENTIONS:  -  Assess patient's ability to carry out ADLs; assess patient's baseline for ADL function and identify physical deficits which impact ability to perform ADLs (bathing, care of mouth/teeth, toileting, grooming, dressing, etc )  - Assess/evaluate cause of self-care deficits   - Assess range of motion  - Assess patient's mobility; develop plan if impaired  - Assess patient's need for assistive devices and provide as appropriate  - Encourage maximum independence but intervene and supervise when necessary  - Involve family in performance of ADLs  - Assess for home care needs following discharge   - Consider OT consult to assist with ADL evaluation and planning for discharge  - Provide patient education as appropriate  Outcome: Progressing  Goal: Maintains/Returns to pre admission functional level  Description: INTERVENTIONS:  - Perform BMAT or MOVE assessment daily    - Set and communicate daily mobility goal to care team and patient/family/caregiver  - Collaborate with rehabilitation services on mobility goals if consulted  - Perform Range of Motion 3 times a day  - Reposition patient every 2 hours if pt unable to reposition self  - Out of bed for toileting  - Record patient progress and toleration of activity level   Outcome: Progressing     Problem: DISCHARGE PLANNING  Goal: Discharge to home or other facility with appropriate resources  Description: INTERVENTIONS:  - Identify barriers to discharge w/patient and caregiver  - Arrange for needed discharge resources and transportation as appropriate  - Identify discharge learning needs (meds, wound care, etc )  - Arrange for interpretive services to assist at discharge as needed  - Refer to Case Management Department for coordinating discharge planning if the patient needs post-hospital services based on physician/advanced practitioner order or complex needs related to functional status, cognitive ability, or social support system  Outcome: Progressing     Problem: Knowledge Deficit  Goal: Patient/family/caregiver demonstrates understanding of disease process, treatment plan, medications, and discharge instructions  Description: Complete learning assessment and assess knowledge base  Interventions:  - Provide teaching at level of understanding  - Provide teaching via preferred learning methods  Outcome: Progressing     Problem: CARDIOVASCULAR - ADULT  Goal: Maintains optimal cardiac output and hemodynamic stability  Description: INTERVENTIONS:     - Monitor I/O, vital signs and rhythm  - Monitor for S/S and trends of decreased cardiac output  - Administer and titrate ordered vasoactive medications to optimize hemodynamic stability  - Assess quality of pulses, skin color and temperature  - Assess for signs of decreased coronary artery perfusion  - Instruct patient to report change in severity of symptoms  Outcome: Progressing  Goal: Absence of cardiac dysrhythmias or at baseline rhythm  Description: INTERVENTIONS:  - Continuous cardiac monitoring, vital signs, obtain 12 lead EKG if ordered  - Administer antiarrhythmic and heart rate control medications as ordered  - Monitor electrolytes and administer replacement therapy as ordered  Outcome: Progressing

## 2023-02-23 NOTE — ASSESSMENT & PLAN NOTE
Lab Results   Component Value Date    EGFR 55 02/23/2023    EGFR 70 02/22/2023    EGFR 59 02/21/2023    CREATININE 0 93 02/23/2023    CREATININE 0 76 02/22/2023    CREATININE 0 87 02/21/2023   · History CKD 3  · creatinine worsened to 1 5    Placed on gentle hydration and now back to baseline creatinine  · Renally dose medications, avoid nephrotoxic medication

## 2023-02-23 NOTE — ASSESSMENT & PLAN NOTE
Malnutrition Findings:   Adult Malnutrition type: Chronic illness  Adult Degree of Malnutrition: Malnutrition of moderate degree  Malnutrition Characteristics: Muscle loss, Fat loss, Inadequate energy                  360 Statement: Chronic moderate malnutrition related to decreased oral intake, difficulties with meal preparation at home as evidenced by < 75% estimated energy intake > 1 mo; moderate muscle loss to temporalis, pectoralis, deltoid muscles; moderate to severe fat loss to orbitals, moderate fat loss to buccal pads  Treated with: Advance diet as medically appropriate to modest 4gm DENY given hx of CHF  Will discuss supplements with pt once diet initiated  Recommend daily weights for nutrition monitoring  Did discuss Mom's Meals with pt for ease of meal preparation at home  BMI Findings: Body mass index is 26 98 kg/m²

## 2023-02-23 NOTE — PROGRESS NOTES
[address]  Progress Note - Steven Holes 1934, 80 y o  female MRN: [id number]  Unit/Bed#: -01 Encounter: [id number]  Primary Care Provider: Gomez Flores DO   Date and time admitted to hospital: 2/10/2023 12:28 AM    Atrial fibrillation with RVR (HCC)  Assessment & Plan  · History paroxysmal atrial fibrillation chronically maintained on metoprolol succinate 25 mg daily   · chronic anticoagulation with Eliquis 2 5 mg twice daily for elevated [id number] stroke risk  · Initially patient received some IV Cardizem, then placed on beta-blocker, later on Cardizem short-acting added after that as per cardiology recommendation, amiodarone added  · Continue Toprol-XL  Continue midodrine for hypotension  And Eliquis for anticoagulation  mild  Tachycardia noted today with heart rate 106-107    * Small bowel obstruction [facility])  Assessment & Plan  Since patient was throwing up,  CT abd 2/15  which showed a small bowel obstruction which has now resolved with conservative management  cT scan done on 2/21 shows resolution of small bowel obstruction  Status post NG tube   Now removed  Cont clear liquid diet  stop tpn due to third spacing    Diverticulitis of small intestine with perforation and abscess without bleeding  Assessment & Plan  concern for ischemia of the small bowel with perforation and fluid collection  CT abdomen pelvis shows Most of the thickened loops of bowel described above are thickened segments of left-sided small bowel  Mild mucosal hyperemia of the proximal/mid colon  Unchanged right hemipelvis collection measuring 5 3 x 3 1 cm #2/131 adjacent to the mid sigmoid colon      · Stool Culture including C  difficile and enteric panel negative today however diarrhea has worsened and will retest for C  difficile and placed on oral vancomycin prophylactically for now  · Blood Culture 1 out of 2 gram-negative rods  · Empiric ceftriaxone/metronidazole -elevated procalcitonin worsening leukocytosis noted and switch to Zosyn 2/22  · discussed CT scan findings with IR, general surgery and infectious disease  Overall prognosis is guarded at this time  Discussed with patient that she might need surgical intervention which will be difficult due to her overall clinical condition and EF dropping to 25 to 30% or consider comfort care/hospice care  Patient and family to decide on further course of care by this afternoon    Moderate protein-calorie malnutrition ([address] )  Assessment & Plan  Malnutrition Findings:   Adult Malnutrition type: Chronic illness  Adult Degree of Malnutrition: Malnutrition of moderate degree  Malnutrition Characteristics: Muscle loss, Fat loss, Inadequate energy                  360 Statement: Chronic moderate malnutrition related to decreased oral intake, difficulties with meal preparation at home as evidenced by < 75% estimated energy intake > 1 mo; moderate muscle loss to temporalis, pectoralis, deltoid muscles; moderate to severe fat loss to orbitals, moderate fat loss to buccal pads  Treated with: Advance diet as medically appropriate to modest 4gm DENY given hx of CHF  Will discuss supplements with pt once diet initiated  Recommend daily weights for nutrition monitoring  Did discuss Mom's Meals with pt for ease of meal preparation at home  BMI Findings: Body mass index is 26 98 kg/m²  Coronary artery disease involving native coronary artery of native heart  Assessment & Plan  · NSTEMI September 2022  · Cardiac catheterization with minimal atherosclerotic disease, no PCI  · Continue Eliquis and simvastatin    Stage 3b chronic kidney disease [facility])  Assessment & Plan  Lab Results   Component Value Date    EGFR 55 02/23/2023    EGFR 70 02/22/2023    EGFR 59 02/21/2023    CREATININE 0 93 02/23/2023    CREATININE 0 76 02/22/2023    CREATININE 0 87 02/21/2023   · History CKD 3  · creatinine worsened to 1 5    Placed on gentle hydration and now back to baseline creatinine  · Renally dose medications, avoid nephrotoxic medication    Acute cystitis  Assessment & Plan  · Presented with complaints of abdominal pain and foul-smelling urine  · UA with bacteriuria, pyuria  · Urine did not reflex to culture  · Blood culture 1 out of 2 gram-negative rods  · Already completed course of antibiotics for acute cystitis    Acute on chronic combined systolic and diastolic congestive heart failure (HCC)  Assessment & Plan  Wt Readings from Last 3 Encounters:   02/23/23 66 9 kg (147 lb 7 8 oz)   06/23/21 55 3 kg (121 lb 14 6 oz)   03/15/19 59 kg (130 lb)     · History chronic congestive heart failure, follows with [facility] cardiology  · Per their notes - Probable Takotsubo cardiomyopathy with recovered EF and at least moderate MR and moderate TR   ·  2D echo EF has dropped to 25 to 30%  · Continue Toprol  · Lisinopril 2 5 mg daily on hold due to hypotension  · place on Lasix 40 mg IV bid  Noted To have 10 to 20 pound weight gain    Pleural effusion, bilateral  Assessment & Plan  · Moderate bilateral pleural effusions did on imaging and also third spacing noted with anasarca mostly of bilateral upper extremities  Decreased oral intake for the last 2 weeks which is probably causing her to have third spacing  · Get 2D echo to evaluate LV function  · Ultrasound of upper extremity shows superficial thrombophlebitis  Already on Eliquis is currently on hold temporarily in case of any surgical intervention or IR procedure  · May consider thoracentesis also today and continue IV Lasix      VTE Pharmacologic Prophylaxis:   Pharmacologic: Apixaban (Eliquis) on temp hold  Mechanical VTE Prophylaxis in Place: Yes    Patient Centered Rounds: I have performed bedside rounds with nursing staff today      Discussions with Specialists or Other Care Team Provider: discussed with general surgery IR and infectious disease    Education and Discussions with Family / Patient: discussed with patient and daughter    Time Spent for Care: 45 minutes  More than 50% of total time spent on counseling and coordination of care as described above  Current Length of Stay: 13 day(s)    Current Patient Status: Inpatient   Certification Statement: The patient will continue to require additional inpatient hospital stay due to small bowel ischemia    Discharge Plan: Pending progress    Code Status: Level 3 - DNAR and DNI      Subjective:   Denies any chest pain or palpitations  Complains of abdominal pain  Clear this morning and answering questions appropriately  Noted to have a lot of diarrhea since yesterday and rectal tube placed but still having stool leaking around the tube    Objective:     Vitals:   Temp (24hrs), Av 5 °F (36 4 °C), Min:97 3 °F (36 3 °C), Max:97 7 °F (36 5 °C)    Temp:  [97 3 °F (36 3 °C)-97 7 °F (36 5 °C)] 97 5 °F (36 4 °C)  HR:  [] 106  Resp:  [16-28] 16  BP: ()/(57-73) 115/73  SpO2:  [91 %-100 %] 91 %  Body mass index is 26 98 kg/m²  Input and Output Summary (last 24 hours): Intake/Output Summary (Last 24 hours) at 2023 1147  Last data filed at 2023 1230  Gross per 24 hour   Intake --   Output 400 ml   Net -400 ml       Physical Exam:     Physical Exam  Vitals and nursing note reviewed  Constitutional:       Appearance: Normal appearance  She is ill-appearing  HENT:      Head: Normocephalic and atraumatic  Right Ear: External ear normal       Left Ear: External ear normal       Nose: Nose normal       Mouth/Throat:      Pharynx: Oropharynx is clear  Eyes:      Pupils: Pupils are equal, round, and reactive to light  Cardiovascular:      Rate and Rhythm: Normal rate and regular rhythm  Heart sounds: Normal heart sounds  Pulmonary:      Effort: Pulmonary effort is normal       Comments: decreased breath sounds bilateral bases  Abdominal:      General: There is distension  Palpations: Abdomen is soft  Tenderness:  There is abdominal tenderness  There is guarding  Musculoskeletal:         General: Normal range of motion  Cervical back: Normal range of motion and neck supple  Skin:     General: Skin is warm and dry  Capillary Refill: Capillary refill takes less than 2 seconds  Neurological:      General: No focal deficit present  Mental Status: She is alert and oriented to person, place, and time  Psychiatric:         Mood and Affect: Mood normal            Additional Data:     Labs:    Results from last 7 days   Lab Units 02/23/23  0833 02/22/23  0558 02/21/23  0515   WBC Thousand/uL 21 87*   < > 13 16*   HEMOGLOBIN g/dL 12 5   < > 13 0   HEMATOCRIT % 37 8   < > 40 2   PLATELETS Thousands/uL 428*   < > 391*   NEUTROS PCT %  --   --  86*   LYMPHS PCT %  --   --  6*   LYMPHO PCT % 5*  --   --    MONOS PCT %  --   --  7   MONO PCT % 5  --   --    EOS PCT % 0  --  0    < > = values in this interval not displayed  Results from last 7 days   Lab Units 02/23/23  0510 02/22/23  0558 02/21/23  0515   SODIUM mmol/L 132*   < > 130*   POTASSIUM mmol/L 3 6   < > 3 9   CHLORIDE mmol/L 100   < > 99   CO2 mmol/L 28   < > 26   BUN mg/dL 32*   < > 30*   CREATININE mg/dL 0 93   < > 0 87   ANION GAP mmol/L 4   < > 5   CALCIUM mg/dL 6 7*   < > 6 9*   ALBUMIN g/dL  --   --  2 9*   TOTAL BILIRUBIN mg/dL  --   --  0 37   ALK PHOS U/L  --   --  38   ALT U/L  --   --  19   AST U/L  --   --  51*   GLUCOSE RANDOM mg/dL 108   < > 204*    < > = values in this interval not displayed  * I Have Reviewed All Lab Data Listed Above  * Additional Pertinent Lab Tests Reviewed:  Jv 66 Admission Reviewed    Imaging:    Imaging Reports Reviewed Today Include: ct abd  Imaging Personally Reviewed by Myself Includes:  Ct abd    Recent Cultures (last 7 days):           Last 24 Hours Medication List:   Current Facility-Administered Medications   Medication Dose Route Frequency Provider Last Rate   • amiodarone  200 mg Oral BID With Meals Pranay Abdi MD     • furosemide  40 mg Intravenous BID (diuretic) Pranay Abdi MD     • gabapentin  300 mg Oral HS Pranay Abdi MD     • latanoprost  1 drop Both Eyes HS Pranay Abdi MD     • levalbuterol  1 25 mg Nebulization Q4H PRN Pranay Abdi MD     • metoprolol  5 mg Intravenous Q6H PRN Pranay Abdi MD     • metoprolol succinate  50 mg Oral BID Pranay Abdi MD     • midodrine  2 5 mg Oral TID AC Pranay Abdi MD     • ondansetron  4 mg Intravenous Q4H PRN Pranay Abdi MD     • phenol  1 spray Mouth/Throat Q2H PRN Pranay Abdi MD     • piperacillin-tazobactam  3 375 g Intravenous Q6H Pranay Abdi MD 3 375 g (02/23/23 0840)   • traZODone  100 mg Oral HS Pranay Abdi MD     • vancomycin oral  125 mg Oral Q6H [address] Pranay Abdi MD          Today, Patient Was Seen By: Pranay Abdi MD    ** Please Note: Dictation voice to text software may have been used in the creation of this document   **

## 2023-02-23 NOTE — PHYSICAL THERAPY NOTE
PHYSICAL THERAPY NOTE          Patient Name: Prerna Campbell  [id number] Date: 2/23/2023  PT treatment cancelled today after speaking to RN who reported that patients WBC is elevated and will be going for a thoracentesis and possible drain insertion secondary to possible diverticulitis perforation found on CT scan  Will continue to offer PT treatment as ordered and progress as able when patient is medically stable and able to participate in treatment    [place], [place]

## 2023-02-23 NOTE — CONSULTS
Interventional Radiology  Consultation 2/23/2023     Consults  Keny Martinez   1934   [id number]      Assessment/Plan:  Patient with known SBO now resolved  Patient had right pelvic collection with no increase in prior recent CT scans measuring 6 0 x 3 0 cm  CT scans from 02/15,2/21 and 2/23 shows unchanged appearance with adjacent colon with diverticulosis  This is amenable to percutaneous drainage  Increasing WBC count , now at 21 8 from 13 3 with request for drainage  This will be scheduled with CT  Medical Problems     Problem List     * (Principal) Small bowel obstruction (HCC)    Atrial fibrillation with RVR (HCC)    Diverticulitis of large intestine with perforation and abscess without bleeding    Pleural effusion, bilateral    Acute on chronic combined systolic and diastolic congestive heart failure (HCC)    Wt Readings from Last 3 Encounters:   02/23/23 66 9 kg (147 lb 7 8 oz)   06/23/21 55 3 kg (121 lb 14 6 oz)   03/15/19 59 kg (130 lb)                 Acute cystitis    Stage 3b chronic kidney disease ([facility] [address] )    Lab Results   Component Value Date    EGFR 55 02/23/2023    EGFR 70 02/22/2023    EGFR 59 02/21/2023    CREATININE 0 93 02/23/2023    CREATININE 0 76 02/22/2023    CREATININE 0 87 02/21/2023         Coronary artery disease involving native coronary artery of native heart    Moderate protein-calorie malnutrition ([facility] [address] )    Malnutrition Findings:   Adult Malnutrition type: Chronic illness  Adult Degree of Malnutrition: Malnutrition of moderate degree  Malnutrition Characteristics: Muscle loss, Fat loss, Inadequate energy                  360 Statement: Chronic moderate malnutrition related to decreased oral intake, difficulties with meal preparation at home as evidenced by < 75% estimated energy intake > 1 mo; moderate muscle loss to temporalis, pectoralis, deltoid muscles; moderate to severe fat loss to orbitals, moderate fat loss to buccal pads   Treated with: Advance diet as medically appropriate to modest 4gm DENY given hx of CHF  Will discuss supplements with pt once diet initiated  Recommend daily weights for nutrition monitoring  Did discuss Mom's Meals with pt for ease of meal preparation at home  BMI Findings: Body mass index is 26 98 kg/m²  PVC's (premature ventricular contractions)    Bacteremia    PICC (peripherally inserted central catheter) in place    Goals of care, counseling/discussion            Subjective:     Patient ID: David Ritchie is a 80 y o  female  History of Present Illness  Patient with persistent right pelvic collection now with elevated WBC for drainage       Review of Systems      Past Medical History:   Diagnosis Date   • Arthritis    • CHF (congestive heart failure) ([facility] [address] )    • Coronary artery disease    • Glaucoma    • Hypertension    • Kidney disease         Past Surgical History:   Procedure Laterality Date   • CHOLECYSTECTOMY     • HYSTERECTOMY     • IR PICC PLACEMENT SINGLE LUMEN  2/16/2023   • KNEE ARTHROPLASTY Left    • THYROID LOBECTOMY          Social History     Tobacco Use   Smoking Status Former   Smokeless Tobacco Never   Tobacco Comments    quit over 50 years ago        Social History     Substance and Sexual Activity   Alcohol Use Not Currently        Social History     Substance and Sexual Activity   Drug Use Never        Allergies   Allergen Reactions   • Diclofenac Other (See Comments)     liver problems  liver problems     • Meloxicam Other (See Comments)     Kidney failure  Kidney failure     • Sertraline Other (See Comments)     Other reaction(s): increase in anxiety  Other reaction(s): increase in anxiety         Current Facility-Administered Medications   Medication Dose Route Frequency Provider Last Rate Last Admin   • amiodarone tablet 200 mg  200 mg Oral BID With Meals Han Barragan MD   200 mg at 02/23/23 0840   • furosemide (LASIX) injection 20 mg  20 mg Intravenous Once Han Barragan MD       • furosemide (LASIX) injection 40 mg  40 mg Intravenous BID (diuretic) Piyush Pinzon MD       • gabapentin (NEURONTIN) capsule 300 mg  300 mg Oral HS Piyush Pinzon MD   300 mg at 02/22/23 2030   • latanoprost (XALATAN) 0 005 % ophthalmic solution 1 drop  1 drop Both Eyes HS Piyush Pinzon MD   1 drop at 02/22/23 2033   • levalbuterol (Aziza Formosa) inhalation solution 1 25 mg  1 25 mg Nebulization Q4H PRN Piyush Pinzon MD   1 25 mg at 02/22/23 1753   • metoprolol (LOPRESSOR) injection 5 mg  5 mg Intravenous Q6H PRN Piyush Pinzon MD   5 mg at 02/18/23 2103   • metoprolol succinate (TOPROL-XL) 24 hr tablet 50 mg  50 mg Oral BID Piyush Pinzon MD   50 mg at 02/23/23 0840   • midodrine (PROAMATINE) tablet 2 5 mg  2 5 mg Oral TID [facility] Piyush Pinzon MD   2 5 mg at 02/23/23 0509   • ondansetron (ZOFRAN) injection 4 mg  4 mg Intravenous Q4H PRN Piyush Pinzon MD   4 mg at 02/15/23 1036   • phenol (CHLORASEPTIC) 1 4 % mucosal liquid 1 spray  1 spray Mouth/Throat Q2H PRN Piyush Pinzon MD   1 spray at 02/16/23 1331   • piperacillin-tazobactam (ZOSYN) 3 375 g in sodium chloride 0 9 % 100 mL IVPB  3 375 g Intravenous Q6H Piyush Pinzon  mL/hr at 02/23/23 0840 3 375 g at 02/23/23 0840   • traZODone (DESYREL) tablet 100 mg  100 mg Oral HS Piyush Pinzon MD   100 mg at 02/22/23 2031          Objective:    Vitals:    02/23/23 0509 02/23/23 0707 02/23/23 0835 02/23/23 0932   BP: 100/61 99/63 113/63    BP Location:       Pulse: 93 (!) 107 (!) 106    Resp: 16 16     Temp:  97 5 °F (36 4 °C)     TempSrc:       SpO2: 96% 93% 93%    Weight:    66 9 kg (147 lb 7 8 oz)   Height:            Physical Exam      Lab Results   Component Value Date     (H) 02/10/2023      Lab Results   Component Value Date    WBC 21 87 (H) 02/23/2023    HGB 12 5 02/23/2023    HCT 37 8 02/23/2023    MCV 85 02/23/2023     (H) 02/23/2023     Lab Results   Component Value Date    INR 1 90 (H) 02/15/2023    PROTIME 21 9 (H) 02/15/2023     Lab Results   Component Value Date    PTT 46 (H) 02/20/2023         I have personally reviewed pertinent imaging and laboratory results  Code Status: Level 3 - DNAR and DNI  Advance Directive and Living Will:      Power of :    POLST:      IR has been consulted to evaluate the patient, determine the appropriate procedure, and whether or not a procedure can and should be performed  Thank you for allowing me to participate in the care of Frank Man  Please don't hesitate to call, text, email, or TigerText with any questions  This text is generated with voice recognition software  There may be translation, syntax,  or grammatical errors  If you have any questions, please contact the dictating provider

## 2023-02-24 NOTE — ASSESSMENT & PLAN NOTE
Wt Readings from Last 3 Encounters:   02/23/23 66 9 kg (147 lb 7 8 oz)   06/23/21 55 3 kg (121 lb 14 6 oz)   03/15/19 59 kg (130 lb)     · History chronic congestive heart failure, follows with [facility] cardiology  · Per their notes - Probable Takotsubo cardiomyopathy with recovered EF and at least moderate MR and moderate TR   ·  2D echo EF has dropped to 25 to 30%  · Continue Toprol  · Lisinopril 2 5 mg daily on hold due to hypotension  · place on Lasix 40 mg IV prn

## 2023-02-24 NOTE — PROGRESS NOTES
[address]  Progress Note - Axel Macedo 1934, 80 y o  female MRN: [id number]  Unit/Bed#: -01 Encounter: [id number]  Primary Care Provider: Sena Garcia DO   Date and time admitted to hospital: 2/10/2023 12:28 AM    Atrial fibrillation with RVR (HCC)  Assessment & Plan  · History paroxysmal atrial fibrillation chronically maintained on metoprolol succinate 25 mg daily   · chronic anticoagulation with Eliquis 2 5 mg twice daily for elevated [id number] stroke risk  · Initially patient received some IV Cardizem, then placed on beta-blocker, later on Cardizem short-acting added after that as per cardiology recommendation, amiodarone added  · Continue Toprol-XL  Continue midodrine for hypotension  And Eliquis for anticoagulation  mild  Tachycardia noted today with heart rate 106-107    * Small bowel obstruction [facility])  Assessment & Plan  Since patient was throwing up,  CT abd 2/15  which showed a small bowel obstruction which has now resolved with conservative management  cT scan done on 2/21 shows resolution of small bowel obstruction  Status post NG tube   Now removed  Cont npo and cont tpn    Diverticulitis of small intestine with perforation and abscess without bleeding  Assessment & Plan  concern for ischemia of the small bowel with perforation and fluid collection  CT abdomen pelvis shows Most of the thickened loops of bowel described above are thickened segments of left-sided small bowel  Mild mucosal hyperemia of the proximal/mid colon  Unchanged right hemipelvis collection measuring 5 3 x 3 1 cm #2/131 adjacent to the mid sigmoid colon      · Stool Culture including C  difficile and enteric panel negative today however diarrhea has worsened   retest for C   Difficile negative and placed on oral vancomycin prophylactically for now  · Blood Culture 1 out of 2 gram-negative rods  · Empiric ceftriaxone/metronidazole -elevated procalcitonin worsening leukocytosis noted and switch to Zosyn 2/22  · discussed CT scan findings with IR, general surgery and infectious disease  Overall prognosis is guarded at this time  Discussed with patient that she might need surgical intervention which will be difficult due to her overall clinical condition and EF dropping to 25 to 30% or consider comfort care/hospice care  · He is looking and feeling a little bit better today and white count improved a little  Keep n p o  TPN  Surgery to follow along in case patient has any worsening to determine surgical intervention versus hospice care  Continue to hold Eliquis for another 24 hours    Acute cystitis  Assessment & Plan  · Presented with complaints of abdominal pain and foul-smelling urine  · UA with bacteriuria, pyuria  · Urine did not reflex to culture  · Blood culture 1 out of 2 gram-negative rods  · Already completed course of antibiotics for acute cystitis    Acute on chronic combined systolic and diastolic congestive heart failure (HCC)  Assessment & Plan  Wt Readings from Last 3 Encounters:   02/23/23 66 9 kg (147 lb 7 8 oz)   06/23/21 55 3 kg (121 lb 14 6 oz)   03/15/19 59 kg (130 lb)     · History chronic congestive heart failure, follows with [facility] cardiology  · Per their notes - Probable Takotsubo cardiomyopathy with recovered EF and at least moderate MR and moderate TR   ·  2D echo EF has dropped to 25 to 30%  · Continue Toprol  · Lisinopril 2 5 mg daily on hold due to hypotension  · place on Lasix 40 mg IV prn  Pleural effusion, bilateral  Assessment & Plan  · Moderate bilateral pleural effusions did on imaging and also third spacing noted with anasarca mostly of bilateral upper extremities  Decreased oral intake for the last 2 weeks which is probably causing her to have third spacing  · Get 2D echo to evaluate LV function  · Ultrasound of upper extremity shows superficial thrombophlebitis    Already on Eliquis is currently on hold temporarily in case of any surgical intervention or IR procedure  · Rt Sided thoracocentesis done on  for 900 cc  Continue as needed Lasix      VTE Pharmacologic Prophylaxis:   Pharmacologic: Apixaban (Eliquis) hold  Mechanical VTE Prophylaxis in Place: Yes    Patient Centered Rounds: I have performed bedside rounds with nursing staff today  Discussions with Specialists or Other Care Team Provider: Discussed with surgery    Education and Discussions with Family / Patient: discussed with patient at bedside and updated daughter    Time Spent for Care: 45 minutes  More than 50% of total time spent on counseling and coordination of care as described above  Current Length of Stay: 14 day(s)    Current Patient Status: Inpatient   Certification Statement: The patient will continue to require additional inpatient hospital stay due to Small bowel perforation    Discharge Plan: pending progress    Code Status: Level 3 - DNAR and DNI      Subjective:   Patient complaining about difficulty sleeping at night otherwise feels better today with less abdominal pain and less leukocytosis noted today  No nausea vomiting reported and less watery diarrhea reported  Objective:     Vitals:   Temp (24hrs), Av 5 °F (36 4 °C), Min:97 3 °F (36 3 °C), Max:97 5 °F (36 4 °C)    Temp:  [97 3 °F (36 3 °C)-97 5 °F (36 4 °C)] 97 5 °F (36 4 °C)  HR:  [] 95  Resp:  [14-22] 20  BP: (109-124)/(62-76) 124/76  SpO2:  [92 %-94 %] 94 %  Body mass index is 26 98 kg/m²  Input and Output Summary (last 24 hours): Intake/Output Summary (Last 24 hours) at 2023 1500  Last data filed at 2023 1001  Gross per 24 hour   Intake 480 ml   Output 300 ml   Net 180 ml       Physical Exam:     Physical Exam  Vitals and nursing note reviewed  Constitutional:       Appearance: Normal appearance  HENT:      Head: Normocephalic and atraumatic        Right Ear: External ear normal       Left Ear: External ear normal       Nose: Nose normal       Mouth/Throat: Pharynx: Oropharynx is clear  Eyes:      Pupils: Pupils are equal, round, and reactive to light  Cardiovascular:      Rate and Rhythm: Normal rate  Rhythm irregular  Heart sounds: Normal heart sounds  Pulmonary:      Effort: Pulmonary effort is normal       Comments: Decreased breath sounds noted on the left mid to lower chest   Right base clear  Abdominal:      General: Bowel sounds are normal  There is no distension  Palpations: Abdomen is soft  Tenderness: There is abdominal tenderness  Musculoskeletal:         General: Normal range of motion  Cervical back: Normal range of motion and neck supple  Skin:     General: Skin is warm and dry  Capillary Refill: Capillary refill takes less than 2 seconds  Neurological:      General: No focal deficit present  Mental Status: She is alert and oriented to person, place, and time  Psychiatric:         Mood and Affect: Mood normal            Additional Data:     Labs:    Results from last 7 days   Lab Units 02/24/23  0534 02/23/23  0833 02/22/23  0558 02/21/23  0515   WBC Thousand/uL 17 79* 21 87*   < > 13 16*   HEMOGLOBIN g/dL 12 0 12 5   < > 13 0   HEMATOCRIT % 36 5 37 8   < > 40 2   PLATELETS Thousands/uL 421* 428*   < > 391*   NEUTROS PCT %  --   --   --  86*   LYMPHS PCT %  --   --   --  6*   LYMPHO PCT %  --  5*  --   --    MONOS PCT %  --   --   --  7   MONO PCT %  --  5  --   --    EOS PCT %  --  0  --  0    < > = values in this interval not displayed       Results from last 7 days   Lab Units 02/24/23  0534 02/22/23  0558 02/21/23  0515   SODIUM mmol/L 134*   < > 130*   POTASSIUM mmol/L 3 0*   < > 3 9   CHLORIDE mmol/L 100   < > 99   CO2 mmol/L 31   < > 26   BUN mg/dL 29*   < > 30*   CREATININE mg/dL 0 94   < > 0 87   ANION GAP mmol/L 3*   < > 5   CALCIUM mg/dL 6 7*   < > 6 9*   ALBUMIN g/dL  --   --  2 9*   TOTAL BILIRUBIN mg/dL  --   --  0 37   ALK PHOS U/L  --   --  38   ALT U/L  --   --  19   AST U/L  --   --  51* GLUCOSE RANDOM mg/dL 160*   < > 204*    < > = values in this interval not displayed  * I Have Reviewed All Lab Data Listed Above  * Additional Pertinent Lab Tests Reviewed: Jv 66 Admission Reviewed    Imaging:    Imaging Reports Reviewed Today Include: None today  Imaging Personally Reviewed by Myself Includes: None today    Recent Cultures (last 7 days):     Results from last 7 days   Lab Units 02/23/23  1322 02/23/23  1114   GRAM STAIN RESULT  2+ Polys  No bacteria seen  --    C DIFF TOXIN B BY PCR   --  Negative       Last 24 Hours Medication List:   Current Facility-Administered Medications   Medication Dose Route Frequency Provider Last Rate   • Adult TPN (STANDARD BASE/STANDARD ELECTROLYTE)   Intravenous Continuous TPN Dileep Guzmán MD 42 mL/hr at 02/23/23 2125   • Adult TPN (STANDARD BASE/STANDARD ELECTROLYTE)   Intravenous Continuous TPN Dileep Guzmán MD     • amiodarone  200 mg Oral BID With Meals Dileep Guzmán MD     • gabapentin  300 mg Oral HS Dileep Guzmán MD     • latanoprost  1 drop Both Eyes HS Dileep Guzmán MD     • levalbuterol  1 25 mg Nebulization Q4H PRN Dileep Guzmán MD     • melatonin  3 mg Oral HS Dileep Guzmán MD     • metoprolol  5 mg Intravenous Q6H PRN Dileep Guzmán MD     • metoprolol succinate  50 mg Oral BID Dileep Guzmán MD     • midodrine  2 5 mg Oral TID AC Dileep Guzmán MD     • ondansetron  4 mg Intravenous Q4H PRN Dileep Guzmán MD     • phenol  1 spray Mouth/Throat Q2H PRN Dileep Guzmán MD     • piperacillin-tazobactam  3 375 g Intravenous Q6H Dileep Guzmán MD 3 375 g (02/24/23 0827)   • potassium chloride  20 mEq Intravenous Q2H Dileep Guzmán MD     • traZODone  100 mg Oral HS Dileep Guzmán MD     • vancomycin oral  125 mg Oral Q6H [address] Dileep Guzmán MD          Today, Patient Was Seen By: Dileep Guzmán MD    ** Please Note: Dictation voice to text software may have been used in the creation of this document   **

## 2023-02-24 NOTE — ASSESSMENT & PLAN NOTE
Since patient was throwing up,  CT abd 2/15  which showed a small bowel obstruction which has now resolved with conservative management  cT scan done on 2/21 shows resolution of small bowel obstruction  Status post NG tube   Now removed   Cont npo and cont tpn

## 2023-02-24 NOTE — PLAN OF CARE
Problem: Nutrition/Hydration-ADULT  Goal: Nutrient/Hydration intake appropriate for improving, restoring or maintaining nutritional needs  Description: Monitor and assess patient's nutrition/hydration status for malnutrition  Collaborate with interdisciplinary team and initiate plan and interventions as ordered  Monitor patient's weight and dietary intake as ordered or per policy  Utilize nutrition screening tool and intervene as necessary  Determine patient's food preferences and provide high-protein, high-caloric foods as appropriate       INTERVENTIONS:  - Monitor oral intake, urinary output, labs, and treatment plans  - Assess nutrition and hydration status and recommend course of action  - Evaluate amount of meals eaten  - Assist patient with eating if necessary   - Allow adequate time for meals  - Recommend/ encourage appropriate diets, oral nutritional supplements, and vitamin/mineral supplements  - Order, calculate, and assess calorie counts as needed  - Recommend, monitor, and adjust tube feedings and TPN/PPN based on assessed needs  - Assess need for intravenous fluids  - Provide specific nutrition/hydration education as appropriate  - Include patient/family/caregiver in decisions related to nutrition  Outcome: Progressing     Problem: PAIN - ADULT  Goal: Verbalizes/displays adequate comfort level or baseline comfort level  Description: Interventions:  - Encourage patient to monitor pain and request assistance  - Assess pain using appropriate pain scale0-10  - Administer analgesics based on type and severity of pain and evaluate response  - Implement non-pharmacological measures as appropriate and evaluate response  - Consider cultural and social influences on pain and pain management  - Notify physician/advanced practitioner if interventions unsuccessful or patient reports new pain  Outcome: Progressing     Problem: INFECTION - ADULT  Goal: Absence or prevention of progression during hospitalization  Description: INTERVENTIONS:  - Assess and monitor for signs and symptoms of infection  - Monitor lab/diagnostic results  - Monitor all insertion sites, i e  indwelling lines, tubes, and drains  - Monitor endotracheal if appropriate and nasal secretions for changes in amount and color  - [place] appropriate cooling/warming therapies per order  - Administer medications as ordered  - Instruct and encourage patient and family to use good hand hygiene technique  - Identify and instruct in appropriate isolation precautions for identified infection/condition  Outcome: Progressing     Problem: SAFETY ADULT  Goal: Patient will remain free of falls  Description: INTERVENTIONS:  - Educate patient/family on patient safety including physical limitations  - Instruct patient to call for assistance with activity   - Consult OT/PT to assist with strengthening/mobility   - Keep Call bell within reach  - Keep bed low and locked with side rails adjusted as appropriate  - Keep care items and personal belongings within reach  - Initiate and maintain comfort rounds  - Make Fall Risk Sign visible to staff  - Apply yellow socks and bracelet for high fall risk patients  - Consider moving patient to room near nurses station  Outcome: Progressing  Goal: Maintain or return to baseline ADL function  Description: INTERVENTIONS:  -  Assess patient's ability to carry out ADLs; assess patient's baseline for ADL function and identify physical deficits which impact ability to perform ADLs (bathing, care of mouth/teeth, toileting, grooming, dressing, etc )  - Assess/evaluate cause of self-care deficits   - Assess range of motion  - Assess patient's mobility; develop plan if impaired  - Assess patient's need for assistive devices and provide as appropriate  - Encourage maximum independence but intervene and supervise when necessary  - Involve family in performance of ADLs  - Assess for home care needs following discharge   - Consider OT consult to assist with ADL evaluation and planning for discharge  - Provide patient education as appropriate  Outcome: Progressing  Goal: Maintains/Returns to pre admission functional level  Description: INTERVENTIONS:  - Perform BMAT or MOVE assessment daily    - Set and communicate daily mobility goal to care team and patient/family/caregiver  - Collaborate with rehabilitation services on mobility goals if consulted  - Perform Range of Motion 3 times a day  - Reposition patient every 2 hours if pt unable to reposition self  - Out of bed for toileting  - Record patient progress and toleration of activity level   Outcome: Progressing     Problem: DISCHARGE PLANNING  Goal: Discharge to home or other facility with appropriate resources  Description: INTERVENTIONS:  - Identify barriers to discharge w/patient and caregiver  - Arrange for needed discharge resources and transportation as appropriate  - Identify discharge learning needs (meds, wound care, etc )  - Arrange for interpretive services to assist at discharge as needed  - Refer to Case Management Department for coordinating discharge planning if the patient needs post-hospital services based on physician/advanced practitioner order or complex needs related to functional status, cognitive ability, or social support system  Outcome: Progressing     Problem: Knowledge Deficit  Goal: Patient/family/caregiver demonstrates understanding of disease process, treatment plan, medications, and discharge instructions  Description: Complete learning assessment and assess knowledge base    Interventions:  - Provide teaching at level of understanding  - Provide teaching via preferred learning methods  Outcome: Progressing     Problem: CARDIOVASCULAR - ADULT  Goal: Maintains optimal cardiac output and hemodynamic stability  Description: INTERVENTIONS:  - Monitor I/O, vital signs and rhythm  - Monitor for S/S and trends of decreased cardiac output  - Administer and titrate ordered vasoactive medications to optimize hemodynamic stability  - Assess quality of pulses, skin color and temperature  - Assess for signs of decreased coronary artery perfusion  - Instruct patient to report change in severity of symptoms  Outcome: Progressing  Goal: Absence of cardiac dysrhythmias or at baseline rhythm  Description: INTERVENTIONS:  - Continuous cardiac monitoring, vital signs, obtain 12 lead EKG if ordered  - Administer antiarrhythmic and heart rate control medications as ordered  - Monitor electrolytes and administer replacement therapy as ordered  Outcome: Progressing     Problem: GASTROINTESTINAL - ADULT  Goal: Minimal or absence of nausea and/or vomiting  Description: INTERVENTIONS:  - Administer IV fluids if ordered to ensure adequate hydration  - Maintain NPO status until nausea and vomiting are resolved  - Nasogastric tube if ordered  - Administer ordered antiemetic medications as needed  - Provide nonpharmacologic comfort measures as appropriate  - Advance diet as tolerated, if ordered  - Consider nutrition services referral to assist patient with adequate nutrition and appropriate food choices  Outcome: Progressing  Goal: Maintains or returns to baseline bowel function  Description: INTERVENTIONS:  - Assess bowel function  - Encourage oral fluids to ensure adequate hydration  - Administer IV fluids if ordered to ensure adequate hydration  - Administer ordered medications as needed  - Encourage mobilization and activity  - Consider nutritional services referral to assist patient with adequate nutrition and appropriate food choices  Outcome: Progressing  Goal: Maintains adequate nutritional intake  Description: INTERVENTIONS:  - Monitor percentage of each meal consumed  - Identify factors contributing to decreased intake, treat as appropriate  - Assist with meals as needed  - Monitor I&O, weight, and lab values if indicated  - Obtain nutrition services referral as needed  Outcome: Progressing     Problem: MOBILITY - ADULT  Goal: Maintain or return to baseline ADL function  Description: INTERVENTIONS:  -  Assess patient's ability to carry out ADLs; assess patient's baseline for ADL function and identify physical deficits which impact ability to perform ADLs (bathing, care of mouth/teeth, toileting, grooming, dressing, etc )  - Assess/evaluate cause of self-care deficits   - Assess range of motion  - Assess patient's mobility; develop plan if impaired  - Assess patient's need for assistive devices and provide as appropriate  - Encourage maximum independence but intervene and supervise when necessary  - Involve family in performance of ADLs  - Assess for home care needs following discharge   - Consider OT consult to assist with ADL evaluation and planning for discharge  - Provide patient education as appropriate  Outcome: Progressing  Goal: Maintains/Returns to pre admission functional level  Description: INTERVENTIONS:  - Perform BMAT or MOVE assessment daily    - Set and communicate daily mobility goal to care team and patient/family/caregiver  - Collaborate with rehabilitation services on mobility goals if consulted  - Perform Range of Motion 3 times a day    - Reposition patient every 2 hours if pt unable to reposition self  - Out of bed for toileting  - Record patient progress and toleration of activity level   Outcome: Progressing     Problem: Prexisting or High Potential for Compromised Skin Integrity  Goal: Skin integrity is maintained or improved  Description: INTERVENTIONS:  - Identify patients at risk for skin breakdown  - Assess and monitor skin integrity  - Assess and monitor nutrition and hydration status  - Monitor labs   - Assess for incontinence   - Turn and reposition patient  - Assist with mobility/ambulation  - Relieve pressure over bony prominences  - Avoid friction and shearing  - Provide appropriate hygiene as needed including keeping skin clean and dry  - Evaluate need for skin moisturizer/barrier cream  - Collaborate with interdisciplinary team   - Patient/family teaching  - Consider wound care consult   Outcome: Progressing

## 2023-02-24 NOTE — PROGRESS NOTES
PT on standard TPN, reviewed labs: 2/24 Na 134, K 3 0, BUN 29, Ca 6 7, random , replete K and Ca  Will continue to monitor labs/electrolytes, check triglycerides  Weight has significantly increased d/t 3+ edema UE's, LE's, on lasix  Continue standard TPN for today  Once electrolytes wnl can advance to goal TPN AA15% 500mL, D60% 400mL, oiyhiz99% 200mL, provides total of 1516cal, 75g pro, 1100mL, glucose load 2 4mg/kg/min, lipid load   6g/kg/day  Will monitor labs/Na and adjust TPN recs as needed

## 2023-02-24 NOTE — ASSESSMENT & PLAN NOTE
· History paroxysmal atrial fibrillation chronically maintained on metoprolol succinate 25 mg daily   · chronic anticoagulation with Eliquis 2 5 mg twice daily for elevated [id number] stroke risk  · Initially patient received some IV Cardizem, then placed on beta-blocker, later on Cardizem short-acting added after that as per cardiology recommendation, amiodarone added  · Continue Toprol-XL  Continue midodrine for hypotension  And Eliquis for anticoagulation  mild  Tachycardia noted today with heart rate 106-107

## 2023-02-24 NOTE — PROGRESS NOTES
Progress Note - Infectious Disease   Kenneth Goodwin 80 y o  female MRN: [id number]  Unit/Bed#: -01 Encounter: [id number]        REQUIRED DOCUMENTATION:     1  This service was provided via Telemedicine  2  Provider located at [facility]  3  TeleMed provider: Treasure Klein MD   4  Identify all parties in room with patient during tele consult:RN  5  After connecting through [employer], patient was identified by name and date of birth and assistant checked wristband  Patient was then informed that this was a Telemedicine visit and that the exam was being conducted confidentially over secure lines  My office door was closed  No one else was in the room  Patient acknowledged consent and understanding of privacy and security of the Telemedicine visit, and gave us permission to have the assistant stay in the room in order to assist with the history and to conduct the exam   I informed the patient that I have reviewed their record in Epic and presented the opportunity for them to ask any questions regarding the visit today  The patient agreed to participate  Assessment/Recommendations     1  Leukocytosis  -Possibly secondary to GI perforation/abscess versus C  difficile associated diarrhea  -Afebrile, leukocytosis improved today    · Continue Pip-tazo  · FU stool for c diff, continue empiric po vanc 125 qid  · Monitor clinical course and white count    2  Possible acute perforated diverticulitis versus small bowel perforation, SBO  - Initially presenting with acute gastroenterocolitis as suggested clinically and on imaging   Hospital course complicated by persistent vomiting and fu CT on 2/15 noted small bowel obstruction with a point of obstruction likely in the midline to lower right pelvis where they also noted a possible multiloculated fluid collection  - Patient has continued to feel ill with worsening leukocytosis, repeat CT notes persistent loculated collection with new extraluminal gas - this constellation of findings concerning for small bowel v/s sigmoid diverticular perforation (hx appendectomy)  -Discussed with IR, given concern for small bowel perforation they recommend surgical evaluation over percutaneous drainage  Family currently deferring surgery  -Afebrile, improving pain and diarrhea and improving leukocytosis    · Continue Pip-tazo 3 375 q6h  · Continue p o  vancomycin 125 4 times daily  · If C  difficile testing is positive, then suspect decompensation was related to cdad in which case would prefer to narrow antibiotics to amp sulbactam and continue oral vanc  · If C  difficile testing is negative then would recommend surgery reevaluation  · Monitor clinical course and white count, serial abdominal exams  · Final choice and duration of antibiotics to be determined    3  Parabacteroides bacteremia  - Source of bacteremia is likely acute gastroenterocolitis/bowel perforation/rule out intra-abdominal abscess  - Afebrile with leukocytosis     • Completed 10 days of therapy with metronidazole  • Monitor clinical course, additional management as above     4  Possible UTI  -Urinalysis with trace pyuria, no urine culture sent, patient was symptomatic with urinary urgency  -No current localizing urinary symptoms     • Completed empiric course of therapy, no additional work-up     5  CHF, A fib, CAD, bilateral pleural effusion  -No evidence of underlying pneumonia  -s/p therapeutic thoracentesis, fluid status studies appear transudative     • Management per primary team     6  Chronic kidney disease    · Continue renal dosing of abx    Discussed in detail with the primary service  History       Subjective: The patient reports feeling better today with improving diarrhea and improving abdominal pain  She underwent IR guided thoracentesis yesterday with improvement in shortness of breath  Denies fevers, chills, or sweats    Denies nausea, vomiting, or diarrhea  Antibiotics:  Pip-Tazo      Physical Exam     Temp:  [97 3 °F (36 3 °C)-97 5 °F (36 4 °C)] 97 5 °F (36 4 °C)  HR:  [] 95  Resp:  [14-22] 20  BP: (109-124)/(62-76) 124/76  SpO2:  [92 %-94 %] 94 %  Temp (24hrs), Av 5 °F (36 4 °C), Min:97 3 °F (36 3 °C), Max:97 5 °F (36 4 °C)  Current: Temperature: 97 5 °F (36 4 °C)    Intake/Output Summary (Last 24 hours) at 2023 1219  Last data filed at 2023 1001  Gross per 24 hour   Intake 480 ml   Output 1200 ml   Net -720 ml       Physical exam findings reported by bedside and primary medical team staff      General Appearance:  Appearing ill, frail, nontoxic, and in no distress, appears stated age   Throat: Oropharynx moist without lesions; lips, mucosa, and tongue normal; teeth and gums normal   Lungs:   Diminished to auscultation bilaterally, no audible wheezes, rhonchi and rales, respirations unlabored   Heart:  Irregular rate and rhythm, S1, S2 normal, no murmur, rub or gallop   Abdomen:   Soft, non-tender, distended, hypoactive bowel sounds, no masses, no organomegaly    No CVA tenderness   Extremities: Extremities normal, atraumatic, no cyanosis, clubbing or edema   Skin: Skin color, texture, turgor normal, no rashes or lesions  No draining wounds noted  Neurologic: Alert and oriented times 3, generally weak         Invasive Devices:   PICC Line 23 (Active)   Reasons to continue PICC Total parenteral nutrition 23   Goal for Removal N/A- Continuing for TPN 23   Line Necessity Reviewed Yes, reviewed with provider 23   Site Assessment Clean;Dry; Intact 23   #1 Lumen Color/Status Flushed;Normal saline locked; Blood return noted; Purple lumen 23   Dressing Type Chlorhexidine dressing 23   Dressing Status Clean;Dry; Intact 23   Dressing Change Due 23 0800       Peripheral IV 02/15/23 Right Forearm (Active)   Site Assessment Clean;Dry; Intact 23 2100   Dressing Type Transparent 02/21/23 2100   Line Status Flushed;Saline locked; No blood return 02/21/23 2100   Dressing Status Clean;Dry; Intact 02/21/23 2100   Dressing Change Due 02/23/23 02/20/23 0800   Reason Not Rotated Poor venous access 02/21/23 2100       Peripheral IV 02/16/23 Right;Ventral (anterior) Forearm (Active)   Site Assessment Clean;Dry; Intact 02/21/23 2100   Dressing Type Transparent 02/21/23 2100   Line Status Flushed;Saline locked; No blood return 02/21/23 2100   Dressing Status Clean;Dry; Intact 02/21/23 2100   Dressing Change Due 02/20/23 02/20/23 0800   Reason Not Rotated Poor venous access 02/21/23 2100       Peripheral IV 02/18/23 Right Arm (Active)   Site Assessment Clean;Dry; Intact 02/21/23 2100   Dressing Type Transparent 02/21/23 2100   Line Status Flushed;Saline locked; No blood return 02/21/23 2100   Dressing Status Clean;Dry; Intact 02/21/23 2100   Dressing Change Due 02/22/23 02/20/23 0800   Reason Not Rotated Poor venous access 02/21/23 2100       External Urinary Catheter (Active)   Collection Container Canister and suction tubing (For Female) 02/21/23 2122   Suction Pressure (mmHg) 100 mmHg 02/21/23 2122   Interventions Removed and skin assessed; Pericare performed;Device changed 02/21/23 2122   Output (mL) 155 mL 02/22/23 0557       Labs, Imaging, & Other Studies     Lab Results:    I have personally reviewed pertinent labs      Results from last 7 days   Lab Units 02/24/23  0534 02/23/23  0833 02/22/23  0558   WBC Thousand/uL 17 79* 21 87* 13 34*   HEMOGLOBIN g/dL 12 0 12 5 12 2   PLATELETS Thousands/uL 421* 428* 401*     Results from last 7 days   Lab Units 02/24/23  0534 02/22/23  0558 02/21/23  0515 02/20/23  0546 02/19/23  0628   POTASSIUM mmol/L 3 0*   < > 3 9 4 4 4 1   CHLORIDE mmol/L 100   < > 99 104 103   CO2 mmol/L 31   < > 26 23 28   BUN mg/dL 29*   < > 30* 23 19   CREATININE mg/dL 0 94   < > 0 87 0 79 0 94   EGFR ml/min/1 73sq m 54   < > 59 67 54   CALCIUM mg/dL 6 7* < > 6 9* 7 0* 6 8*   AST U/L  --   --  51* 22 17   ALT U/L  --   --  19 9 7   ALK PHOS U/L  --   --  38 39 37    < > = values in this interval not displayed  Results from last 7 days   Lab Units 02/23/23  1322   GRAM STAIN RESULT  2+ Polys  No bacteria seen       Imaging Studies:   I have personally reviewed pertinent imaging study reports and images in PACS  EKG, Pathology, and Other Studies:   I have personally reviewed pertinent reports  Counseling/Coordination of care: Total 35 minutes communication with the patient via telehealth  Labs, medical tests and imaging studies were independently reviewed by me as noted above

## 2023-02-24 NOTE — CASE MANAGEMENT
Case Management Discharge Planning Note    Patient name Lexi Hammond  Location [address] [id number] MRN [id number]  : 1934 Date 2023       Current Admission Date: 2/10/2023  Current Admission Diagnosis:Small bowel obstruction [facility])   Patient Active Problem List    Diagnosis Date Noted   • Goals of care, counseling/discussion 2023   • Bacteremia 2023   • PICC (peripherally inserted central catheter) in place 2023   • Small bowel obstruction ([address] ) 02/15/2023   • PVC's (premature ventricular contractions) 2023   • Atrial fibrillation with RVR ([facility] [address] ) 02/10/2023   • Diverticulitis of small intestine with perforation and abscess without bleeding 02/10/2023   • Pleural effusion, bilateral 02/10/2023   • Acute on chronic combined systolic and diastolic congestive heart failure ([facility] [address] ) 02/10/2023   • Acute cystitis 02/10/2023   • Stage 3b chronic kidney disease ([facility] [address] ) 02/10/2023   • Coronary artery disease involving native coronary artery of native heart 02/10/2023   • Moderate protein-calorie malnutrition ([facility] [address] ) 02/10/2023      LOS (days): 14  Geometric Mean LOS (GMLOS) (days): 3 50  Days to GMLOS:-10 9     OBJECTIVE:  Risk of Unplanned Readmission Score: 16 33         Current admission status: Inpatient   Preferred Pharmacy:   [facility], [address]  [place] [address] [address] [address]  Phone: [phone] Fax: [phone]    Primary Care Provider: Jay Davis DO    Primary Insurance: MEDICARE  [facility]    DISCHARGE DETAILS:     CM met with daughter, Geraldo Pineda, and patient at bedside  CM informed daughter and patient blanket STR referrals made for patient in case STR needed following stay  Daughter indicated she desires for patient to return to her home at [address] following discharge with [facility], if possible, rather than STR   Daughter requested possible hospital bed referral  for patients discharge at her home  CM submitted antoine BAKER [facility] referral for patient at daughters home  CM to follow referral     Daughter also requested CM make STR referral for patient close to daughters home to explore available options, rather than close to patients home, as back up option  CM submitted marcialet OLIVIA referral for STR close to daughters home    CM to follow referral

## 2023-02-24 NOTE — ASSESSMENT & PLAN NOTE
· Moderate bilateral pleural effusions did on imaging and also third spacing noted with anasarca mostly of bilateral upper extremities  Decreased oral intake for the last 2 weeks which is probably causing her to have third spacing  · Get 2D echo to evaluate LV function  · Ultrasound of upper extremity shows superficial thrombophlebitis  Already on Eliquis is currently on hold temporarily in case of any surgical intervention or IR procedure  · Rt Sided thoracocentesis done on 2/23 for 900 cc    Continue as needed Lasix

## 2023-02-24 NOTE — ASSESSMENT & PLAN NOTE
concern for ischemia of the small bowel with perforation and fluid collection  CT abdomen pelvis shows Most of the thickened loops of bowel described above are thickened segments of left-sided small bowel  Mild mucosal hyperemia of the proximal/mid colon  Unchanged right hemipelvis collection measuring 5 3 x 3 1 cm #2/131 adjacent to the mid sigmoid colon      · Stool Culture including C  difficile and enteric panel negative today however diarrhea has worsened   retest for C  Difficile negative and placed on oral vancomycin prophylactically for now  · Blood Culture 1 out of 2 gram-negative rods  · Empiric ceftriaxone/metronidazole -elevated procalcitonin worsening leukocytosis noted and switch to Zosyn 2/22  · discussed CT scan findings with IR, general surgery and infectious disease  Overall prognosis is guarded at this time  Discussed with patient that she might need surgical intervention which will be difficult due to her overall clinical condition and EF dropping to 25 to 30% or consider comfort care/hospice care  · He is looking and feeling a little bit better today and white count improved a little  Keep n p o  TPN  Surgery to follow along in case patient has any worsening to determine surgical intervention versus hospice care    Continue to hold Eliquis for another 24 hours

## 2023-02-24 NOTE — PROGRESS NOTES
Progress Note - General Surgery   Frank Man 80 y o  female MRN: [id number]  Unit/Bed#: -01 Encounter: [id number]    Assessment:  -81yo female admitted with dx of gastroenterocolitis on 2/10/23  -Small bowel obstruction, now resolved  -Repeat CTAP done yesterday 2/23/23 due to worsening abdominal tenderness and leukocytosis showed increased pneumoperitoneaum near a thickened loop of small bowel in the LLQ, suggesting possible sb perforation vs perforated sigmoid diverticulitits  The fluid collection in her right hemipelvis near the sigmoid appeared to be stable in size  Mild colitis was also noted  -Continued pain and tenderness in lower abdomen- though improved today  -Leukocytosis, improved today, WBC 17 79 (21 87, 13 16,12 58, 8 05, 7 49, 7 85)  -Continued diarrhea -multiple episodes daily  -Bacteremia,  blood culture 1 of 2 positive for GNR, parabacteroides distasonis  -Hyponatremia   -Hypocalcemia  -Cdiff negative on 2/11, retesting sent yesterday and pending, being treated empirically      Plan:  NPO  Continue TNP  IVF hydration  Serial abdominal exams  Monitor I/Os  IV abx as indicate, appreciate ID rec's  Medicate PRN pain/nausea  Management of co-morbidities per primary team    Subjective/Objective     Subjective: She is alert, pleasant and sitting up in bed  She continues to have abdominal pain but states that it is improved from yesterday  It is now only present with palpation in the mid lower abdomen  Denies N/V, CP, SOB  Objective:   Blood pressure 118/74, pulse (!) 114, temperature 97 5 °F (36 4 °C), resp  rate 20, height 5' 2" (1 575 m), weight 66 9 kg (147 lb 7 8 oz), SpO2 94 %  ,Body mass index is 26 98 kg/m²        Intake/Output Summary (Last 24 hours) at 2/24/2023 0730  Last data filed at 2/23/2023 2239  Gross per 24 hour   Intake 480 ml   Output 900 ml   Net -420 ml       Invasive Devices     Peripherally Inserted Central Catheter Line  Duration           PICC Line 02/16/23 7 days Peripheral Intravenous Line  Duration           Peripheral IV Left;Upper;Ventral (anterior) Arm -- days          Drain  Duration           External Urinary Catheter 7 days    Rectal Tube With balloon 1 day                Physical Exam:   Gen: AxOx3, pleasant, alert, [place]  Abd: softly distended, bowel sounds present throughout, +no tenderness in [place] upper abd or periumbilically  +Tenderness to deep palpation with mild guarding in mid lower abdomen  Extremities: Mild edema of both upper extremities    Lab, Imaging and other studies:  I have personally reviewed pertinent lab results  REPEAT CTAP  2/23/23:    ADDENDUM:     Most of the thickened loops of bowel described above are thickened segments of left-sided small bowel  Mild mucosal hyperemia of the proximal/mid colon      Multiple small new foci of pneumoperitoneum in the anterior mid pelvis #2/90      As seen on the comparison study there are small dots of gas within and adjacent to the right hemipelvis collection, likely the sequelae of an adjacent sigmoid diverticulitis       LIVER/BILIARY TREE:  Unchanged mild intrahepatic and extrahepatic biliary dilation likely of a normal degree status post cholecystectomy  Slightly nodular hepatic contours may indicate underlying hepatocellular disease      GALLBLADDER:  Cholecystectomy      SPLEEN:  Unremarkable        PANCREAS:  Unremarkable      ADRENAL GLANDS:  Unremarkable      KIDNEYS/URETERS:  Unremarkable  No hydronephrosis      STOMACH AND BOWEL:  Interval development of prominent diffuse colonic thickening in keeping with a nonspecific colitis  No bowel obstruction  No bowel pneumatosis    Fluid in the rectal vault with a rectal drainage tube      APPENDIX:  No findings to suggest appendicitis      ABDOMINOPELVIC CAVITY:  Reidentified moderate abdominal ascites      Unchanged right hemipelvis collection measuring 5 3 x 3 1 cm #2/131 adjacent to the mid sigmoid colon      VESSELS:  Unremarkable for patient's age      PELVIS     REPRODUCTIVE ORGANS:  Hysterectomy      URINARY BLADDER:  Unremarkable      ABDOMINAL WALL/INGUINAL REGIONS:  Prominent diffuse body wall edema/anasarca     OSSEOUS STRUCTURES:  No acute fracture or destructive osseous lesion  Partially imaged left femoral fixation nails      IMPRESSION:     Reidentified 5 3 x 3 1 cm pelvic collection adjacent to the mid sigmoid colon      Interval development of diffuse colonic thickening and mucosal hyperemia indicating a nonspecific colitis, likely infectious or inflammatory        CBC:   Lab Results   Component Value Date    WBC 17 79 (H) 02/24/2023    HGB 12 0 02/24/2023    HCT 36 5 02/24/2023    MCV 86 02/24/2023     (H) 02/24/2023    MCH 28 2 02/24/2023    MCHC 32 9 02/24/2023    RDW 15 9 (H) 02/24/2023    MPV 9 5 02/24/2023     CMP:   Lab Results   Component Value Date    SODIUM 134 (L) 02/24/2023    K 3 0 (L) 02/24/2023     02/24/2023    CO2 31 02/24/2023    BUN 29 (H) 02/24/2023    CREATININE 0 94 02/24/2023    CALCIUM 6 7 (L) 02/24/2023    EGFR 54 02/24/2023     VTE Pharmacologic Prophylaxis: Heparin  VTE Mechanical Prophylaxis: sequential compression device     Milas Rolling

## 2023-02-24 NOTE — PLAN OF CARE
PT on standard TPN, reviewed labs: 2/24 Na 134, K 3 0, BUN 29, Ca 6 7, random , replete K and Ca  Will continue to monitor labs/electrolytes, check triglycerides  Weight has significantly increased d/t 3+ edema UE's, LE's, on lasix  Continue standard TPN for today  Once electrolytes wnl can advance to goal TPN AA15% 500mL, D60% 400mL, crlgcc92% 200mL, provides total of 1516cal, 75g pro, 1100mL, glucose load 2 4mg/kg/min, lipid load   6g/kg/day  Will monitor labs/Na and adjust TPN recs as needed  Problem: Nutrition/Hydration-ADULT  Goal: Nutrient/Hydration intake appropriate for improving, restoring or maintaining nutritional needs  Description: Monitor and assess patient's nutrition/hydration status for malnutrition  Collaborate with interdisciplinary team and initiate plan and interventions as ordered  Monitor patient's weight and dietary intake as ordered or per policy  Utilize nutrition screening tool and intervene as necessary  Determine patient's food preferences and provide high-protein, high-caloric foods as appropriate       INTERVENTIONS:  - Monitor oral intake, urinary output, labs, and treatment plans  - Assess nutrition and hydration status and recommend course of action  - Evaluate amount of meals eaten  - Assist patient with eating if necessary   - Allow adequate time for meals  - Recommend/ encourage appropriate diets, oral nutritional supplements, and vitamin/mineral supplements  - Order, calculate, and assess calorie counts as needed  - Recommend, monitor, and adjust tube feedings and TPN/PPN based on assessed needs  - Assess need for intravenous fluids  - Provide specific nutrition/hydration education as appropriate  - Include patient/family/caregiver in decisions related to nutrition  Outcome: Progressing

## 2023-02-25 NOTE — ASSESSMENT & PLAN NOTE
· History paroxysmal atrial fibrillation chronically maintained on metoprolol succinate 25 mg daily   · chronic anticoagulation with Eliquis 2 5 mg twice daily for elevated [id number] stroke risk  · Initially patient received some IV Cardizem, then placed on beta-blocker, later on Cardizem short-acting added after that as per cardiology recommendation, amiodarone added  · Continue Toprol-XL  Continue midodrine for hypotension  Hold  Eliquis for anticoagulation another 24 hours  mild  Tachycardia noted today with heart rate

## 2023-02-25 NOTE — PLAN OF CARE
Problem: Nutrition/Hydration-ADULT  Goal: Nutrient/Hydration intake appropriate for improving, restoring or maintaining nutritional needs  Description: Monitor and assess patient's nutrition/hydration status for malnutrition  Collaborate with interdisciplinary team and initiate plan and interventions as ordered  Monitor patient's weight and dietary intake as ordered or per policy  Utilize nutrition screening tool and intervene as necessary  Determine patient's food preferences and provide high-protein, high-caloric foods as appropriate       INTERVENTIONS:  - Monitor oral intake, urinary output, labs, and treatment plans  - Assess nutrition and hydration status and recommend course of action  - Evaluate amount of meals eaten  - Assist patient with eating if necessary   - Allow adequate time for meals  - Recommend/ encourage appropriate diets, oral nutritional supplements, and vitamin/mineral supplements  - Order, calculate, and assess calorie counts as needed  - Recommend, monitor, and adjust tube feedings and TPN/PPN based on assessed needs  - Assess need for intravenous fluids  - Provide specific nutrition/hydration education as appropriate  - Include patient/family/caregiver in decisions related to nutrition  Outcome: Progressing     Problem: PAIN - ADULT  Goal: Verbalizes/displays adequate comfort level or baseline comfort level  Description: Interventions:  - Encourage patient to monitor pain and request assistance  - Assess pain using appropriate pain scale0-10  - Administer analgesics based on type and severity of pain and evaluate response  - Implement non-pharmacological measures as appropriate and evaluate response  - Consider cultural and social influences on pain and pain management  - Notify physician/advanced practitioner if interventions unsuccessful or patient reports new pain  Outcome: Progressing     Problem: INFECTION - ADULT  Goal: Absence or prevention of progression during hospitalization  Description: INTERVENTIONS:  - Assess and monitor for signs and symptoms of infection  - Monitor lab/diagnostic results  - Monitor all insertion sites, i e  indwelling lines, tubes, and drains  - Monitor endotracheal if appropriate and nasal secretions for changes in amount and color  - [place] appropriate cooling/warming therapies per order  - Administer medications as ordered  - Instruct and encourage patient and family to use good hand hygiene technique  - Identify and instruct in appropriate isolation precautions for identified infection/condition  Outcome: Progressing     Problem: SAFETY ADULT  Goal: Patient will remain free of falls  Description: INTERVENTIONS:  - Educate patient/family on patient safety including physical limitations  - Instruct patient to call for assistance with activity   - Consult OT/PT to assist with strengthening/mobility   - Keep Call bell within reach  - Keep bed low and locked with side rails adjusted as appropriate  - Keep care items and personal belongings within reach  - Initiate and maintain comfort rounds  - Make Fall Risk Sign visible to staff  - Apply yellow socks and bracelet for high fall risk patients  - Consider moving patient to room near nurses station  Outcome: Progressing  Goal: Maintain or return to baseline ADL function  Description: INTERVENTIONS:  -  Assess patient's ability to carry out ADLs; assess patient's baseline for ADL function and identify physical deficits which impact ability to perform ADLs (bathing, care of mouth/teeth, toileting, grooming, dressing, etc )  - Assess/evaluate cause of self-care deficits   - Assess range of motion  - Assess patient's mobility; develop plan if impaired  - Assess patient's need for assistive devices and provide as appropriate  - Encourage maximum independence but intervene and supervise when necessary  - Involve family in performance of ADLs  - Assess for home care needs following discharge   - Consider OT consult to assist with ADL evaluation and planning for discharge  - Provide patient education as appropriate  Outcome: Progressing  Goal: Maintains/Returns to pre admission functional level  Description: INTERVENTIONS:  - Perform BMAT or MOVE assessment daily    - Set and communicate daily mobility goal to care team and patient/family/caregiver  - Collaborate with rehabilitation services on mobility goals if consulted  - Perform Range of Motion 3 times a day  - Reposition patient every 2 hours if pt unable to reposition self  - Out of bed for toileting  - Record patient progress and toleration of activity level   Outcome: Progressing     Problem: DISCHARGE PLANNING  Goal: Discharge to home or other facility with appropriate resources  Description: INTERVENTIONS:  - Identify barriers to discharge w/patient and caregiver  - Arrange for needed discharge resources and transportation as appropriate  - Identify discharge learning needs (meds, wound care, etc )  - Arrange for interpretive services to assist at discharge as needed  - Refer to Case Management Department for coordinating discharge planning if the patient needs post-hospital services based on physician/advanced practitioner order or complex needs related to functional status, cognitive ability, or social support system  Outcome: Progressing     Problem: Knowledge Deficit  Goal: Patient/family/caregiver demonstrates understanding of disease process, treatment plan, medications, and discharge instructions  Description: Complete learning assessment and assess knowledge base    Interventions:  - Provide teaching at level of understanding  - Provide teaching via preferred learning methods  Outcome: Progressing     Problem: CARDIOVASCULAR - ADULT  Goal: Maintains optimal cardiac output and hemodynamic stability  Description: INTERVENTIONS:  - Monitor I/O, vital signs and rhythm  - Monitor for S/S and trends of decreased cardiac output  - Administer and titrate ordered vasoactive medications to optimize hemodynamic stability  - Assess quality of pulses, skin color and temperature  - Assess for signs of decreased coronary artery perfusion  - Instruct patient to report change in severity of symptoms  Outcome: Progressing  Goal: Absence of cardiac dysrhythmias or at baseline rhythm  Description: INTERVENTIONS:  - Continuous cardiac monitoring, vital signs, obtain 12 lead EKG if ordered  - Administer antiarrhythmic and heart rate control medications as ordered  - Monitor electrolytes and administer replacement therapy as ordered  Outcome: Progressing     Problem: GASTROINTESTINAL - ADULT  Goal: Minimal or absence of nausea and/or vomiting  Description: INTERVENTIONS:  - Administer IV fluids if ordered to ensure adequate hydration  - Maintain NPO status until nausea and vomiting are resolved  - Nasogastric tube if ordered  - Administer ordered antiemetic medications as needed  - Provide nonpharmacologic comfort measures as appropriate  - Advance diet as tolerated, if ordered  - Consider nutrition services referral to assist patient with adequate nutrition and appropriate food choices  Outcome: Progressing  Goal: Maintains or returns to baseline bowel function  Description: INTERVENTIONS:  - Assess bowel function  - Encourage oral fluids to ensure adequate hydration  - Administer IV fluids if ordered to ensure adequate hydration  - Administer ordered medications as needed  - Encourage mobilization and activity  - Consider nutritional services referral to assist patient with adequate nutrition and appropriate food choices  Outcome: Progressing  Goal: Maintains adequate nutritional intake  Description: INTERVENTIONS:  - Monitor percentage of each meal consumed  - Identify factors contributing to decreased intake, treat as appropriate  - Assist with meals as needed  - Monitor I&O, weight, and lab values if indicated  - Obtain nutrition services referral as needed  Outcome: Progressing     Problem: MOBILITY - ADULT  Goal: Maintain or return to baseline ADL function  Description: INTERVENTIONS:  -  Assess patient's ability to carry out ADLs; assess patient's baseline for ADL function and identify physical deficits which impact ability to perform ADLs (bathing, care of mouth/teeth, toileting, grooming, dressing, etc )  - Assess/evaluate cause of self-care deficits   - Assess range of motion  - Assess patient's mobility; develop plan if impaired  - Assess patient's need for assistive devices and provide as appropriate  - Encourage maximum independence but intervene and supervise when necessary  - Involve family in performance of ADLs  - Assess for home care needs following discharge   - Consider OT consult to assist with ADL evaluation and planning for discharge  - Provide patient education as appropriate  Outcome: Progressing  Goal: Maintains/Returns to pre admission functional level  Description: INTERVENTIONS:  - Perform BMAT or MOVE assessment daily    - Set and communicate daily mobility goal to care team and patient/family/caregiver  - Collaborate with rehabilitation services on mobility goals if consulted  - Perform Range of Motion 3 times a day    - Reposition patient every 2 hours if pt unable to reposition self  - Out of bed for toileting  - Record patient progress and toleration of activity level   Outcome: Progressing     Problem: Prexisting or High Potential for Compromised Skin Integrity  Goal: Skin integrity is maintained or improved  Description: INTERVENTIONS:  - Identify patients at risk for skin breakdown  - Assess and monitor skin integrity  - Assess and monitor nutrition and hydration status  - Monitor labs   - Assess for incontinence   - Turn and reposition patient  - Assist with mobility/ambulation  - Relieve pressure over bony prominences  - Avoid friction and shearing  - Provide appropriate hygiene as needed including keeping skin clean and dry  - Evaluate need for skin moisturizer/barrier cream  - Collaborate with interdisciplinary team   - Patient/family teaching  - Consider wound care consult   Outcome: Progressing

## 2023-02-25 NOTE — ASSESSMENT & PLAN NOTE
Since patient was throwing up,  CT abd 2/15  which showed a small bowel obstruction which has now resolved with conservative management  cT scan done on 2/21 shows resolution of small bowel obstruction  Status post NG tube   Now removed    cont tpn

## 2023-02-25 NOTE — PROGRESS NOTES
Progress Note - General Surgery   Jovanni Campbell 80 y o  female MRN: [id number]  Unit/Bed#: -01 Encounter: [id number]    Assessment:  -81yo female admitted with dx of gastroenterocolitis on 2/10/23  The patient then developed small bowel obstruction which has resolved  Most recently, the patient developed worsening abdominal pain and leukocytosis  Findings were then consistent with perforated viscus with worsening free air on CT scan  The patient remained stable and did not show any signs of sepsis  Due to her age and comorbidities, the patient and her family decided to not proceed with immediate surgical intervention  Conservative management with bowel rest and IV antibiotics has been continued  The patient is showing signs of improvement  Her abdominal exam and leukocytosis are slowly improving  -Small bowel obstruction, now resolved  -C  difficile negative       Plan: We will advance to a clear liquid diet, the patient was instructed to take a liquid sparingly  Continue IV antibiotics with Zosyn  Continue TPN  Out of bed as tolerated, may discontinue rectal tube  The patient once again expressed her wishes that she would only consider surgical management if she acutely decompensates  Subjective/Objective     Subjective: The patient is doing well  She states her abdominal pain is much improved  She denies any nausea or vomiting  She continues to have diarrhea although, her stool output is decreasing  Afebrile  Objective:   Blood pressure 126/64, pulse 86, temperature (!) 97 3 °F (36 3 °C), resp  rate 15, height 5' 2" (1 575 m), weight 66 9 kg (147 lb 7 8 oz), SpO2 92 %  ,Body mass index is 26 98 kg/m²        Intake/Output Summary (Last 24 hours) at 2/25/2023 0929  Last data filed at 2/25/2023 0817  Gross per 24 hour   Intake --   Output 700 ml   Net -700 ml       Invasive Devices     Peripherally Inserted Central Catheter Line  Duration           PICC Line 02/16/23 8 days Peripheral Intravenous Line  Duration           Peripheral IV Left;Upper;Ventral (anterior) Arm -- days          Drain  Duration           External Urinary Catheter 8 days    Rectal Tube With balloon 2 days                Physical Exam:   Gen: AxOx3, pleasant, alert, [place]  Abd: Soft, nondistended, positive for mild diffuse tenderness although, this is much improved since previous examination  Extremities: Mild edema of both upper extremities    Lab, Imaging and other studies:  I have personally reviewed pertinent lab results  REPEAT CTAP  2/23/23:    ADDENDUM:     Most of the thickened loops of bowel described above are thickened segments of left-sided small bowel  Mild mucosal hyperemia of the proximal/mid colon      Multiple small new foci of pneumoperitoneum in the anterior mid pelvis #2/90      As seen on the comparison study there are small dots of gas within and adjacent to the right hemipelvis collection, likely the sequelae of an adjacent sigmoid diverticulitis       LIVER/BILIARY TREE:  Unchanged mild intrahepatic and extrahepatic biliary dilation likely of a normal degree status post cholecystectomy  Slightly nodular hepatic contours may indicate underlying hepatocellular disease      GALLBLADDER:  Cholecystectomy      SPLEEN:  Unremarkable        PANCREAS:  Unremarkable      ADRENAL GLANDS:  Unremarkable      KIDNEYS/URETERS:  Unremarkable  No hydronephrosis      STOMACH AND BOWEL:  Interval development of prominent diffuse colonic thickening in keeping with a nonspecific colitis  No bowel obstruction  No bowel pneumatosis    Fluid in the rectal vault with a rectal drainage tube      APPENDIX:  No findings to suggest appendicitis      ABDOMINOPELVIC CAVITY:  Reidentified moderate abdominal ascites      Unchanged right hemipelvis collection measuring 5 3 x 3 1 cm #2/131 adjacent to the mid sigmoid colon      VESSELS:  Unremarkable for patient's age      PELVIS     REPRODUCTIVE ORGANS: Hysterectomy      URINARY BLADDER:  Unremarkable      ABDOMINAL WALL/INGUINAL REGIONS:  Prominent diffuse body wall edema/anasarca     OSSEOUS STRUCTURES:  No acute fracture or destructive osseous lesion  Partially imaged left femoral fixation nails      IMPRESSION:     Reidentified 5 3 x 3 1 cm pelvic collection adjacent to the mid sigmoid colon      Interval development of diffuse colonic thickening and mucosal hyperemia indicating a nonspecific colitis, likely infectious or inflammatory        CBC:   Lab Results   Component Value Date    WBC 14 99 (H) 02/25/2023    HGB 12 1 02/25/2023    HCT 37 2 02/25/2023    MCV 87 02/25/2023     (H) 02/25/2023    MCH 28 3 02/25/2023    MCHC 32 5 02/25/2023    RDW 15 8 (H) 02/25/2023    MPV 9 4 02/25/2023     CMP:   Lab Results   Component Value Date    SODIUM 133 (L) 02/25/2023    K 3 6 02/25/2023     02/25/2023    CO2 27 02/25/2023    BUN 26 (H) 02/25/2023    CREATININE 0 85 02/25/2023    CALCIUM 6 7 (L) 02/25/2023    EGFR 61 02/25/2023     VTE Pharmacologic Prophylaxis: Heparin  VTE Mechanical Prophylaxis: sequential compression device     Lashanda Miranda

## 2023-02-25 NOTE — PROGRESS NOTES
[address]  Progress Note - Yesica Urias 1934, 80 y o  female MRN: [id number]  Unit/Bed#: -01 Encounter: [id number]  Primary Care Provider: Shanice Valdovinos DO   Date and time admitted to hospital: 2/10/2023 12:28 AM    Atrial fibrillation with RVR (HCC)  Assessment & Plan  · History paroxysmal atrial fibrillation chronically maintained on metoprolol succinate 25 mg daily   · chronic anticoagulation with Eliquis 2 5 mg twice daily for elevated [id number] stroke risk  · Initially patient received some IV Cardizem, then placed on beta-blocker, later on Cardizem short-acting added after that as per cardiology recommendation, amiodarone added  · Continue Toprol-XL  Continue midodrine for hypotension  Hold  Eliquis for anticoagulation another 24 hours  mild  Tachycardia noted today with heart rate     * Small bowel obstruction [facility])  Assessment & Plan  Since patient was throwing up,  CT abd 2/15  which showed a small bowel obstruction which has now resolved with conservative management  cT scan done on 2/21 shows resolution of small bowel obstruction  Status post NG tube   Now removed  cont tpn    Diverticulitis of small intestine with perforation and abscess without bleeding  Assessment & Plan  concern for ischemia of the small bowel with perforation and fluid collection  CT abdomen pelvis shows Most of the thickened loops of bowel described above are thickened segments of left-sided small bowel  Mild mucosal hyperemia of the proximal/mid colon  Unchanged right hemipelvis collection measuring 5 3 x 3 1 cm #2/131 adjacent to the mid sigmoid colon      · Stool Culture including C  difficile and enteric panel negative today however diarrhea has worsened   retest for C   Difficile negative and placed on oral vancomycin prophylactically for now  · Blood Culture 1 out of 2 gram-negative rods  · Empiric ceftriaxone/metronidazole -elevated procalcitonin worsening leukocytosis noted and switch to Zosyn 2/22  · discussed CT scan findings with IR, general surgery and infectious disease  Overall prognosis is guarded at this time  Discussed with patient that she might need surgical intervention which will be difficult due to her overall clinical condition and EF dropping to 25 to 30% or consider comfort care/hospice care  · He is looking and feeling a little bit better today and white count improved a little  Keep on TPN and plan few sips of clear liquids today  Surgery to follow along in case patient has any worsening to determine surgical intervention versus hospice care  Continue to hold Eliquis for another 24 hours    Acute cystitis  Assessment & Plan  · Presented with complaints of abdominal pain and foul-smelling urine  · UA with bacteriuria, pyuria  · Urine did not reflex to culture  · Blood culture 1 out of 2 gram-negative rods  · Already completed course of antibiotics for acute cystitis    Acute on chronic combined systolic and diastolic congestive heart failure (HCC)  Assessment & Plan  Wt Readings from Last 3 Encounters:   02/23/23 66 9 kg (147 lb 7 8 oz)   06/23/21 55 3 kg (121 lb 14 6 oz)   03/15/19 59 kg (130 lb)     · History chronic congestive heart failure, follows with [facility] cardiology  · Per their notes - Probable Takotsubo cardiomyopathy with recovered EF and at least moderate MR and moderate TR   ·  2D echo EF has dropped to 25 to 30%  · Continue Toprol  · Lisinopril 2 5 mg daily on hold due to hypotension  · place on Lasix 40 mg IV prn  Pleural effusion, bilateral  Assessment & Plan  · Moderate bilateral pleural effusions did on imaging and also third spacing noted with anasarca mostly of bilateral upper extremities  Decreased oral intake for the last 2 weeks which is probably causing her to have third spacing  · Get 2D echo to evaluate LV function  · Ultrasound of upper extremity shows superficial thrombophlebitis    Already on Eliquis is currently on hold temporarily in case of any surgical intervention or IR procedure  · Rt Sided thoracocentesis done on  for 900 cc  Continue as needed Lasix      VTE Pharmacologic Prophylaxis:   Pharmacologic: Apixaban (Eliquis)on hold  Mechanical VTE Prophylaxis in Place: Yes    Patient Centered Rounds: I have performed bedside rounds with nursing staff today  Discussions with Specialists or Other Care Team Provider: evan surgery    Education and Discussions with Family / Patient: evan patient and family at bedside    Time Spent for Care: 30 minutes  More than 50% of total time spent on counseling and coordination of care as described above  Current Length of Stay: 15 day(s)    Current Patient Status: Inpatient   Certification Statement: The patient will continue to require additional inpatient hospital stay due to pneumoperitoneum    Discharge Plan: pending progress    Code Status: Level 3 - DNAR and DNI      Subjective:   Feeling better today no diarrhea reported  Rectal tube removed  Leukocytosis improving  Still has some abdominal pain but it is less than before  We will do a trial of sips of clear liquids today and observe    Objective:     Vitals:   Temp (24hrs), Av 5 °F (36 4 °C), Min:97 3 °F (36 3 °C), Max:97 7 °F (36 5 °C)    Temp:  [97 3 °F (36 3 °C)-97 7 °F (36 5 °C)] 97 3 °F (36 3 °C)  HR:  [] 92  Resp:  [15-17] 15  BP: (124-129)/(64-80) 124/78  SpO2:  [92 %-94 %] 94 %  Body mass index is 26 98 kg/m²  Input and Output Summary (last 24 hours): Intake/Output Summary (Last 24 hours) at 2023 1300  Last data filed at 2023 0817  Gross per 24 hour   Intake --   Output 400 ml   Net -400 ml       Physical Exam:     Physical Exam  Vitals and nursing note reviewed  Constitutional:       Appearance: Normal appearance  HENT:      Head: Normocephalic and atraumatic        Right Ear: External ear normal       Left Ear: External ear normal       Nose: Nose normal  Mouth/Throat:      Pharynx: Oropharynx is clear  Eyes:      Pupils: Pupils are equal, round, and reactive to light  Cardiovascular:      Rate and Rhythm: Normal rate  Rhythm irregular  Heart sounds: Normal heart sounds  Pulmonary:      Effort: Pulmonary effort is normal       Comments: Clear To auscultation on the right side with diminished breath sounds on the left side  Abdominal:      General: Bowel sounds are normal       Palpations: Abdomen is soft  Tenderness: There is abdominal tenderness  Comments: Periumbilical tenderness noted with no guarding or rebound tenderness   Musculoskeletal:         General: Normal range of motion  Cervical back: Normal range of motion and neck supple  Skin:     General: Skin is warm and dry  Capillary Refill: Capillary refill takes less than 2 seconds  Neurological:      General: No focal deficit present  Mental Status: She is alert and oriented to person, place, and time  Psychiatric:         Mood and Affect: Mood normal          Additional Data:     Labs:    Results from last 7 days   Lab Units 02/25/23  0510 02/24/23  0534 02/23/23  0833 02/22/23  0558 02/21/23  0515   WBC Thousand/uL 14 99*   < > 21 87*   < > 13 16*   HEMOGLOBIN g/dL 12 1   < > 12 5   < > 13 0   HEMATOCRIT % 37 2   < > 37 8   < > 40 2   PLATELETS Thousands/uL 398*   < > 428*   < > 391*   NEUTROS PCT %  --   --   --   --  86*   LYMPHS PCT %  --   --   --   --  6*   LYMPHO PCT %  --   --  5*  --   --    MONOS PCT %  --   --   --   --  7   MONO PCT %  --   --  5  --   --    EOS PCT %  --   --  0  --  0    < > = values in this interval not displayed       Results from last 7 days   Lab Units 02/25/23  0510 02/22/23  0558 02/21/23  0515   SODIUM mmol/L 133*   < > 130*   POTASSIUM mmol/L 3 6   < > 3 9   CHLORIDE mmol/L 102   < > 99   CO2 mmol/L 27   < > 26   BUN mg/dL 26*   < > 30*   CREATININE mg/dL 0 85   < > 0 87   ANION GAP mmol/L 4   < > 5   CALCIUM mg/dL 6 7*   < > 6 9*   ALBUMIN g/dL  --   --  2 9*   TOTAL BILIRUBIN mg/dL  --   --  0 37   ALK PHOS U/L  --   --  38   ALT U/L  --   --  19   AST U/L  --   --  51*   GLUCOSE RANDOM mg/dL 195*   < > 204*    < > = values in this interval not displayed  * I Have Reviewed All Lab Data Listed Above  * Additional Pertinent Lab Tests Reviewed:  Jv 66 Admission Reviewed    Imaging:    Imaging Reports Reviewed Today Include: none  Imaging Personally Reviewed by Myself Includes:  none    Recent Cultures (last 7 days):     Results from last 7 days   Lab Units 02/23/23  1322 02/23/23  1114   GRAM STAIN RESULT  2+ Polys  No bacteria seen  --    BODY FLUID CULTURE, STERILE  No growth  --    C DIFF TOXIN B BY PCR   --  Negative       Last 24 Hours Medication List:   Current Facility-Administered Medications   Medication Dose Route Frequency Provider Last Rate   • Adult TPN (STANDARD BASE/STANDARD ELECTROLYTE)   Intravenous Continuous TPN Dileep Guzmán MD 42 mL/hr at 02/24/23 2136   • Adult TPN (STANDARD BASE/STANDARD ELECTROLYTE)   Intravenous Continuous TPN Dileep Guzmán MD     • amiodarone  200 mg Oral BID With Meals Dileep Guzmán MD     • calcium gluconate  2 g Intravenous Once Dileep Guzmán MD     • furosemide  40 mg Intravenous Once Dileep Guzmán MD     • gabapentin  300 mg Oral HS Dileep Guzmán MD     • latanoprost  1 drop Both Eyes HS Dileep Guzmán MD     • levalbuterol  1 25 mg Nebulization Q4H PRN Dileep Guzmán MD     • melatonin  3 mg Oral HS Dileep Guzmán MD     • metoprolol  5 mg Intravenous Q6H PRN Dileep Guzmán MD     • metoprolol succinate  50 mg Oral BID Dileep Guzmán MD     • midodrine  2 5 mg Oral TID AC Dileep Guzmán MD     • ondansetron  4 mg Intravenous Q4H PRN Dileep Guzmán MD     • phenol  1 spray Mouth/Throat Q2H PRN Dileep Guzmán MD     • piperacillin-tazobactam  3 375 g Intravenous Q6H Dileep Guzmán MD 3 375 g (02/25/23 8660)   • traZODone  100 mg Oral HS Dileep Guzmán MD     • vancomycin oral  125 mg Oral Q12H Pepe Lazar MD          Today, Patient Was Seen By: Sasha Leon MD    ** Please Note: Dictation voice to text software may have been used in the creation of this document   **

## 2023-02-26 NOTE — ASSESSMENT & PLAN NOTE
· History paroxysmal atrial fibrillation chronically maintained on metoprolol succinate 25 mg daily   · chronic anticoagulation with Eliquis 2 5 mg twice daily for elevated [id number] stroke risk  · Initially patient received some IV Cardizem, then placed on beta-blocker, later on Cardizem short-acting added after that as per cardiology recommendation, amiodarone added  · Continue Toprol-XL  Continue midodrine for hypotension  Hold  Eliquis for anticoagulation another 24 hours  mild  Tachycardia noted today with heart rate   placed on lovenox 40 daily while eliquis on hold

## 2023-02-26 NOTE — ASSESSMENT & PLAN NOTE
concern for ischemia of the small bowel with perforation and fluid collection  CT abdomen pelvis shows Most of the thickened loops of bowel described above are thickened segments of left-sided small bowel  Mild mucosal hyperemia of the proximal/mid colon  Unchanged right hemipelvis collection measuring 5 3 x 3 1 cm #2/131 adjacent to the mid sigmoid colon      · Stool Culture including C  difficile and enteric panel negative today however diarrhea has worsened   retest for C  Difficile negative and placed on oral vancomycin prophylactically for now  · Blood Culture 1 out of 2 gram-negative rods  · Empiric ceftriaxone/metronidazole -elevated procalcitonin worsening leukocytosis noted and switch to Zosyn 2/22  · discussed CT scan findings with IR, general surgery and infectious disease  Overall prognosis is guarded at this time  Discussed with patient that she might need surgical intervention which will be difficult due to her overall clinical condition and EF dropping to 25 to 30% or consider comfort care/hospice care  · sHe is looking and feeling a little bit better today and white count improved a little  Keep on TPN and plan few sips of clear liquids today  Surgery to follow along in case patient has any worsening to determine surgical intervention versus hospice care    Continue to hold Eliquis for another 24 hours

## 2023-02-26 NOTE — PROGRESS NOTES
[address]  Progress Note - Shivam Pearce 1934, 80 y o  female MRN: [id number]  Unit/Bed#: -01 Encounter: [id number]  Primary Care Provider: Pati Self DO   Date and time admitted to hospital: 2/10/2023 12:28 AM    Atrial fibrillation with RVR (HCC)  Assessment & Plan  · History paroxysmal atrial fibrillation chronically maintained on metoprolol succinate 25 mg daily   · chronic anticoagulation with Eliquis 2 5 mg twice daily for elevated [id number] stroke risk  · Initially patient received some IV Cardizem, then placed on beta-blocker, later on Cardizem short-acting added after that as per cardiology recommendation, amiodarone added  · Continue Toprol-XL  Continue midodrine for hypotension  Hold  Eliquis for anticoagulation another 24 hours  mild  Tachycardia noted today with heart rate   placed on lovenox 40 daily while eliquis on hold    * Small bowel obstruction [facility])  Assessment & Plan  Since patient was throwing up,  CT abd 2/15  which showed a small bowel obstruction which has now resolved with conservative management  cT scan done on 2/21 shows resolution of small bowel obstruction  Status post NG tube   Now removed  cont tpn    Diverticulitis of small intestine with perforation and abscess without bleeding  Assessment & Plan  concern for ischemia of the small bowel with perforation and fluid collection  CT abdomen pelvis shows Most of the thickened loops of bowel described above are thickened segments of left-sided small bowel  Mild mucosal hyperemia of the proximal/mid colon  Unchanged right hemipelvis collection measuring 5 3 x 3 1 cm #2/131 adjacent to the mid sigmoid colon      · Stool Culture including C  difficile and enteric panel negative today however diarrhea has worsened   retest for C   Difficile negative and placed on oral vancomycin prophylactically for now  · Blood Culture 1 out of 2 gram-negative rods  · Empiric ceftriaxone/metronidazole -elevated procalcitonin worsening leukocytosis noted and switch to Zosyn 2/22  · discussed CT scan findings with IR, general surgery and infectious disease  Overall prognosis is guarded at this time  Discussed with patient that she might need surgical intervention which will be difficult due to her overall clinical condition and EF dropping to 25 to 30% or consider comfort care/hospice care  · sHe is looking and feeling a little bit better today and white count improved a little  Keep on TPN and plan few sips of clear liquids today  Surgery to follow along in case patient has any worsening to determine surgical intervention versus hospice care  Continue to hold Eliquis for another 24 hours    Moderate protein-calorie malnutrition (HCC)  Assessment & Plan  Malnutrition Findings:   Adult Malnutrition type: Chronic illness  Adult Degree of Malnutrition: Malnutrition of moderate degree  Malnutrition Characteristics: Muscle loss, Fat loss, Inadequate energy                  360 Statement: Chronic moderate malnutrition related to decreased oral intake, difficulties with meal preparation at home as evidenced by < 75% estimated energy intake > 1 mo; moderate muscle loss to temporalis, pectoralis, deltoid muscles; moderate to severe fat loss to orbitals, moderate fat loss to buccal pads  Treated with: Advance diet as medically appropriate to modest 4gm DENY given hx of CHF  Will discuss supplements with pt once diet initiated  Recommend daily weights for nutrition monitoring  Did discuss Mom's Meals with pt for ease of meal preparation at home  BMI Findings: Body mass index is 26 98 kg/m²         Coronary artery disease involving native coronary artery of native heart  Assessment & Plan  · NSTEMI September 2022  · Cardiac catheterization with minimal atherosclerotic disease, no PCI  · Held Eliquis and simvastatin    Stage 3b chronic kidney disease [facility])  Assessment & Plan  Lab Results   Component Value Date    EGFR 58 02/26/2023    EGFR 61 02/25/2023    EGFR 54 02/24/2023    CREATININE 0 88 02/26/2023    CREATININE 0 85 02/25/2023    CREATININE 0 94 02/24/2023   · History CKD 3  · creatinine worsened to 1 5  Placed on gentle hydration and now back to baseline creatinine  · Renally dose medications, avoid nephrotoxic medication    Acute cystitis  Assessment & Plan  · Presented with complaints of abdominal pain and foul-smelling urine  · UA with bacteriuria, pyuria  · Urine did not reflex to culture  · Blood culture 1 out of 2 gram-negative rods  · Already completed course of antibiotics for acute cystitis    Acute on chronic combined systolic and diastolic congestive heart failure (HCC)  Assessment & Plan  Wt Readings from Last 3 Encounters:   02/23/23 66 9 kg (147 lb 7 8 oz)   06/23/21 55 3 kg (121 lb 14 6 oz)   03/15/19 59 kg (130 lb)     · History chronic congestive heart failure, follows with [facility] cardiology  · Per their notes - Probable Takotsubo cardiomyopathy with recovered EF and at least moderate MR and moderate TR   ·  2D echo EF has dropped to 25 to 30%  · Continue Toprol  · Lisinopril 2 5 mg daily on hold due to hypotension  · Lasix prn basis    Pleural effusion, bilateral  Assessment & Plan  · Moderate bilateral pleural effusions did on imaging and also third spacing noted with anasarca mostly of bilateral upper extremities  Decreased oral intake for the last 2 weeks which is probably causing her to have third spacing  · Get 2D echo to evaluate LV function  · Ultrasound of upper extremity shows superficial thrombophlebitis  Already on Eliquis is currently on hold temporarily in case of any surgical intervention or IR procedure  · Rt Sided thoracocentesis done on 2/23 for 900 cc    Continue as needed Lasix      VTE Pharmacologic Prophylaxis:   Pharmacologic: Enoxaparin (Lovenox)  Mechanical VTE Prophylaxis in Place: Yes    Patient Centered Rounds: I have performed bedside rounds with nursing staff today  Discussions with Specialists or Other Care Team Provider: evan surgery    Education and Discussions with Family / Patient: dw patient    Time Spent for Care: 30 minutes  More than 50% of total time spent on counseling and coordination of care as described above  Current Length of Stay: 16 day(s)    Current Patient Status: Inpatient   Certification Statement: The patient will continue to require additional inpatient hospital stay due to small bowel perforation    Discharge Plan: pending progress    Code Status: Level 3 - DNAR and DNI      Subjective:   Patient denies any nausea vomiting  Abdominal pain is decreased  Starting to have some bowel movements again  Objective:     Vitals:   Temp (24hrs), Av 3 °F (36 3 °C), Min:97 2 °F (36 2 °C), Max:97 3 °F (36 3 °C)    Temp:  [97 2 °F (36 2 °C)-97 3 °F (36 3 °C)] 97 2 °F (36 2 °C)  HR:  [57-86] 69  Resp:  [15-16] 15  BP: (110-144)/(60-90) 110/60  SpO2:  [91 %-94 %] 91 %  Body mass index is 26 98 kg/m²  Input and Output Summary (last 24 hours): Intake/Output Summary (Last 24 hours) at 2023 1202  Last data filed at 2023 0916  Gross per 24 hour   Intake 120 ml   Output 1700 ml   Net -1580 ml       Physical Exam:     Physical Exam  Vitals and nursing note reviewed  Constitutional:       Appearance: Normal appearance  HENT:      Head: Normocephalic and atraumatic  Right Ear: External ear normal       Left Ear: External ear normal       Nose: Nose normal       Mouth/Throat:      Pharynx: Oropharynx is clear  Eyes:      Pupils: Pupils are equal, round, and reactive to light  Cardiovascular:      Rate and Rhythm: Normal rate and regular rhythm  Heart sounds: Normal heart sounds  Pulmonary:      Effort: Pulmonary effort is normal       Comments: Breath sounds decreased on the left base  Abdominal:      General: Bowel sounds are normal       Palpations: Abdomen is soft  Tenderness:  There is abdominal tenderness  Musculoskeletal:         General: Normal range of motion  Cervical back: Normal range of motion and neck supple  Skin:     General: Skin is warm and dry  Capillary Refill: Capillary refill takes less than 2 seconds  Neurological:      General: No focal deficit present  Mental Status: She is alert and oriented to person, place, and time  Psychiatric:         Mood and Affect: Mood normal            Additional Data:     Labs:    Results from last 7 days   Lab Units 02/26/23  0455   WBC Thousand/uL 11 32*   HEMOGLOBIN g/dL 11 8   HEMATOCRIT % 36 6   PLATELETS Thousands/uL 335   NEUTROS PCT % 89*   LYMPHS PCT % 3*   MONOS PCT % 7   EOS PCT % 0     Results from last 7 days   Lab Units 02/26/23  0455 02/22/23  0558 02/21/23  0515   SODIUM mmol/L 136   < > 130*   POTASSIUM mmol/L 3 1*   < > 3 9   CHLORIDE mmol/L 102   < > 99   CO2 mmol/L 32   < > 26   BUN mg/dL 28*   < > 30*   CREATININE mg/dL 0 88   < > 0 87   ANION GAP mmol/L 2*   < > 5   CALCIUM mg/dL 6 7*   < > 6 9*   ALBUMIN g/dL  --   --  2 9*   TOTAL BILIRUBIN mg/dL  --   --  0 37   ALK PHOS U/L  --   --  38   ALT U/L  --   --  19   AST U/L  --   --  51*   GLUCOSE RANDOM mg/dL 153*   < > 204*    < > = values in this interval not displayed  * I Have Reviewed All Lab Data Listed Above  * Additional Pertinent Lab Tests Reviewed:  [facility]    Imaging:    Imaging Reports Reviewed Today Include: none  Imaging Personally Reviewed by Myself Includes:  none    Recent Cultures (last 7 days):     Results from last 7 days   Lab Units 02/23/23  1322 02/23/23  1114   GRAM STAIN RESULT  2+ Polys  No bacteria seen  --    BODY FLUID CULTURE, STERILE  No growth  --    C DIFF TOXIN B BY PCR   --  Negative       Last 24 Hours Medication List:   Current Facility-Administered Medications   Medication Dose Route Frequency Provider Last Rate   • Adult TPN (STANDARD BASE/STANDARD ELECTROLYTE)   Intravenous Continuous TPN Pranay Abdi MD 42 mL/hr at 02/25/23 2137   • Adult TPN (STANDARD BASE/STANDARD ELECTROLYTE)   Intravenous Continuous TPN Pranay Abdi MD     • amiodarone  200 mg Oral BID With Meals Prnaay Abdi MD     • enoxaparin  40 mg Subcutaneous Q24H [address] Praany Abdi MD     • gabapentin  300 mg Oral HS Pranay Abdi MD     • latanoprost  1 drop Both Eyes HS Pranay Abdi MD     • levalbuterol  1 25 mg Nebulization Q4H PRN Pranay Abdi MD     • melatonin  3 mg Oral HS Pranay Abdi MD     • metoprolol  5 mg Intravenous Q6H PRN Pranay Abdi MD     • metoprolol succinate  50 mg Oral BID Pranay Abdi MD     • midodrine  2 5 mg Oral TID AC Pranay Abdi MD     • ondansetron  4 mg Intravenous Q4H PRN Pranay Abdi MD     • phenol  1 spray Mouth/Throat Q2H PRN Pranay Abdi MD     • piperacillin-tazobactam  3 375 g Intravenous Q6H Pranay Abdi MD 3 375 g (02/26/23 0924)   • potassium chloride  40 mEq Intravenous Once Pranay Abdi MD     • traZODone  100 mg Oral HS Pranay Abdi MD     • vancomycin oral  125 mg Oral Q12H [address] Pranay Abdi MD          Today, Patient Was Seen By: Pranay Abdi MD    ** Please Note: Dictation voice to text software may have been used in the creation of this document   **

## 2023-02-26 NOTE — PROGRESS NOTES
Progress Note - General Surgery   Steven Holes 80 y o  female MRN: [id number]  Unit/Bed#: -01 Encounter: [id number]    Assessment:  -79yo female admitted with dx of gastroenterocolitis on 2/10/23  The patient then developed small bowel obstruction which has resolved  Most recently, the patient developed worsening abdominal pain and leukocytosis  Findings were then consistent with perforated viscus with worsening free air on CT scan  The patient remained stable and did not show any signs of sepsis  The patient and her family understand that surgical intervention is the treatment for this condition  Due to her age and comorbidities, the patient and her family decided to not proceed with immediate surgical intervention  Conservative management with bowel rest and IV antibiotics has been continued  She continues to show signs of improvement  Her abdominal exam and leukocytosis are slowly improving  -Small bowel obstruction, now resolved  -C  difficile negative       Plan:  Continue clear liquid diet, the patient is taking sips  She states she is having some coughing  Continue IV antibiotics with Zosyn  Continue TPN  Out of bed as tolerated  The patient once again expressed her wishes that she would only consider surgical management if she acutely decompensates  Subjective/Objective     Subjective: The patient is fatigued today and seems a bit depressed  She is tolerating small amounts of clears however, she feels as though she may be coughing more  Her pain continues to improve  She does still have pain with repositioning  Objective:   Blood pressure 123/67, pulse 57, temperature (!) 97 2 °F (36 2 °C), resp  rate 15, height 5' 2" (1 575 m), weight 66 9 kg (147 lb 7 8 oz), SpO2 91 %  ,Body mass index is 26 98 kg/m²        Intake/Output Summary (Last 24 hours) at 2/26/2023 0848  Last data filed at 2/25/2023 2326  Gross per 24 hour   Intake --   Output 1400 ml   Net -1400 ml       Invasive Devices     Peripherally Inserted Central Catheter Line  Duration           PICC Line 02/16/23 9 days          Peripheral Intravenous Line  Duration           Peripheral IV Left;Upper;Ventral (anterior) Arm -- days          Drain  Duration           External Urinary Catheter 9 days                Physical Exam:   Gen: AxOx3, pleasant, alert, [place]  Abd: Soft, nondistended, positive for lower abdominal tenderness which remains improved, no tenderness elicited in the upper abdomen today   Extremities: Mild edema of both upper extremities    Lab, Imaging and other studies:  I have personally reviewed pertinent lab results  REPEAT CTAP  2/23/23:    ADDENDUM:     Most of the thickened loops of bowel described above are thickened segments of left-sided small bowel  Mild mucosal hyperemia of the proximal/mid colon      Multiple small new foci of pneumoperitoneum in the anterior mid pelvis #2/90      As seen on the comparison study there are small dots of gas within and adjacent to the right hemipelvis collection, likely the sequelae of an adjacent sigmoid diverticulitis       LIVER/BILIARY TREE:  Unchanged mild intrahepatic and extrahepatic biliary dilation likely of a normal degree status post cholecystectomy  Slightly nodular hepatic contours may indicate underlying hepatocellular disease      GALLBLADDER:  Cholecystectomy      SPLEEN:  Unremarkable        PANCREAS:  Unremarkable      ADRENAL GLANDS:  Unremarkable      KIDNEYS/URETERS:  Unremarkable  No hydronephrosis      STOMACH AND BOWEL:  Interval development of prominent diffuse colonic thickening in keeping with a nonspecific colitis  No bowel obstruction  No bowel pneumatosis    Fluid in the rectal vault with a rectal drainage tube      APPENDIX:  No findings to suggest appendicitis      ABDOMINOPELVIC CAVITY:  Reidentified moderate abdominal ascites      Unchanged right hemipelvis collection measuring 5 3 x 3 1 cm #2/131 adjacent to the mid sigmoid colon      VESSELS:  Unremarkable for patient's age      PELVIS     REPRODUCTIVE ORGANS:  Hysterectomy      URINARY BLADDER:  Unremarkable      ABDOMINAL WALL/INGUINAL REGIONS:  Prominent diffuse body wall edema/anasarca     OSSEOUS STRUCTURES:  No acute fracture or destructive osseous lesion  Partially imaged left femoral fixation nails      IMPRESSION:     Reidentified 5 3 x 3 1 cm pelvic collection adjacent to the mid sigmoid colon      Interval development of diffuse colonic thickening and mucosal hyperemia indicating a nonspecific colitis, likely infectious or inflammatory        CBC:   Lab Results   Component Value Date    WBC 11 32 (H) 02/26/2023    HGB 11 8 02/26/2023    HCT 36 6 02/26/2023    MCV 88 02/26/2023     02/26/2023    MCH 28 5 02/26/2023    MCHC 32 2 02/26/2023    RDW 16 1 (H) 02/26/2023    MPV 8 8 (L) 02/26/2023    NRBC 0 02/26/2023     CMP:   Lab Results   Component Value Date    SODIUM 136 02/26/2023    K 3 1 (L) 02/26/2023     02/26/2023    CO2 32 02/26/2023    BUN 28 (H) 02/26/2023    CREATININE 0 88 02/26/2023    CALCIUM 6 7 (L) 02/26/2023    EGFR 58 02/26/2023     VTE Pharmacologic Prophylaxis: Heparin  VTE Mechanical Prophylaxis: sequential compression device     Tarun Payne

## 2023-02-26 NOTE — ASSESSMENT & PLAN NOTE
Wt Readings from Last 3 Encounters:   02/23/23 66 9 kg (147 lb 7 8 oz)   06/23/21 55 3 kg (121 lb 14 6 oz)   03/15/19 59 kg (130 lb)     · History chronic congestive heart failure, follows with [facility] cardiology  · Per their notes - Probable Takotsubo cardiomyopathy with recovered EF and at least moderate MR and moderate TR   ·  2D echo EF has dropped to 25 to 30%  · Continue Toprol  · Lisinopril 2 5 mg daily on hold due to hypotension  · Lasix prn basis

## 2023-02-26 NOTE — ASSESSMENT & PLAN NOTE
CHF teaching started post introduction to pt/family; aware of diagnosis. Planner/scale( home) @ BS and will follow. Smoking/ ETOH/Illicit drug use cessation and maintain a healthy weight covered. Pt/family aware that I can not prescribe nor adjust  medications: 15mins  Palliative Care score: OBS  Start 2L/D Fluid restriction  CHF teaching continues to pt/family. Emphasis on taking prescription meds as ordered, to keep F/U appts and to call MD STAT if any of the following occur:   If you gain 2 lbs in one day or 5 lbs in a week, and short of breath.  If you can not lay flat without developing short of breath or rapid breathing at night; or if it wakes you up. Develop a cough or wheezing.  If you notice swollen hands/feet/ankles or stomach with a bloated/ full feeling.  If you become confused or mentally fuzzy or dizzy.  If you notice a rapid or change in your heart rate.  If you become more exhausted all the time and unable to do the same level of activity without stopping to catch your breath. Drink no more than 8 cups a day in 8 oz. cups. Your Heart can not handle any more. Stay away from salt (limit anything with salt or sodium in it). Limit to 250mg per serving.   Pt/family verbalizes understanding, will follow to reinforce teaching skills: 20 mins · Presented with complaints of abdominal pain and foul-smelling urine  · UA with bacteriuria, pyuria  · Urine did not reflex to culture  · Blood culture 1 out of 2 gram-negative rods  · Already completed course of antibiotics for acute cystitis

## 2023-02-26 NOTE — PLAN OF CARE
Problem: Nutrition/Hydration-ADULT  Goal: Nutrient/Hydration intake appropriate for improving, restoring or maintaining nutritional needs  Description: Monitor and assess patient's nutrition/hydration status for malnutrition  Collaborate with interdisciplinary team and initiate plan and interventions as ordered  Monitor patient's weight and dietary intake as ordered or per policy  Utilize nutrition screening tool and intervene as necessary  Determine patient's food preferences and provide high-protein, high-caloric foods as appropriate       INTERVENTIONS:  - Monitor oral intake, urinary output, labs, and treatment plans  - Assess nutrition and hydration status and recommend course of action  - Evaluate amount of meals eaten  - Assist patient with eating if necessary   - Allow adequate time for meals  - Recommend/ encourage appropriate diets, oral nutritional supplements, and vitamin/mineral supplements  - Order, calculate, and assess calorie counts as needed  - Recommend, monitor, and adjust tube feedings and TPN/PPN based on assessed needs  - Assess need for intravenous fluids  - Provide specific nutrition/hydration education as appropriate  - Include patient/family/caregiver in decisions related to nutrition  Outcome: Progressing     Problem: PAIN - ADULT  Goal: Verbalizes/displays adequate comfort level or baseline comfort level  Description: Interventions:  - Encourage patient to monitor pain and request assistance  - Assess pain using appropriate pain scale0-10  - Administer analgesics based on type and severity of pain and evaluate response  - Implement non-pharmacological measures as appropriate and evaluate response  - Consider cultural and social influences on pain and pain management  - Notify physician/advanced practitioner if interventions unsuccessful or patient reports new pain  Outcome: Progressing     Problem: INFECTION - ADULT  Goal: Absence or prevention of progression during hospitalization  Description: INTERVENTIONS:  - Assess and monitor for signs and symptoms of infection  - Monitor lab/diagnostic results  - Monitor all insertion sites, i e  indwelling lines, tubes, and drains  - Monitor endotracheal if appropriate and nasal secretions for changes in amount and color  - [place] appropriate cooling/warming therapies per order  - Administer medications as ordered  - Instruct and encourage patient and family to use good hand hygiene technique  - Identify and instruct in appropriate isolation precautions for identified infection/condition  Outcome: Progressing     Problem: SAFETY ADULT  Goal: Patient will remain free of falls  Description: INTERVENTIONS:  - Educate patient/family on patient safety including physical limitations  - Instruct patient to call for assistance with activity   - Consult OT/PT to assist with strengthening/mobility   - Keep Call bell within reach  - Keep bed low and locked with side rails adjusted as appropriate  - Keep care items and personal belongings within reach  - Initiate and maintain comfort rounds  - Make Fall Risk Sign visible to staff  - Apply yellow socks and bracelet for high fall risk patients  - Consider moving patient to room near nurses station  Outcome: Progressing  Goal: Maintain or return to baseline ADL function  Description: INTERVENTIONS:  -  Assess patient's ability to carry out ADLs; assess patient's baseline for ADL function and identify physical deficits which impact ability to perform ADLs (bathing, care of mouth/teeth, toileting, grooming, dressing, etc )  - Assess/evaluate cause of self-care deficits   - Assess range of motion  - Assess patient's mobility; develop plan if impaired  - Assess patient's need for assistive devices and provide as appropriate  - Encourage maximum independence but intervene and supervise when necessary  - Involve family in performance of ADLs  - Assess for home care needs following discharge   - Consider OT consult to assist with ADL evaluation and planning for discharge  - Provide patient education as appropriate  Outcome: Progressing  Goal: Maintains/Returns to pre admission functional level  Description: INTERVENTIONS:  - Perform BMAT or MOVE assessment daily    - Set and communicate daily mobility goal to care team and patient/family/caregiver  - Collaborate with rehabilitation services on mobility goals if consulted  - Perform Range of Motion 3 times a day  - Reposition patient every 2 hours if pt unable to reposition self  - Out of bed for toileting  - Record patient progress and toleration of activity level   Outcome: Progressing     Problem: DISCHARGE PLANNING  Goal: Discharge to home or other facility with appropriate resources  Description: INTERVENTIONS:  - Identify barriers to discharge w/patient and caregiver  - Arrange for needed discharge resources and transportation as appropriate  - Identify discharge learning needs (meds, wound care, etc )  - Arrange for interpretive services to assist at discharge as needed  - Refer to Case Management Department for coordinating discharge planning if the patient needs post-hospital services based on physician/advanced practitioner order or complex needs related to functional status, cognitive ability, or social support system  Outcome: Progressing     Problem: Knowledge Deficit  Goal: Patient/family/caregiver demonstrates understanding of disease process, treatment plan, medications, and discharge instructions  Description: Complete learning assessment and assess knowledge base    Interventions:  - Provide teaching at level of understanding  - Provide teaching via preferred learning methods  Outcome: Progressing     Problem: CARDIOVASCULAR - ADULT  Goal: Maintains optimal cardiac output and hemodynamic stability  Description: INTERVENTIONS:  - Monitor I/O, vital signs and rhythm  - Monitor for S/S and trends of decreased cardiac output  - Administer and titrate ordered vasoactive medications to optimize hemodynamic stability  - Assess quality of pulses, skin color and temperature  - Assess for signs of decreased coronary artery perfusion  - Instruct patient to report change in severity of symptoms  Outcome: Progressing  Goal: Absence of cardiac dysrhythmias or at baseline rhythm  Description: INTERVENTIONS:  - Continuous cardiac monitoring, vital signs, obtain 12 lead EKG if ordered  - Administer antiarrhythmic and heart rate control medications as ordered  - Monitor electrolytes and administer replacement therapy as ordered  Outcome: Progressing     Problem: GASTROINTESTINAL - ADULT  Goal: Minimal or absence of nausea and/or vomiting  Description: INTERVENTIONS:  - Administer IV fluids if ordered to ensure adequate hydration  - Maintain NPO status until nausea and vomiting are resolved  - Nasogastric tube if ordered  - Administer ordered antiemetic medications as needed  - Provide nonpharmacologic comfort measures as appropriate  - Advance diet as tolerated, if ordered  - Consider nutrition services referral to assist patient with adequate nutrition and appropriate food choices  Outcome: Progressing  Goal: Maintains or returns to baseline bowel function  Description: INTERVENTIONS:  - Assess bowel function  - Encourage oral fluids to ensure adequate hydration  - Administer IV fluids if ordered to ensure adequate hydration  - Administer ordered medications as needed  - Encourage mobilization and activity  - Consider nutritional services referral to assist patient with adequate nutrition and appropriate food choices  Outcome: Progressing  Goal: Maintains adequate nutritional intake  Description: INTERVENTIONS:  - Monitor percentage of each meal consumed  - Identify factors contributing to decreased intake, treat as appropriate  - Assist with meals as needed  - Monitor I&O, weight, and lab values if indicated  - Obtain nutrition services referral as needed  Outcome: Progressing     Problem: MOBILITY - ADULT  Goal: Maintain or return to baseline ADL function  Description: INTERVENTIONS:  -  Assess patient's ability to carry out ADLs; assess patient's baseline for ADL function and identify physical deficits which impact ability to perform ADLs (bathing, care of mouth/teeth, toileting, grooming, dressing, etc )  - Assess/evaluate cause of self-care deficits   - Assess range of motion  - Assess patient's mobility; develop plan if impaired  - Assess patient's need for assistive devices and provide as appropriate  - Encourage maximum independence but intervene and supervise when necessary  - Involve family in performance of ADLs  - Assess for home care needs following discharge   - Consider OT consult to assist with ADL evaluation and planning for discharge  - Provide patient education as appropriate  Outcome: Progressing  Goal: Maintains/Returns to pre admission functional level  Description: INTERVENTIONS:  - Perform BMAT or MOVE assessment daily    - Set and communicate daily mobility goal to care team and patient/family/caregiver  - Collaborate with rehabilitation services on mobility goals if consulted  - Perform Range of Motion 3 times a day    - Reposition patient every 2 hours if pt unable to reposition self  - Out of bed for toileting  - Record patient progress and toleration of activity level   Outcome: Progressing     Problem: Prexisting or High Potential for Compromised Skin Integrity  Goal: Skin integrity is maintained or improved  Description: INTERVENTIONS:  - Identify patients at risk for skin breakdown  - Assess and monitor skin integrity  - Assess and monitor nutrition and hydration status  - Monitor labs   - Assess for incontinence   - Turn and reposition patient  - Assist with mobility/ambulation  - Relieve pressure over bony prominences  - Avoid friction and shearing  - Provide appropriate hygiene as needed including keeping skin clean and dry  - Evaluate need for skin moisturizer/barrier cream  - Collaborate with interdisciplinary team   - Patient/family teaching  - Consider wound care consult   Outcome: Progressing

## 2023-02-26 NOTE — ASSESSMENT & PLAN NOTE
Lab Results   Component Value Date    EGFR 58 02/26/2023    EGFR 61 02/25/2023    EGFR 54 02/24/2023    CREATININE 0 88 02/26/2023    CREATININE 0 85 02/25/2023    CREATININE 0 94 02/24/2023   · History CKD 3  · creatinine worsened to 1 5    Placed on gentle hydration and now back to baseline creatinine  · Renally dose medications, avoid nephrotoxic medication

## 2023-02-26 NOTE — ASSESSMENT & PLAN NOTE
· NSTEMI September 2022  · Cardiac catheterization with minimal atherosclerotic disease, no PCI  · Held Eliquis and simvastatin

## 2023-02-27 NOTE — PROGRESS NOTES
Pt has been on standard TPN since 2/23  Recent electrolyte levels WNL  Pt is on clear liquids diet, did not drink breakfast tray  0-25% meal completions per RN flowsheets  Pt reports no appetite, only wants water  Noting fluid retention  Recommend custom TPN order: AA15% 500mL, D60% 400mL, Lipids 20% 200mL  This will provide 1516 kcal, 1100 ml fluid, 75 g AA  CHO load of 2 47 mg/kg/min, lipid load of 0 59 g/kg/day  Micronutrients per pharmacy  Last TRIG checked on 2/19, recommend obtaining current TRIG level  Advance diet as medically appropriate gradually to low fiber diet

## 2023-02-27 NOTE — ASSESSMENT & PLAN NOTE
Lab Results   Component Value Date    EGFR 55 02/27/2023    EGFR 58 02/26/2023    EGFR 61 02/25/2023    CREATININE 0 92 02/27/2023    CREATININE 0 88 02/26/2023    CREATININE 0 85 02/25/2023   · History CKD 3  · creatinine worsened to 1 5    Placed on gentle hydration and now back to baseline creatinine  · Renally dose medications, avoid nephrotoxic medication

## 2023-02-27 NOTE — ASSESSMENT & PLAN NOTE
Malnutrition Findings:   Adult Malnutrition type: Chronic illness  Adult Degree of Malnutrition: Malnutrition of moderate degree  Malnutrition Characteristics: Muscle loss, Fat loss, Inadequate energy                  360 Statement: Chronic moderate malnutrition related to decreased oral intake, difficulties with meal preparation at home as evidenced by < 75% estimated energy intake > 1 mo; moderate muscle loss to temporalis, pectoralis, deltoid muscles; moderate to severe fat loss to orbitals, moderate fat loss to buccal pads  Treated with: Advance diet as medically appropriate to modest 4gm DENY given hx of CHF  Will discuss supplements with pt once diet initiated  Recommend daily weights for nutrition monitoring  Did discuss Mom's Meals with pt for ease of meal preparation at home  BMI Findings: Body mass index is 27 25 kg/m²

## 2023-02-27 NOTE — PHYSICAL THERAPY NOTE
PHYSICAL THERAPY NOTE          Patient Name: Deb OMERMShineA Date: 2/27/2023  PT treatment attempted this afternoon and cancelled  Upon entering room patient was aslleep and unarousable with multiple family members sitting around patients bed  After attempting to speak with patient without response, patients family reported that patient was not doing well today and requested PT treatment be held at this time  Will continue to offer PT treatment as ordered and progress as able when appropriate to participate    Prince Patino [place], [place]

## 2023-02-27 NOTE — ACP (ADVANCE CARE PLANNING)
Advanced Care Planning Progress Note    Serious Illness Conversation    1  What is your understanding now of where you are with your illness? Prognostic Understanding: appropriate understanding of prognosis     2  How much information about what is likely to be ahead with your illness would you like to have? Information: patient wants to be fully informed     3  What did you (clinician) communicate to the patient? Prognostic Communication: Uncertain - It can be difficult to predict what will happen with your illness  I hope you will continue to live well for a long time but I’m worried that you could get sick quickly, and I think it is important to prepare for that possibility  4  If your health situation worsens, what are your most important goals? Goals: be physically comfortable     5  What are the biggest fears and worries about the future and your health? 6  What abilities are so critical to your life that you cannot imagine living without them? 7  What gives you strength as you think about the future with your illness? 8  If you become sicker, how much are you willing to go through for the possibility of gaining more time? Be in the hospital: No Have a feeding tube: No   Be in the ICU: No Live in a nursing home: No   Be on a ventilator: No Be uncomfortable: No   Be on dialysis: No Undergo aggressive test and/or procedures: No   9  How much does your proxy and family know about your priorities and wishes? Discussion Discussion: extensive discussion with family about goals and wishes     Travis Tejada heard you say that being on hospice is really important to you  Keeping that in mind, and what we know about your illness, I recommend that we proceed with hospice  This will help us make sure that your treatment plans reflect what’s important to you  How does this plan sound to you? I will do everything I can to help you through this    Patient verbalized understanding of the plan     I have spent 35 minutes speaking with my patient on advanced care planning today or during this visit     Advanced directives         Shikha Foy MD

## 2023-02-27 NOTE — ASSESSMENT & PLAN NOTE
Wt Readings from Last 3 Encounters:   02/26/23 67 6 kg (149 lb)   06/23/21 55 3 kg (121 lb 14 6 oz)   03/15/19 59 kg (130 lb)     · History chronic congestive heart failure, follows with [facility] cardiology  · Per their notes - Probable Takotsubo cardiomyopathy with recovered EF and at least moderate MR and moderate TR   ·  2D echo EF has dropped to 25 to 30%  · Continue Toprol  · Lisinopril 2 5 mg daily on hold due to hypotension  · Lasix prn basis

## 2023-02-27 NOTE — ASSESSMENT & PLAN NOTE
· Moderate bilateral pleural effusions did on imaging and also third spacing noted with anasarca mostly of bilateral upper extremities  Decreased oral intake for the last 2 weeks which is probably causing her to have third spacing  · Get 2D echo to evaluate LV function  · Ultrasound of upper extremity shows superficial thrombophlebitis  Already on Eliquis is currently on hold temporarily in case of any surgical intervention or IR procedure  · Rt Sided thoracocentesis done on 2/23 for 900 cc  Continue as needed Lasix  modified tpn for now based on nutrition recommendations  · Stop tpn now on hospice

## 2023-02-27 NOTE — CASE MANAGEMENT
Case Management Discharge Planning Note    Patient name Feliciano Settler  Location [address] [id number] MRN [id number]  : 1934 Date 2023       Current Admission Date: 2/10/2023  Current Admission Diagnosis:Small bowel obstruction [facility])   Patient Active Problem List    Diagnosis Date Noted   • Goals of care, counseling/discussion 2023   • Bacteremia 2023   • PICC (peripherally inserted central catheter) in place 2023   • Small bowel obstruction ([facility] ) 02/15/2023   • PVC's (premature ventricular contractions) 2023   • Atrial fibrillation with RVR ([facility] ) 02/10/2023   • Diverticulitis of small intestine with perforation and abscess without bleeding 02/10/2023   • Pleural effusion, bilateral 02/10/2023   • Acute on chronic combined systolic and diastolic congestive heart failure ([facility] ) 02/10/2023   • Acute cystitis 02/10/2023   • Stage 3b chronic kidney disease ([facility] ) 02/10/2023   • Coronary artery disease involving native coronary artery of native heart 02/10/2023   • Moderate protein-calorie malnutrition ([facility] ) 02/10/2023      LOS (days): 17  Geometric Mean LOS (GMLOS) (days): 5 00  Days to GMLOS:-12 4     OBJECTIVE:  Risk of Unplanned Readmission Score: 17 43         Current admission status: Inpatient   [address] [address]  Phone: [phone] Fax: [phone]    Primary Care Provider: Darnell Huang DO    Primary Insurance: MEDICARE  Secondary Insurance: AARP    DISCHARGE DETAILS:        CM made aware patient switched to [facility]  CM met with patient and daughters, Rex Griffith and Carry Webster, to discuss discharge on hospice  Daughters unsure patient was to be discharged from [address] [place] [address] and need to discuss options  CM to follow up with family

## 2023-02-27 NOTE — OCCUPATIONAL THERAPY NOTE
Occupational Therapy Progress Note     Patient Name: Jovanni CAZARES Date: 2/27/2023  Problem List  Principal Problem:    Small bowel obstruction (HCC)  Active Problems:    Atrial fibrillation with RVR (HCC)    Diverticulitis of small intestine with perforation and abscess without bleeding    Pleural effusion, bilateral    Acute on chronic combined systolic and diastolic congestive heart failure (HCC)    Acute cystitis    Stage 3b chronic kidney disease ([facility] )    Coronary artery disease involving native coronary artery of native heart    Moderate protein-calorie malnutrition ([facility] )            02/27/23 0800   OT Last Visit   OT Visit Date 02/27/23   Note Type   Note Type   (Refusal)     LEONARD Nguyen/MINE

## 2023-02-27 NOTE — PROGRESS NOTES
Progress Note - Infectious Disease   Remy Graver 80 y o  female MRN: [id number]  Unit/Bed#: -01 Encounter: [id number]        REQUIRED DOCUMENTATION:     1  This service was provided via Telemedicine  2  Provider located at [facility]  3  TeleMed provider: Lani Turner MD   4  Identify all parties in room with patient during tele consult:RN  5  After connecting through televideo, patient was identified by name and date of birth and assistant checked wristband  Patient was then informed that this was a Telemedicine visit and that the exam was being conducted confidentially over secure lines  My office door was closed  No one else was in the room  Patient acknowledged consent and understanding of privacy and security of the Telemedicine visit, and gave us permission to have the assistant stay in the room in order to assist with the history and to conduct the exam   I informed the patient that I have reviewed their record in Epic and presented the opportunity for them to ask any questions regarding the visit today  The patient agreed to participate  Assessment/Recommendations     1  Perforated viscus, pneumoperitoneum   - Initially presenting with acute gastroenterocolitis  Hospital course complicated by development of SBO, hospital course has been complicated by worsening leukocytosis and serial CT notes evidence of an unchanged right hemipelvic collection (5 3x3 1 cm) , worsening pneumoperitoneum   Unclear if she has a ruptured sigmoid diverticulitis v/s small bowel perforation  Discussed with IR, given concern for small bowel perforation they recommend surgical evaluation over percutaneous drainage    Family currently deferring surgery  - Repeat stool c diff testing negative  -Afebrile, worsening dyspnea, persistent diarrhea and persistent leukocytosis    · Overall prognosis is very poor given persistent infection and pneumoperitoneum and surgical options would involve high morbidity and mortality  Recommend goals of care discussion  · No role for additional iv antibiotics and would consider transitioning to po augmentin 875 bid , duration to be defined by clinical progress  · Monitor clinical course and white count, serial abdominal exams  · Final choice and duration of antibiotics to be determined    2  Parabacteroides bacteremia  - Source of bacteremia is likely acute gastroenterocolitis/bowel perforation/rule out intra-abdominal abscess  - Afebrile with leukocytosis     • Completed 10 days of therapy with metronidazole  • Monitor clinical course, additional management as above     3  Possible UTI  -Urinalysis with trace pyuria, no urine culture sent, patient was symptomatic with urinary urgency  -No current localizing urinary symptoms     • Completed empiric course of therapy, no additional work-up     4  CHF, A fib, CAD, bilateral pleural effusion  -No evidence of underlying pneumonia  -s/p therapeutic thoracentesis, fluid status studies appear transudative  -now with recurrent dyspnea, volume overload     • Fu CXR, management per primary team     5  Chronic kidney disease    · Continue renal dosing of abx    Discussed in detail with the primary service  History       Subjective: The patient reports feeling anxious, ill and short of breath today  Has been taking clear liquids and has been on TPN  Family is at bedside and are concerned about her breathing  No reports of fever      Antibiotics:  Pip-Tazo, PO Vanc      Physical Exam     Temp:  [97 3 °F (36 3 °C)-97 7 °F (36 5 °C)] 97 5 °F (36 4 °C)  HR:  [67-87] 87  Resp:  [17] 17  BP: ()/(57-82) 108/58  SpO2:  [89 %-100 %] 98 %  Temp (24hrs), Av 4 °F (36 3 °C), Min:97 3 °F (36 3 °C), Max:97 7 °F (36 5 °C)  Current: Temperature: 97 5 °F (36 4 °C)    Intake/Output Summary (Last 24 hours) at 2023 1024  Last data filed at 2023 0900  Gross per 24 hour   Intake 480 ml   Output 408 ml   Net 72 ml       Physical exam findings reported by bedside and primary medical team staff      General Appearance:  Appearing ill, frail, nontoxic, and anxious, appears stated age   Throat: Oropharynx moist without lesions; lips, mucosa, and tongue normal; teeth and gums normal   Lungs:   Diminished to auscultation bilaterally, no audible wheezes, rhonchi and rales, respirations unlabored   Heart:  Irregular rate and rhythm, S1, S2 normal, no murmur, rub or gallop   Abdomen:   Soft, tenderness lower abdomen, distended, hypoactive bowel sounds, no masses, no organomegaly    No CVA tenderness   Extremities: Extremities normal, atraumatic, no cyanosis, clubbing or edema   Skin: Skin color, texture, turgor normal, no rashes or lesions  No draining wounds noted  Neurologic: Alert and oriented times 3, generally weak         Invasive Devices:   PICC Line 02/16/23 (Active)   Reasons to continue PICC Total parenteral nutrition 02/21/23 2100   Goal for Removal N/A- Continuing for TPN 02/21/23 2100   Line Necessity Reviewed Yes, reviewed with provider 02/21/23 2100   Site Assessment Clean;Dry; Intact 02/21/23 2100   #1 Lumen Color/Status Flushed;Normal saline locked; Blood return noted; Purple lumen 02/21/23 2100   Dressing Type Chlorhexidine dressing 02/21/23 2100   Dressing Status Clean;Dry; Intact 02/21/23 2100   Dressing Change Due 02/23/23 02/20/23 0800       Peripheral IV 02/15/23 Right Forearm (Active)   Site Assessment Clean;Dry; Intact 02/21/23 2100   Dressing Type Transparent 02/21/23 2100   Line Status Flushed;Saline locked; No blood return 02/21/23 2100   Dressing Status Clean;Dry; Intact 02/21/23 2100   Dressing Change Due 02/23/23 02/20/23 0800   Reason Not Rotated Poor venous access 02/21/23 2100       Peripheral IV 02/16/23 Right;Ventral (anterior) Forearm (Active)   Site Assessment Clean;Dry; Intact 02/21/23 2100   Dressing Type Transparent 02/21/23 2100   Line Status Flushed;Saline locked; No blood return 02/21/23 2100   Dressing Status Clean;Dry; Intact 02/21/23 2100   Dressing Change Due 02/20/23 02/20/23 0800   Reason Not Rotated Poor venous access 02/21/23 2100       Peripheral IV 02/18/23 Right Arm (Active)   Site Assessment Clean;Dry; Intact 02/21/23 2100   Dressing Type Transparent 02/21/23 2100   Line Status Flushed;Saline locked; No blood return 02/21/23 2100   Dressing Status Clean;Dry; Intact 02/21/23 2100   Dressing Change Due 02/22/23 02/20/23 0800   Reason Not Rotated Poor venous access 02/21/23 2100       External Urinary Catheter (Active)   Collection Container Canister and suction tubing (For Female) 02/21/23 2122   Suction Pressure (mmHg) 100 mmHg 02/21/23 2122   Interventions Removed and skin assessed; Pericare performed;Device changed 02/21/23 2122   Output (mL) 155 mL 02/22/23 0557       Labs, Imaging, & Other Studies     Lab Results:    I have personally reviewed pertinent labs  Results from last 7 days   Lab Units 02/27/23  0611 02/26/23  0455 02/25/23  0510   WBC Thousand/uL 12 71* 11 32* 14 99*   HEMOGLOBIN g/dL 12 9 11 8 12 1   PLATELETS Thousands/uL 336 335 398*     Results from last 7 days   Lab Units 02/27/23  0611 02/22/23  0558 02/21/23  0515   POTASSIUM mmol/L 3 5   < > 3 9   CHLORIDE mmol/L 102   < > 99   CO2 mmol/L 32   < > 26   BUN mg/dL 30*   < > 30*   CREATININE mg/dL 0 92   < > 0 87   EGFR ml/min/1 73sq m 55   < > 59   CALCIUM mg/dL 6 8*   < > 6 9*   AST U/L  --   --  51*   ALT U/L  --   --  19   ALK PHOS U/L  --   --  38    < > = values in this interval not displayed  Results from last 7 days   Lab Units 02/23/23  1322 02/23/23  1114   GRAM STAIN RESULT  2+ Polys  No bacteria seen  --    BODY FLUID CULTURE, STERILE  No growth  --    C DIFF TOXIN B BY PCR   --  Negative       Imaging Studies:   I have personally reviewed pertinent imaging study reports and images in PACS  EKG, Pathology, and Other Studies:   I have personally reviewed pertinent reports          Counseling/Coordination of care: Total 35 minutes communication with the patient via telehealth  Labs, medical tests and imaging studies were independently reviewed by me as noted above

## 2023-02-27 NOTE — PLAN OF CARE
Problem: Nutrition/Hydration-ADULT  Goal: Nutrient/Hydration intake appropriate for improving, restoring or maintaining nutritional needs  Description: Monitor and assess patient's nutrition/hydration status for malnutrition  Collaborate with interdisciplinary team and initiate plan and interventions as ordered  Monitor patient's weight and dietary intake as ordered or per policy  Utilize nutrition screening tool and intervene as necessary  Determine patient's food preferences and provide high-protein, high-caloric foods as appropriate       INTERVENTIONS:  - Monitor oral intake, urinary output, labs, and treatment plans  - Assess nutrition and hydration status and recommend course of action  - Evaluate amount of meals eaten  - Assist patient with eating if necessary   - Allow adequate time for meals  - Recommend/ encourage appropriate diets, oral nutritional supplements, and vitamin/mineral supplements  - Order, calculate, and assess calorie counts as needed  - Recommend, monitor, and adjust tube feedings and TPN/PPN based on assessed needs  - Assess need for intravenous fluids  - Provide specific nutrition/hydration education as appropriate  - Include patient/family/caregiver in decisions related to nutrition  Outcome: Progressing     Problem: PAIN - ADULT  Goal: Verbalizes/displays adequate comfort level or baseline comfort level  Description: Interventions:  - Encourage patient to monitor pain and request assistance  - Assess pain using appropriate pain scale0-10  - Administer analgesics based on type and severity of pain and evaluate response  - Implement non-pharmacological measures as appropriate and evaluate response  - Consider cultural and social influences on pain and pain management  - Notify physician/advanced practitioner if interventions unsuccessful or patient reports new pain  Outcome: Progressing     Problem: INFECTION - ADULT  Goal: Absence or prevention of progression during hospitalization  Description: INTERVENTIONS:  - Assess and monitor for signs and symptoms of infection  - Monitor lab/diagnostic results  - Monitor all insertion sites, i e  indwelling lines, tubes, and drains  - Monitor endotracheal if appropriate and nasal secretions for changes in amount and color  - [place] appropriate cooling/warming therapies per order  - Administer medications as ordered  - Instruct and encourage patient and family to use good hand hygiene technique  - Identify and instruct in appropriate isolation precautions for identified infection/condition  Outcome: Progressing     Problem: SAFETY ADULT  Goal: Patient will remain free of falls  Description: INTERVENTIONS:  - Educate patient/family on patient safety including physical limitations  - Instruct patient to call for assistance with activity   - Consult OT/PT to assist with strengthening/mobility   - Keep Call bell within reach  - Keep bed low and locked with side rails adjusted as appropriate  - Keep care items and personal belongings within reach  - Initiate and maintain comfort rounds  - Make Fall Risk Sign visible to staff  - Apply yellow socks and bracelet for high fall risk patients  - Consider moving patient to room near nurses station  Outcome: Progressing  Goal: Maintain or return to baseline ADL function  Description: INTERVENTIONS:  -  Assess patient's ability to carry out ADLs; assess patient's baseline for ADL function and identify physical deficits which impact ability to perform ADLs (bathing, care of mouth/teeth, toileting, grooming, dressing, etc )  - Assess/evaluate cause of self-care deficits   - Assess range of motion  - Assess patient's mobility; develop plan if impaired  - Assess patient's need for assistive devices and provide as appropriate  - Encourage maximum independence but intervene and supervise when necessary  - Involve family in performance of ADLs  - Assess for home care needs following discharge   - Consider OT consult to assist with ADL evaluation and planning for discharge  - Provide patient education as appropriate  Outcome: Progressing  Goal: Maintains/Returns to pre admission functional level  Description: INTERVENTIONS:  - Perform BMAT or MOVE assessment daily    - Set and communicate daily mobility goal to care team and patient/family/caregiver  - Collaborate with rehabilitation services on mobility goals if consulted  - Perform Range of Motion 3 times a day    - Reposition patient every 2 hours if pt unable to reposition self  - Out of bed for toileting  - Record patient progress and toleration of activity level   Outcome: Progressing

## 2023-02-27 NOTE — PROGRESS NOTES
Multiple attempts made to place new peripheral IV in patient were unsuccessful  Blood work was collected  Current IV site not technically due to be changed until 1830 tonight  Current IV site CDI and flushes without issue  Request made to ICU to place new IV with ultrasound  Request received  Currently busy over in ICU- per ICU RN- will pass on to dayshift  Pt oxygen levels between 88-90% on RA while resting and lying flat  Placed on 2L via NC  Oxygen 96%  Will continue to monitor closely

## 2023-02-27 NOTE — ASSESSMENT & PLAN NOTE
Since patient was throwing up,  CT abd 2/15  which showed a small bowel obstruction which has now resolved with conservative management  cT scan done on 2/21 shows resolution of small bowel obstruction  Status post NG tube   Now removed

## 2023-02-27 NOTE — ASSESSMENT & PLAN NOTE
concern for ischemia of the small bowel with perforation and fluid collection  CT abdomen pelvis shows Most of the thickened loops of bowel described above are thickened segments of left-sided small bowel  Mild mucosal hyperemia of the proximal/mid colon  Unchanged right hemipelvis collection measuring 5 3 x 3 1 cm #2/131 adjacent to the mid sigmoid colon      · Stool Culture including C  difficile and enteric panel negative today however diarrhea has worsened   retest for C  Difficile negative and placed on oral vancomycin prophylactically for now  · Blood Culture 1 out of 2 gram-negative rods  · Empiric ceftriaxone/metronidazole -elevated procalcitonin worsening leukocytosis noted and switch to Zosyn 2/22  · discussed CT scan findings with IR, general surgery and infectious disease  Overall prognosis is guarded at this time    Discussed with patient that she might need surgical intervention which will be difficult due to her overall clinical condition and EF dropping to 25 to 30%   · Discussed all options with patient and family at bedside and patient decided on comfort care/hospice care at home

## 2023-02-27 NOTE — PROGRESS NOTES
[address]  Progress Note - Shanita Brought 1934, 80 y o  female MRN: [id number]  Unit/Bed#: -01 Encounter: [id number]  Primary Care Provider: Jaquan Regan DO   Date and time admitted to hospital: 2/10/2023 12:28 AM    Atrial fibrillation with RVR (HCC)  Assessment & Plan  · History paroxysmal atrial fibrillation chronically maintained on metoprolol succinate 25 mg daily   · chronic anticoagulation with Eliquis 2 5 mg twice daily for elevated [id number] stroke risk  · Initially patient received some IV Cardizem, then placed on beta-blocker, later on Cardizem short-acting added after that as per cardiology recommendation, amiodarone added  · Continue Toprol-XL  Continue midodrine for hypotension  · Patient decided on hospice    * Small bowel obstruction [facility])  Assessment & Plan  Since patient was throwing up,  CT abd 2/15  which showed a small bowel obstruction which has now resolved with conservative management  cT scan done on 2/21 shows resolution of small bowel obstruction  Status post NG tube   Now removed  Diverticulitis of small intestine with perforation and abscess without bleeding  Assessment & Plan  concern for ischemia of the small bowel with perforation and fluid collection  CT abdomen pelvis shows Most of the thickened loops of bowel described above are thickened segments of left-sided small bowel  Mild mucosal hyperemia of the proximal/mid colon  Unchanged right hemipelvis collection measuring 5 3 x 3 1 cm #2/131 adjacent to the mid sigmoid colon      · Stool Culture including C  difficile and enteric panel negative today however diarrhea has worsened   retest for C   Difficile negative and placed on oral vancomycin prophylactically for now  · Blood Culture 1 out of 2 gram-negative rods  · Empiric ceftriaxone/metronidazole -elevated procalcitonin worsening leukocytosis noted and switch to Zosyn 2/22  · discussed CT scan findings with IR, general surgery and infectious disease  Overall prognosis is guarded at this time  Discussed with patient that she might need surgical intervention which will be difficult due to her overall clinical condition and EF dropping to 25 to 30%   · Discussed all options with patient and family at bedside and patient decided on comfort care/hospice care at home    Moderate protein-calorie malnutrition [facility])  Assessment & Plan  Malnutrition Findings:   Adult Malnutrition type: Chronic illness  Adult Degree of Malnutrition: Malnutrition of moderate degree  Malnutrition Characteristics: Muscle loss, Fat loss, Inadequate energy                  360 Statement: Chronic moderate malnutrition related to decreased oral intake, difficulties with meal preparation at home as evidenced by < 75% estimated energy intake > 1 mo; moderate muscle loss to temporalis, pectoralis, deltoid muscles; moderate to severe fat loss to orbitals, moderate fat loss to buccal pads  Treated with: Advance diet as medically appropriate to modest 4gm DENY given hx of CHF  Will discuss supplements with pt once diet initiated  Recommend daily weights for nutrition monitoring  Did discuss Mom's Meals with pt for ease of meal preparation at home  BMI Findings: Body mass index is 27 25 kg/m²  Coronary artery disease involving native coronary artery of native heart  Assessment & Plan  · NSTEMI September 2022  · Cardiac catheterization with minimal atherosclerotic disease, no PCI  · Held Eliquis and simvastatin    Stage 3b chronic kidney disease [facility])  Assessment & Plan  Lab Results   Component Value Date    EGFR 55 02/27/2023    EGFR 58 02/26/2023    EGFR 61 02/25/2023    CREATININE 0 92 02/27/2023    CREATININE 0 88 02/26/2023    CREATININE 0 85 02/25/2023   · History CKD 3  · creatinine worsened to 1 5    Placed on gentle hydration and now back to baseline creatinine  · Renally dose medications, avoid nephrotoxic medication    Acute cystitis  Assessment & Plan  · Presented with complaints of abdominal pain and foul-smelling urine  · UA with bacteriuria, pyuria  · Urine did not reflex to culture  · Blood culture 1 out of 2 gram-negative rods  · Already completed course of antibiotics for acute cystitis    Acute on chronic combined systolic and diastolic congestive heart failure (HCC)  Assessment & Plan  Wt Readings from Last 3 Encounters:   02/26/23 67 6 kg (149 lb)   06/23/21 55 3 kg (121 lb 14 6 oz)   03/15/19 59 kg (130 lb)     · History chronic congestive heart failure, follows with [facility] cardiology  · Per their notes - Probable Takotsubo cardiomyopathy with recovered EF and at least moderate MR and moderate TR   ·  2D echo EF has dropped to 25 to 30%  · Continue Toprol  · Lisinopril 2 5 mg daily on hold due to hypotension  · Lasix prn basis    Pleural effusion, bilateral  Assessment & Plan  · Moderate bilateral pleural effusions did on imaging and also third spacing noted with anasarca mostly of bilateral upper extremities  Decreased oral intake for the last 2 weeks which is probably causing her to have third spacing  · Get 2D echo to evaluate LV function  · Ultrasound of upper extremity shows superficial thrombophlebitis  Already on Eliquis is currently on hold temporarily in case of any surgical intervention or IR procedure  · Rt Sided thoracocentesis done on 2/23 for 900 cc  Continue as needed Lasix  modified tpn for now based on nutrition recommendations  · Stop tpn now on hospice      VTE Pharmacologic Prophylaxis:   Pharmacologic: Pharmacologic VTE Prophylaxis contraindicated due to hospice  Mechanical VTE Prophylaxis in Place: No    Patient Centered Rounds: I have performed bedside rounds with nursing staff today  Discussions with Specialists or Other Care Team Provider: surgery    Education and Discussions with Family / Patient: evan patient and daughters at bedside    Time Spent for Care: 45 minutes    More than 50% of total time spent on counseling and coordination of care as described above  Current Length of Stay: 17 day(s)    Current Patient Status: Inpatient   Certification Statement: The patient will continue to require additional inpatient hospital stay due to small bowel perforation    Discharge Plan: Home with hospice care    Code Status: Level 4 - Comfort Care      Subjective:   Discussion with patient and family at bedside about her current clinical condition and patient decided on hospice care at home  Wants to initiate hospice right away    Objective:     Vitals:   Temp (24hrs), Av 4 °F (36 3 °C), Min:97 3 °F (36 3 °C), Max:97 7 °F (36 5 °C)    Temp:  [97 3 °F (36 3 °C)-97 7 °F (36 5 °C)] 97 5 °F (36 4 °C)  HR:  [67-87] 87  Resp:  [17] 17  BP: ()/(57-82) 108/58  SpO2:  [89 %-100 %] 98 %  Body mass index is 27 25 kg/m²  Input and Output Summary (last 24 hours): Intake/Output Summary (Last 24 hours) at 2023 1210  Last data filed at 2023 0900  Gross per 24 hour   Intake 480 ml   Output 408 ml   Net 72 ml       Physical Exam:     Physical Exam  Vitals and nursing note reviewed  Constitutional:       Appearance: She is ill-appearing  HENT:      Head: Normocephalic and atraumatic  Right Ear: External ear normal       Left Ear: External ear normal       Nose: Nose normal       Mouth/Throat:      Pharynx: Oropharynx is clear  Eyes:      Pupils: Pupils are equal, round, and reactive to light  Cardiovascular:      Rate and Rhythm: Normal rate  Rhythm irregular  Heart sounds: Normal heart sounds  Pulmonary:      Effort: Pulmonary effort is normal       Breath sounds: Rales present  Abdominal:      General: Bowel sounds are normal       Palpations: Abdomen is soft  Tenderness: There is abdominal tenderness  Musculoskeletal:         General: Normal range of motion  Cervical back: Normal range of motion and neck supple  Skin:     General: Skin is warm and dry  Capillary Refill: Capillary refill takes less than 2 seconds  Neurological:      General: No focal deficit present  Mental Status: She is alert and oriented to person, place, and time  Psychiatric:         Mood and Affect: Mood normal            Additional Data:     Labs:    Results from last 7 days   Lab Units 02/27/23  0611   WBC Thousand/uL 12 71*   HEMOGLOBIN g/dL 12 9   HEMATOCRIT % 40 2   PLATELETS Thousands/uL 336   NEUTROS PCT % 89*   LYMPHS PCT % 5*   MONOS PCT % 6   EOS PCT % 0     Results from last 7 days   Lab Units 02/27/23  0611 02/22/23  0558 02/21/23  0515   SODIUM mmol/L 136   < > 130*   POTASSIUM mmol/L 3 5   < > 3 9   CHLORIDE mmol/L 102   < > 99   CO2 mmol/L 32   < > 26   BUN mg/dL 30*   < > 30*   CREATININE mg/dL 0 92   < > 0 87   ANION GAP mmol/L 2*   < > 5   CALCIUM mg/dL 6 8*   < > 6 9*   ALBUMIN g/dL  --   --  2 9*   TOTAL BILIRUBIN mg/dL  --   --  0 37   ALK PHOS U/L  --   --  38   ALT U/L  --   --  19   AST U/L  --   --  51*   GLUCOSE RANDOM mg/dL 152*   < > 204*    < > = values in this interval not displayed  * I Have Reviewed All Lab Data Listed Above  * Additional Pertinent Lab Tests Reviewed:  All [facility] Ad[facility] Reviewed    Imaging:    Imaging Reports Reviewed Today Include:chest X-ray  Imaging Personally Reviewed by Myself Includes:chest X-ray    Recent Cultures (last 7 days):     Results from last 7 days   Lab Units 02/23/23  1322 02/23/23  1114   GRAM STAIN RESULT  2+ Polys  No bacteria seen  --    BODY FLUID CULTURE, STERILE  No growth  --    C DIFF TOXIN B BY PCR   --  Negative       Last 24 Hours Medication List:   Current Facility-Administered Medications   Medication Dose Route Frequency Provider Last Rate   • amiodarone  200 mg Oral BID With Meals Yahaira Dawn MD     • latanoprost  1 drop Both Eyes HS Yahaira Dawn MD     • levalbuterol  1 25 mg Nebulization Q4H PRN Yahaira Dawn MD     • melatonin  3 mg Oral HS Twyllmontez Quinones MD Chris     • metoprolol succinate  50 mg Oral BID Radha Haro MD     • midodrine  2 5 mg Oral TID AC Radha Haro MD     • ondansetron  4 mg Intravenous Q4H PRN Radha Haro MD     • phenol  1 spray Mouth/Throat Q2H PRN Radha Haro MD     • traZODone  100 mg Oral HS Radha Haro MD          Today, Patient Was Seen By: Radha Haro MD    ** Please Note: Dictation voice to text software may have been used in the creation of this document   **

## 2023-02-27 NOTE — PROGRESS NOTES
Progress Note - General Surgery   Jovanni Campbell 80 y o  female MRN: [id number]  Unit/Bed#: -01 Encounter: [id number]    Assessment:  -79yo female admitted with dx of gastroenterocolitis on 2/10/23 then developed SBO, now resolved   -CTAP 2/23/23 with signs of perforated viscus with worsening pneumoperitoneum, conservative management at this time with IV abx and bowel rest  -Developed SOB and increased weakness overnight  -Continued but stable pain and tenderness in the lower abdomen, worst in LLQ  -Leukocytosis, stable  WBC 12 71 (11 32, 14 99, 17 79)  -Continued diarrhea, Cdiff negative      Plan:  Continue Clear liquid diet  Continue TNP  IVF hydration  Serial abdominal exams  Monitor I/Os  IV abx as indicate, appreciate ID rec's  Medicate PRN pain/nausea  Management of co-morbidities per primary team    Subjective: She is feeling weak today and has developed SOB  She feels too weak to take any clear liquids by mouth  Nasal O2 in place  She denies any change to her abdominal pain  She states it hurts only when someone pushes on her lower abdomen  Objective:   Blood pressure 108/58, pulse 87, temperature 97 5 °F (36 4 °C), resp  rate 17, height 5' 2" (1 575 m), weight 67 6 kg (149 lb), SpO2 98 %  ,Body mass index is 27 25 kg/m²  Intake/Output Summary (Last 24 hours) at 2/27/2023 0810  Last data filed at 2/26/2023 1801  Gross per 24 hour   Intake 480 ml   Output 708 ml   Net -228 ml       Invasive Devices     Peripherally Inserted Central Catheter Line  Duration           PICC Line 02/16/23 10 days          Peripheral Intravenous Line  Duration           Peripheral IV Left;Upper;Ventral (anterior) Arm -- days          Drain  Duration           External Urinary Catheter 10 days                Physical Exam:   Gen: AxOx3, pleasant, alert, [place]  Abd: + distended, bowel sounds present throughout, +no tenderness in keeley upper abd or periumbilically  +Tenderness to deep palpation with mild guarding in LLQ abdomen  Extremities: Mild edema of both upper extremities    Lab, Imaging and other studies:  I have personally reviewed pertinent lab results  REPEAT CTAP  2/23/23:    ADDENDUM:     Most of the thickened loops of bowel described above are thickened segments of left-sided small bowel  Mild mucosal hyperemia of the proximal/mid colon      Multiple small new foci of pneumoperitoneum in the anterior mid pelvis #2/90      As seen on the comparison study there are small dots of gas within and adjacent to the right hemipelvis collection, likely the sequelae of an adjacent sigmoid diverticulitis       LIVER/BILIARY TREE:  Unchanged mild intrahepatic and extrahepatic biliary dilation likely of a normal degree status post cholecystectomy  Slightly nodular hepatic contours may indicate underlying hepatocellular disease      GALLBLADDER:  Cholecystectomy      SPLEEN:  Unremarkable        PANCREAS:  Unremarkable      ADRENAL GLANDS:  Unremarkable      KIDNEYS/URETERS:  Unremarkable  No hydronephrosis      STOMACH AND BOWEL:  Interval development of prominent diffuse colonic thickening in keeping with a nonspecific colitis  No bowel obstruction  No bowel pneumatosis  Fluid in the rectal vault with a rectal drainage tube      APPENDIX:  No findings to suggest appendicitis      ABDOMINOPELVIC CAVITY:  Reidentified moderate abdominal ascites      Unchanged right hemipelvis collection measuring 5 3 x 3 1 cm #2/131 adjacent to the mid sigmoid colon      VESSELS:  Unremarkable for patient's age      PELVIS     REPRODUCTIVE ORGANS:  Hysterectomy      URINARY BLADDER:  Unremarkable      ABDOMINAL WALL/INGUINAL REGIONS:  Prominent diffuse body wall edema/anasarca     OSSEOUS STRUCTURES:  No acute fracture or destructive osseous lesion    Partially imaged left femoral fixation nails      IMPRESSION:     Reidentified 5 3 x 3 1 cm pelvic collection adjacent to the mid sigmoid colon      Interval development of diffuse colonic thickening and mucosal hyperemia indicating a nonspecific colitis, likely infectious or inflammatory        CBC:   Lab Results   Component Value Date    WBC 12 71 (H) 02/27/2023    HGB 12 9 02/27/2023    HCT 40 2 02/27/2023    MCV 89 02/27/2023     02/27/2023    MCH 28 5 02/27/2023    MCHC 32 1 02/27/2023    RDW 16 1 (H) 02/27/2023    MPV 8 9 02/27/2023    NRBC 0 02/27/2023     CMP:   Lab Results   Component Value Date    SODIUM 136 02/27/2023    K 3 5 02/27/2023     02/27/2023    CO2 32 02/27/2023    BUN 30 (H) 02/27/2023    CREATININE 0 92 02/27/2023    CALCIUM 6 8 (L) 02/27/2023    EGFR 55 02/27/2023     VTE Pharmacologic Prophylaxis: Heparin  VTE Mechanical Prophylaxis: sequential compression device     Merrilyn Enid

## 2023-02-27 NOTE — ASSESSMENT & PLAN NOTE
· History paroxysmal atrial fibrillation chronically maintained on metoprolol succinate 25 mg daily   · chronic anticoagulation with Eliquis 2 5 mg twice daily for elevated [id number] stroke risk  · Initially patient received some IV Cardizem, then placed on beta-blocker, later on Cardizem short-acting added after that as per cardiology recommendation, amiodarone added  · Continue Toprol-XL  Continue midodrine for hypotension     · Patient decided on hospice

## 2023-02-27 NOTE — DISCHARGE SUMMARY
[address]  Discharge- Dosbryn Ritchie 1934, 80 y o  female MRN: [id number]  Unit/Bed#: -01 Encounter: [id number]  Primary Care Provider: Viki Altman DO   Date and time admitted to hospital: 2/10/2023 12:28 AM    Atrial fibrillation with RVR (HCC)  Assessment & Plan  · History paroxysmal atrial fibrillation chronically maintained on metoprolol succinate 25 mg daily   · chronic anticoagulation with Eliquis 2 5 mg twice daily for elevated [id number] stroke risk  · Initially patient received some IV Cardizem, then placed on beta-blocker, later on Cardizem short-acting added after that as per cardiology recommendation, amiodarone added  · Continue Toprol-XL  Continue midodrine for hypotension  Restart eliquis tonight mild  Tachycardia noted today with heart rate   * Small bowel obstruction [facility])  Assessment & Plan  Since patient was throwing up,  CT abd 2/15  which showed a small bowel obstruction which has now resolved with conservative management  cT scan done on 2/21 shows resolution of small bowel obstruction  Status post NG tube   Now removed  cont tpn    Diverticulitis of small intestine with perforation and abscess without bleeding  Assessment & Plan  concern for ischemia of the small bowel with perforation and fluid collection  CT abdomen pelvis shows Most of the thickened loops of bowel described above are thickened segments of left-sided small bowel  Mild mucosal hyperemia of the proximal/mid colon  Unchanged right hemipelvis collection measuring 5 3 x 3 1 cm #2/131 adjacent to the mid sigmoid colon      · Stool Culture including C  difficile and enteric panel negative today however diarrhea has worsened   retest for C   Difficile negative and placed on oral vancomycin prophylactically for now  · Blood Culture 1 out of 2 gram-negative rods  · Empiric ceftriaxone/metronidazole -elevated procalcitonin worsening leukocytosis noted and switch to Zosyn 2/22  · discussed CT scan findings with IR, general surgery and infectious disease  Overall prognosis is guarded at this time  Discussed with patient that she might need surgical intervention which will be difficult due to her overall clinical condition and EF dropping to 25 to 30% or consider comfort care/hospice care  · sHe is looking and feeling a little bit better today and white count improved a little  Keep on TPN and plan few sips of clear liquids today  Surgery to follow along in case patient has any worsening to determine surgical intervention versus hospice care  Moderate protein-calorie malnutrition ([address] )  Assessment & Plan  Malnutrition Findings:   Adult Malnutrition type: Chronic illness  Adult Degree of Malnutrition: Malnutrition of moderate degree  Malnutrition Characteristics: Muscle loss, Fat loss, Inadequate energy                  360 Statement: Chronic moderate malnutrition related to decreased oral intake, difficulties with meal preparation at home as evidenced by < 75% estimated energy intake > 1 mo; moderate muscle loss to temporalis, pectoralis, deltoid muscles; moderate to severe fat loss to orbitals, moderate fat loss to buccal pads  Treated with: Advance diet as medically appropriate to modest 4gm DENY given hx of CHF  Will discuss supplements with pt once diet initiated  Recommend daily weights for nutrition monitoring  Did discuss Mom's Meals with pt for ease of meal preparation at home  BMI Findings: Body mass index is 27 25 kg/m²         Coronary artery disease involving native coronary artery of native heart  Assessment & Plan  · NSTEMI September 2022  · Cardiac catheterization with minimal atherosclerotic disease, no PCI  · Held Eliquis and simvastatin    Stage 3b chronic kidney disease [facility])  Assessment & Plan  Lab Results   Component Value Date    EGFR 55 02/27/2023    EGFR 58 02/26/2023    EGFR 61 02/25/2023    CREATININE 0 92 02/27/2023    CREATININE 0 88 02/26/2023    CREATININE 0 85 02/25/2023   · History CKD 3  · creatinine worsened to 1 5  Placed on gentle hydration and now back to baseline creatinine  · Renally dose medications, avoid nephrotoxic medication    Acute cystitis  Assessment & Plan  · Presented with complaints of abdominal pain and foul-smelling urine  · UA with bacteriuria, pyuria  · Urine did not reflex to culture  · Blood culture 1 out of 2 gram-negative rods  · Already completed course of antibiotics for acute cystitis    Acute on chronic combined systolic and diastolic congestive heart failure (HCC)  Assessment & Plan  Wt Readings from Last 3 Encounters:   02/26/23 67 6 kg (149 lb)   06/23/21 55 3 kg (121 lb 14 6 oz)   03/15/19 59 kg (130 lb)     · History chronic congestive heart failure, follows with [facility] cardiology  · Per their notes - Probable Takotsubo cardiomyopathy with recovered EF and at least moderate MR and moderate TR   ·  2D echo EF has dropped to 25 to 30%  · Continue Toprol  · Lisinopril 2 5 mg daily on hold due to hypotension  · Lasix prn basis    Pleural effusion, bilateral  Assessment & Plan  · Moderate bilateral pleural effusions did on imaging and also third spacing noted with anasarca mostly of bilateral upper extremities  Decreased oral intake for the last 2 weeks which is probably causing her to have third spacing  · Get 2D echo to evaluate LV function  · Ultrasound of upper extremity shows superficial thrombophlebitis  Already on Eliquis is currently on hold temporarily in case of any surgical intervention or IR procedure  · Rt Sided thoracocentesis done on 2/23 for 900 cc  Continue as needed Lasix  modified tpn for now based on nutrition recommendations      VTE Pharmacologic Prophylaxis:   Pharmacologic: Enoxaparin (Lovenox)  Mechanical VTE Prophylaxis in Place: Yes    Patient Centered Rounds: I have performed bedside rounds with nursing staff today      Discussions with Specialists or Other Care Team Provider: evan surgery    Education and Discussions with Family / Patient: dw patient and will update family    Time Spent for Care: 30 minutes  More than 50% of total time spent on counseling and coordination of care as described above  Current Length of Stay: 17 day(s)    Current Patient Status: Inpatient   Certification Statement: The patient will continue to require additional inpatient hospital stay due to small bowel ischemia    Discharge Plan: possibly after 2-3 days      Code Status: Level 3 - DNAR and DNI      Subjective:   Patient denies any chest pain and abd pain is decreasing but still having diarrhea  seems more fatigued today and complaining of sob    Objective:     Vitals:   Temp (24hrs), Av 4 °F (36 3 °C), Min:97 3 °F (36 3 °C), Max:97 7 °F (36 5 °C)    Temp:  [97 3 °F (36 3 °C)-97 7 °F (36 5 °C)] 97 5 °F (36 4 °C)  HR:  [67-87] 87  Resp:  [17] 17  BP: ()/(57-82) 108/58  SpO2:  [89 %-100 %] 98 %  Body mass index is 27 25 kg/m²  Input and Output Summary (last 24 hours): Intake/Output Summary (Last 24 hours) at 2023 1115  Last data filed at 2023 0900  Gross per 24 hour   Intake 480 ml   Output 408 ml   Net 72 ml       Physical Exam:     Physical Exam  Vitals and nursing note reviewed  Constitutional:       Appearance: She is ill-appearing  HENT:      Head: Normocephalic and atraumatic  Right Ear: External ear normal       Left Ear: External ear normal       Nose: Nose normal       Mouth/Throat:      Pharynx: Oropharynx is clear  Eyes:      Pupils: Pupils are equal, round, and reactive to light  Cardiovascular:      Rate and Rhythm: Normal rate and regular rhythm  Heart sounds: Normal heart sounds  Pulmonary:      Effort: Pulmonary effort is normal       Breath sounds: Normal breath sounds  Abdominal:      General: Bowel sounds are normal  There is distension  Palpations: Abdomen is soft  Tenderness: There is abdominal tenderness     Musculoskeletal: General: Normal range of motion  Cervical back: Normal range of motion and neck supple  Right lower leg: Edema present  Left lower leg: Edema present  Skin:     General: Skin is warm and dry  Capillary Refill: Capillary refill takes less than 2 seconds  Neurological:      General: No focal deficit present  Mental Status: She is alert and oriented to person, place, and time  Psychiatric:         Mood and Affect: Mood normal            Additional Data:     Labs:    Results from last 7 days   Lab Units 02/27/23  0611   WBC Thousand/uL 12 71*   HEMOGLOBIN g/dL 12 9   HEMATOCRIT % 40 2   PLATELETS Thousands/uL 336   NEUTROS PCT % 89*   LYMPHS PCT % 5*   MONOS PCT % 6   EOS PCT % 0     Results from last 7 days   Lab Units 02/27/23  0611 02/22/23  0558 02/21/23  0515   SODIUM mmol/L 136   < > 130*   POTASSIUM mmol/L 3 5   < > 3 9   CHLORIDE mmol/L 102   < > 99   CO2 mmol/L 32   < > 26   BUN mg/dL 30*   < > 30*   CREATININE mg/dL 0 92   < > 0 87   ANION GAP mmol/L 2*   < > 5   CALCIUM mg/dL 6 8*   < > 6 9*   ALBUMIN g/dL  --   --  2 9*   TOTAL BILIRUBIN mg/dL  --   --  0 37   ALK PHOS U/L  --   --  38   ALT U/L  --   --  19   AST U/L  --   --  51*   GLUCOSE RANDOM mg/dL 152*   < > 204*    < > = values in this interval not displayed  * I Have Reviewed All Lab Data Listed Above  * Additional Pertinent Lab Tests Reviewed:  [facility] Admission Reviewed    Imaging:    Imaging Reports Reviewed Today Include: cxray  Imaging Personally Reviewed by Myself Includes:  cxray    Recent Cultures (last 7 days):     Results from last 7 days   Lab Units 02/23/23  1322 02/23/23  1114   GRAM STAIN RESULT  2+ Polys  No bacteria seen  --    BODY FLUID CULTURE, STERILE  No growth  --    C DIFF TOXIN B BY PCR   --  Negative       Last 24 Hours Medication List:   Current Facility-Administered Medications   Medication Dose Route Frequency Provider Last Rate   • Adult TPN (CUSTOM BASE/CUSTOM ELECTROLYTE)   Intravenous Continuous TPN Carter Valladares MD     • Adult TPN (STANDARD BASE/STANDARD ELECTROLYTE)   Intravenous Continuous TPN Carter Valladares MD 42 mL/hr at 02/26/23 2138   • amiodarone  200 mg Oral BID With Meals Carter Valladares MD     • enoxaparin  40 mg Subcutaneous Q24H [address] Carter Valladares MD     • gabapentin  300 mg Oral HS Carter Valladares MD     • latanoprost  1 drop Both Eyes HS Carter Valladares MD     • levalbuterol  1 25 mg Nebulization Q4H PRN Carter Valladares MD     • melatonin  3 mg Oral HS Carter Valladares MD     • metoprolol  5 mg Intravenous Q6H PRN Carter Valladares MD     • metoprolol succinate  50 mg Oral BID Carter Valladares MD     • midodrine  2 5 mg Oral TID AC Carter Valladares MD     • ondansetron  4 mg Intravenous Q4H PRN Carter Valladares MD     • phenol  1 spray Mouth/Throat Q2H PRN Carter Valladares MD     • piperacillin-tazobactam  3 375 g Intravenous Q6H Carter Valladares MD 3 375 g (02/27/23 3316)   • traZODone  100 mg Oral HS Carter Valladares MD     • vancomycin oral  125 mg Oral Q12H [address] Carter Valladares MD          Today, Patient Was Seen By: Carter Valladares MD    ** Please Note: Dictation voice to text software may have been used in the creation of this document   **

## 2023-02-28 NOTE — ASSESSMENT & PLAN NOTE
Lab Results   Component Value Date    EGFR 55 02/27/2023    EGFR 58 02/26/2023    EGFR 61 02/25/2023    CREATININE 0 92 02/27/2023    CREATININE 0 88 02/26/2023    CREATININE 0 85 02/25/2023   · History CKD 3  · creatinine worsened to 1 5  Placed on gentle hydration and now back to baseline creatinine  · Renally dose medications, avoid nephrotoxic medication  Patient pronounced dead at 5:12 PM, family member at the bedside    During the whole day multiple rounds has made on this patient and family updated on regular basis

## 2023-02-28 NOTE — CASE MANAGEMENT
Case Management Discharge Planning Note    Patient name Bobbi Garibay  Location [address] 322/-29 MRN [id number]  : 1934 Date 2023       Current Admission Date: 2/10/2023  Current Admission Diagnosis:Small bowel obstruction [facility])   Patient Active Problem List    Diagnosis Date Noted   • Goals of care, counseling/discussion 2023   • Bacteremia 2023   • PICC (peripherally inserted central catheter) in place 2023   • Small bowel obstruction ([address] ) 02/15/2023   • PVC's (premature ventricular contractions) 2023   • Atrial fibrillation with RVR ([facility] ) 02/10/2023   • Diverticulitis of small intestine with perforation and abscess without bleeding 02/10/2023   • Pleural effusion, bilateral 02/10/2023   • Acute on chronic combined systolic and diastolic congestive heart failure ([facility] ) 02/10/2023   • Acute cystitis 02/10/2023   • Stage 3b chronic kidney disease ([facility] ) 02/10/2023   • Coronary artery disease involving native coronary artery of native heart 02/10/2023   • Moderate protein-calorie malnutrition ([facility] ) 02/10/2023      LOS (days): 18  Geometric Mean LOS (GMLOS) (days): 5 00  Days to GMLOS:-13 5     OBJECTIVE:  Risk of Unplanned Readmission Score: 17         Current admission status: Inpatient   Preferred Pharmacy:   [facility], [address] [place] [address] [address] [place] [address]  Phone: [phone] Fax: [phone]    Primary Care Provider: Arnaud Cruz DO    Primary Insurance: MEDICARE  Secondary Insurance: AARP    DISCHARGE DETAILS:    Discharge planning discussed with[de-identified] daughters, Yoshi Joyner and Tanna Judgey of Choice: Yes  Comments - [place] of Choice: [address]  CM contacted family/caregiver?: Yes  Were Treatment Team discharge recommendations reviewed with patient/caregiver?: Yes  Did patient/caregiver verbalize understanding of patient care needs?: Yes  Were patient/caregiver advised of the risks associated with not following Treatment Team discharge recommendations?: Yes    Contacts  Patient Contacts: daughters, Molly Jain and Carlo Smith  Relationship to Patient[de-identified] Family  Contact Method: In Person  Reason/Outcome: Discharge [address]         Is the patient interested in [address] at discharge?: Yes  [address] requested[de-identified] [address] Name[de-identified] [facility] Provider[de-identified] PCP  Home Health Services Needed[de-identified] Other (comment) (Hospice)  Oxygen LPM Ordered (if applicable based on home health services needed):: 3 LPM  Homebound Criteria Met[de-identified] Requires the Assistance of Another Person for Safe Ambulation or to Leave the Home  Supporting Clincal Findings[de-identified] Bed Bound or Wheelchair Bound         Other Referral/Resources/Interventions Provided:  Interventions: [address]  Referral Comments: [address]         Treatment Team Recommendation: Home  Discharge Destination Plan[de-identified] Home  Transport at Discharge : BLS Ambulance                                         CM met with patient, daughters, Carlo Smith and Molly Jain  CM discussed Home Hospice referral following MDs second assessment of patient later in day  Daughters agreeable with referral     CM submitted AIDIN referral to [address]

## 2023-02-28 NOTE — CASE MANAGEMENT
Case Management Discharge Planning Note    Patient name Yesica Urias  Location [address] 322/-77 MRN [id number]  : 1934 Date 2023       Current Admission Date: 2/10/2023  Current Admission Diagnosis:Small bowel obstruction [facility])   Patient Active Problem List    Diagnosis Date Noted   • Goals of care, counseling/discussion 2023   • Bacteremia 2023   • PICC (peripherally inserted central catheter) in place 2023   • Small bowel obstruction ([address] ) 02/15/2023   • PVC's (premature ventricular contractions) 2023   • Atrial fibrillation with RVR ([facility] ) 02/10/2023   • Diverticulitis of small intestine with perforation and abscess without bleeding 02/10/2023   • Pleural effusion, bilateral 02/10/2023   • Acute on chronic combined systolic and diastolic congestive heart failure ([facility] ) 02/10/2023   • Acute cystitis 02/10/2023   • Stage 3b chronic kidney disease ([facility] ) 02/10/2023   • Coronary artery disease involving native coronary artery of native heart 02/10/2023   • Moderate protein-calorie malnutrition ([facility] ) 02/10/2023      LOS (days): 18  Geometric Mean LOS (GMLOS) (days): 5 00  Days to GMLOS:-13 4     OBJECTIVE:  Risk of Unplanned Readmission Score: 16 96         Current admission status: Inpatient   Preferred Pharmacy:   [facility] [address]  [place] [address]  Phone: [phone] Fax: [phone]    Primary Care Provider: Shanice Vladovinos DO    Primary Insurance: MEDICARE  Secondary Insurance: AAR    DISCHARGE DETAILS:              CM met with patient, spouse, and daughter Carina Alvarez at bedside  CM discussed with daughter concerns for accessing bank accounts, life insurance policies, patient not pre-planning  arrangements and family not knowing patients wishes up until a few days ago  CM explained referral for Home Hospice would include Visiting Nurse, Aide and grief counseling for family  Hospice agency would secure DME if patient discharged home: hospital bed, O2 and any additional needed items  Daughter would prefer patient remain at [facility] but agreeable to [facility] if patient medically stable for transport  Dr Manuelito Carpio examined patient during CMs discussion and indicated he will revisit patient later in day to make better determination  CM to follow

## 2023-02-28 NOTE — ASSESSMENT & PLAN NOTE
· NSTEMI September 2022  · Cardiac catheterization with minimal atherosclerotic disease, no PCI  · Held Eliquis and simvastatin  Patient pronounced dead at 5:12 PM, family member at the bedside    During the whole day multiple rounds has made on this patient and family updated on regular basis

## 2023-02-28 NOTE — ASSESSMENT & PLAN NOTE
· Presented with complaints of abdominal pain and foul-smelling urine  · UA with bacteriuria, pyuria  · Urine did not reflex to culture  · Blood culture 1 out of 2 gram-negative rods  · Already completed course of antibiotics for acute cystitis  Patient pronounced dead at 5:12 PM, family member at the bedside    During the whole day multiple rounds has made on this patient and family updated on regular basis

## 2023-02-28 NOTE — HOSPICE NOTE
Received referral, patient was approved for [facility] (at home or SNF)  I called and reviewed services with daughters Davidson Benz and Drake Morin via phone  They did not want to make a decision right now, as they wanted to see how she did overnight  I will touch base with them in the morning   I updated Areli Jimenez

## 2023-02-28 NOTE — ASSESSMENT & PLAN NOTE
· History paroxysmal atrial fibrillation chronically maintained on metoprolol succinate 25 mg daily   · chronic anticoagulation with Eliquis 2 5 mg twice daily for elevated [id number] stroke risk  · Initially patient received some IV Cardizem, then placed on beta-blocker, later on Cardizem short-acting added after that as per cardiology recommendation, amiodarone added  · Continue Toprol-XL  Continue midodrine for hypotension  · Patient decided on hospice  · Patient pronounced dead at 5:12 PM, family member at the bedside    During the whole day multiple rounds has made on this patient and family updated on regular basis

## 2023-02-28 NOTE — ASSESSMENT & PLAN NOTE
· Moderate bilateral pleural effusions did on imaging and also third spacing noted with anasarca mostly of bilateral upper extremities  Decreased oral intake for the last 2 weeks which is probably causing her to have third spacing  · Get 2D echo to evaluate LV function  · Ultrasound of upper extremity shows superficial thrombophlebitis  Already on Eliquis is currently on hold temporarily in case of any surgical intervention or IR procedure  · Rt Sided thoracocentesis done on 2/23 for 900 cc  Continue as needed Lasix  modified tpn for now based on nutrition recommendations  · Stop tpn now on hospice  Patient pronounced dead at 5:12 PM, family member at the bedside    During the whole day multiple rounds has made on this patient and family updated on regular basis

## 2023-02-28 NOTE — DISCHARGE SUMMARY
[address]  Discharge- Dossithien Ritchie 1934, 80 y o  female MRN: [id number]  Unit/Bed#: -01 Encounter: [id number]  Primary Care Provider: Viki Altman DO   Date and time admitted to hospital: 2/10/2023 12:28 AM    Small bowel obstruction [facility])  Assessment & Plan  Since patient was throwing up,  CT abd 2/15  which showed a small bowel obstruction which has now resolved with conservative management  cT scan done on 2/21 shows resolution of small bowel obstruction  Status post NG tube   Now removed  Patient pronounced dead at 5:12 PM, family member at the bedside  During the whole day multiple rounds has made on this patient and family updated on regular basis      Atrial fibrillation with RVR ([facility] [address] )  Assessment & Plan  · History paroxysmal atrial fibrillation chronically maintained on metoprolol succinate 25 mg daily   · chronic anticoagulation with Eliquis 2 5 mg twice daily for elevated [id number] stroke risk  · Initially patient received some IV Cardizem, then placed on beta-blocker, later on Cardizem short-acting added after that as per cardiology recommendation, amiodarone added  · Continue Toprol-XL  Continue midodrine for hypotension  · Patient decided on hospice  · Patient pronounced dead at 5:12 PM, family member at the bedside  During the whole day multiple rounds has made on this patient and family updated on regular basis    * Diverticulitis of small intestine with perforation and abscess without bleeding  Assessment & Plan  concern for ischemia of the small bowel with perforation and fluid collection  CT abdomen pelvis shows Most of the thickened loops of bowel described above are thickened segments of left-sided small bowel  Mild mucosal hyperemia of the proximal/mid colon  Unchanged right hemipelvis collection measuring 5 3 x 3 1 cm #2/131 adjacent to the mid sigmoid colon      · Stool Culture including C  difficile and enteric panel negative today however diarrhea has worsened   retest for C  Difficile negative and placed on oral vancomycin prophylactically for now  · Blood Culture 1 out of 2 gram-negative rods  · Empiric ceftriaxone/metronidazole -elevated procalcitonin worsening leukocytosis noted and switch to Zosyn 2/22  · discussed CT scan findings with IR, general surgery and infectious disease  Overall prognosis is guarded at this time  Discussed with patient that she might need surgical intervention which will be difficult due to her overall clinical condition and EF dropping to 25 to 30%   · Discussed all options with patient and family at bedside and patient decided on comfort care/hospice care at home  Patient pronounced dead at 5:12 PM, family member at the bedside  During the whole day multiple rounds has made on this patient and family updated on regular basis    Moderate protein-calorie malnutrition ([address] )  Assessment & Plan  Malnutrition Findings:   Adult Malnutrition type: Chronic illness  Adult Degree of Malnutrition: Malnutrition of moderate degree  Malnutrition Characteristics: Muscle loss, Fat loss, Inadequate energy                  360 Statement: Chronic moderate malnutrition related to decreased oral intake, difficulties with meal preparation at home as evidenced by < 75% estimated energy intake > 1 mo; moderate muscle loss to temporalis, pectoralis, deltoid muscles; moderate to severe fat loss to orbitals, moderate fat loss to buccal pads  Treated with: Advance diet as medically appropriate to modest 4gm DENY given hx of CHF  Will discuss supplements with pt once diet initiated  Recommend daily weights for nutrition monitoring  Did discuss Mom's Meals with pt for ease of meal preparation at home  BMI Findings: Body mass index is 27 25 kg/m²  Patient pronounced dead at 5:12 PM, family member at the bedside    During the whole day multiple rounds has made on this patient and family updated on regular basis      Coronary artery disease involving native coronary artery of native heart  Assessment & Plan  · NSTEMI September 2022  · Cardiac catheterization with minimal atherosclerotic disease, no PCI  · Held Eliquis and simvastatin  Patient pronounced dead at 5:12 PM, family member at the bedside  During the whole day multiple rounds has made on this patient and family updated on regular basis    Stage 3b chronic kidney disease [facility])  Assessment & Plan  Lab Results   Component Value Date    EGFR 55 02/27/2023    EGFR 58 02/26/2023    EGFR 61 02/25/2023    CREATININE 0 92 02/27/2023    CREATININE 0 88 02/26/2023    CREATININE 0 85 02/25/2023   · History CKD 3  · creatinine worsened to 1 5  Placed on gentle hydration and now back to baseline creatinine  · Renally dose medications, avoid nephrotoxic medication  Patient pronounced dead at 5:12 PM, family member at the bedside  During the whole day multiple rounds has made on this patient and family updated on regular basis    Acute cystitis  Assessment & Plan  · Presented with complaints of abdominal pain and foul-smelling urine  · UA with bacteriuria, pyuria  · Urine did not reflex to culture  · Blood culture 1 out of 2 gram-negative rods  · Already completed course of antibiotics for acute cystitis  Patient pronounced dead at 5:12 PM, family member at the bedside    During the whole day multiple rounds has made on this patient and family updated on regular basis    Acute on chronic combined systolic and diastolic congestive heart failure ([address] )  Assessment & Plan  Wt Readings from Last 3 Encounters:   02/26/23 67 6 kg (149 lb)   06/23/21 55 3 kg (121 lb 14 6 oz)   03/15/19 59 kg (130 lb)     · History chronic congestive heart failure, follows with [facility] cardiology  · Per their notes - Probable Takotsubo cardiomyopathy with recovered EF and at least moderate MR and moderate TR   ·  2D echo EF has dropped to 25 to 30%  · Continue Toprol  · Lisinopril 2 5 mg daily on hold due to hypotension  · Lasix prn basis  Patient pronounced dead at 5:12 PM, family member at the bedside  During the whole day multiple rounds has made on this patient and family updated on regular basis    Pleural effusion, bilateral  Assessment & Plan  · Moderate bilateral pleural effusions did on imaging and also third spacing noted with anasarca mostly of bilateral upper extremities  Decreased oral intake for the last 2 weeks which is probably causing her to have third spacing  · Get 2D echo to evaluate LV function  · Ultrasound of upper extremity shows superficial thrombophlebitis  Already on Eliquis is currently on hold temporarily in case of any surgical intervention or IR procedure  · Rt Sided thoracocentesis done on 2/23 for 900 cc  Continue as needed Lasix  modified tpn for now based on nutrition recommendations  · Stop tpn now on hospice  Patient pronounced dead at 5:12 PM, family member at the bedside  During the whole day multiple rounds has made on this patient and family updated on regular basis      Sepsis  Present on admission  Secondary to Enterocolitis  Was on IV antibiotic        Medical Problems     Resolved Problems  Date Reviewed: 2/27/2023          Resolved    Hypotension 2/14/2023     Resolved by  Alan Ba MD        Discharging Physician / Practitioner: Alan Ba MD  PCP: Maria Victoria Dozier DO  Admission Date:   Admission Orders (From admission, onward)     Ordered        02/10/23 0259  INPATIENT ADMISSION  Once                      Discharge Date: 02/28/23    Consultations During Hospital Stay:  · General surgery  · IR  · Infectious disease  · Cardiology    Procedures Performed:   XR chest portable   Final Result by Aaron Singh MD (02/27 1220)      Bilateral pleural effusions are similar to prior study                    Workstation performed: [id number]         IR IN-Patient Thoracentesis   Final Result by Victor Manuel Kelley MD (02/23 2667) Impression:   1  Successful ultrasound-guided thoracentesis yielding 900 mL of clear yellow pleural fluid  Workstation performed: [id number]         CT abdomen pelvis w contrast   Final Result by Jaime Aguila MD (02/23 1030)   Addendum (preliminary) 1 of 1 by Jaime Aguila MD (02/23 1030)   ADDENDUM:      Most of the thickened loops of bowel described above are thickened    segments of left-sided small bowel  Mild mucosal hyperemia of the    proximal/mid colon  Multiple small new foci of pneumoperitoneum in the anterior mid pelvis    #2/90  As seen on the comparison study there are small dots of gas within and    adjacent to the right hemipelvis collection, likely the sequelae of an    adjacent sigmoid diverticulitis  The study was marked in [facility] for immediate notification  Final      Reidentified 5 3 x 3 1 cm pelvic collection adjacent to the mid sigmoid colon  Interval development of diffuse colonic thickening and mucosal hyperemia indicating a nonspecific colitis, likely infectious or inflammatory  At least moderate-sized bibasilar pleural effusions  Moderate abdominal ascites and advanced body wall edema/anasarca  The study was marked in [facility] for immediate notification  Workstation performed: [id number]         VAS upper limb venous duplex scan, unilateral/limited   Final Result by Anastasiya Samson MD (02/22 2219)      CT abdomen pelvis w contrast   Final Result by Jeniffer Romero MD (02/21 1443)      1  Limited study due to early phase of contrast enhancement  Loculated fluid collection in the right hemipelvis adjacent to the sigmoid colon is grossly stable in size though there are now appears to be a collection of extraluminal gas, not definitely    present previously  Given the location, this constellation of findings may represent perforated diverticulitis    The appendix is not visualized and perforated appendicitis is not excluded  2   Resolution of previous dilated small bowel though there now appears to be wall thickening  This may be reactive and related to peritonitis  3   Bilateral moderate pleural effusions with overlying atelectasis  4   Anasaadriana  I personally discussed this study with Lida Carrillo on 2/21/2023 at 2:22 PM                Workstation performed: [id number]         XR chest portable   Final Result by Carolyn Mendez MD (02/19 0912)      Moderate pulmonary venous congestion with effusions and atelectasis  Workstation performed: [id number]         IR PICC line placement single lumen (preferred for home antibiotics/medications)   Final Result by Brant Isaac MD (02/16 9050)   1  Status post placement of a  4-Swiss 43 cm single-lumen power PICC  central venous catheter via the right basilic vein with its tip at the cavoatrial junction under ultrasound and fluoroscopic guidance  Workstation performed: [id number]         XR abdomen 1 view kub   Final Result by Chana Smith MD (02/16 4478)      Nasogastric tube side port and tip are in the stomach  Workstation performed: [id number]         CT abdomen pelvis w contrast   Final Result by Sally Queen MD (02/15 8770)      Small bowel obstruction, with the point of obstruction likely in the midline to lower right pelvis, where there is an possible fluid collection adjacent to a 1 cm calcification  This could represent acute appendicitis with rupture  Recommend    correlation with physical exam and lab parameters  Bilateral pleural effusions and bibasilar atelectasis  Small amount of free fluid in the peritoneal cavity  Marked bilobar intrahepatic biliary ductal dilatation, possibly on the basis of prior cholecystectomy, but recommend correlation with obstructive LFTs  The study was marked in [facility] for immediate notification  Workstation performed: [id number]         XR chest portable   Final Result by Christie Reyna MD (02/13 1225)   Stable CHF with left greater than right bibasilar pleural effusions  Workstation performed: [id number]         PE Study with CT Abdomen and Pelvis with contrast   Final Result by Ras Mcgill DO (02/10 8697)      Findings most suggestive of severe gastroenterocolitis with associated mesenteric edema and small volume ascites, which is likely reactive  No evidence of peritoneal enhancement at this time to suggest peritonitis, however, clinical follow-up recommended  Tubular fluid-filled structure in the right lower quadrant  Although patient has undergone hysterectomy, correlate for the possibility of remnant right fallopian tube  This could represent hydrosalpinx or pyosalpinx  If symptoms persist, recommend contrast-enhanced scan with oral contrast for better delineation of these structures  Moderate bilateral pleural effusions  No acute pulmonary embolus  The study was marked in [facility] for immediate notification  Workstation performed: [id number]         XR chest portable - 1 view   ED Interpretation by Jesus De Leon MD (02/10 0122)   B/l pleural effusions      Final Result by Heather Rodriguez MD (02/10 1029)      Bilateral small effusions more on the left  CT performed later demonstrates moderate effusions              Workstation performed: [id number]         ·   ·     Significant Findings / Test Results:   Lab Results   Component Value Date    WBC 12 71 (H) 02/27/2023    HGB 12 9 02/27/2023    HCT 40 2 02/27/2023    MCV 89 02/27/2023     02/27/2023   ·   Lab Results   Component Value Date    SODIUM 136 02/27/2023    K 3 5 02/27/2023     02/27/2023    CO2 32 02/27/2023    AGAP 2 (L) 02/27/2023    BUN 30 (H) 02/27/2023    CREATININE 0 92 02/27/2023    GLUC 152 (H) 02/27/2023    CALCIUM 6 8 (L) 02/27/2023    AST 51 (H) 02/21/2023    ALT 19 02/21/2023    ALKPHOS 38 02/21/2023    TP 5 5 (L) 02/21/2023    TBILI 0 37 02/21/2023    EGFR 55 02/27/2023   ·   Collected Updated Procedure Result Status Patient Facility Result Comment    02/23/2023 1322 02/26/2023 0929 Body fluid culture (Pleural Fluid Culture) and Gram stain [[id number]]   Body Fluid from Pleural, Right    Final result [address]  Component Value   Body Fluid Culture, Sterile No growth   Gram Stain Result 2+ Polys    No bacteria seen             02/23/2023 1322 02/23/2023 1818 LD (LDH), Body Fluid [[id number]]   Body Fluid from Other    Final result [address]  Component Value Units   LD, Fluid 58  U/L          02/23/2023 1322 02/23/2023 1818 Total Protein, body fluid [[id number]]   Body Fluid from Other    Final result [address]  Component Value Units   Protein, Fluid <2 0  g/dL          02/23/2023 1114 02/24/2023 1227 Clostridium difficile toxin by PCR with EIA [[id number]]   Stool from Per Rectum    Final result [address]  Component Value   C difficile toxin by PCR Negative           02/12/2023 0546 02/13/2023 1117 Stool Enteric Bacterial Panel by PCR [[id number]]   Stool from Rectum    Final result [address]  Component Value   Salmonella sp PCR None Detected   Shigella sp/Enteroinvasive E  coli (EIEC) PCR None Detected   Campylobacter sp (jejuni and coli) PCR None Detected   Shiga toxin 1/Shiga toxin 2 genes PCR None Detected          02/11/2023 1910 02/13/2023 1047 Clostridium difficile toxin by PCR with EIA [[id number]]   Stool from Rectum    Final result [address]  Component Value   C difficile toxin by PCR Negative           02/10/2023 0052 02/14/2023 0946 Blood culture #1 [[id number]]    (Abnormal)   Blood from Arm, Right    Final result [address]  Component Value   Blood Culture Parabacteroides distasonis Abnormal     This organism has been edited  The previous result was Anaerobic Gram Negative Bacilli on 2/13/2023 at 1022 EST  Gram Stain Result Gram negative rods Abnormal     Refer to Blood Culture Identification Panel results     This is an appended report  These results have been appended to a previously preliminary verified report  Susceptibility    Parabacteroides distasonis (1)    Antibiotic Interpretation Microscan  Method Status    ZID Performed  Yes  NICKY Final    Beta Lactamase  Positive  Not Specified Final    Penicillin Resistant >32 000 ug/ml NICKY Final    Metronidazole ($) Susceptible 0 190 ug/ml NICKY Final          02/10/2023 0052 02/15/2023 0901 Blood culture #2 [[id number]]   Blood from Arm, Right    Final result [address]  Component Value   Blood Culture No Growth After 5 Days  02/10/2023 0052 02/14/2023 0946 Blood Culture Identification Panel [[id number]]   Blood from Arm, Right    Final result [address]  Component Value   ALL TARGETS Not Detected    Unable to identify organism by Film Array Panel  Please follow up with routine culture and susceptibility results  02/10/2023 0050 02/10/2023 0142 FLU/RSV/COVID - if FLU/RSV clinically relevant [[id number]]    Nares from Nose    Final result [facility] [facility] - copy/paste COVID Guidelines URL to browser: [email or web address]   SARS-CoV-2 assay is a Nucleic Acid Amplification assay intended for the   qualitative detection of nucleic acid from SARS-CoV-2 in nasopharyngeal   swabs  Results are for the presumptive identification of SARS-CoV-2 RNA  Positive results are indicative of infection with SARS-CoV-2, the virus   causing COVID-19, but do not rule out bacterial infection or co-infection   with other viruses   Laboratories within the [place] and its territories are required to report all positive results to the appropriate   public health authorities  Negative results do not preclude SARS-CoV-2   infection and should not be used as the sole basis for treatment or other   patient management decisions  Negative results must be combined with   clinical observations, patient history, and epidemiological information  This test has not been FDA cleared or approved  This test has been authorized by FDA under an Emergency Use Authorization   (EUA)  This test is only authorized for the duration of time the   declaration that circumstances exist justifying the authorization of the   emergency use of an in vitro diagnostic tests for detection of SARS-CoV-2   virus and/or diagnosis of COVID-19 infection under section 564(b)(1) of   the Act, 21 U  S C  997JEF-6(Y)(3), unless the authorization is terminated   or revoked sooner  The test has been validated but independent review by FDA   and CLIA is pending  Test performed using [employer] GeneXpert: This RT-PCR assay targets N2,   a region unique to SARS-CoV-2  A conserved region in the E-gene was chosen   for pan-Sarbecovirus detection which includes SARS-CoV-2  According to CMS-2020-01-R, this platform meets the definition of high-Medx[employer] technology  Component Value   SARS-CoV-2 Negative   INFLUENZA A PCR Negative   INFLUENZA B PCR Negative   RSV PCR Negative        Incidental Findings:   · As mentioned above  · I reviewed the above mentioned incidental findings with the patient and/or family and they expressed understanding  Test Results Pending at Discharge (will require follow up):   · none     Outpatient Tests Requested:  · none    Complications: Small bowel obstruction, perforation    Reason for Admission: Abdominal pain    Hospital Course:   Kenneth Goodwin is a 80 y o  female patient who originally presented to the hospital on 2/10/2023 due to abdominal pain    Patient initially admitted in the diagnosis of enterocolitis, atrial fibrillation with RVR  Coronary artery disease involving native coronary artery of native heart, stage IIIb chronic kidney disease acute cystitis  Patient placed on IV antibiotic  C  difficile negative  But patient 1 set of blood culture came back positive  ID consulted  IR consulted, general surgery consulted  Later on patient developed RVR with A-fib, cardiology consulted and patient received multiple medication including anticoagulation  Then patient condition deteriorated, and patient started developing small bowel obstruction, as per general surgery recommendation, consult pain management was achieved since patient does not want to proceed with any invasive or surgical procedure  With hospital course, patient condition deteriorated, patient developed diverticulitis of small intestine with perforation and abscess without bleeding  Initially this provider had discussion with patient and patient daughter in presence, patient which was DNR/DNI only if medical management fails  Patient does not want to proceed with any surgical intervention  Patient condition anxiety deteriorated, patient placed on comfort care  Patient pronounced dead on 2/28/23 at 5:12 PM   Patient family member at the bedside  Please see above list of diagnoses and related plan for additional information  Condition at Discharge: Patient pronounced dead    Discharge Day Visit / Exam:   Subjective:    Vitals: Blood Pressure: Undetectable  Pulse: Absent  Respirations: Absent    Exam:   Physical Exam   Unresponsive to noxious stimuli, Spontaneous respirations absent, Breath sounds absent, Carotid pulse absent, Heart sounds absent, Pupillary light reflex absent and Corneal blink reflex absent    Discussion with Family: Updated  (daughter) at bedside      Discharge instructions/Information to patient and family:   See after visit summary for information provided to patient and family  Provisions for Follow-Up Care:  See after visit summary for information related to follow-up care and any pertinent home health orders  Disposition:   Other: Patient pronounced dead     Planned Readmission: none     Discharge Statement:   Greater than 50% of the total time was spent examining patient, answering all patient questions, arranging and discussing plan of care with patient as well as directly providing post-discharge instructions  Additional time then spent on discharge activities  Discharge Medications:  See after visit summary for reconciled discharge medications provided to patient and/or family        **Please Note: This note may have been constructed using a voice recognition system**

## 2023-02-28 NOTE — ASSESSMENT & PLAN NOTE
Malnutrition Findings:   Adult Malnutrition type: Chronic illness  Adult Degree of Malnutrition: Malnutrition of moderate degree  Malnutrition Characteristics: Muscle loss, Fat loss, Inadequate energy                  360 Statement: Chronic moderate malnutrition related to decreased oral intake, difficulties with meal preparation at home as evidenced by < 75% estimated energy intake > 1 mo; moderate muscle loss to temporalis, pectoralis, deltoid muscles; moderate to severe fat loss to orbitals, moderate fat loss to buccal pads  Treated with: Advance diet as medically appropriate to modest 4gm DENY given hx of CHF  Will discuss supplements with pt once diet initiated  Recommend daily weights for nutrition monitoring  Did discuss Mom's Meals with pt for ease of meal preparation at home  BMI Findings: Body mass index is 27 25 kg/m²  Patient pronounced dead at 5:12 PM, family member at the bedside    During the whole day multiple rounds has made on this patient and family updated on regular basis

## 2023-02-28 NOTE — PLAN OF CARE
Problem: Nutrition/Hydration-ADULT  Goal: Nutrient/Hydration intake appropriate for improving, restoring or maintaining nutritional needs  Description: Monitor and assess patient's nutrition/hydration status for malnutrition  Collaborate with interdisciplinary team and initiate plan and interventions as ordered  Monitor patient's weight and dietary intake as ordered or per policy  Utilize nutrition screening tool and intervene as necessary  Determine patient's food preferences and provide high-protein, high-caloric foods as appropriate       INTERVENTIONS:  - Monitor oral intake, urinary output, labs, and treatment plans  - Assess nutrition and hydration status and recommend course of action  - Evaluate amount of meals eaten  - Assist patient with eating if necessary   - Allow adequate time for meals  - Recommend/ encourage appropriate diets, oral nutritional supplements, and vitamin/mineral supplements  - Order, calculate, and assess calorie counts as needed  - Recommend, monitor, and adjust tube feedings and TPN/PPN based on assessed needs  - Assess need for intravenous fluids  - Provide specific nutrition/hydration education as appropriate  - Include patient/family/caregiver in decisions related to nutrition  Outcome: Progressing     Problem: PAIN - ADULT  Goal: Verbalizes/displays adequate comfort level or baseline comfort level  Description: Interventions:  - Encourage patient to monitor pain and request assistance  - Assess pain using appropriate pain scale0-10  - Administer analgesics based on type and severity of pain and evaluate response  - Implement non-pharmacological measures as appropriate and evaluate response  - Consider cultural and social influences on pain and pain management  - Notify physician/advanced practitioner if interventions unsuccessful or patient reports new pain  Outcome: Progressing     Problem: INFECTION - ADULT  Goal: Absence or prevention of progression during hospitalization  Description: INTERVENTIONS:  - Assess and monitor for signs and symptoms of infection  - Monitor lab/diagnostic results  - Monitor all insertion sites, i e  indwelling lines, tubes, and drains  - Monitor endotracheal if appropriate and nasal secretions for changes in amount and color  - [place] appropriate cooling/warming therapies per order  - Administer medications as ordered  - Instruct and encourage patient and family to use good hand hygiene technique  - Identify and instruct in appropriate isolation precautions for identified infection/condition  Outcome: Progressing     Problem: SAFETY ADULT  Goal: Patient will remain free of falls  Description: INTERVENTIONS:  - Educate patient/family on patient safety including physical limitations  - Instruct patient to call for assistance with activity   - Consult OT/PT to assist with strengthening/mobility   - Keep Call bell within reach  - Keep bed low and locked with side rails adjusted as appropriate  - Keep care items and personal belongings within reach  - Initiate and maintain comfort rounds  - Make Fall Risk Sign visible to staff  - Apply yellow socks and bracelet for high fall risk patients  - Consider moving patient to room near nurses station  Outcome: Progressing  Goal: Maintain or return to baseline ADL function  Description: INTERVENTIONS:  -  Assess patient's ability to carry out ADLs; assess patient's baseline for ADL function and identify physical deficits which impact ability to perform ADLs (bathing, care of mouth/teeth, toileting, grooming, dressing, etc )  - Assess/evaluate cause of self-care deficits   - Assess range of motion  - Assess patient's mobility; develop plan if impaired  - Assess patient's need for assistive devices and provide as appropriate  - Encourage maximum independence but intervene and supervise when necessary  - Involve family in performance of ADLs  - Assess for home care needs following discharge   - Consider OT consult to assist with ADL evaluation and planning for discharge  - Provide patient education as appropriate  Outcome: Progressing  Goal: Maintains/Returns to pre admission functional level  Description: INTERVENTIONS:  - Perform BMAT or MOVE assessment daily    - Set and communicate daily mobility goal to care team and patient/family/caregiver  - Collaborate with rehabilitation services on mobility goals if consulted  - Perform Range of Motion 3 times a day    - Reposition patient every 2 hours if pt unable to reposition self  - Out of bed for toileting  - Record patient progress and toleration of activity level   Outcome: Progressing     Problem: INFECTION - ADULT  Goal: Absence or prevention of progression during hospitalization  Description: INTERVENTIONS:  - Assess and monitor for signs and symptoms of infection  - Monitor lab/diagnostic results  - Monitor all insertion sites, i e  indwelling lines, tubes, and drains  - Monitor endotracheal if appropriate and nasal secretions for changes in amount and color  - [place] appropriate cooling/warming therapies per order  - Administer medications as ordered  - Instruct and encourage patient and family to use good hand hygiene technique  - Identify and instruct in appropriate isolation precautions for identified infection/condition  Outcome: Progressing     Problem: DISCHARGE PLANNING  Goal: Discharge to home or other facility with appropriate resources  Description: INTERVENTIONS:  - Identify barriers to discharge w/patient and caregiver  - Arrange for needed discharge resources and transportation as appropriate  - Identify discharge learning needs (meds, wound care, etc )  - Arrange for interpretive services to assist at discharge as needed  - Refer to Case Management Department for coordinating discharge planning if the patient needs post-hospital services based on physician/advanced practitioner order or complex needs related to functional status, cognitive ability, or social support system  Outcome: Progressing     Problem: Knowledge Deficit  Goal: Patient/family/caregiver demonstrates understanding of disease process, treatment plan, medications, and discharge instructions  Description: Complete learning assessment and assess knowledge base    Interventions:  - Provide teaching at level of understanding  - Provide teaching via preferred learning methods  Outcome: Progressing     Problem: CARDIOVASCULAR - ADULT  Goal: Maintains optimal cardiac output and hemodynamic stability  Description: INTERVENTIONS:  - Monitor I/O, vital signs and rhythm  - Monitor for S/S and trends of decreased cardiac output  - Administer and titrate ordered vasoactive medications to optimize hemodynamic stability  - Assess quality of pulses, skin color and temperature  - Assess for signs of decreased coronary artery perfusion  - Instruct patient to report change in severity of symptoms  Outcome: Progressing  Goal: Absence of cardiac dysrhythmias or at baseline rhythm  Description: INTERVENTIONS:  - Continuous cardiac monitoring, vital signs, obtain 12 lead EKG if ordered  - Administer antiarrhythmic and heart rate control medications as ordered  - Monitor electrolytes and administer replacement therapy as ordered  Outcome: Progressing

## 2023-02-28 NOTE — PLAN OF CARE
Problem: Nutrition/Hydration-ADULT  Goal: Nutrient/Hydration intake appropriate for improving, restoring or maintaining nutritional needs  Description: Monitor and assess patient's nutrition/hydration status for malnutrition  Collaborate with interdisciplinary team and initiate plan and interventions as ordered  Monitor patient's weight and dietary intake as ordered or per policy  Utilize nutrition screening tool and intervene as necessary  Determine patient's food preferences and provide high-protein, high-caloric foods as appropriate       INTERVENTIONS:  - Monitor oral intake, urinary output, labs, and treatment plans  - Assess nutrition and hydration status and recommend course of action  - Evaluate amount of meals eaten  - Assist patient with eating if necessary   - Allow adequate time for meals  - Recommend/ encourage appropriate diets, oral nutritional supplements, and vitamin/mineral supplements  - Order, calculate, and assess calorie counts as needed  - Recommend, monitor, and adjust tube feedings and TPN/PPN based on assessed needs  - Assess need for intravenous fluids  - Provide specific nutrition/hydration education as appropriate  - Include patient/family/caregiver in decisions related to nutrition  2/28/2023 1758 by Jez Karimi RN  Outcome: Adequate for Discharge  2/28/2023 0928 by Jez Karimi RN  Outcome: Progressing     Problem: PAIN - ADULT  Goal: Verbalizes/displays adequate comfort level or baseline comfort level  Description: Interventions:  - Encourage patient to monitor pain and request assistance  - Assess pain using appropriate pain scale0-10  - Administer analgesics based on type and severity of pain and evaluate response  - Implement non-pharmacological measures as appropriate and evaluate response  - Consider cultural and social influences on pain and pain management  - Notify physician/advanced practitioner if interventions unsuccessful or patient reports new pain  2/28/2023 1758 by Isa Daily RN  Outcome: Adequate for Discharge  2/28/2023 2955 by Isa Daily RN  Outcome: Progressing     Problem: INFECTION - ADULT  Goal: Absence or prevention of progression during hospitalization  Description: INTERVENTIONS:  - Assess and monitor for signs and symptoms of infection  - Monitor lab/diagnostic results  - Monitor all insertion sites, i e  indwelling lines, tubes, and drains  - Monitor endotracheal if appropriate and nasal secretions for changes in amount and color  - [place] appropriate cooling/warming therapies per order  - Administer medications as ordered  - Instruct and encourage patient and family to use good hand hygiene technique  - Identify and instruct in appropriate isolation precautions for identified infection/condition  2/28/2023 1758 by Isa Daily RN  Outcome: Adequate for Discharge  2/28/2023 0928 by Isa Daily RN  Outcome: Progressing     Problem: SAFETY ADULT  Goal: Patient will remain free of falls  Description: INTERVENTIONS:  - Educate patient/family on patient safety including physical limitations  - Instruct patient to call for assistance with activity   - Consult OT/PT to assist with strengthening/mobility   - Keep Call bell within reach  - Keep bed low and locked with side rails adjusted as appropriate  - Keep care items and personal belongings within reach  - Initiate and maintain comfort rounds  - Make Fall Risk Sign visible to staff  - Apply yellow socks and bracelet for high fall risk patients  - Consider moving patient to room near nurses station  2/28/2023 1758 by Isa Daily RN  Outcome: Adequate for Discharge  2/28/2023 5847 by Isa Daily RN  Outcome: Progressing  Goal: Maintain or return to baseline ADL function  Description: INTERVENTIONS:  -  Assess patient's ability to carry out ADLs; assess patient's baseline for ADL function and identify physical deficits which impact ability to perform ADLs (bathing, care of mouth/teeth, toileting, grooming, dressing, etc )  - Assess/evaluate cause of self-care deficits   - Assess range of motion  - Assess patient's mobility; develop plan if impaired  - Assess patient's need for assistive devices and provide as appropriate  - Encourage maximum independence but intervene and supervise when necessary  - Involve family in performance of ADLs  - Assess for home care needs following discharge   - Consider OT consult to assist with ADL evaluation and planning for discharge  - Provide patient education as appropriate  2/28/2023 1758 by Skyler Woo RN  Outcome: Adequate for Discharge  2/28/2023 8947 by Skyler Woo RN  Outcome: Progressing  Goal: Maintains/Returns to pre admission functional level  Description: INTERVENTIONS:  - Perform BMAT or MOVE assessment daily    - Set and communicate daily mobility goal to care team and patient/family/caregiver  - Collaborate with rehabilitation services on mobility goals if consulted  - Perform Range of Motion 3 times a day    - Reposition patient every 2 hours if pt unable to reposition self  - Out of bed for toileting  - Record patient progress and toleration of activity level   2/28/2023 1758 by Skyler Woo RN  Outcome: Adequate for Discharge  2/28/2023 6024 by Skyler Woo RN  Outcome: Progressing     Problem: DISCHARGE PLANNING  Goal: Discharge to home or other facility with appropriate resources  Description: INTERVENTIONS:  - Identify barriers to discharge w/patient and caregiver  - Arrange for needed discharge resources and transportation as appropriate  - Identify discharge learning needs (meds, wound care, etc )  - Arrange for interpretive services to assist at discharge as needed  - Refer to Case Management Department for coordinating discharge planning if the patient needs post-hospital services based on physician/advanced practitioner order or complex needs related to functional status, cognitive ability, or social support system  2/28/2023 1758 by Skyler Woo RN  Outcome: Adequate for Discharge  2/28/2023 1428 by Skyler Woo RN  Outcome: Progressing     Problem: Knowledge Deficit  Goal: Patient/family/caregiver demonstrates understanding of disease process, treatment plan, medications, and discharge instructions  Description: Complete learning assessment and assess knowledge base    Interventions:  - Provide teaching at level of understanding  - Provide teaching via preferred learning methods  2/28/2023 1758 by Skyler Woo RN  Outcome: Adequate for Discharge  2/28/2023 7826 by Skyler Woo RN  Outcome: Progressing     Problem: CARDIOVASCULAR - ADULT  Goal: Maintains optimal cardiac output and hemodynamic stability  Description: INTERVENTIONS:  - Monitor I/O, vital signs and rhythm  - Monitor for S/S and trends of decreased cardiac output  - Administer and titrate ordered vasoactive medications to optimize hemodynamic stability  - Assess quality of pulses, skin color and temperature  - Assess for signs of decreased coronary artery perfusion  - Instruct patient to report change in severity of symptoms  2/28/2023 1758 by Skyler Woo RN  Outcome: Adequate for Discharge  2/28/2023 8967 by Skyler Woo RN  Outcome: Progressing  Goal: Absence of cardiac dysrhythmias or at baseline rhythm  Description: INTERVENTIONS:  - Continuous cardiac monitoring, vital signs, obtain 12 lead EKG if ordered  - Administer antiarrhythmic and heart rate control medications as ordered  - Monitor electrolytes and administer replacement therapy as ordered  2/28/2023 1758 by Skyler Woo RN  Outcome: Adequate for Discharge  2/28/2023 0928 by Skyler Woo RN  Outcome: Progressing     Problem: GASTROINTESTINAL - ADULT  Goal: Minimal or absence of nausea and/or vomiting  Description: INTERVENTIONS:  - Administer IV fluids if ordered to ensure adequate hydration  - Maintain NPO status until nausea and vomiting are resolved  - Nasogastric tube if ordered  - Administer ordered antiemetic medications as needed  - Provide nonpharmacologic comfort measures as appropriate  - Advance diet as tolerated, if ordered  - Consider nutrition services referral to assist patient with adequate nutrition and appropriate food choices  2/28/2023 1758 by Nahed Tam RN  Outcome: Adequate for Discharge  2/28/2023 0056 by Nahed Tam RN  Outcome: Progressing  Goal: Maintains or returns to baseline bowel function  Description: INTERVENTIONS:  - Assess bowel function  - Encourage oral fluids to ensure adequate hydration  - Administer IV fluids if ordered to ensure adequate hydration  - Administer ordered medications as needed  - Encourage mobilization and activity  - Consider nutritional services referral to assist patient with adequate nutrition and appropriate food choices  2/28/2023 1758 by Nahed Tam RN  Outcome: Adequate for Discharge  2/28/2023 5795 by Nahed Tam RN  Outcome: Progressing  Goal: Maintains adequate nutritional intake  Description: INTERVENTIONS:  - Monitor percentage of each meal consumed  - Identify factors contributing to decreased intake, treat as appropriate  - Assist with meals as needed  - Monitor I&O, weight, and lab values if indicated  - Obtain nutrition services referral as needed  2/28/2023 1758 by Nahed Tam RN  Outcome: Adequate for Discharge  2/28/2023 0441 by Nahed Tam RN  Outcome: Progressing     Problem: MOBILITY - ADULT  Goal: Maintain or return to baseline ADL function  Description: INTERVENTIONS:  -  Assess patient's ability to carry out ADLs; assess patient's baseline for ADL function and identify physical deficits which impact ability to perform ADLs (bathing, care of mouth/teeth, toileting, grooming, dressing, etc )  - Assess/evaluate cause of self-care deficits   - Assess range of motion  - Assess patient's mobility; develop plan if impaired  - Assess patient's need for assistive devices and provide as appropriate  - Encourage maximum independence but intervene and supervise when necessary  - Involve family in performance of ADLs  - Assess for home care needs following discharge   - Consider OT consult to assist with ADL evaluation and planning for discharge  - Provide patient education as appropriate  2/28/2023 1758 by Jaimee Asher RN  Outcome: Adequate for Discharge  2/28/2023 9332 by Jaimee Asher RN  Outcome: Progressing  Goal: Maintains/Returns to pre admission functional level  Description: INTERVENTIONS:  - Perform BMAT or MOVE assessment daily    - Set and communicate daily mobility goal to care team and patient/family/caregiver  - Collaborate with rehabilitation services on mobility goals if consulted  - Perform Range of Motion 3 times a day  - Reposition patient every 2 hours if pt unable to reposition self  - Out of bed for toileting  - Record patient progress and toleration of activity level   2/28/2023 1758 by Jaimee Asher RN  Outcome: Adequate for Discharge  2/28/2023 0928 by Jaimee Asher RN  Outcome: Progressing     Problem: OCCUPATIONAL THERAPY ADULT  Goal: Performs self-care activities at highest level of function for planned discharge setting  See evaluation for individualized goals  Description: Treatment Interventions: ADL retraining, Functional transfer training, UE strengthening/ROM, Endurance training, Patient/family training, Equipment evaluation/education, Neuromuscular reeducation, Compensatory technique education, Continued evaluation, Energy conservation, Activityengagement          See flowsheet documentation for full assessment, interventions and recommendations  Outcome: Adequate for Discharge     Problem: PHYSICAL THERAPY ADULT  Goal: Performs mobility at highest level of function for planned discharge setting  See evaluation for individualized goals    Description: Treatment/Interventions: ADL retraining, Functional transfer training, LE strengthening/ROM, Elevations, Therapeutic exercise, Endurance training, Patient/family training, Bed mobility, Equipment eval/education, Gait training, Compensatory technique education, Spoke to nursing, Spoke to case management, OT  Equipment Recommended:  (RW-pt has)       See flowsheet documentation for full assessment, interventions and recommendations  Outcome: Adequate for Discharge     Problem: Prexisting or High Potential for Compromised Skin Integrity  Goal: Skin integrity is maintained or improved  Description: INTERVENTIONS:  - Identify patients at risk for    skin breakdown  - Assess and monitor skin integrity  - Assess and monitor nutrition and hydration status  - Monitor labs   - Assess for incontinence   - Turn and reposition patient  - Assist with mobility/ambulation  - Relieve pressure over bony prominences  - Avoid friction and shearing  - Provide appropriate hygiene as needed including keeping skin clean and dry  - Evaluate need for skin moisturizer/barrier cream  - Collaborate with interdisciplinary team   - Patient/family teaching  - Consider wound care consult   2/28/2023 8523 by Andra Salinas RN  Outcome: Adequate for Discharge  2/28/2023 2562 by Andra Salinas RN  Outcome: Progressing

## 2023-02-28 NOTE — ASSESSMENT & PLAN NOTE
Wt Readings from Last 3 Encounters:   02/26/23 67 6 kg (149 lb)   06/23/21 55 3 kg (121 lb 14 6 oz)   03/15/19 59 kg (130 lb)     · History chronic congestive heart failure, follows with [facility] cardiology  · Per their notes - Probable Takotsubo cardiomyopathy with recovered EF and at least moderate MR and moderate TR   ·  2D echo EF has dropped to 25 to 30%  · Continue Toprol  · Lisinopril 2 5 mg daily on hold due to hypotension  · Lasix prn basis  Patient pronounced dead at 5:12 PM, family member at the bedside    During the whole day multiple rounds has made on this patient and family updated on regular basis

## 2023-02-28 NOTE — ASSESSMENT & PLAN NOTE
concern for ischemia of the small bowel with perforation and fluid collection  CT abdomen pelvis shows Most of the thickened loops of bowel described above are thickened segments of left-sided small bowel  Mild mucosal hyperemia of the proximal/mid colon  Unchanged right hemipelvis collection measuring 5 3 x 3 1 cm #2/131 adjacent to the mid sigmoid colon      · Stool Culture including C  difficile and enteric panel negative today however diarrhea has worsened   retest for C  Difficile negative and placed on oral vancomycin prophylactically for now  · Blood Culture 1 out of 2 gram-negative rods  · Empiric ceftriaxone/metronidazole -elevated procalcitonin worsening leukocytosis noted and switch to Zosyn 2/22  · discussed CT scan findings with IR, general surgery and infectious disease  Overall prognosis is guarded at this time  Discussed with patient that she might need surgical intervention which will be difficult due to her overall clinical condition and EF dropping to 25 to 30%   · Discussed all options with patient and family at bedside and patient decided on comfort care/hospice care at home  Patient pronounced dead at 5:12 PM, family member at the bedside    During the whole day multiple rounds has made on this patient and family updated on regular basis

## 2023-02-28 NOTE — DEATH NOTE
INPATIENT DEATH NOTE  Abel Coates 80 y o  female MRN: [id number]  Unit/Bed#: -01 Encounter: [id number]             PHYSICAL EXAM:  Unresponsive to noxious stimuli, Spontaneous respirations absent, Breath sounds absent, Carotid pulse absent, Heart sounds absent, Pupillary light reflex absent and Corneal blink reflex absent    Medical Examiner notification criteria:  NONE APPLICABLE   Medical Examiner's office notified?:  No, does not meet ME notification criteria   Medical Examiner accepted case?:  No  Name of Medical Examiner: Not applicable         Autopsy Options:  Post-mortem examination declined by next of kin    Primary Service Attending Physician notified?:  yes - Attending:  Farhana Gage MD    Physician/Resident responsible for completing Discharge Summary:  Kari Vazquez MD

## 2023-02-28 NOTE — ASSESSMENT & PLAN NOTE
Since patient was throwing up,  CT abd 2/15  which showed a small bowel obstruction which has now resolved with conservative management  cT scan done on 2/21 shows resolution of small bowel obstruction  Status post NG tube   Now removed  Patient pronounced dead at 5:12 PM, family member at the bedside    During the whole day multiple rounds has made on this patient and family updated on regular basis

## 2023-03-02 PROBLEM — A41.9 SEPSIS (HCC): Status: ACTIVE | Noted: 2023-03-02

## 2024-08-26 NOTE — ASSESSMENT & PLAN NOTE
No response from pt.  Results letter printed and mailed to pt.    concern for ischemia of the small bowel with perforation and fluid collection  CT abdomen pelvis shows Most of the thickened loops of bowel described above are thickened segments of left-sided small bowel  Mild mucosal hyperemia of the proximal/mid colon  Unchanged right hemipelvis collection measuring 5 3 x 3 1 cm #2/131 adjacent to the mid sigmoid colon      · Stool Culture including C  difficile and enteric panel negative today however diarrhea has worsened   retest for C  Difficile negative and placed on oral vancomycin prophylactically for now  · Blood Culture 1 out of 2 gram-negative rods  · Empiric ceftriaxone/metronidazole -elevated procalcitonin worsening leukocytosis noted and switch to Zosyn 2/22  · discussed CT scan findings with IR, general surgery and infectious disease  Overall prognosis is guarded at this time  Discussed with patient that she might need surgical intervention which will be difficult due to her overall clinical condition and EF dropping to 25 to 30% or consider comfort care/hospice care  · He is looking and feeling a little bit better today and white count improved a little  Keep on TPN and plan few sips of clear liquids today  Surgery to follow along in case patient has any worsening to determine surgical intervention versus hospice care    Continue to hold Eliquis for another 24 hours
